# Patient Record
Sex: MALE | Race: WHITE | NOT HISPANIC OR LATINO | Employment: OTHER | URBAN - METROPOLITAN AREA
[De-identification: names, ages, dates, MRNs, and addresses within clinical notes are randomized per-mention and may not be internally consistent; named-entity substitution may affect disease eponyms.]

---

## 2017-02-20 ENCOUNTER — ALLSCRIPTS OFFICE VISIT (OUTPATIENT)
Dept: OTHER | Facility: OTHER | Age: 82
End: 2017-02-20

## 2017-02-23 ENCOUNTER — ALLSCRIPTS OFFICE VISIT (OUTPATIENT)
Dept: OTHER | Facility: OTHER | Age: 82
End: 2017-02-23

## 2017-02-23 DIAGNOSIS — K40.20 BILATERAL INGUINAL HERNIA WITHOUT OBSTRUCTION OR GANGRENE: ICD-10-CM

## 2017-04-06 RX ORDER — AMLODIPINE BESYLATE 5 MG/1
5 TABLET ORAL DAILY
COMMUNITY
End: 2018-02-20

## 2017-04-06 RX ORDER — VALSARTAN 80 MG/1
80 TABLET ORAL DAILY
COMMUNITY
End: 2018-02-22

## 2017-04-06 RX ORDER — ATORVASTATIN CALCIUM 80 MG/1
80 TABLET, FILM COATED ORAL DAILY
COMMUNITY
End: 2018-02-20

## 2017-04-06 RX ORDER — NITROGLYCERIN 0.4 MG/1
0.4 TABLET SUBLINGUAL
COMMUNITY
End: 2020-09-01 | Stop reason: SDUPTHER

## 2017-04-06 RX ORDER — METOPROLOL SUCCINATE 25 MG/1
25 TABLET, EXTENDED RELEASE ORAL DAILY
COMMUNITY
End: 2018-09-08 | Stop reason: SDUPTHER

## 2017-04-10 ENCOUNTER — ANESTHESIA EVENT (OUTPATIENT)
Dept: PERIOP | Facility: HOSPITAL | Age: 82
End: 2017-04-10
Payer: MEDICARE

## 2017-04-10 RX ORDER — OMEGA-3-ACID ETHYL ESTERS 1 G/1
2 CAPSULE, LIQUID FILLED ORAL DAILY
COMMUNITY

## 2017-04-10 RX ORDER — FLUTICASONE PROPIONATE 50 MCG
2 SPRAY, SUSPENSION (ML) NASAL DAILY
COMMUNITY
End: 2019-06-26 | Stop reason: ALTCHOICE

## 2017-04-10 RX ORDER — CLOPIDOGREL BISULFATE 75 MG/1
75 TABLET ORAL DAILY
COMMUNITY
End: 2018-10-09 | Stop reason: SDUPTHER

## 2017-04-10 RX ORDER — VITAMIN B COMPLEX
1 CAPSULE ORAL DAILY
COMMUNITY
End: 2018-02-20

## 2017-04-11 ENCOUNTER — ANESTHESIA (OUTPATIENT)
Dept: PERIOP | Facility: HOSPITAL | Age: 82
End: 2017-04-11
Payer: MEDICARE

## 2017-04-11 ENCOUNTER — HOSPITAL ENCOUNTER (OUTPATIENT)
Facility: HOSPITAL | Age: 82
Setting detail: OUTPATIENT SURGERY
Discharge: HOME | End: 2017-04-11
Attending: SURGERY | Admitting: SURGERY
Payer: MEDICARE

## 2017-04-11 VITALS
DIASTOLIC BLOOD PRESSURE: 63 MMHG | RESPIRATION RATE: 28 BRPM | HEART RATE: 70 BPM | BODY MASS INDEX: 23.88 KG/M2 | TEMPERATURE: 97.1 F | HEIGHT: 66 IN | SYSTOLIC BLOOD PRESSURE: 137 MMHG | OXYGEN SATURATION: 99 % | WEIGHT: 148.59 LBS

## 2017-04-11 PROBLEM — K40.20 NON-RECURRENT BILATERAL INGUINAL HERNIA WITHOUT OBSTRUCTION OR GANGRENE: Status: ACTIVE | Noted: 2017-04-11

## 2017-04-11 PROCEDURE — C1781 MESH (IMPLANTABLE): HCPCS | Performed by: SURGERY

## 2017-04-11 DEVICE — BARD SOFT MESH
Type: IMPLANTABLE DEVICE | Site: INGUINAL | Status: FUNCTIONAL
Brand: BARD SOFT MESH

## 2017-04-11 RX ORDER — SODIUM CHLORIDE, SODIUM LACTATE, POTASSIUM CHLORIDE, CALCIUM CHLORIDE 600; 310; 30; 20 MG/100ML; MG/100ML; MG/100ML; MG/100ML
100 INJECTION, SOLUTION INTRAVENOUS
Status: COMPLETED | OUTPATIENT
Start: 2017-04-11 | End: 2017-04-11

## 2017-04-11 RX ORDER — ROCURONIUM BROMIDE 10 MG/ML
INJECTION, SOLUTION INTRAVENOUS AS NEEDED
Status: DISCONTINUED | OUTPATIENT
Start: 2017-04-11 | End: 2017-04-11 | Stop reason: SURG

## 2017-04-11 RX ORDER — ONDANSETRON 2 MG/ML
INJECTION INTRAMUSCULAR; INTRAVENOUS AS NEEDED
Status: DISCONTINUED | OUTPATIENT
Start: 2017-04-11 | End: 2017-04-11 | Stop reason: SURG

## 2017-04-11 RX ORDER — LIDOCAINE HYDROCHLORIDE 10 MG/ML
INJECTION, SOLUTION INFILTRATION; PERINEURAL AS NEEDED
Status: DISCONTINUED | OUTPATIENT
Start: 2017-04-11 | End: 2017-04-11 | Stop reason: SURG

## 2017-04-11 RX ORDER — PROPOFOL 10 MG/ML
INJECTION, EMULSION INTRAVENOUS AS NEEDED
Status: DISCONTINUED | OUTPATIENT
Start: 2017-04-11 | End: 2017-04-11 | Stop reason: SURG

## 2017-04-11 RX ORDER — FENTANYL CITRATE 50 UG/ML
INJECTION, SOLUTION INTRAMUSCULAR; INTRAVENOUS AS NEEDED
Status: DISCONTINUED | OUTPATIENT
Start: 2017-04-11 | End: 2017-04-11 | Stop reason: SURG

## 2017-04-11 RX ORDER — ACETAMINOPHEN 325 MG/1
650 TABLET ORAL EVERY 6 HOURS PRN
Status: DISCONTINUED | OUTPATIENT
Start: 2017-04-11 | End: 2017-04-11 | Stop reason: HOSPADM

## 2017-04-11 RX ORDER — ONDANSETRON 2 MG/ML
4 INJECTION INTRAMUSCULAR; INTRAVENOUS ONCE
Status: DISCONTINUED | OUTPATIENT
Start: 2017-04-11 | End: 2017-04-11 | Stop reason: HOSPADM

## 2017-04-11 RX ORDER — MAGNESIUM HYDROXIDE 1200 MG/15ML
LIQUID ORAL AS NEEDED
Status: DISCONTINUED | OUTPATIENT
Start: 2017-04-11 | End: 2017-04-11 | Stop reason: HOSPADM

## 2017-04-11 RX ORDER — MORPHINE SULFATE 2 MG/ML
2 INJECTION, SOLUTION INTRAMUSCULAR; INTRAVENOUS
Status: DISCONTINUED | OUTPATIENT
Start: 2017-04-11 | End: 2017-04-11 | Stop reason: HOSPADM

## 2017-04-11 RX ORDER — ACETAMINOPHEN AND CODEINE PHOSPHATE 300; 30 MG/1; MG/1
1 TABLET ORAL EVERY 4 HOURS PRN
Qty: 20 TABLET | Refills: 0 | Status: SHIPPED | OUTPATIENT
Start: 2017-04-11 | End: 2019-10-09

## 2017-04-11 RX ORDER — BUPIVACAINE HYDROCHLORIDE 2.5 MG/ML
INJECTION, SOLUTION EPIDURAL; INFILTRATION; INTRACAUDAL AS NEEDED
Status: DISCONTINUED | OUTPATIENT
Start: 2017-04-11 | End: 2017-04-11 | Stop reason: HOSPADM

## 2017-04-11 RX ORDER — SODIUM CHLORIDE, SODIUM LACTATE, POTASSIUM CHLORIDE, CALCIUM CHLORIDE 600; 310; 30; 20 MG/100ML; MG/100ML; MG/100ML; MG/100ML
INJECTION, SOLUTION INTRAVENOUS CONTINUOUS PRN
Status: DISCONTINUED | OUTPATIENT
Start: 2017-04-11 | End: 2017-04-11 | Stop reason: SURG

## 2017-04-11 RX ORDER — GLYCOPYRROLATE 0.2 MG/ML
INJECTION INTRAMUSCULAR; INTRAVENOUS AS NEEDED
Status: DISCONTINUED | OUTPATIENT
Start: 2017-04-11 | End: 2017-04-11 | Stop reason: SURG

## 2017-04-11 RX ORDER — FENTANYL CITRATE/PF 50 MCG/ML
25 SYRINGE (ML) INJECTION
Status: COMPLETED | OUTPATIENT
Start: 2017-04-11 | End: 2017-04-11

## 2017-04-11 RX ORDER — ESMOLOL HYDROCHLORIDE 10 MG/ML
INJECTION INTRAVENOUS AS NEEDED
Status: DISCONTINUED | OUTPATIENT
Start: 2017-04-11 | End: 2017-04-11 | Stop reason: SURG

## 2017-04-11 RX ADMIN — SODIUM CHLORIDE, SODIUM LACTATE, POTASSIUM CHLORIDE, AND CALCIUM CHLORIDE: .6; .31; .03; .02 INJECTION, SOLUTION INTRAVENOUS at 09:00

## 2017-04-11 RX ADMIN — SODIUM CHLORIDE, SODIUM LACTATE, POTASSIUM CHLORIDE, AND CALCIUM CHLORIDE 100 ML/HR: .6; .31; .03; .02 INJECTION, SOLUTION INTRAVENOUS at 08:40

## 2017-04-11 RX ADMIN — ESMOLOL HYDROCHLORIDE 20 MG: 10 INJECTION, SOLUTION INTRAVENOUS at 09:18

## 2017-04-11 RX ADMIN — ROCURONIUM BROMIDE 30 MG: 10 INJECTION, SOLUTION INTRAVENOUS at 09:07

## 2017-04-11 RX ADMIN — FENTANYL CITRATE 25 MCG: 50 INJECTION INTRAMUSCULAR; INTRAVENOUS at 10:25

## 2017-04-11 RX ADMIN — CEFAZOLIN SODIUM 2000 MG: 2 SOLUTION INTRAVENOUS at 09:13

## 2017-04-11 RX ADMIN — GLYCOPYRROLATE 0.4 MG: 0.2 INJECTION, SOLUTION INTRAMUSCULAR; INTRAVENOUS at 09:55

## 2017-04-11 RX ADMIN — FENTANYL CITRATE 50 MCG: 50 INJECTION, SOLUTION INTRAMUSCULAR; INTRAVENOUS at 09:07

## 2017-04-11 RX ADMIN — FENTANYL CITRATE 25 MCG: 50 INJECTION INTRAMUSCULAR; INTRAVENOUS at 10:33

## 2017-04-11 RX ADMIN — FENTANYL CITRATE 25 MCG: 50 INJECTION INTRAMUSCULAR; INTRAVENOUS at 10:16

## 2017-04-11 RX ADMIN — NEOSTIGMINE METHYLSULFATE 3 MG: 1 INJECTION INTRAMUSCULAR; INTRAVENOUS; SUBCUTANEOUS at 09:55

## 2017-04-11 RX ADMIN — PROPOFOL 120 MG: 10 INJECTION, EMULSION INTRAVENOUS at 09:07

## 2017-04-11 RX ADMIN — ONDANSETRON 4 MG: 2 INJECTION INTRAMUSCULAR; INTRAVENOUS at 09:11

## 2017-04-11 RX ADMIN — LIDOCAINE HYDROCHLORIDE 50 MG: 10 INJECTION, SOLUTION INFILTRATION; PERINEURAL at 09:07

## 2017-04-11 RX ADMIN — FENTANYL CITRATE 50 MCG: 50 INJECTION, SOLUTION INTRAMUSCULAR; INTRAVENOUS at 10:02

## 2017-04-11 RX ADMIN — FENTANYL CITRATE 25 MCG: 50 INJECTION INTRAMUSCULAR; INTRAVENOUS at 10:40

## 2017-04-27 ENCOUNTER — ALLSCRIPTS OFFICE VISIT (OUTPATIENT)
Dept: OTHER | Facility: OTHER | Age: 82
End: 2017-04-27

## 2017-05-02 ENCOUNTER — ALLSCRIPTS OFFICE VISIT (OUTPATIENT)
Dept: OTHER | Facility: OTHER | Age: 82
End: 2017-05-02

## 2017-05-24 ENCOUNTER — TRANSCRIBE ORDERS (OUTPATIENT)
Dept: LAB | Facility: CLINIC | Age: 82
End: 2017-05-24

## 2017-05-24 ENCOUNTER — APPOINTMENT (OUTPATIENT)
Dept: LAB | Facility: CLINIC | Age: 82
End: 2017-05-24
Payer: MEDICARE

## 2017-05-24 DIAGNOSIS — I10 ESSENTIAL (PRIMARY) HYPERTENSION: ICD-10-CM

## 2017-05-24 DIAGNOSIS — E55.9 VITAMIN D DEFICIENCY: ICD-10-CM

## 2017-05-24 DIAGNOSIS — R63.4 ABNORMAL WEIGHT LOSS: ICD-10-CM

## 2017-05-24 DIAGNOSIS — E78.5 HYPERLIPIDEMIA: ICD-10-CM

## 2017-05-24 DIAGNOSIS — K40.20 BILATERAL INGUINAL HERNIA WITHOUT OBSTRUCTION OR GANGRENE: ICD-10-CM

## 2017-05-24 LAB
ALBUMIN SERPL BCP-MCNC: 3.7 G/DL (ref 3.5–5)
ALP SERPL-CCNC: 93 U/L (ref 46–116)
ALT SERPL W P-5'-P-CCNC: 23 U/L (ref 12–78)
ANION GAP SERPL CALCULATED.3IONS-SCNC: 4 MMOL/L (ref 4–13)
AST SERPL W P-5'-P-CCNC: 21 U/L (ref 5–45)
BASOPHILS # BLD AUTO: 0.02 THOUSANDS/ΜL (ref 0–0.1)
BASOPHILS NFR BLD AUTO: 0 % (ref 0–1)
BILIRUB SERPL-MCNC: 0.78 MG/DL (ref 0.2–1)
BUN SERPL-MCNC: 18 MG/DL (ref 5–25)
CALCIUM SERPL-MCNC: 8.8 MG/DL (ref 8.3–10.1)
CHLORIDE SERPL-SCNC: 106 MMOL/L (ref 100–108)
CO2 SERPL-SCNC: 30 MMOL/L (ref 21–32)
CREAT SERPL-MCNC: 0.8 MG/DL (ref 0.6–1.3)
CRP SERPL QL: <3 MG/L
EOSINOPHIL # BLD AUTO: 0.13 THOUSAND/ΜL (ref 0–0.61)
EOSINOPHIL NFR BLD AUTO: 2 % (ref 0–6)
ERYTHROCYTE [DISTWIDTH] IN BLOOD BY AUTOMATED COUNT: 13.2 % (ref 11.6–15.1)
ERYTHROCYTE [SEDIMENTATION RATE] IN BLOOD: 9 MM/HOUR (ref 0–10)
GFR SERPL CREATININE-BSD FRML MDRD: >60 ML/MIN/1.73SQ M
GLUCOSE P FAST SERPL-MCNC: 92 MG/DL (ref 65–99)
HCT VFR BLD AUTO: 41.4 % (ref 36.5–49.3)
HGB BLD-MCNC: 13.5 G/DL (ref 12–17)
LDLC SERPL DIRECT ASSAY-MCNC: 45 MG/DL (ref 0–100)
LYMPHOCYTES # BLD AUTO: 1.9 THOUSANDS/ΜL (ref 0.6–4.47)
LYMPHOCYTES NFR BLD AUTO: 30 % (ref 14–44)
MCH RBC QN AUTO: 30.7 PG (ref 26.8–34.3)
MCHC RBC AUTO-ENTMCNC: 32.6 G/DL (ref 31.4–37.4)
MCV RBC AUTO: 94 FL (ref 82–98)
MONOCYTES # BLD AUTO: 0.58 THOUSAND/ΜL (ref 0.17–1.22)
MONOCYTES NFR BLD AUTO: 9 % (ref 4–12)
NEUTROPHILS # BLD AUTO: 3.71 THOUSANDS/ΜL (ref 1.85–7.62)
NEUTS SEG NFR BLD AUTO: 59 % (ref 43–75)
NRBC BLD AUTO-RTO: 0 /100 WBCS
PLATELET # BLD AUTO: 192 THOUSANDS/UL (ref 149–390)
PMV BLD AUTO: 10.2 FL (ref 8.9–12.7)
POTASSIUM SERPL-SCNC: 4.3 MMOL/L (ref 3.5–5.3)
PROT SERPL-MCNC: 7 G/DL (ref 6.4–8.2)
RBC # BLD AUTO: 4.4 MILLION/UL (ref 3.88–5.62)
SODIUM SERPL-SCNC: 140 MMOL/L (ref 136–145)
T4 FREE SERPL-MCNC: 0.8 NG/DL (ref 0.76–1.46)
TRIGL SERPL-MCNC: 42 MG/DL
TSH SERPL DL<=0.05 MIU/L-ACNC: 2.03 UIU/ML (ref 0.36–3.74)
WBC # BLD AUTO: 6.36 THOUSAND/UL (ref 4.31–10.16)

## 2017-05-24 PROCEDURE — 86140 C-REACTIVE PROTEIN: CPT

## 2017-05-24 PROCEDURE — 84478 ASSAY OF TRIGLYCERIDES: CPT

## 2017-05-24 PROCEDURE — 85652 RBC SED RATE AUTOMATED: CPT

## 2017-05-24 PROCEDURE — 84443 ASSAY THYROID STIM HORMONE: CPT

## 2017-05-24 PROCEDURE — 84439 ASSAY OF FREE THYROXINE: CPT

## 2017-05-24 PROCEDURE — 83721 ASSAY OF BLOOD LIPOPROTEIN: CPT

## 2017-05-24 PROCEDURE — 85025 COMPLETE CBC W/AUTO DIFF WBC: CPT

## 2017-05-24 PROCEDURE — 80053 COMPREHEN METABOLIC PANEL: CPT

## 2017-05-24 PROCEDURE — 36415 COLL VENOUS BLD VENIPUNCTURE: CPT

## 2017-06-13 ENCOUNTER — ALLSCRIPTS OFFICE VISIT (OUTPATIENT)
Dept: OTHER | Facility: OTHER | Age: 82
End: 2017-06-13

## 2017-06-19 ENCOUNTER — LAB REQUISITION (OUTPATIENT)
Dept: LAB | Facility: HOSPITAL | Age: 82
End: 2017-06-19
Payer: MEDICARE

## 2017-06-19 ENCOUNTER — ALLSCRIPTS OFFICE VISIT (OUTPATIENT)
Dept: OTHER | Facility: OTHER | Age: 82
End: 2017-06-19

## 2017-06-19 DIAGNOSIS — L98.9 DISORDER OF SKIN OR SUBCUTANEOUS TISSUE: ICD-10-CM

## 2017-06-19 PROCEDURE — 88305 TISSUE EXAM BY PATHOLOGIST: CPT | Performed by: DERMATOLOGY

## 2017-06-27 ENCOUNTER — GENERIC CONVERSION - ENCOUNTER (OUTPATIENT)
Dept: OTHER | Facility: OTHER | Age: 82
End: 2017-06-27

## 2017-07-31 ENCOUNTER — LAB REQUISITION (OUTPATIENT)
Dept: LAB | Facility: HOSPITAL | Age: 82
End: 2017-07-31
Payer: MEDICARE

## 2017-07-31 ENCOUNTER — ALLSCRIPTS OFFICE VISIT (OUTPATIENT)
Dept: OTHER | Facility: OTHER | Age: 82
End: 2017-07-31

## 2017-07-31 DIAGNOSIS — C44.41 BASAL CELL CARCINOMA OF SKIN OF SCALP AND NECK: ICD-10-CM

## 2017-07-31 PROCEDURE — 88305 TISSUE EXAM BY PATHOLOGIST: CPT | Performed by: DERMATOLOGY

## 2017-08-08 ENCOUNTER — GENERIC CONVERSION - ENCOUNTER (OUTPATIENT)
Dept: OTHER | Facility: OTHER | Age: 82
End: 2017-08-08

## 2017-08-10 ENCOUNTER — ALLSCRIPTS OFFICE VISIT (OUTPATIENT)
Dept: OTHER | Facility: OTHER | Age: 82
End: 2017-08-10

## 2017-08-24 ENCOUNTER — ALLSCRIPTS OFFICE VISIT (OUTPATIENT)
Dept: OTHER | Facility: OTHER | Age: 82
End: 2017-08-24

## 2017-09-27 ENCOUNTER — GENERIC CONVERSION - ENCOUNTER (OUTPATIENT)
Dept: OTHER | Facility: OTHER | Age: 82
End: 2017-09-27

## 2017-12-08 ENCOUNTER — APPOINTMENT (OUTPATIENT)
Dept: LAB | Facility: CLINIC | Age: 82
End: 2017-12-08
Payer: MEDICARE

## 2017-12-08 ENCOUNTER — TRANSCRIBE ORDERS (OUTPATIENT)
Dept: LAB | Facility: CLINIC | Age: 82
End: 2017-12-08

## 2017-12-08 DIAGNOSIS — E78.5 HYPERLIPIDEMIA: ICD-10-CM

## 2017-12-08 DIAGNOSIS — I25.10 ATHEROSCLEROTIC HEART DISEASE OF NATIVE CORONARY ARTERY WITHOUT ANGINA PECTORIS: ICD-10-CM

## 2017-12-08 DIAGNOSIS — I10 ESSENTIAL (PRIMARY) HYPERTENSION: ICD-10-CM

## 2017-12-08 DIAGNOSIS — E55.9 VITAMIN D DEFICIENCY: ICD-10-CM

## 2017-12-08 LAB
ALBUMIN SERPL BCP-MCNC: 3.7 G/DL (ref 3.5–5)
ALP SERPL-CCNC: 89 U/L (ref 46–116)
ALT SERPL W P-5'-P-CCNC: 24 U/L (ref 12–78)
ANION GAP SERPL CALCULATED.3IONS-SCNC: 4 MMOL/L (ref 4–13)
AST SERPL W P-5'-P-CCNC: 26 U/L (ref 5–45)
BASOPHILS # BLD AUTO: 0.02 THOUSANDS/ΜL (ref 0–0.1)
BASOPHILS NFR BLD AUTO: 0 % (ref 0–1)
BILIRUB SERPL-MCNC: 1.21 MG/DL (ref 0.2–1)
BUN SERPL-MCNC: 19 MG/DL (ref 5–25)
CALCIUM SERPL-MCNC: 9 MG/DL (ref 8.3–10.1)
CHLORIDE SERPL-SCNC: 106 MMOL/L (ref 100–108)
CO2 SERPL-SCNC: 29 MMOL/L (ref 21–32)
CREAT SERPL-MCNC: 0.93 MG/DL (ref 0.6–1.3)
EOSINOPHIL # BLD AUTO: 0.1 THOUSAND/ΜL (ref 0–0.61)
EOSINOPHIL NFR BLD AUTO: 2 % (ref 0–6)
ERYTHROCYTE [DISTWIDTH] IN BLOOD BY AUTOMATED COUNT: 12.9 % (ref 11.6–15.1)
GFR SERPL CREATININE-BSD FRML MDRD: 76 ML/MIN/1.73SQ M
GLUCOSE P FAST SERPL-MCNC: 91 MG/DL (ref 65–99)
HCT VFR BLD AUTO: 41 % (ref 36.5–49.3)
HGB BLD-MCNC: 13.4 G/DL (ref 12–17)
LYMPHOCYTES # BLD AUTO: 1.65 THOUSANDS/ΜL (ref 0.6–4.47)
LYMPHOCYTES NFR BLD AUTO: 26 % (ref 14–44)
MCH RBC QN AUTO: 30.7 PG (ref 26.8–34.3)
MCHC RBC AUTO-ENTMCNC: 32.7 G/DL (ref 31.4–37.4)
MCV RBC AUTO: 94 FL (ref 82–98)
MONOCYTES # BLD AUTO: 0.55 THOUSAND/ΜL (ref 0.17–1.22)
MONOCYTES NFR BLD AUTO: 9 % (ref 4–12)
NEUTROPHILS # BLD AUTO: 3.91 THOUSANDS/ΜL (ref 1.85–7.62)
NEUTS SEG NFR BLD AUTO: 63 % (ref 43–75)
NRBC BLD AUTO-RTO: 0 /100 WBCS
PLATELET # BLD AUTO: 207 THOUSANDS/UL (ref 149–390)
PMV BLD AUTO: 10.7 FL (ref 8.9–12.7)
POTASSIUM SERPL-SCNC: 5.3 MMOL/L (ref 3.5–5.3)
PROT SERPL-MCNC: 7.3 G/DL (ref 6.4–8.2)
RBC # BLD AUTO: 4.37 MILLION/UL (ref 3.88–5.62)
SODIUM SERPL-SCNC: 139 MMOL/L (ref 136–145)
TSH SERPL DL<=0.05 MIU/L-ACNC: 2.25 UIU/ML (ref 0.36–3.74)
WBC # BLD AUTO: 6.24 THOUSAND/UL (ref 4.31–10.16)

## 2017-12-08 PROCEDURE — 82306 VITAMIN D 25 HYDROXY: CPT

## 2017-12-08 PROCEDURE — 85025 COMPLETE CBC W/AUTO DIFF WBC: CPT

## 2017-12-08 PROCEDURE — 80053 COMPREHEN METABOLIC PANEL: CPT

## 2017-12-08 PROCEDURE — 84443 ASSAY THYROID STIM HORMONE: CPT

## 2017-12-09 LAB — 25(OH)D3 SERPL-MCNC: 35.5 NG/ML (ref 30–100)

## 2017-12-13 DIAGNOSIS — I25.10 ATHEROSCLEROTIC HEART DISEASE OF NATIVE CORONARY ARTERY WITHOUT ANGINA PECTORIS: ICD-10-CM

## 2017-12-13 DIAGNOSIS — R17 JAUNDICE: ICD-10-CM

## 2017-12-13 DIAGNOSIS — I10 ESSENTIAL (PRIMARY) HYPERTENSION: ICD-10-CM

## 2017-12-13 DIAGNOSIS — E78.5 HYPERLIPIDEMIA: ICD-10-CM

## 2017-12-13 DIAGNOSIS — E55.9 VITAMIN D DEFICIENCY: ICD-10-CM

## 2017-12-15 ENCOUNTER — ALLSCRIPTS OFFICE VISIT (OUTPATIENT)
Dept: OTHER | Facility: OTHER | Age: 82
End: 2017-12-15

## 2017-12-16 NOTE — PROGRESS NOTES
Assessment  1  Actinic keratosis (702 0) (L57 0)   2  Seborrheic keratosis (702 19) (L82 1)   3  Screening for skin condition (V82 0) (Z13 89)   4  H/O nonmelanoma skin cancer (V10 83) (Z85 828)    Plan   · Use a sun block product with an SPF of 15 or more ; Status:Complete;   Done:62Kwf5602   · Wound care as instructed ; Status:Complete;   Done: 95UUC2554   · Follow-up visit in 6 months Evaluation and Treatment  Follow-up  Status: Complete Done: 83UZN9062    Discussion/Summary  Discussion Summary- [facility]:  Assessment #1: Actinic keratosis  Care Plan:  Patient advise lesion is a precancer should resolve with cryosurgery if not to let us know sunblock recommended  Assessment #2: Seborrheic keratosis  Care Plan:  Patient reassured these are normal growths we acquire with age no treatment needed  Assessment #3: History of skin cancer  Care Plan:  No recurrence nothing else atypical sunblock recommended follow-up in 6 months  Assessment #4: Screening for dermatologic disorders  Care Plan:  Nothing else of concern noted on complete exam follow-up in 6 months  Chief Complaint  Chief Complaint Free Text Note Form: 6 month check up      History of Present Illness  HPI: 66-year-old male presents for overall skin check previous history of basal cell carcinoma no specific concerns noted      Review of Systems  Complete Male Dermatology Myra Huntley Patient:  Constitutional: Denies constitutional symptoms  Eyes: Denies eye symptoms  ENT:  denies ear symptoms, nasal symptoms, mouth or throat symptoms  Cardiovascular: Denies cardiovascular symptoms  Respiratory: Denies respiratory symptoms  Gastrointestinal: Denies gastrointestinal symptoms  Musculoskeletal: Denies musculoskeletal symptoms  Integumentary: Denies skin, hair and nail symptoms  Neurological: Denies neurologic symptoms  Psychiatric: Denies psychiatric symptoms  Endocrine: Denies endocrine symptoms    Hematologic/Lymphatic: Denies hematologic symptoms  Active Problems  1  2-vessel coronary artery disease (414 00) (I25 10)   2  Basal cell carcinoma of back (173 51) (C44 519)   3  Basal cell carcinoma of left forearm (173 61) (C44 619)   4  Basal cell carcinoma of scalp (173 41) (C44 41)   5  Basal cell carcinoma of skin (173 91) (C44 91)   6  Bilateral inguinal hernia without obstruction or gangrene, recurrence not specified (550 92) (K40 20)   7  Bradycardia (427 89) (R00 1)   8  Cardiomyopathy (425 4) (I42 9)   9  Cath Stent 1 Type Drug-Eluting   10  Changing pigmented skin lesion (216 9) (L81 9)   11  Changing skin lesion (709 9) (L98 9)   12  Chronic rhinitis (472 0) (J31 0)   13  Colonoscopy (Fiberoptic) Screening   14  Elevated bilirubin (277 4) (R17)   15  Flu vaccine need (V04 81) (Z23)   16  Foot Pain (Soft Tissue) (729 5)   17  Hemorrhoids (455 6) (K64 9)   18  Hepatic hemangioma (228 04) (D18 03)   19  History of cataract surgery (V45 61) (Z98 49)   20  History of multiple pulmonary nodules (V12 69) (Z87 898)   21  Hyperlipidemia (272 4) (E78 5)   22  Hypertension (401 9) (I10)   23  Immunization, tetanus-diphtheria (V06 5) (Z23)   24  Inguinal hernia (550 90) (K40 90)   25  Left shoulder pain (719 41) (M25 512)   26  Left shoulder tendinitis (726 10) (M75 82)   27  Lightheadedness (780 4) (R42)   28  Lumbar pain (724 2) (M54 5)   29  Nasal congestion (478 19) (R09 81)   30  Need for prophylactic vaccination and inoculation against influenza (V04 81) (Z23)   31  Postoperative state (V45 89) (Z98 890)   32  Presence of stent in coronary artery in patient with coronary artery disease  (414 01,V45 82) (I25 10,Z95 5)   33  Primary osteoarthritis of left knee (715 16) (M17 12)   34  Primary osteoarthritis of right knee (715 16) (M17 11)   35  Screening for skin condition (V82 0) (Z13 89)   36  Seborrheic keratosis (702 19) (L82 1)   37  Skin lesion (709 9) (L98 9)   38  Urgency of urination (788 63) (R39 15)   39   Urinary incontinence (788 30) (R32)   40  Visit for suture removal (V58 32) (Z48 02)   41  Vitamin D deficiency (268 9) (E55 9)    Past Medical History  1  History of Abnormal laboratory test (796 4) (R89 9)   2  History of Bilateral pleural effusion (511 9) (J90)   3  History of Chest discomfort (786 59) (R07 89)   4  History of Cough (786 2) (R05)   5  History of Exertional dyspnea (786 09) (R06 09)   6  H/O nonmelanoma skin cancer (V10 83) (Z85 828)   7  History of Herniation of lumbar intervertebral disc (722 10) (M51 26)   8  History of abnormal weight loss (V13 89) (Z87 898)   9  History of basal cell carcinoma (V10 83) (Z85 828)   10  History of bursitis (V13 59) (Z87 39)   11  History of chest pain (V13 89) (Z87 898)   12  History of chest pain (V13 89) (Z87 898)   13  History of dizziness (V13 89) (Z87 898)   14  History of ecchymosis (V12 59) (Z86 79)   15  History of multiple pulmonary nodules (V12 69) (Z87 898)   16  History of Right-sided chest wall pain (786 52) (R07 89)   17  History of Weight loss, non-intentional (783 21) (R63 4)   18  History of Wound infection following procedure (998 59) (T81 4XXA)  Past Medical History Reviewed- Derm:   The past medical history was reviewed  Surgical History  1  History of CABG  Surgical History Reviewed [facility]:   Surgical History reviewed      Family History  Mother    1  Family history of cardiac disorder (V17 49) (Z82 49)  Family History Reviewed- Derm:   Family History was reviewed      Social History   · Alcohol Use (History)   · Caffeine Use   · Former smoker (C89 56) (I87 571)   · Tobacco use (305 1) (Z72 0)  Social History Reviewed [place] [facility]: The social history was reviewed      Current Meds   1  AmLODIPine Besylate 5 MG Oral Tablet; take 1 tablet every day; Therapy: 25LNC0662 to (Evaluate:08Jun2018)  Requested for: 96VSV8256; Last Rx:13Jun2017 Ordered   2  Aspirin 81 MG TABS; TAKE 2 TABLET Daily; Last Rx:73Udr5278 Ordered   3   Atorvastatin Calcium 80 MG Oral Tablet; take 1 tablet every day; Therapy: 13EBI1847 to (Evaluate:25Mar2018)  Requested for: 90UPU1692; Last Rx:30Mar2017 Ordered   4  Clopidogrel Bisulfate 75 MG Oral Tablet; Take 1 tablet daily; Therapy: 51OKL0824 to (Last Rx:17Mar2016)  Requested for: 78NTD0740 Ordered   5  CoQ-10 CAPS; TAKE 1 CAPSULE DAILY WITH A MEAL; Therapy: (Recorded:70Dll8367) to Recorded   6  Fluticasone Propionate 50 MCG/ACT Nasal Suspension; USE 2 SPRAYS IN EACH NOSTRIL ONCE DAILY; Therapy: 12TLE4488 to (Last Rx:23Feb2016)  Requested for: 21Fjb0838 Ordered   7  Hydrocortisone Ace-Pramoxine 2 5-1 % Rectal Cream; use bid prn; Therapy: 77RUZ8433 to (Last Rx:13Jun2017)  Requested for: 13Jun2017 Ordered   8  Metoprolol Succinate ER 25 MG Oral Tablet Extended Release 24 Hour; take 1/2 tablet daily; Therapy: 83PLC7827 to (Evaluate:10Dec2017)  Requested for: 94DSD5358; Last Rx:13Jun2017 Ordered   9  Nitrostat 0 4 MG Sublingual Tablet Sublingual; place 1 tablet under the tongue every 5 minutes up to 3 doses as needed for chest pain; Therapy: 09SLA5801 to (Evaluate:29Nov2015); Last Rx:30Sep2015 Ordered   10  Omega 3 CAPS; take 1 capsule daily; Therapy: (Recorded:73Jfe0855) to Recorded   11  Valsartan 80 MG Oral Tablet; take 1 tablet every day; Therapy: 79Jpn2058 to (Evaluate:25Mar2018)  Requested for: 35XAS4311; Last  Rx:30Mar2017 Ordered   12  Vitamin B Complex Oral Tablet; TAKE 1 TABLET DAILY; Therapy: (Recorded:05Ffl7062) to Recorded   13  Vitamin D3 2000 UNIT Oral Capsule; TAKE 1 CAPSULE Before meals; Therapy: (Recorded:08Fkm5039) to Recorded  Medication List Reviewed: The medication list was reviewed and updated today  Allergies  1  No Known Drug Allergies    Physical Exam   Constitutional  General appearance: Appears healthy and well developed  Lymphatic  No visible disturbance  Musculoskeletal  Digits and nails: No clubbing, cyanosis or edema   Cutaneous and nail exam normal    Skin  Scalp skin texture and hair distribution: Abnormal    Head: Abnormal    Neck: Normal turgor, no rashes, no lesions  Chest: Normal turgor, no rashes, no lesions  Abdomen: Normal turgor, no rashes, no lesions  Back: Normal turgor, no rashes, no lesions  Right upper extremity: Normal turgor, no rashes, no lesions  Left upper extremity: Abnormal    Right lower extremity: Normal turgor, no rashes, no lesions  Left lower extremity: Normal turgor, no rashes, no lesions  Examination for skin lesions: Abnormal   Skin Lesions: Actinic Keratosis: on area number 2 on the diagram   Neuro/Psych  Alert and oriented x 3  Displays comfort and cooperation during encounterl  Affect is normal    Finding Scaling erythematous areas noted above normal keratotic papules with greasy stuck appearance previous site of basal cell carcinoma well healed without recurrence nothing else atypical on complete exam       Procedure   Procedure: destruction of lesion  Indications for the procedure include actinic keratosis  Risks, benefits, alternatives, infection risk, bleeding risk, risk of allergic reaction and the risk of scarring were discussed with the patient--   verbal consent was obtained prior to the procedure  Procedure Note:  The lesion location: See Map  Destruction Technique: cryotherapy with liquid nitrogen application-- and-- 47-61 seconds via cryospray--   Destruction of 2 lesions  Post-Procedure:  Patient Status: the patient tolerated the procedure well  Complications: there were no complications  Future Appointments    Date/Time Provider Specialty Site   12/21/2017 09:40 AM Sara Gunn DO Internal Medicine [address]   06/15/2018 11:50 AM NOLAN Webber   Dermatology [facility]     Signatures   Electronically signed by : NOLAN Burks ; Dec 15 2017 11:56AM EST                       (Author)

## 2017-12-21 ENCOUNTER — GENERIC CONVERSION - ENCOUNTER (OUTPATIENT)
Dept: OTHER | Facility: OTHER | Age: 82
End: 2017-12-21

## 2017-12-28 ENCOUNTER — HOSPITAL ENCOUNTER (OUTPATIENT)
Dept: ULTRASOUND IMAGING | Facility: HOSPITAL | Age: 82
Discharge: HOME | End: 2017-12-31
Attending: INTERNAL MEDICINE
Payer: MEDICARE

## 2017-12-28 ENCOUNTER — APPOINTMENT (OUTPATIENT)
Dept: LAB | Facility: HOSPITAL | Age: 82
End: 2017-12-28
Attending: INTERNAL MEDICINE
Payer: MEDICARE

## 2017-12-28 DIAGNOSIS — R17 JAUNDICE: ICD-10-CM

## 2017-12-28 LAB
BILIRUB DIRECT SERPL-MCNC: 0.23 MG/DL (ref 0–0.2)
BILIRUB SERPL-MCNC: 0.8 MG/DL (ref 0.2–1)
GGT SERPL-CCNC: 11 U/L (ref 5–85)
IRON SATN MFR SERPL: 29 %
IRON SERPL-MCNC: 95 UG/DL (ref 65–175)
TIBC SERPL-MCNC: 329 UG/DL (ref 250–450)

## 2017-12-28 PROCEDURE — 36415 COLL VENOUS BLD VENIPUNCTURE: CPT

## 2017-12-28 PROCEDURE — 83550 IRON BINDING TEST: CPT

## 2017-12-28 PROCEDURE — 86803 HEPATITIS C AB TEST: CPT

## 2017-12-28 PROCEDURE — 82248 BILIRUBIN DIRECT: CPT

## 2017-12-28 PROCEDURE — 82977 ASSAY OF GGT: CPT

## 2017-12-28 PROCEDURE — 83540 ASSAY OF IRON: CPT

## 2017-12-28 PROCEDURE — 82247 BILIRUBIN TOTAL: CPT

## 2017-12-28 PROCEDURE — 76705 ECHO EXAM OF ABDOMEN: CPT

## 2017-12-29 LAB — HCV AB SER QL: NORMAL

## 2018-01-11 NOTE — RESULT NOTES
Verified Results  (1) TISSUE EXAM 14IXA4346 04:48PM Cherelle Broad Order Number: [id number]     Test Name Result Flag Reference   LAB AP CASE REPORT (Report)     Surgical Pathology Report             Case: [id number]                   Authorizing Provider: Sonal Lawson MD     Collected:      06/19/2017 1648        Pathologist:      Ruth Clark MD      Received:      06/21/2017 1053        Specimen:  Skin, Other, Right Scalp   LAB AP FINAL DIAGNOSIS (Report)     A  Skin, Right Scalp, shave biopsy:  - Basal cell carcinoma, nodular and infiltrative types, extending to base   of biopsy  Interpretation performed at [facility], [address] [place] [address]  Electronically signed by Ruth Clark MD on 6/23/2017 at 9:02 PM   LAB AP SURGICAL ADDITIONAL INFORMATION (Report)     These tests were developed and their performance characteristics   determined by [facility] or [facility]  They may not be cleared or approved by the [place]  Food and   Drug Administration  The FDA has determined that such clearance or   approval is not necessary  These tests are used for clinical purposes  They should not be regarded as investigational or for research  This   laboratory has been approved by [place] [address], designated as a high-complexity   laboratory and is qualified to perform these tests  LAB AP GROSS DESCRIPTION (Report)     A  The specimen is received in formalin, labeled with the patient's name   and hospital number, and is designated right scalp, is a shave biopsy of   skin measuring 0 9 x 0 4 x 0 1 cm  The epidermal surface is tan-white and   roughened  The resection margin is inked green and the tips are inked red  The specimen is trisected revealing tan cut surfaces  Entirely submitted   sequentially  One cassette      Note: The estimated total formalin fixation time based upon information   provided by the submitting clinician and the standard processing schedule   is 51 5 hours   RLR   LAB AP CLINICAL INFORMATION      TW Order Number: [id number]  R/O [facility]

## 2018-01-11 NOTE — PROGRESS NOTES
Chief Complaint  patient came in today due to bleeding @bx site L scalp, patient stated it was bleeding all night, patient's wife applied a bandage but site was still actively bleeding when pt arrived  applied pressure, and aluminum chloride, pressure bandage placed  patient was told if bleeding starts again to go to the e r  Active Problems    1  2-vessel coronary artery disease (414 00) (I25 10)   2  Abnormal laboratory test (796 4) (R89 9)   3  Basal cell carcinoma of left forearm (173 61) (C44 619)   4  Basal cell carcinoma of skin (173 91) (C44 91)   5  Bradycardia (427 89) (R00 1)   6  Cardiomyopathy (425 4) (I42 9)   7  Cath Stent 1 Type Drug-Eluting   8  Changing pigmented skin lesion (216 9) (D22 9)   9  Chronic rhinitis (472 0) (J31 0)   10  Colonoscopy (Fiberoptic) Screening   11  Ecchymosis (459 89) (R58)   12  Foot Pain (Soft Tissue) (729 5)   13  Hemorrhoids (455 6) (K64 9)   14  Hepatic hemangioma (228 04) (D18 03)   15  History of cataract surgery (V45 61) (Z98 49)   16  Hyperlipidemia (272 4) (E78 5)   17  Hypertension (401 9) (I10)   18  Immunization, tetanus-diphtheria (V06 5) (Z23)   19  Left shoulder pain (719 41) (M25 512)   20  Left shoulder tendinitis (726 10) (M75 82)   21  Lightheadedness (780 4) (R42)   22  Lumbar pain (724 2) (M54 5)   23  Nasal congestion (478 19) (R09 81)   24  Need for prophylactic vaccination and inoculation against influenza (V04 81) (Z23)   25  Presence of stent in coronary artery in patient with coronary artery disease    (414 01,V45 82) (I25 10,Z95 5)   26  Primary osteoarthritis of left knee (715 16) (M17 12)   27  Primary osteoarthritis of right knee (715 16) (M17 11)   28  Pulmonary nodules (793 19) (R91 8)   29  Seborrheic keratosis (702 19) (L82 1)   30  Skin lesion (709 9) (L98 9)   31  Urgency of urination (788 63) (R39 15)   32  Urinary incontinence (788 30) (R32)   33  Visit for suture removal (V58 32) (Z48 02)   34   Vitamin D deficiency (268 9) (E55 9)   35  Wound infection following procedure (998 59) (T81 4XXA)    Current Meds   1  AmLODIPine Besylate 5 MG Oral Tablet; take 1 tablet every day; Therapy: 66SGS5934 to (Evaluate:56Yfq9657)  Requested for: 15Mzs2072; Last   Rx:63Wtx3942 Ordered   2  Aspirin 81 MG TABS; TAKE 2 TABLET Daily; Last Rx:92Xmh4312 Ordered   3  Atorvastatin Calcium 80 MG Oral Tablet; Take 1 tablet daily; Therapy: 87VKV8407 to (Last Rx:17Mar2016)  Requested for: 52EOV8260 Ordered   4  Clopidogrel Bisulfate 75 MG Oral Tablet; Take 1 tablet daily; Therapy: 60FNK2997 to (Last Rx:17Mar2016)  Requested for: 11EIL0323 Ordered   5  CoQ-10 CAPS; TAKE 1 CAPSULE DAILY WITH A MEAL; Therapy: (Recorded:41Ihy1859) to Recorded   6  Fluticasone Propionate 50 MCG/ACT Nasal Suspension; USE 2 SPRAYS IN EACH   NOSTRIL ONCE DAILY; Therapy: 88ZON6463 to (Last Rx:96Djs8023)  Requested for: 04Gyy2853 Ordered   7  Hydrocortisone Ace-Pramoxine 2 5-1 % Rectal Cream; use bid prn; Therapy: 66CKA1459 to (Last Rx:16Apr2014)  Requested for: 16Apr2014 Ordered   8  Metoprolol Succinate ER 25 MG Oral Tablet Extended Release 24 Hour; TAKE 0 5 tablet   daily; Therapy: 47DSW0456 to (Evaluate:15Jun2016)  Requested for: 43TAB0455; Last   Rx:17Mar2016 Ordered   9  Nitrostat 0 4 MG Sublingual Tablet Sublingual; place 1 tablet under the tongue every 5   minutes up to 3 doses as needed for chest pain; Therapy: 40QCN0112 to (Evaluate:29Nov2015); Last Rx:53Npz4326 Ordered   10  Omega 3 CAPS; take 1 capsule daily; Therapy: (Recorded:60Nci5780) to Recorded   11  Sulfamethoxazole-Trimethoprim 800-160 MG Oral Tablet; TAKE 2 TABLETS EVERY 12    HOURS; Therapy: 45Fnx8039 to (Maribell Nava)  Requested for: 87Lft7495; Last    Rx:51Vyp9529 Ordered   12  Valsartan 80 MG Oral Tablet; Take 1 tablet daily; Therapy: 03Apr2014 to (Evaluate:12Mar2017)  Requested for: 39ZIN8518; Last    Rx:17Mar2016 Ordered   13   Vitamin B Complex Oral Tablet; TAKE 1 TABLET DAILY; Therapy: (Recorded:08Kme9622) to Recorded   14  Vitamin D3 2000 UNIT Oral Capsule; TAKE 1 CAPSULE Before meals; Therapy: (Recorded:30Bhb6065) to Recorded    Allergies    1  No Known Drug Allergies    Assessment    1  Changing pigmented skin lesion (216 9) (D22 9)   2  Skin lesion (709 9) (L98 9)   3  Seborrheic keratosis (702 19) (L82 1)   4  Basal cell carcinoma of left forearm (173 61) (C44 619)    Plan  Basal cell carcinoma of left forearm    · Use a sun block product with an SPF of 15 or more ; Status:Complete;   Done:  87FAR1709   · Schedule Surgery Treatment  Procedure  Status: Complete  Done: 99NSA3012  Changing pigmented skin lesion    · (1) TISSUE EXAM; Status: In Progress - Specimen/Data Collected;   Done: 86TTC1209  B : BACK  B Date/Time: : 53JDP5190  B : Skin Shave  A : L SCALP  A Date/Time: : 16YTK0719  A : Skin Shave  Impression: : A- CHANGING PIGMENTED LESION      B-R/O [facility]    Future Appointments    Date/Time Provider Specialty Site   11/08/2016 01:30 PM Joey Zamora DO Internal Medicine [facility]   11/08/2016 01:00 PM Jovany Begum  Internal Medicine [facility]   09/15/2016 02:00 PM NOLAN Quiles   Dermatology [facility]     Signatures   Electronically signed by : NOLAN Valencia ; Sep  5 2016  9:12AM EST                       (Author)

## 2018-01-13 VITALS
WEIGHT: 146 LBS | HEIGHT: 65 IN | DIASTOLIC BLOOD PRESSURE: 62 MMHG | SYSTOLIC BLOOD PRESSURE: 125 MMHG | BODY MASS INDEX: 24.32 KG/M2 | OXYGEN SATURATION: 98 % | TEMPERATURE: 97.8 F | HEART RATE: 78 BPM

## 2018-01-13 VITALS
BODY MASS INDEX: 25.16 KG/M2 | WEIGHT: 151 LBS | RESPIRATION RATE: 70 BRPM | DIASTOLIC BLOOD PRESSURE: 78 MMHG | HEART RATE: 80 BPM | SYSTOLIC BLOOD PRESSURE: 130 MMHG | HEIGHT: 65 IN | TEMPERATURE: 97.5 F

## 2018-01-14 VITALS
DIASTOLIC BLOOD PRESSURE: 50 MMHG | SYSTOLIC BLOOD PRESSURE: 130 MMHG | TEMPERATURE: 98.2 F | OXYGEN SATURATION: 98 % | WEIGHT: 151 LBS | HEART RATE: 90 BPM | HEIGHT: 65 IN | BODY MASS INDEX: 25.16 KG/M2

## 2018-01-14 VITALS
TEMPERATURE: 97.7 F | SYSTOLIC BLOOD PRESSURE: 138 MMHG | BODY MASS INDEX: 25.55 KG/M2 | DIASTOLIC BLOOD PRESSURE: 68 MMHG | WEIGHT: 153.38 LBS | HEIGHT: 65 IN | OXYGEN SATURATION: 98 % | HEART RATE: 83 BPM

## 2018-01-15 NOTE — RESULT NOTES
Verified Results  (1) TISSUE EXAM 23Fhq0109 12:50PM Trini Dryced Order Number: [id number]     Test Name Result Flag Reference   LAB AP CASE REPORT (Report)     Surgical Pathology Report             Case: [id number]                   Authorizing Provider: Esmer Ng MD     Collected:      07/31/2017 1250        Pathologist:      Natalie Carballo MD      Received:      08/01/2017 1143        Specimen:  Skin, Other, Right Scalp   LAB AP FINAL DIAGNOSIS (Report)     A  Skin, Right Scalp, excision:  - Scar and residual basal cell carcinoma (0 9 cm), nodular and   infiltrative types,    extending to deep reticular dermis   - No perineural or lymph-vascular invasion identified   - Resection margins (peripheral and deep) are negative for malignancy  - Adjacent pigmented and reticulated seborrheic keratosis  Interpretation performed at [facility], [address] [place] [address]  Electronically signed by Natalie Carballo MD on 8/7/2017 at 8:22 PM   LAB AP SURGICAL ADDITIONAL INFORMATION (Report)     All controls performed with the immunohistochemical stains reported above   reacted appropriately  These tests were developed and their performance   characteristics determined by [facility] or   [facility]  They may not be cleared or approved by the [place]  Food and Drug Administration  The FDA has determined that such clearance   or approval is not necessary  These tests are used for clinical purposes  They should not be regarded as investigational or for research  This   laboratory has been approved by [place], designated as a high-complexity   laboratory and is qualified to perform these tests  LAB AP GROSS DESCRIPTION (Report)     A   The specimen is received in formalin, labeled with the patient's name   and hospital number, and is designated right scalp, is an unoriented   elliptical resection of skin measuring 2 6 x 1 4 cm and is excised to a depth of 0 4 cm  The epidermal surface is tan and contains a 0 9 x 0 6 cm   tan pink, firm, and ill-defined macule that comes within 0 4 cm of the   nearest resection margin  The resection margin is inked green and the tips   are inked red  The specimen is serially sectioned parallel to the short   axis revealing tan cut surfaces  The specimen is entirely submitted with   the tips in cassette A1 and the remaining tissue containing lesion in   cassettes A2-A3  Note: The estimated total formalin fixation time based upon information   provided by the submitting clinician and the standard processing schedule   is 31 75 hours   RLR   LAB AP CLINICAL INFORMATION      TW Order Number: [id number]  [facility] check margins

## 2018-01-16 NOTE — PROGRESS NOTES
Assessment    1  Encounter for preventive health examination (V70 0) (Z00 00)    Plan  Health Maintenance    · Follow-up as previously scheduled Evaluation and Treatment  Follow-up  Status:  Complete  Done: [id number]    Discussion/Summary    Medicare wellness completed  Follow up as scheduled  Impression: Initial Annual Wellness Visit  Cardiovascular screening and counseling: screening is current  Diabetes screening and counseling: screening is current  Colorectal cancer screening and counseling: screening not indicated  Prostate cancer screening and counseling: screening not indicated  Osteoporosis screening and counseling: screening not indicated  Abdominal aortic aneurysm screening and counseling: screening not indicated  Glaucoma screening and counseling: screening is current  Immunizations: influenza vaccine is up to date this year, will consider prevnar, has rx and Tdap vaccination up to date  Chief Complaint  Patient presents for a wellness care  History of Present Illness  Welcome to Medicare and Wellness Visits: The patient is being seen for the initial annual wellness visit  Medicare Screening and Risk Factors   Hospitalizations: he has been previously hospitalizied and he has been hospitalized 1 times  Once per lifetime medicare screening tests: AAA screening US has not yet been done  Medicare Screening Tests Risk Questions   Abdominal aortic aneurysm risk assessment: none indicated  Drug and Alcohol Use: The patient is a former cigarette smoker and quit smoking 1967  The patient reports drinking 1 wine drinks per day  Diet and Physical Activity: Current diet includes well balanced meals, 2 servings of fruit per day, 1 servings of vegetables per day, 1 servings of meat per day, 2 servings of whole grains per day and 2 cups of coffee per day  The patient does not exercise     Mood Disorder and Cognitive Impairment Screening: PHQ-9 Depression Scale   Over the past 2 weeks, how often have you been bothered by the following problems? 1 ) Little interest or pleasure in doing things? Not at all    2 ) Feeling down, depressed or hopeless? Not at all    3 ) Trouble falling asleep or sleeping too much? Not at all    4 ) Feeling tired or having little energy? Not at all    5 ) Poor appetite or overeating? Not at all    6 ) Feeling bad about yourself, or that you are a failure, or have let yourself or your family down? Not at all    7 ) Trouble concentrating on things, such as reading a newspaper or watching television? Not at all    8 ) Moving or speaking so slowly that other people could have noticed, or the opposite, moving or speaking faster than usual? Not at all    9 ) Thoughts that you would be off dead or of hurting yourself in some way? Not at all  TOTAL SCORE: 9    Cognitive impairment screening: denies difficulty learning/retaining new information, denies difficulty handling complex tasks, denies difficulty with reasoning, denies difficulty with spatial ability and orientation, difficulty with language and denies difficulty with behavior  Functional Ability/Level of Safety: Hearing is slightly decreased in the right ear, normal in the left ear and a hearing aid is not used  The patient is currently able to do activities of daily living without limitations, able to do instrumental activities of daily living without limitations and able to participate in social activities without limitations  Fall risk factors: The patient fell 0 times in the past 12 months  Home safety risk factors:  grab bars in the bathroom  Advance Directives: Advance directives: no living will, no durable power of  for health care directives and no advance directives  Co-Managers and Medical Equipment/Suppliers: See Patient Care Team      Patient Care Team    Care Team Member Role Specialty Office Number   Julissa Nguyen   [phone]   Pavel GALE    Dermatology [phone]     Review of Systems    Constitutional: negative  Head and Face: negative  Eyes: negative  ENT: negative  Cardiovascular: negative  Respiratory: negative  Gastrointestinal: negative  Genitourinary: negative  Musculoskeletal: negative  Integumentary and Breasts: negative  Neurological: negative  Psychiatric: negative  Active Problems     1  Basal cell carcinoma of back (173 51) (C44 519)   2  Basal cell carcinoma of left forearm (173 61) (C44 619)   3  Basal cell carcinoma of skin (173 91) (C44 91)   4  Bilateral inguinal hernia without obstruction or gangrene, recurrence not specified   (550 92) (K40 20)   5  Bradycardia (427 89) (R00 1)   6  Cardiomyopathy (425 4) (I42 9)   7  Changing pigmented skin lesion (216 9) (L81 9)   8  Chronic rhinitis (472 0) (J31 0)   9  Colonoscopy (Fiberoptic) Screening   10  Flu vaccine need (V04 81) (Z23)   11  Foot Pain (Soft Tissue) (729 5)   12  Hemorrhoids (455 6) (K64 9)   13  Hepatic hemangioma (228 04) (D18 03)   14  History of cataract surgery (V45 61) (Z98 49)   15  History of multiple pulmonary nodules (V12 69) (Z87 898)   16  Immunization, tetanus-diphtheria (V06 5) (Z23)   17  Inguinal hernia (550 90) (K40 90)   18  Left shoulder pain (719 41) (M25 512)   19  Left shoulder tendinitis (726 10) (M75 82)   20  Lightheadedness (780 4) (R42)   21  Lumbar pain (724 2) (M54 5)   22  Nasal congestion (478 19) (R09 81)   23  Need for prophylactic vaccination and inoculation against influenza (V04 81) (Z23)   24  Postoperative state (V45 89) (Z98 890)   25  Presence of stent in coronary artery in patient with coronary artery disease    (414 01,V45 82) (I25 10,Z95 5)   26  Primary osteoarthritis of left knee (715 16) (M17 12)   27  Primary osteoarthritis of right knee (715 16) (M17 11)   28  Seborrheic keratosis (702 19) (L82 1)   29  Skin lesion (709 9) (L98 9)   30  Urgency of urination (788 63) (R39 15)   31   Urinary incontinence (788 30) (R32)   32  Visit for suture removal (V58 32) (Z48 02)    Hyperlipidemia (272 4) (E78 5)       Hypertension (401 9) (I10)       Vitamin D deficiency (268 9) (E55 9)       Abnormal laboratory test (796 4) (R89 9)       2-vessel coronary artery disease (414 00) (I25 10)       Cath Stent 1 Type Drug-Eluting       Ecchymosis (459 89) (R58)       Wound infection following procedure (998 59) (T81 4XXA)       Weight loss, non-intentional (783 21) (R63 4)          Past Medical History    · History of Bilateral pleural effusion (511 9) (J90)   · History of Chest discomfort (786 59) (R07 89)   · History of Cough (786 2) (R05)   · History of Exertional dyspnea (786 09) (R06 09)   · History of Herniation of lumbar intervertebral disc (722 10) (M51 26)   · History of abnormal weight loss (V13 89) (Z33 196)   · History of basal cell carcinoma (V10 83) (Z85 828)   · History of bursitis (V13 59) (Z87 39)   · History of chest pain (V13 89) (F15 942)   · History of chest pain (V13 89) (D91 265)   · History of dizziness (V13 89) (M27 104)   · History of multiple pulmonary nodules (V12 69) (J62 066)   · History of Right-sided chest wall pain (786 52) (R07 89)    The active problems and past medical history were reviewed and updated today  Surgical History    · History of CABG    The surgical history was reviewed and updated today  Family History  Mother    · Family history of cardiac disorder (V17 49) (Z82 49)    The family history was reviewed and updated today  Social History    · Alcohol Use (History)   · Caffeine Use   · Former smoker (V15 82) (S69 134)   · Tobacco use (305 1) (Z72 0)   · [address]  The social history was reviewed and updated today  Current Meds   1  AmLODIPine Besylate 5 MG Oral Tablet; take 1 tablet every day; Therapy: 28AHV3574 to (Evaluate:98Zca5238)  Requested for: 46Nmr1587; Last   Rx:89Qtz6641 Ordered   2  Aspirin 81 MG TABS; TAKE 2 TABLET Daily;  Last Rx:35Env6205 Ordered   3  Atorvastatin Calcium 80 MG Oral Tablet; take 1 tablet every day; Therapy: 30UDT3557 to (Evaluate:25Mar2018)  Requested for: 07YCK1356; Last   Rx:30Mar2017 Ordered   4  Clopidogrel Bisulfate 75 MG Oral Tablet; Take 1 tablet daily; Therapy: 03MAG5396 to (Last Rx:17Mar2016)  Requested for: 02OCB1801 Ordered   5  CoQ-10 CAPS; TAKE 1 CAPSULE DAILY WITH A MEAL; Therapy: (Recorded:24Qud7189) to Recorded   6  Fluticasone Propionate 50 MCG/ACT Nasal Suspension; USE 2 SPRAYS IN EACH   NOSTRIL ONCE DAILY; Therapy: 25GKH7825 to (Last Rx:83Jwq2239)  Requested for: 66Hku7163 Ordered   7  Hydrocortisone Ace-Pramoxine 2 5-1 % Rectal Cream; use bid prn; Therapy: 43LTZ4972 to (Last Rx:16Apr2014)  Requested for: 51Ryh0766 Ordered   8  Metoprolol Succinate ER 25 MG Oral Tablet Extended Release 24 Hour; take 1/2 tablet   daily; Therapy: 48RTI9809 to (Evaluate:03Dec2017)  Requested for: 88WMK6611; Last   Rx:06Jun2017 Ordered   9  Nitrostat 0 4 MG Sublingual Tablet Sublingual; place 1 tablet under the tongue every 5   minutes up to 3 doses as needed for chest pain; Therapy: 98SSK3916 to (Evaluate:29Nov2015); Last Rx:38Esx9293 Ordered   10  Omega 3 CAPS; take 1 capsule daily; Therapy: (Recorded:90Shc3157) to Recorded   11  Valsartan 80 MG Oral Tablet; take 1 tablet every day; Therapy: 03Apr2014 to (Evaluate:25Mar2018)  Requested for: 74LEY7713; Last    Rx:30Mar2017 Ordered   12  Vitamin B Complex Oral Tablet; TAKE 1 TABLET DAILY; Therapy: (Recorded:81Wdl0016) to Recorded   13  Vitamin D3 2000 UNIT Oral Capsule; TAKE 1 CAPSULE Before meals; Therapy: (Recorded:75Hfc8937) to Recorded    The medication list was reviewed and updated today  Allergies    1   No Known Drug Allergies    Immunizations   1 2 3 4    Influenza  01-Oct-2012 17-Sep-2014 30-Sep-2015 06-Oct-2016    PPSV  10-Dec-2004 10-Sep-2010      Td/DT  08-Mar-2005 07-Oct-2015       Procedure    Procedure:   Results: 20/20 in both eyes with corrective device, 20/20 in the right eye with corrective device, 20/20 in the left eye with corrective device   Color vision was and the results were normal       Future Appointments    Date/Time Provider Specialty Site   12/21/2017 09:30 AM Rohit Velasquez DO Internal Medicine [facility] INTERNAL MED Merissa Argueta   06/19/2017 12:15 PM NOLAN Parikh   Dermatology [facility]     Signatures   Electronically signed by : Jovany Mcclure [facility]; Jun 13 2017  9:55AM EST                       (Author)    Electronically signed by : Jonathan Diop DO; Jun 13 2017  4:56PM EST

## 2018-01-18 NOTE — MISCELLANEOUS
Discussion/Summary  Discussion Summary:   1 hyperlipidemia will stop high dose simvastatin secondary to drug interaction with amlodipine  We'll change statin to Rx atorvastatin 80 to call if he develops any muscle aches  2 status post stent 2 aspirin and clopidogrel, he was having lightheadedness will try low-dose metoprolol 12 5 ER once a day, to stop if he develops any lightheadedness will see him back in one month  3 bilateral pleural effusions we'll check chest x-ray in 3 weeks see him back in one month if they remain will refer to pulmonary    Pulmonary function tests were normal  We'll need to check laboratory work after his next visit in a month  Counseling Documentation With Imm: The patient was counseled regarding diagnostic results, impressions  total time of encounter was 30 minutes and 20 minutes was spent counseling  Medication SE Review and Pt Understands Tx: Possible side effects of new medications were reviewed with the patient/guardian today  The treatment plan was reviewed with the patient/guardian  The patient/guardian understands and agrees with the treatment plan      Chief Complaint  Chief Complaint Free Text Note Form: PT presents for f/u hospital       History of Present Illness  TCM Communication [facility] records were reviewed  He was hospitalized at [facility]  The date of admission: 03/09/2016, date of discharge: 03/10/2016  Diagnosis: cardiac cath coronary stent  He was discharged to home  Medications reviewed and updated today  Counseling was provided to the patient  Topics counseled included importance of compliance with treatment  Communication performed and completed by daniele   HPI: Has not had any chest pain or lightheadedness since discharge from the hospital,      Active Problems    1  2-vessel coronary artery disease (414 00) (I25 10)   2  Abnormal laboratory test (796 4) (R89 9)   3  Abnormal weight loss (783 21) (R63 4)   4  Bradycardia (427 89) (R00 1)   5   Cath Stent 1 Type Drug-Eluting   6  Chest discomfort (786 59) (R07 89)   7  Chronic rhinitis (472 0) (J31 0)   8  Colonoscopy (Fiberoptic) Screening   9  Cough (786 2) (R05)   10  Exertional dyspnea (786 09) (R06 09)   11  Foot Pain (Soft Tissue) (729 5)   12  Hemorrhoids (455 6) (K64 9)   13  Hepatic hemangioma (228 04) (D18 03)   14  History of cataract surgery (V45 61) (Z98 49)   15  Hyperlipidemia (272 4) (E78 5)   16  Hypertension (401 9) (I10)   17  Immunization, tetanus-diphtheria (V06 5) (Z23)   18  Lightheadedness (780 4) (R42)   19  Lumbar pain (724 2) (M54 5)   20  Nasal congestion (478 19) (R09 81)   21  Need for prophylactic vaccination and inoculation against influenza (V04 81) (Z23)   22  Primary osteoarthritis of left knee (715 16) (M17 12)   23  Primary osteoarthritis of right knee (715 16) (M17 11)   24  Pulmonary nodules (793 19) (R91 8)   25  Right-sided chest wall pain (786 52) (R07 89)   26  Urgency of urination (788 63) (R39 15)   27  Urinary incontinence (788 30) (R32)   28  Vitamin D deficiency (268 9) (E55 9)    Past Medical History    1  History of Herniation of lumbar intervertebral disc (722 10) (M51 26)   2  History of bursitis (V13 59) (Z87 39)   3  History of chest pain (V13 89) (Z87 898)   4  History of chest pain (V13 89) (Z87 898)   5  History of dizziness (V13 89) (K06 888)    Surgical History    1  History of CABG    Family History    1  Family history of cardiac disorder (V17 49) (Z82 49)  Family History Reviewed: The family history was reviewed and updated today  Social History    · Alcohol Use (History)   · Caffeine Use   · Former smoker (P39 06) (C14 341)   · Tobacco use (305 1) (Z72 0)  Social History Reviewed: The social history was reviewed and updated today  The social history was reviewed and is unchanged  Current Meds   1  AmLODIPine Besylate 5 MG Oral Tablet; take 1 tablet every day; Therapy: 31XEQ6706 to (Radha Hsieh)  Requested for: 17ZMK7885;  Last Rx: 46XZB5692 Ordered   2  Aspirin 81 MG Oral Tablet; TAKE 2 TABLET Daily; Last Rx:39Gef4343 Ordered   3  CoQ-10 CAPS; TAKE 1 CAPSULE DAILY WITH A MEAL; Therapy: (Recorded:16Pma5621) to Recorded   4  Fluticasone Propionate 50 MCG/ACT Nasal Suspension; USE 2 SPRAYS IN EACH   NOSTRIL ONCE DAILY; Therapy: 83FDI5668 to (Last Rx:79Cow3176)  Requested for: 36Ima4511 Ordered   5  Hydrocortisone Ace-Pramoxine 2 5-1 % Rectal Cream; use bid prn; Therapy: 21KLG6741 to (Last Rx:13Llc4399)  Requested for: 58Wta3988 Ordered   6  Nitrostat 0 4 MG Sublingual Tablet Sublingual; place 1 tablet under the tongue every 5   minutes up to 3 doses as needed for chest pain; Therapy: 14JZB1972 to (Evaluate:29Nov2015); Last Rx:85Qlv7716 Ordered   7  Omega 3 CAPS; take 1 capsule daily; Therapy: (Recorded:07Tqh4808) to Recorded   8  Simvastatin 20 MG Oral Tablet; TAKE 1 TABLET DAILY AT BEDTIME  Requested for:   40WJN6254; Last Rx:32Ndl2557 Ordered   9  Valsartan 80 MG Oral Tablet; Take 1 tablet daily; Therapy: 03Apr2014 to (Evaluate:19Mar2016)  Requested for: 46EJA8576; Last   Rx:25Mar2015 Ordered   10  Vitamin B Complex Oral Tablet; TAKE 1 TABLET DAILY; Therapy: (Recorded:98Yzd0436) to Recorded   11  Vitamin D3 2000 UNIT Oral Capsule; TAKE 1 CAPSULE Before meals; Therapy: (Recorded:32Ebh9627) to Recorded  Medication List Reviewed: The medication list was reviewed and updated today  Allergies    1  No Known Drug Allergies    Vitals  Signs [Data Includes: Current Encounter]   Recorded: 29BOP7623 02:27PM   Temperature: 97 7 F  Heart Rate: 74  Height: 5 ft 5 in  Weight: 152 lb   BMI Calculated: 25 29  BSA Calculated: 1 76  O2 Saturation: 99    Physical Exam    Constitutional   General appearance: No acute distress, well appearing and well nourished  Neck   Neck: Supple, symmetric, trachea midline, no masses  Thyroid: Normal, no thyromegaly      Pulmonary   Respiratory effort: No increased work of breathing or signs of respiratory distress  Auscultation of lungs: Clear to auscultation  Cardiovascular   Auscultation of heart: Abnormal   The heart rate was normal  The rhythm was regular with premature beats  Heart sounds: abnormal S1 and abnormal S2, but no S3  no murmurs were heard  Carotid pulses: 2+ bilaterally  Examination of extremities for edema and/or varicosities: Normal     Abdomen   Abdomen: Non-tender, no masses  Liver and spleen: No hepatomegaly or splenomegaly         Signatures   Electronically signed by : Aurelia Hill, ; Mar 10 2016  2:06PM EST                       (Author)    Electronically signed by : Erick Juarez DO; Mar 17 2016  3:39PM EST                       (Author)

## 2018-01-18 NOTE — RESULT NOTES
Message   margins clear     Verified Results  (1) TISSUE EXAM [id number] 12:00AM Barney Thorpe     Test Name Result Flag Reference   LAB AP CASE REPORT (Report)     Surgical Pathology Report             Case: [id number]                   Authorizing Provider: Fatimah Reeder MD     Collected:      09/15/2016           Pathologist:      Deidra Gleason MD      Received:      09/19/2016 5779        Specimen:  Arm, Left, Left forearm   LAB AP FINAL DIAGNOSIS (Report)     A  Skin, Left forearm, re-excision:  - No residual basal cell carcinoma identified; negative for malignancy  -- No perineural or lymph-vascular invasion identified   - Scar and changes consistent with prior surgical site  - Adjacent solar lentigo with evolving seborrheic keratosis  Interpretation performed at [facility], [address] [place] [address]  Electronically signed by Deidra Gleason MD on 9/23/2016 at 5:03 PM   LAB AP SURGICAL ADDITIONAL INFORMATION (Report)     These tests were developed and their performance characteristics   determined by [facility] or [employer]  They may not be cleared or approved by the [place]  Food and   Drug Administration  The FDA has determined that such clearance or   approval is not necessary  These tests are used for clinical purposes  They should not be regarded as investigational or for research  This   laboratory has been approved by [facility], designated as a high-complexity   laboratory and is qualified to perform these tests  LAB AP GROSS DESCRIPTION (Report)     A  The specimen is received in formalin, labeled with the patient's name   and hospital number, and is designated left forearm BCC check margins  The specimen consists of an unoriented tan ellipse of skin measuring 2 4 x   1 5 cm, excised to a maximum depth of 0 4 cm  The skin surface appears   keratotic; no discrete lesion is seen  The margin is inked blue   The skin   surface tips are inked red  The specimen is sectioned revealing somewhat   firm tan yellow cut surfaces  Entirely submitted  Four cassettes  Tips in   cassette 1; center sequentially submitted in cassettes 2-4, 2 pieces in   each cassette  Note: The estimated total formalin fixation time based upon information   provided by the submitting clinician and the standard processing schedule   is over 72 hours   MAC   LAB AP CLINICAL INFORMATION BCC check margins

## 2018-01-23 NOTE — PROGRESS NOTES
Chief Complaint  CC: NEW PATIENT, FULL BODY SKIN CANCER EXAM, BCC L FOREARM, BX IN CHART  History of Present Illness  80-year-old male presents for overall skin check and recent history of excision of a basal cell carcinoma left forearm with margins involved patient also concerned regarding several other growths including a lesion on his left scalp that may have been treated previously with cryosurgery      Assessment  Assessed    1  Changing pigmented skin lesion (216 9) (D22 9)   2  Skin lesion (709 9) (L98 9)   3  Seborrheic keratosis (702 19) (L82 1)   4  Basal cell carcinoma of left forearm (173 61) (C44 619)    Active Problems  Problems    1  2-vessel coronary artery disease (414 00) (I25 10)   2  Abnormal laboratory test (796 4) (R89 9)   3  Basal cell carcinoma of skin (173 91) (C44 91)   4  Bradycardia (427 89) (R00 1)   5  Cardiomyopathy (425 4) (I42 9)   6  Cath Stent 1 Type Drug-Eluting   7  Chronic rhinitis (472 0) (J31 0)   8  Colonoscopy (Fiberoptic) Screening   9  Ecchymosis (459 89) (R58)   10  Foot Pain (Soft Tissue) (729 5)   11  Hemorrhoids (455 6) (K64 9)   12  Hepatic hemangioma (228 04) (D18 03)   13  History of cataract surgery (V45 61) (Z98 49)   14  Hyperlipidemia (272 4) (E78 5)   15  Hypertension (401 9) (I10)   16  Immunization, tetanus-diphtheria (V06 5) (Z23)   17  Left shoulder pain (719 41) (M25 512)   18  Left shoulder tendinitis (726 10) (M75 82)   19  Lightheadedness (780 4) (R42)   20  Lumbar pain (724 2) (M54 5)   21  Nasal congestion (478 19) (R09 81)   22  Need for prophylactic vaccination and inoculation against influenza (V04 81) (Z23)   23  Presence of stent in coronary artery in patient with coronary artery disease    (414 01,V45 82) (I25 10,Z95 5)   24  Primary osteoarthritis of left knee (715 16) (M17 12)   25  Primary osteoarthritis of right knee (715 16) (M17 11)   26  Pulmonary nodules (793 19) (R91 8)   27  Skin lesion (709 9) (L98 9)   28   Urgency of urination (788 63) (R39 15)   29  Urinary incontinence (788 30) (R32)   30  Visit for suture removal (V58 32) (Z48 02)   31  Vitamin D deficiency (268 9) (E55 9)   32  Wound infection following procedure (998 59) (T81 4XXA)    Past Medical History  Problems    1  History of Bilateral pleural effusion (511 9) (J90)   2  History of Chest discomfort (786 59) (R07 89)   3  History of Cough (786 2) (R05)   4  History of Exertional dyspnea (786 09) (R06 09)   5  History of Herniation of lumbar intervertebral disc (722 10) (M51 26)   6  History of abnormal weight loss (V13 89) (Z87 898)   7  History of basal cell carcinoma (V10 83) (Z85 828)   8  History of bursitis (V13 59) (Z87 39)   9  History of chest pain (V13 89) (Z87 898)   10  History of chest pain (V13 89) (Z87 898)   11  History of dizziness (V13 89) (Z87 898)   12  History of Right-sided chest wall pain (786 52) (R07 89)    The past medical history was reviewed  Surgical History  Problems    1  History of CABG    Surgical History reviewed      Family History  Mother    1  Family history of cardiac disorder (V17 49) (Z82 49)  Family History Reviewed- Derm:   Family History was reviewed      Social History  Problems    · Alcohol Use (History)   · Caffeine Use   · Former smoker (V15 82) (C94 571)   · Tobacco use (305 1) (Z72 0)  The social history was reviewed      Current Meds   1  AmLODIPine Besylate 5 MG Oral Tablet; take 1 tablet every day; Therapy: 49CKP6707 to (Evaluate:94Iui3295)  Requested for: 20Vep9786; Last   Rx:83Loz0196 Ordered   2  Aspirin 81 MG TABS; TAKE 2 TABLET Daily; Last Rx:30Ydq0611 Ordered   3  Atorvastatin Calcium 80 MG Oral Tablet; Take 1 tablet daily; Therapy: 43ZLL4765 to (Last Rx:17Mar2016)  Requested for: 10YTE2543 Ordered   4  Clopidogrel Bisulfate 75 MG Oral Tablet; Take 1 tablet daily; Therapy: 46RJY2937 to (Last Rx:17Mar2016)  Requested for: 84DBN7960 Ordered   5  CoQ-10 CAPS; TAKE 1 CAPSULE DAILY WITH A MEAL;    Therapy: (Recorded:80Men8899) to Recorded   6  Fluticasone Propionate 50 MCG/ACT Nasal Suspension; USE 2 SPRAYS IN EACH   NOSTRIL ONCE DAILY; Therapy: 96MCA4094 to (Last Rx:16Xla4601)  Requested for: 12Gnt6555 Ordered   7  Hydrocortisone Ace-Pramoxine 2 5-1 % Rectal Cream; use bid prn; Therapy: 33WOB4831 to (Last Rx:16Apr2014)  Requested for: 89Wch2299 Ordered   8  Metoprolol Succinate ER 25 MG Oral Tablet Extended Release 24 Hour; TAKE 0 5 tablet   daily; Therapy: 61SBB1439 to (Evaluate:15Jun2016)  Requested for: 33NJZ3202; Last   Rx:17Mar2016 Ordered   9  Nitrostat 0 4 MG Sublingual Tablet Sublingual; place 1 tablet under the tongue every 5   minutes up to 3 doses as needed for chest pain; Therapy: 22MHA7431 to (Evaluate:29Nov2015); Last Rx:70Aig7918 Ordered   10  Omega 3 CAPS; take 1 capsule daily; Therapy: (Recorded:63Ewd7503) to Recorded   11  Sulfamethoxazole-Trimethoprim 800-160 MG Oral Tablet; TAKE 2 TABLETS EVERY 12    HOURS; Therapy: 76Bsj6939 to (Hannah Daub)  Requested for: 40Ffh6763; Last    Rx:22Wra5013 Ordered   12  Valsartan 80 MG Oral Tablet; Take 1 tablet daily; Therapy: 25Dfd0270 to (Evaluate:12Mar2017)  Requested for: 13KPZ1138; Last    Rx:17Mar2016 Ordered   13  Vitamin B Complex Oral Tablet; TAKE 1 TABLET DAILY; Therapy: (Recorded:75Dll8651) to Recorded   14  Vitamin D3 2000 UNIT Oral Capsule; TAKE 1 CAPSULE Before meals; Therapy: (Recorded:53Xlj8832) to Recorded    Review of Systems    Constitutional: Denies constitutional symptoms  Eyes: Denies eye symptoms  ENT:  denies ear symptoms, nasal symptoms, mouth or throat symptoms  Cardiovascular: Denies cardiovascular symptoms  Respiratory: Denies respiratory symptoms  Gastrointestinal: Denies gastrointestinal symptoms  Musculoskeletal: Denies musculoskeletal symptoms  Integumentary: Denies symptoms other than stated above  Neurological: Denies neurologic symptoms  Psychiatric: Denies psychiatric symptoms  Endocrine: Denies endocrine symptoms  Hematologic/Lymphatic: Denies hematologic symptoms  Allergies  Medication    1  No Known Drug Allergies    Physical Exam    Constitutional   General appearance: Appears healthy and well developed  Lymphatic   No visible disturbance  Musculoskeletal   Digits and nails: No clubbing, cyanosis or edema  Cutaneous and nail exam normal     Skin   Scalp skin texture and hair distribution: Normal skin texture on scalp, normal hair distribution  Head: Abnormal     Neck: Normal turgor, no rashes, no lesions  Chest: Normal turgor, no rashes, no lesions  Abdomen: Normal turgor, no rashes, no lesions  Back: Abnormal     Right upper extremity: Normal turgor, no rashes, no lesions  Left upper extremity: Abnormal     Right lower extremity: Normal turgor, no rashes, no lesions  Left lower extremity: Normal turgor, no rashes, no lesions  Neuro/Psych   Alert and oriented x 3  Displays comfort and cooperation during encounterl  Affect is normal     Finding Healing surgical wound left forearm no clinical sign of basal cell carcinoma involvement pearly 4 mm macule noted on the right upper back pigmented 6 mm macule with erythema and regular borders left parietal scalp normal keratotic papules with greasy stuck on appearance nothing else atypical noted on exam       Procedure    Procedure: skin biopsy  Indications for the procedure include the lesion has changed  Risks, benefits, alternatives, infection risk, bleeding risk, risk of allergic reaction and risk of scarring were discussed with the patient   verbal consent was obtained prior to the procedure  Procedure Note:   Anesthesia: 1 ml of lidocaine 1% with epinephrine  The lesion was located on the Left parietal scalp  The patient was prepped using alcohol  a shave biopsy of the lesion was taken  The hemostasis of the wound was achieved with aluminum chloride  Dressing:  The wound was cleaned and petroleum jelly was applied  Specimen: the excised lesion was place in buffered formalin and sent for pathology  Skin Lesion #2:   Indications for the procedure include basal cell carcinoma suspected  Procedure Note:  Anesthesia: 1 ml of lidocaine 1% with epinephrine  The lesion was located on the Right back  The patient was prepped using alcohol  a shave biopsy of the lesion was taken  The hemostasis of the wound was achieved with aluminum chloride  Dressing: The wound was cleaned and petroleum jelly was applied and a sterile dressing was placed  Specimen: the excised lesion was place in buffered formalin and sent for pathology  Post-Procedure:   Patient Status: the patient tolerated the procedure well  Complications: there were no complications  Follow-up in the office  patient will be called in event skin cancer is found  Patient can call the office in 7-10 days for results if not contacted  Plan  Basal cell carcinoma of left forearm    · Use a sun block product with an SPF of 15 or more ; Status:Complete;   Done:  26WBE7343   · Schedule Surgery Treatment  Procedure  Status: Complete  Done: 26IUP0340  Changing pigmented skin lesion    · (1) TISSUE EXAM; Status: In Progress - Specimen/Data Collected;   Done: 10SSL5716  B : BACK  B Date/Time: : 72YHQ4035  B : Skin Shave  A : L SCALP  A Date/Time: : 31LSI0054  A : Skin Shave  Impression: : A- CHANGING PIGMENTED LESION      B-R/O BCC    Discussion/Summary    Assessment #1: Changing pigmented lesion  Care Plan:   Question of irritated keratosis rule out atypia await result of biopsy before further treatment indicated  Assessment #2: Changing skin lesion  Care Plan:   Possible basal cell carcinoma mid back we'll plan on curettage positive  Assessment #3: Basal cell carcinoma left forearm margins positive  Care Plan:   We'll plan on reexcision of this lesion near future  Assessment #4: Seborrheic keratosis     Care Plan:   Patient reassured these are normal growths we acquire with age no treatment needed  Assessment #5: Screening for dermatologic disorders  Care Plan:   Nothing else of concern noted on complete exam follow-up for excision  Future Appointments    Date/Time Provider Specialty Site   11/08/2016 01:30 PM Giancarlo Thompson, DO Internal Medicine [facility]   11/08/2016 01:00 PM Naty Carlos, [address]  Internal Medicine [facility] INTERNAL [facility]   09/15/2016 02:00 PM NOLAN Chapman   Dermatology [facility] SPECIALTY [facility]     Signatures   Electronically signed by : NOLAN Sheldon ; Sep  1 2016  5:48PM EST                       (Author)

## 2018-01-24 VITALS
OXYGEN SATURATION: 99 % | BODY MASS INDEX: 25.49 KG/M2 | WEIGHT: 153 LBS | HEART RATE: 74 BPM | HEIGHT: 65 IN | TEMPERATURE: 96.6 F

## 2018-01-24 VITALS — DIASTOLIC BLOOD PRESSURE: 75 MMHG | SYSTOLIC BLOOD PRESSURE: 130 MMHG

## 2018-02-20 ENCOUNTER — OFFICE VISIT (OUTPATIENT)
Dept: INTERNAL MEDICINE CLINIC | Facility: CLINIC | Age: 83
End: 2018-02-20
Payer: MEDICARE

## 2018-02-20 VITALS
SYSTOLIC BLOOD PRESSURE: 120 MMHG | RESPIRATION RATE: 16 BRPM | HEART RATE: 81 BPM | WEIGHT: 154 LBS | HEIGHT: 65 IN | DIASTOLIC BLOOD PRESSURE: 70 MMHG | TEMPERATURE: 97.7 F | BODY MASS INDEX: 25.66 KG/M2 | OXYGEN SATURATION: 98 %

## 2018-02-20 DIAGNOSIS — R04.0 EPISTAXIS: Primary | ICD-10-CM

## 2018-02-20 DIAGNOSIS — I10 HYPERTENSION, UNSPECIFIED TYPE: ICD-10-CM

## 2018-02-20 DIAGNOSIS — I25.10 2-VESSEL CORONARY ARTERY DISEASE: ICD-10-CM

## 2018-02-20 DIAGNOSIS — R17 ELEVATED BILIRUBIN: ICD-10-CM

## 2018-02-20 PROBLEM — K80.20 CHOLELITHIASIS: Status: ACTIVE | Noted: 2018-01-02

## 2018-02-20 PROBLEM — C44.41 BASAL CELL CARCINOMA OF SCALP: Status: ACTIVE | Noted: 2017-07-31

## 2018-02-20 PROCEDURE — 99214 OFFICE O/P EST MOD 30 MIN: CPT | Performed by: INTERNAL MEDICINE

## 2018-02-20 RX ORDER — NITROGLYCERIN 0.4 MG/1
1 TABLET SUBLINGUAL
COMMUNITY
Start: 2015-09-30 | End: 2018-02-20

## 2018-02-20 RX ORDER — METOPROLOL SUCCINATE 25 MG/1
0.5 TABLET, EXTENDED RELEASE ORAL DAILY
COMMUNITY
Start: 2016-03-17 | End: 2018-02-20

## 2018-02-20 RX ORDER — ATORVASTATIN CALCIUM 80 MG/1
1 TABLET, FILM COATED ORAL DAILY
COMMUNITY
Start: 2016-03-17 | End: 2018-04-26 | Stop reason: SDUPTHER

## 2018-02-20 RX ORDER — CHOLECALCIFEROL (VITAMIN D3) 125 MCG
1 CAPSULE ORAL
COMMUNITY
End: 2018-02-20

## 2018-02-20 RX ORDER — B-COMPLEX WITH VITAMIN C
1 TABLET ORAL DAILY
COMMUNITY

## 2018-02-20 RX ORDER — AMLODIPINE BESYLATE 5 MG/1
1 TABLET ORAL DAILY
COMMUNITY
Start: 2015-07-06 | End: 2018-07-02 | Stop reason: SDUPTHER

## 2018-02-20 NOTE — PROGRESS NOTES
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 since 2015 will order MRCP to rule out any other problems  2  Epistaxis with some crusting on the left Kiesselbach's plexus to use Vaseline on a Q-tip 3 times a week while he does on will just observe recheck in 6 months     There are no diagnoses linked to this encounter  The patient was counseled regarding instructions for management, risk factor reductions, patient and family education,impressions, risks and benefits of treatment options, side effects of medications, importance of compliance with treatment  The treatment plan was reviewed with the patient/guardian and patient/guardian understands and agrees with the treatment plan  Subjective:      Patient ID: Rianna Jacobson is a 80 y o  male  Nose bleed 1 week ago to ER waited 1 hr  Stopped, 5 days latter nose bleed again,     He has no abdominal pain        The following portions of the patient's history were reviewed and updated as appropriate:   He has a past medical history of Abnormal weight loss; Arthritis; Basal cell carcinoma; Bursitis; Cancer ([address] ); Cardiomyopathy ([address] ); Chest discomfort; Chest pain; Coronary artery disease; Ecchymosis; Exertional dyspnea; Hepatic hemangioma; Herniation of lumbar intervertebral disc; Hyperlipidemia; Hypertension; Nonmelanoma skin cancer; Pleural effusion; Pulmonary nodule; Shortness of breath; and Vitamin D deficiency  ,   does not have any pertinent problems on file  ,   has a past surgical history that includes Carpal tunnel release (Bilateral); Coronary artery bypass graft (03/24/2011); Coronary artery bypass graft; and pr repair incisional hernia,reducible (Bilateral, 4/11/2017)  ,  family history includes Heart disease in his mother; Hypertension in his mother  ,   reports that he quit smoking about 37 years ago   He has never used smokeless tobacco  He reports that he drinks about 0 6 oz of alcohol per week   He reports that he does not use drugs  ,  has No Known Allergies       Review of Systems   Constitutional: Negative for appetite change, chills, fatigue, fever and unexpected weight change  HENT: Positive for nosebleeds  Negative for congestion, ear pain, facial swelling, hearing loss, mouth sores, postnasal drip, rhinorrhea, sinus pain, sore throat, trouble swallowing and voice change  Eyes: Negative for pain, discharge, redness and visual disturbance  Respiratory: Negative for apnea, chest tightness, shortness of breath, wheezing and stridor  Cardiovascular: Negative for chest pain, palpitations and leg swelling  Gastrointestinal: Negative for abdominal distention, abdominal pain, blood in stool, constipation, diarrhea and vomiting  Endocrine: Negative for cold intolerance, heat intolerance, polydipsia, polyphagia and polyuria  Genitourinary: Negative for difficulty urinating, dysuria, flank pain, frequency, genital sores, hematuria and urgency  Musculoskeletal: Negative for arthralgias and back pain  Skin: Negative for rash and wound  Allergic/Immunologic: Negative for environmental allergies, food allergies and immunocompromised state  Neurological: Negative for dizziness, tremors, seizures, syncope, facial asymmetry, speech difficulty, weakness, light-headedness, numbness and headaches  Hematological: Negative for adenopathy  Does not bruise/bleed easily  Psychiatric/Behavioral: Negative for agitation, behavioral problems, dysphoric mood, hallucinations, self-injury, sleep disturbance and suicidal ideas  The patient is not hyperactive  Objective:     Physical Exam   Constitutional: He is oriented to person, place, and time  He appears well-developed  HENT:   Right Ear: External ear normal    Left Ear: External ear normal    Crusting left kisselback plexus   Eyes: Right eye exhibits no discharge  Left eye exhibits no discharge  No scleral icterus  Neck: Carotid bruit is not present  No tracheal deviation present  No thyroid mass and no thyromegaly present  Cardiovascular: Normal rate, regular rhythm, normal heart sounds and intact distal pulses  Exam reveals no gallop and no friction rub  No murmur heard  Pulmonary/Chest: No respiratory distress  He has no wheezes  He has no rales  Musculoskeletal: He exhibits no edema  Lymphadenopathy:     He has no cervical adenopathy  Neurological: He is alert and oriented to person, place, and time  Coordination normal    Psychiatric: He has a normal mood and affect  His behavior is normal  Judgment and thought content normal    Nursing note and vitals reviewed

## 2018-02-20 NOTE — PATIENT INSTRUCTIONS
Epistaxis   WHAT YOU SHOULD KNOW:   Epistaxis is a nosebleed  A nosebleed occurs when the blood vessels near the surface of the nasal cavity are injured or damaged  AFTER YOU LEAVE:   Medicines:   · Nasal sprays:  Vasoconstrictor nasal spray is a medicine that helps make nasal blood vessels narrower  This limits the blood flow and stops the bleeding  This medicine also decreases the swelling inside your nose and helps you breathe easier  You may also be directed to use saline or other nasal sprays to add moisture to your nose  · Antibiotics: This medicine is given to help treat or prevent an infection caused by bacteria  · Take your medicine as directed  Call your healthcare provider if you think your medicine is not helping or if you have side effects  Tell him if you are allergic to any medicine  Keep a list of the medicines, vitamins, and herbs you take  Include the amounts, and when and why you take them  Bring the list or the pill bottles to follow-up visits  Carry your medicine list with you in case of an emergency  Follow up with your primary healthcare provider or otolaryngologist within 2 to 3 days or as directed: Any packing in your nose should be removed within 2 to 3 days  Write down your questions so you remember to ask them during your visits  First aid:   · Sit up and lean forward: This will help prevent you from swallowing blood  Spit blood and saliva into a bowl  · Apply pressure to your nose:  Use 2 fingers to pinch your nose shut for 10 minutes  This will help stop the bleeding  Breathe through your mouth  · Apply ice:  Use a cold pack or put crushed ice in a bag, cover with a towel, and place on the bridge of your nose  · Nasal packing:  Pack your nose with a cotton ball, tissue, tampon, or gauze bandage to stop the bleeding  Prevent epistaxis:  · Avoid nose picking and blowing your nose too hard: You can irritate or damage your nose if you pick it   Blowing your nose too hard may cause the bleeding to start again  · Avoid irritants:  Substances that can irritate your nose should be avoided  These include tobacco smoke and chemical sprays such as  that contain ammonia  · Use a cool mist humidifier in your home: This will add the moisture to the air and help keep your nose moist      · Put a small amount of petroleum jelly inside your nostrils: You may apply a small amount of petroleum jelly if you do not have a nasal packing  This will help keep your nose from drying out or getting irritated  Do not put anything else inside your nose unless your primary healthcare provider tells you to do so  Contact your primary healthcare provider or otolaryngologist if:   · You have a fever and are vomiting  · You have pain in and around your nose that is getting worse even after you take pain medicines  · Your nasal pack is loose  · You have questions or concerns about your condition or care  Seek care immediately if:   · Your nasal packing is soaked with blood  · Your nose is still bleeding after 20 minutes, even after you pinch it  · You have a foul-smelling discharge coming out of your nose  · You feel so weak and dizzy that you have trouble standing up  · You have trouble breathing or talking  © 2014 [employer] is for End User's use only and may not be sold, redistributed or otherwise used for commercial purposes  All illustrations and images included in CareNotes® are the copyrighted property of A D A M [employer] Inc  or Beny Bartholomew  The above information is an  only  It is not intended as medical advice for individual conditions or treatments  Talk to your doctor, nurse or pharmacist before following any medical regimen to see if it is safe and effective for you      vaseline Q tip 3 times per week

## 2018-02-22 ENCOUNTER — OFFICE VISIT (OUTPATIENT)
Dept: INTERNAL MEDICINE CLINIC | Facility: CLINIC | Age: 83
End: 2018-02-22
Payer: MEDICARE

## 2018-02-22 VITALS
HEIGHT: 65 IN | HEART RATE: 90 BPM | OXYGEN SATURATION: 99 % | RESPIRATION RATE: 18 BRPM | TEMPERATURE: 97.2 F | BODY MASS INDEX: 25.66 KG/M2 | WEIGHT: 154 LBS

## 2018-02-22 DIAGNOSIS — R04.0 EPISTAXIS: Primary | ICD-10-CM

## 2018-02-22 PROCEDURE — 30901 CONTROL OF NOSEBLEED: CPT | Performed by: INTERNAL MEDICINE

## 2018-02-22 RX ORDER — VALSARTAN 80 MG/1
1 TABLET ORAL DAILY
COMMUNITY
Start: 2014-04-03 | End: 2018-04-03 | Stop reason: SDUPTHER

## 2018-02-22 NOTE — PROGRESS NOTES
Assessment/Plan:    1  Recurrent left epistaxis nasal corner E was done after anesthesia with lidocaine using silver nitrate epistaxis precautions given no nose picking, nose blowing     Diagnoses and all orders for this visit:    Epistaxis  -     Epistaxis management    Other orders  -     valsartan (DIOVAN) 80 mg tablet; Take 1 tablet by mouth daily        The patient was counseled regarding instructions for management, risk factor reductions, patient and family education,impressions, risks and benefits of treatment options, side effects of medications, importance of compliance with treatment  The treatment plan was reviewed with the patient/guardian and patient/guardian understands and agrees with the treatment plan  Subjective:      Patient ID: Arti Norton is a 80 y o  male  Patient had recurrence left epistaxis        The following portions of the patient's history were reviewed and updated as appropriate:   He has a past medical history of Abnormal weight loss; Arthritis; Basal cell carcinoma; Bursitis; Cancer ([address] ); [address] [address] ); Chest discomfort; Chest pain; Coronary artery disease; Ecchymosis; Exertional dyspnea; Hepatic hemangioma; Herniation of lumbar intervertebral disc; Hyperlipidemia; Hypertension; Nonmelanoma skin cancer; Pleural effusion; Pulmonary nodule; Shortness of breath; and Vitamin D deficiency  ,   does not have any pertinent problems on file  ,   has a past surgical history that includes Carpal tunnel release (Bilateral); Coronary artery bypass graft (03/24/2011); Coronary artery bypass graft; and pr repair incisional hernia,reducible (Bilateral, 4/11/2017)  ,  family history includes Heart disease in his mother; Hypertension in his mother  ,   reports that he quit smoking about 37 years ago  He has never used smokeless tobacco  He reports that he drinks about 0 6 oz of alcohol per week   He reports that he does not use drugs  ,  has No Known Allergies       Review of Systems   Constitutional: Negative for appetite change, chills, fatigue, fever and unexpected weight change  HENT: Negative for congestion, ear pain, facial swelling, hearing loss, mouth sores, nosebleeds, postnasal drip, rhinorrhea, sinus pain, sore throat, trouble swallowing and voice change  Epistaxis   Eyes: Negative for pain, discharge, redness and visual disturbance  Respiratory: Negative for apnea, chest tightness, shortness of breath, wheezing and stridor  Cardiovascular: Negative for chest pain, palpitations and leg swelling  Gastrointestinal: Negative for abdominal distention, abdominal pain, blood in stool, constipation, diarrhea and vomiting  Endocrine: Negative for cold intolerance, heat intolerance, polydipsia, polyphagia and polyuria  Genitourinary: Negative for difficulty urinating, dysuria, flank pain, frequency, genital sores, hematuria and urgency  Musculoskeletal: Negative for arthralgias and back pain  Skin: Negative for rash and wound  Allergic/Immunologic: Negative for environmental allergies, food allergies and immunocompromised state  Neurological: Negative for dizziness, tremors, seizures, syncope, facial asymmetry, speech difficulty, weakness, light-headedness, numbness and headaches  Hematological: Negative for adenopathy  Does not bruise/bleed easily  Psychiatric/Behavioral: Negative for agitation, behavioral problems, dysphoric mood, hallucinations, self-injury, sleep disturbance and suicidal ideas  The patient is not hyperactive            Objective:     Physical Exam    Epistaxis management  Date/Time: 2/22/2018 12:23 PM  Performed by: Werner Hammer  Authorized by: Werner Hammer     Patient location:  Bedside  Consent:     Consent obtained:  Verbal    Consent given by:  Patient    Risks discussed:  Bleeding  Universal protocol:     Procedure explained and questions answered to patient or proxy's satisfaction: yes      Patient identity confirmed: Verbally with patient  Anesthesia (see MAR for exact dosages): Anesthesia method:  Topical application    Topical anesthesia: Lidocaine on 2 x 2  Procedure details:     Treatment site:  L anterior    Hemostasis method:  Anterior pack    Approach:  External    Spec Headlamp used: Yes      Treatment complexity:  Limited    Treatment episode: initial    Post-procedure details:     Assessment:  Bleeding stopped    Patient tolerance of procedure:   Tolerated well, no immediate complications

## 2018-02-24 ENCOUNTER — HOSPITAL ENCOUNTER (EMERGENCY)
Facility: HOSPITAL | Age: 83
Discharge: HOME | End: 2018-02-24
Attending: EMERGENCY MEDICINE | Admitting: EMERGENCY MEDICINE
Payer: MEDICARE

## 2018-02-24 VITALS
DIASTOLIC BLOOD PRESSURE: 76 MMHG | SYSTOLIC BLOOD PRESSURE: 126 MMHG | HEART RATE: 92 BPM | OXYGEN SATURATION: 99 % | RESPIRATION RATE: 18 BRPM | TEMPERATURE: 98.2 F

## 2018-02-24 DIAGNOSIS — R04.0 ANTERIOR EPISTAXIS: Primary | ICD-10-CM

## 2018-02-24 PROCEDURE — 99283 EMERGENCY DEPT VISIT LOW MDM: CPT

## 2018-02-24 RX ORDER — CEPHALEXIN 500 MG/1
500 CAPSULE ORAL EVERY 8 HOURS SCHEDULED
Qty: 9 CAPSULE | Refills: 0 | Status: SHIPPED | OUTPATIENT
Start: 2018-02-24 | End: 2018-02-27

## 2018-02-24 RX ORDER — BACITRACIN, NEOMYCIN, POLYMYXIN B 400; 3.5; 5 [USP'U]/G; MG/G; [USP'U]/G
1 OINTMENT TOPICAL ONCE
Status: COMPLETED | OUTPATIENT
Start: 2018-02-24 | End: 2018-02-24

## 2018-02-24 RX ORDER — OXYMETAZOLINE HYDROCHLORIDE 0.05 G/100ML
2 SPRAY NASAL ONCE
Status: COMPLETED | OUTPATIENT
Start: 2018-02-24 | End: 2018-02-24

## 2018-02-24 RX ORDER — CEPHALEXIN 250 MG/1
500 CAPSULE ORAL ONCE
Status: COMPLETED | OUTPATIENT
Start: 2018-02-24 | End: 2018-02-24

## 2018-02-24 RX ADMIN — CEPHALEXIN 500 MG: 250 CAPSULE ORAL at 10:59

## 2018-02-24 RX ADMIN — BACITRACIN ZINC NEOMYCIN SULFATE POLYMYXIN B SULFATE 1 LARGE APPLICATION: 400; 3.5; 5 OINTMENT TOPICAL at 11:00

## 2018-02-24 RX ADMIN — OXYMETAZOLINE HYDROCHLORIDE 2 SPRAY: 5 SPRAY NASAL at 10:59

## 2018-02-24 NOTE — DISCHARGE INSTRUCTIONS
Please return if he developed worsening or other concerning symptoms otherwise your follow up with ear nose and throat doctor on Monday or Tuesday as instructed for packing removal and further evaluation as discussed     Epistaxis   WHAT YOU SHOULD KNOW:   Epistaxis is a nosebleed  A nosebleed occurs when the blood vessels near the surface of the nasal cavity are injured or damaged  AFTER YOU LEAVE:   Medicines:   · Nasal sprays:  Vasoconstrictor nasal spray is a medicine that helps make nasal blood vessels narrower  This limits the blood flow and stops the bleeding  This medicine also decreases the swelling inside your nose and helps you breathe easier  You may also be directed to use saline or other nasal sprays to add moisture to your nose  · Antibiotics: This medicine is given to help treat or prevent an infection caused by bacteria  · Take your medicine as directed  Call your healthcare provider if you think your medicine is not helping or if you have side effects  Tell him if you are allergic to any medicine  Keep a list of the medicines, vitamins, and herbs you take  Include the amounts, and when and why you take them  Bring the list or the pill bottles to follow-up visits  Carry your medicine list with you in case of an emergency  Follow up with your primary healthcare provider or otolaryngologist within 2 to 3 days or as directed: Any packing in your nose should be removed within 2 to 3 days  Write down your questions so you remember to ask them during your visits  First aid:   · Sit up and lean forward: This will help prevent you from swallowing blood  Spit blood and saliva into a bowl  · Apply pressure to your nose:  Use 2 fingers to pinch your nose shut for 10 minutes  This will help stop the bleeding  Breathe through your mouth  · Apply ice:  Use a cold pack or put crushed ice in a bag, cover with a towel, and place on the bridge of your nose       · Nasal packing:  Pack your nose with a cotton ball, tissue, tampon, or gauze bandage to stop the bleeding  Prevent epistaxis:  · Avoid nose picking and blowing your nose too hard: You can irritate or damage your nose if you pick it  Blowing your nose too hard may cause the bleeding to start again  · Avoid irritants:  Substances that can irritate your nose should be avoided  These include tobacco smoke and chemical sprays such as  that contain ammonia  · Use a cool mist humidifier in your home: This will add the moisture to the air and help keep your nose moist      · Put a small amount of petroleum jelly inside your nostrils: You may apply a small amount of petroleum jelly if you do not have a nasal packing  This will help keep your nose from drying out or getting irritated  Do not put anything else inside your nose unless your primary healthcare provider tells you to do so  Contact your primary healthcare provider or otolaryngologist if:   · You have a fever and are vomiting  · You have pain in and around your nose that is getting worse even after you take pain medicines  · Your nasal pack is loose  · You have questions or concerns about your condition or care  Seek care immediately if:   · Your nasal packing is soaked with blood  · Your nose is still bleeding after 20 minutes, even after you pinch it  · You have a foul-smelling discharge coming out of your nose  · You feel so weak and dizzy that you have trouble standing up  · You have trouble breathing or talking  © 2014 [address] [employer] is for End User's use only and may not be sold, redistributed or otherwise used for commercial purposes  All illustrations and images included in CareNotes® are the copyrighted property of [employer]  or [employer]y Bartholomew  The above information is an  only  It is not intended as medical advice for individual conditions or treatments   Talk to your doctor, nurse or pharmacist before following any medical regimen to see if it is safe and effective for you  Nosebleed   WHAT YOU NEED TO KNOW:   A nosebleed, or epistaxis, occurs when one or more of the blood vessels in your nose break  You may have dark or bright red blood from one or both nostrils  A nosebleed is most commonly caused by dry air or picking your nose  A direct blow to your nose, irritation from a cold or allergies, or a foreign object can also cause a nosebleed  DISCHARGE INSTRUCTIONS:   Return to the emergency department if:   · Your nasal packing is soaked with blood  · Your nose is still bleeding after 20 minutes, even after you pinch it  · You have a foul-smelling discharge coming out of your nose  · You feel so weak and dizzy that you have trouble standing up  · You have trouble breathing or talking  Contact your healthcare provider if:   · You have a fever and are vomiting  · You have pain in and around your nose that is getting worse even after you take pain medicines  · Your nasal pack is loose  · You have questions or concerns about your condition or care  First aid:   · Sit up and lean forward  This will help prevent you from swallowing blood  Spit blood and saliva into a bowl  · Apply pressure to your nose  Use 2 fingers to pinch your nose shut for 10 to 15 minutes  This will help stop the bleeding  Breathe through your mouth  · Apply ice  on the bridge of your nose to decrease swelling and bleeding  Use a cold pack or put crushed ice in a plastic bag  Cover it with a towel to protect your skin  · Pack your nose  with a cotton ball, tissue, tampon, or gauze bandage to stop the bleeding  Medicines:   · Medicines  applied to a small piece of cotton and placed in your nose  Medicine may also be sprayed in or applied directly to your nose  You may need medicine to prevent an infection   If bleeding is severe, medicine may be injected into a blood vessel in your nose  · Take your medicine as directed  Contact your healthcare provider if you think your medicine is not helping or if you have side effects  Tell him of her if you are allergic to any medicine  Keep a list of the medicines, vitamins, and herbs you take  Include the amounts, and when and why you take them  Bring the list or the pill bottles to follow-up visits  Carry your medicine list with you in case of an emergency  Prevent another nosebleed:   · Keep your nose moist   Put a small amount of petroleum jelly inside your nostrils as needed  Use a saline (saltwater) nasal spray  Do not put anything else inside your nose unless your healthcare provider says it is okay  Do not  use oil-based lubricants if you use oxygen therapy  They may be flammable  · Use a cool mist humidifier to increase air moisture in your home  This will help your nose stay moist      · Do not pick or blow your nose for at least a week  You can irritate or damage your nose if you pick it  Blowing your nose too hard may cause the bleeding to start again  Do not bend over or strain as this can cause the bleeding to start again  · Avoid irritants  such as tobacco smoke or chemical sprays such as   Follow up with your healthcare provider as directed: Any packing in your nose should be removed within 2 to 3 days  Write down your questions so you remember to ask them during your visits  © 2017 [address] [employer] Information is for End User's use only and may not be sold, redistributed or otherwise used for commercial purposes  All illustrations and images included in CareNotes® are the copyrighted property of [employer]  or Beny Bartholomew  The above information is an  only  It is not intended as medical advice for individual conditions or treatments  Talk to your doctor, nurse or pharmacist before following any medical regimen to see if it is safe and effective for you    Epistaxis WHAT YOU SHOULD KNOW:   Epistaxis is a nosebleed  A nosebleed occurs when the blood vessels near the surface of the nasal cavity are injured or damaged  AFTER YOU LEAVE:   Medicines:   · Nasal sprays:  Vasoconstrictor nasal spray is a medicine that helps make nasal blood vessels narrower  This limits the blood flow and stops the bleeding  This medicine also decreases the swelling inside your nose and helps you breathe easier  You may also be directed to use saline or other nasal sprays to add moisture to your nose  · Antibiotics: This medicine is given to help treat or prevent an infection caused by bacteria  · Take your medicine as directed  Call your healthcare provider if you think your medicine is not helping or if you have side effects  Tell him if you are allergic to any medicine  Keep a list of the medicines, vitamins, and herbs you take  Include the amounts, and when and why you take them  Bring the list or the pill bottles to follow-up visits  Carry your medicine list with you in case of an emergency  Follow up with your primary healthcare provider or otolaryngologist within 2 to 3 days or as directed: Any packing in your nose should be removed within 2 to 3 days  Write down your questions so you remember to ask them during your visits  First aid:   · Sit up and lean forward: This will help prevent you from swallowing blood  Spit blood and saliva into a bowl  · Apply pressure to your nose:  Use 2 fingers to pinch your nose shut for 10 minutes  This will help stop the bleeding  Breathe through your mouth  · Apply ice:  Use a cold pack or put crushed ice in a bag, cover with a towel, and place on the bridge of your nose  · Nasal packing:  Pack your nose with a cotton ball, tissue, tampon, or gauze bandage to stop the bleeding  Prevent epistaxis:  · Avoid nose picking and blowing your nose too hard: You can irritate or damage your nose if you pick it   Blowing your nose too hard may cause the bleeding to start again  · Avoid irritants:  Substances that can irritate your nose should be avoided  These include tobacco smoke and chemical sprays such as  that contain ammonia  · Use a cool mist humidifier in your home: This will add the moisture to the air and help keep your nose moist      · Put a small amount of petroleum jelly inside your nostrils: You may apply a small amount of petroleum jelly if you do not have a nasal packing  This will help keep your nose from drying out or getting irritated  Do not put anything else inside your nose unless your primary healthcare provider tells you to do so  Contact your primary healthcare provider or otolaryngologist if:   · You have a fever and are vomiting  · You have pain in and around your nose that is getting worse even after you take pain medicines  · Your nasal pack is loose  · You have questions or concerns about your condition or care  Seek care immediately if:   · Your nasal packing is soaked with blood  · Your nose is still bleeding after 20 minutes, even after you pinch it  · You have a foul-smelling discharge coming out of your nose  · You feel so weak and dizzy that you have trouble standing up  · You have trouble breathing or talking  © 2014 [address] [employer] is for End User's use only and may not be sold, redistributed or otherwise used for commercial purposes  All illustrations and images included in CareNotes® are the copyrighted property of [employer]  or Beny Bartholomew  The above information is an  only  It is not intended as medical advice for individual conditions or treatments  Talk to your doctor, nurse or pharmacist before following any medical regimen to see if it is safe and effective for you

## 2018-02-24 NOTE — ED PROVIDER NOTES
History  Chief Complaint   Patient presents with    Nose Bleed     patient is c/o a nose bleed that began at 0600 this am  saw pcp on thursday for same  denies dizziness  Pt 61-year-old male with history of CAD on baby aspirin presenting with chief complaint of recurrent nose bleed  Patient states that over the last week and a half he has had 3-4 episodes of epistaxis at of his left anterior nostril, it has bled approximately 4 times over the last week and a half, it is out of his left anterior nostril he states that he puts a cold towel over his face to try and stop that as well as tries to show of tissue paper up into his left nostril he seen his family doctor twice, on the 2nd visit he had his left nasal septum cauterized with silver nitrate, he was doing okay until this morning he felt a scab in his nose he stood up and started bleeding spontaneously, he placed a piece of toilet paper in his left nostril it is subsequently resolved is here for further evaluation of recurrent epistaxis no bleeding at this time he is not on Plavix or other anticoagulants at this time he denies dizziness near syncope palpitations chest pain shortness of breath he has no other stigmata of easy bleeding or bruising he has no other complaints or concerns denies a complete review systems as noted            Prior to Admission Medications   Prescriptions Last Dose Informant Patient Reported? Taking?    B Complex Vitamins (VITAMIN B COMPLEX) TABS   Yes No   Sig: Take 1 tablet by mouth daily   Cholecalciferol (VITAMIN D-3 PO)   Yes No   Sig: Take 2,000 Units by mouth 3 (three) times a day before meals   Coenzyme Q10 (COQ-10) 10 MG CAPS   Yes No   Sig: Take 1 capsule by mouth Daily   acetaminophen-codeine (TYLENOL #3) 300-30 mg per tablet   No No   Sig: Take 1 tablet by mouth every 4 (four) hours as needed for moderate pain for up to 10 days   amLODIPine (NORVASC) 5 mg tablet   Yes No   Sig: Take 1 tablet by mouth daily   aspirin 81 MG tablet   Yes No   Sig: Take 2 tablets by mouth daily   atorvastatin (LIPITOR) 80 mg tablet   Yes No   Sig: Take 1 tablet by mouth daily   clopidogrel (PLAVIX) 75 mg tablet   Yes No   Sig: Take 75 mg by mouth daily   co-enzyme Q-10 30 MG capsule  Self Yes No   Sig: Take 30 mg by mouth daily   fluticasone (FLONASE) 50 mcg/act nasal spray   Yes No   Si sprays into each nostril daily   hydrocortisone-pramoxine (ANALPRAM-HC) 2 5-1 % rectal cream   Yes No   Sig: Insert into the rectum   metoprolol succinate (TOPROL-XL) 25 mg 24 hr tablet  Self Yes No   Sig: Take 25 mg by mouth daily   nitroglycerin (NITROSTAT) 0 4 mg SL tablet   Yes No   Sig: Place 0 4 mg under the tongue every 5 (five) minutes as needed for chest pain   omega-3-acid ethyl esters (LOVAZA) 1 g capsule   Yes No   Sig: Take 2 g by mouth daily   valsartan (DIOVAN) 80 mg tablet   Yes No   Sig: Take 1 tablet by mouth daily      Facility-Administered Medications: None       Past Medical History:   Diagnosis Date    Abnormal weight loss     Last Assessed: 2014    Arthritis     Basal cell carcinoma     Last Assessed: 2016    Bursitis     left hip pain    Cancer (HCC)     BCA- back, left forearm, skin    Cardiomyopathy ([address] )     Chest discomfort     Last Assessed: 2016    Chest pain     Last Assessed: 2013    Coronary artery disease     Ecchymosis     Last Assessed: 2016    Exertional dyspnea     Last Assessed: 2016    Hepatic hemangioma     Herniation of lumbar intervertebral disc     Hyperlipidemia     Hypertension     Nonmelanoma skin cancer     Last Assessed: 12/15/2017    Pleural effusion     Bilateral; Last Assessed: 2016    Pulmonary nodule     multiple nodes;  Last Assessesd: 2016    Shortness of breath     Vitamin D deficiency        Past Surgical History:   Procedure Laterality Date    CARPAL TUNNEL RELEASE Bilateral     CORONARY ARTERY BYPASS GRAFT  2011    CORONARY ARTERY BYPASS GRAFT      ID REPAIR INCISIONAL HERNIA,REDUCIBLE Bilateral 4/11/2017    Procedure: LAPAROSCOPIC INGUINAL HERNIA REPAIR WITH MESH ;  Surgeon: Adams Paulson MD;  Location: [facility];  Service: General       Family History   Problem Relation Age of Onset    Heart disease Mother     Hypertension Mother      I have reviewed and agree with the history as documented  Social History   Substance Use Topics    Smoking status: Former Smoker     Quit date: 4/11/1980    Smokeless tobacco: Never Used      Comment: quit date 1970 per allscripts    Alcohol use 0 6 oz/week     1 Glasses of wine per week      Comment: daily        Review of Systems   Constitutional: Negative for chills, fatigue and fever  HENT: Positive for nosebleeds  Negative for facial swelling, rhinorrhea, sore throat, trouble swallowing and voice change  Eyes: Negative for photophobia, pain and visual disturbance  Respiratory: Negative for cough and shortness of breath  Cardiovascular: Negative for chest pain and palpitations  Gastrointestinal: Negative for nausea and vomiting  Genitourinary: Negative for dysuria, frequency and urgency  Musculoskeletal: Negative for arthralgias, neck pain and neck stiffness  Skin: Negative for color change and rash  Neurological: Negative for dizziness, weakness, light-headedness and headaches  Hematological: Negative for adenopathy  Does not bruise/bleed easily  Psychiatric/Behavioral: Negative for agitation and behavioral problems  All other systems reviewed and are negative        Physical Exam  ED Triage Vitals [02/24/18 0924]   Temperature Pulse Respirations Blood Pressure SpO2   98 2 °F (36 8 °C) 92 18 126/76 99 %      Temp Source Heart Rate Source Patient Position - Orthostatic VS BP Location FiO2 (%)   Oral Monitor Sitting Right arm --      Pain Score       No Pain           Orthostatic Vital Signs  Vitals:    02/24/18 0924   BP: 126/76   Pulse: 92   Patient Position - Orthostatic VS: Sitting       Physical Exam   Constitutional: He is oriented to person, place, and time  He appears well-developed and well-nourished  No distress  Very well-appearing conversational no acute distress   HENT:   Head: Normocephalic and atraumatic  Right Ear: External ear normal    Left Ear: External ear normal    Small excoriation left anterior nasal septum that is not actively bleeding, blood clot in the way   Eyes: EOM are normal  Pupils are equal, round, and reactive to light  Neck: Normal range of motion  Neck supple  No tracheal deviation present  Cardiovascular: Normal rate, regular rhythm and normal heart sounds  Exam reveals no gallop and no friction rub  No murmur heard  Pulmonary/Chest: Effort normal and breath sounds normal  He has no wheezes  He has no rales  Musculoskeletal: Normal range of motion  He exhibits no edema or tenderness  Neurological: He is alert and oriented to person, place, and time  No cranial nerve deficit  He exhibits normal muscle tone  Coordination normal    Skin: Skin is warm and dry  No rash noted  Psychiatric: He has a normal mood and affect  His behavior is normal    Nursing note and vitals reviewed        ED Medications  Medications   oxymetazoline (AFRIN) 0 05 % nasal spray 2 spray (2 sprays Each Nare Given 2/24/18 1059)   neomycin-bacitracin-polymyxin b (NEOSPORIN) ointment 1 large application (1 large application Topical Given 2/24/18 1100)   cephalexin (KEFLEX) capsule 500 mg (500 mg Oral Given 2/24/18 1059)       Diagnostic Studies  Results Reviewed     None                 No orders to display              Procedures  Epistaxis Mgmt  Date/Time: 2/24/2018 10:37 AM  Performed by: Deepthi Alvarado by: Stephanie Arevalo     Patient location:  ED  Other Assisting Provider: Yes (comment)    Consent:     Consent obtained:  Verbal    Consent given by:  Patient    Risks discussed:  Bleeding, infection, nasal injury and pain    Alternatives discussed:  No treatment, delayed treatment, alternative treatment, referral and observation  Universal protocol:     Procedure explained and questions answered to patient or proxy's satisfaction: yes      Patient identity confirmed:  Verbally with patient  Anesthesia (see MAR for exact dosages): Anesthesia method:  Topical application    Local [employer]:  Oxymetazoline (Afrin)  Procedure details:     Treatment site:  L anterior    Hemostasis method:  Anterior pack    Approach:  External    Treatment complexity:  Limited    Treatment episode: recurring    Post-procedure details:     Assessment:  Bleeding stopped    Patient tolerance of procedure:   Tolerated well, no immediate complications  Comments:      Patient's clot was evacuated he had brisk bleeding from large excoriation on his left anterior nasal septum, not improved with Afrin, recurrence discussed risks benefits alternatives the packing, large left anterior nasal packing place resolution of bleeding patient tolerated well given extensive discharge return in follow-up instructions           Phone Contacts  ED Phone Contact    ED Course  ED Course as of Feb 24 1853   Sat Feb 24, 2018   1050 Patient seen evaluated discharge, understands agrees to discharge return instructions no subsequent bleeding                                MDM  Number of Diagnoses or Management Options  Anterior epistaxis:   Diagnosis management comments: Move a 1year-old male with a history of CAD on aspirin with recurrent nosebleeds over the last week and half through his left nostril no significant posterior bleeding, had a cauterize recently in his family doctor's office here for recurrent bleeding today, he has no other complaints no dizziness lightheadedness shortness of breath fatigue chest pain or other associated symptoms he is not on other blood thinners on exam here he is afebrile normal vital signs clinically well-appearing, small excoriation on his left anterior nasal septum low blood clot is currently in place, will try to treat conservatively if he fails will place anterior nasal packing, likely discharge with ENT follow-up at this point    CritCare Time    Disposition  Final diagnoses:   Anterior epistaxis     Time reflects when diagnosis was documented in both MDM as applicable and the Disposition within this note     Time User Action Codes Description Comment    2/24/2018 10:50 AM Juaquin Velasco Add [R04 0] Anterior epistaxis       ED Disposition     ED Disposition Condition Comment    Discharge  Alla Josselyn discharge to home/self care  Condition at discharge: Good        Follow-up Information     Follow up With Specialties Details Why Contact Info Additional [address] [facility] Emergency Department Emergency Medicine  If symptoms worsen [address]  [address] [facility] ED, [address] [facility], [place], [address]    Martinez Miller MD Otolaryngology In 3 days  [address] [facility] [address] [facility]       Jazzy Lynn MD Otolaryngology In 3 days  [address], [facility] [address]    [address] [facility] [address]           Discharge Medication List as of 2/24/2018 10:52 AM      START taking these medications    Details   cephalexin (KEFLEX) 500 mg capsule Take 1 capsule (500 mg total) by mouth every 8 (eight) hours for 3 days, Starting Sat 2/24/2018, Until Tue 2/27/2018, Print         CONTINUE these medications which have NOT CHANGED    Details   acetaminophen-codeine (TYLENOL #3) 300-30 mg per tablet Take 1 tablet by mouth every 4 (four) hours as needed for moderate pain for up to 10 days, Starting 4/11/2017, Until Fri 4/21/17, Print      amLODIPine (NORVASC) 5 mg tablet Take 1 tablet by mouth daily, Starting Mon 7/6/2015, Historical Med      aspirin 81 MG tablet Take 2 tablets by mouth daily, Historical Med      atorvastatin (LIPITOR) 80 mg tablet Take 1 tablet by mouth daily, Starting Thu 3/17/2016, Historical Med      B Complex Vitamins (VITAMIN B COMPLEX) TABS Take 1 tablet by mouth daily, Historical Med      Cholecalciferol (VITAMIN D-3 PO) Take 2,000 Units by mouth 3 (three) times a day before meals, Until Discontinued, Historical Med      clopidogrel (PLAVIX) 75 mg tablet Take 75 mg by mouth daily, Until Discontinued, Historical Med      !! co-enzyme Q-10 30 MG capsule Take 30 mg by mouth daily, Until Discontinued, Historical Med      !! Coenzyme Q10 (COQ-10) 10 MG CAPS Take 1 capsule by mouth Daily, Historical Med      fluticasone (FLONASE) 50 mcg/act nasal spray 2 sprays into each nostril daily, Until Discontinued, Historical Med      hydrocortisone-pramoxine (ANALPRAM-HC) 2 5-1 % rectal cream Insert into the rectum, Starting Wed 4/16/2014, Historical Med      metoprolol succinate (TOPROL-XL) 25 mg 24 hr tablet Take 25 mg by mouth daily, Until Discontinued, Historical Med      nitroglycerin (NITROSTAT) 0 4 mg SL tablet Place 0 4 mg under the tongue every 5 (five) minutes as needed for chest pain, Until Discontinued, Historical Med      omega-3-acid ethyl esters (LOVAZA) 1 g capsule Take 2 g by mouth daily, Until Discontinued, Historical Med      valsartan (DIOVAN) 80 mg tablet Take 1 tablet by mouth daily, Starting Thu 4/3/2014, Historical Med       !! - Potential duplicate medications found  Please discuss with provider  No discharge procedures on file      ED Provider  Electronically Signed by           Nava Burnham DO  02/24/18 9780

## 2018-02-26 ENCOUNTER — HOSPITAL ENCOUNTER (EMERGENCY)
Facility: HOSPITAL | Age: 83
Discharge: HOME | End: 2018-02-26
Attending: EMERGENCY MEDICINE
Payer: MEDICARE

## 2018-02-26 VITALS
BODY MASS INDEX: 25.66 KG/M2 | HEIGHT: 65 IN | WEIGHT: 154 LBS | TEMPERATURE: 97.6 F | SYSTOLIC BLOOD PRESSURE: 151 MMHG | RESPIRATION RATE: 18 BRPM | DIASTOLIC BLOOD PRESSURE: 77 MMHG | HEART RATE: 110 BPM | OXYGEN SATURATION: 96 %

## 2018-02-26 DIAGNOSIS — Z48.00 ENCOUNTER FOR REMOVAL OF NASAL PACKING: Primary | ICD-10-CM

## 2018-02-26 PROCEDURE — 99283 EMERGENCY DEPT VISIT LOW MDM: CPT

## 2018-02-26 RX ORDER — BACITRACIN, NEOMYCIN, POLYMYXIN B 400; 3.5; 5 [USP'U]/G; MG/G; [USP'U]/G
1 OINTMENT TOPICAL ONCE
Status: COMPLETED | OUTPATIENT
Start: 2018-02-26 | End: 2018-02-26

## 2018-02-26 RX ORDER — OXYMETAZOLINE HYDROCHLORIDE 0.05 G/100ML
2 SPRAY NASAL ONCE
Status: COMPLETED | OUTPATIENT
Start: 2018-02-26 | End: 2018-02-26

## 2018-02-26 RX ADMIN — BACITRACIN ZINC NEOMYCIN SULFATE POLYMYXIN B SULFATE 1 LARGE APPLICATION: 400; 3.5; 5 OINTMENT TOPICAL at 13:03

## 2018-02-26 RX ADMIN — OXYMETAZOLINE HYDROCHLORIDE 2 SPRAY: 5 SPRAY NASAL at 13:04

## 2018-02-26 NOTE — DISCHARGE INSTRUCTIONS
Please return if you bleeding not controlled at home you worsening or other concerning symptoms otherwise follow up with Ear Nose and Throat doctor as instructed       Epistaxis   WHAT YOU SHOULD KNOW:   Epistaxis is a nosebleed  A nosebleed occurs when the blood vessels near the surface of the nasal cavity are injured or damaged  AFTER YOU LEAVE:   Medicines:   · Nasal sprays:  Vasoconstrictor nasal spray is a medicine that helps make nasal blood vessels narrower  This limits the blood flow and stops the bleeding  This medicine also decreases the swelling inside your nose and helps you breathe easier  You may also be directed to use saline or other nasal sprays to add moisture to your nose  · Antibiotics: This medicine is given to help treat or prevent an infection caused by bacteria  · Take your medicine as directed  Call your healthcare provider if you think your medicine is not helping or if you have side effects  Tell him if you are allergic to any medicine  Keep a list of the medicines, vitamins, and herbs you take  Include the amounts, and when and why you take them  Bring the list or the pill bottles to follow-up visits  Carry your medicine list with you in case of an emergency  Follow up with your primary healthcare provider or otolaryngologist within 2 to 3 days or as directed: Any packing in your nose should be removed within 2 to 3 days  Write down your questions so you remember to ask them during your visits  First aid:   · Sit up and lean forward: This will help prevent you from swallowing blood  Spit blood and saliva into a bowl  · Apply pressure to your nose:  Use 2 fingers to pinch your nose shut for 10 minutes  This will help stop the bleeding  Breathe through your mouth  · Apply ice:  Use a cold pack or put crushed ice in a bag, cover with a towel, and place on the bridge of your nose       · Nasal packing:  Pack your nose with a cotton ball, tissue, tampon, or gauze bandage to stop the bleeding  Prevent epistaxis:  · Avoid nose picking and blowing your nose too hard: You can irritate or damage your nose if you pick it  Blowing your nose too hard may cause the bleeding to start again  · Avoid irritants:  Substances that can irritate your nose should be avoided  These include tobacco smoke and chemical sprays such as  that contain ammonia  · Use a cool mist humidifier in your home: This will add the moisture to the air and help keep your nose moist      · Put a small amount of petroleum jelly inside your nostrils: You may apply a small amount of petroleum jelly if you do not have a nasal packing  This will help keep your nose from drying out or getting irritated  Do not put anything else inside your nose unless your primary healthcare provider tells you to do so  Contact your primary healthcare provider or otolaryngologist if:   · You have a fever and are vomiting  · You have pain in and around your nose that is getting worse even after you take pain medicines  · Your nasal pack is loose  · You have questions or concerns about your condition or care  Seek care immediately if:   · Your nasal packing is soaked with blood  · Your nose is still bleeding after 20 minutes, even after you pinch it  · You have a foul-smelling discharge coming out of your nose  · You feel so weak and dizzy that you have trouble standing up  · You have trouble breathing or talking  © 2014 [address] [employer] is for End User's use only and may not be sold, redistributed or otherwise used for commercial purposes  All illustrations and images included in CareNotes® are the copyrighted property of [employer]  or [address]  The above information is an  only  It is not intended as medical advice for individual conditions or treatments   Talk to your doctor, nurse or pharmacist before following any medical regimen to see if it is safe and effective for you

## 2018-02-26 NOTE — ED PROVIDER NOTES
History  Chief Complaint   Patient presents with    Medical Problem     pt was seen on Sat  for nose bleed, nose was packed, was given number to f/u with ENT, ENT told pt they don't remove packing, was sent here to having packing removed      80-year-old male here for nasal packing removal   Patient was seen by myself 2-3 days ago for recurrent epistaxis from his left not anterior nostril, status post cautery, he had bleeding in the department, his nose was evaluated he had large excoriation after clot was evacuated with persistent oozing failed conservative treatment, this was packed by myself with large Merocel, patient was instructed to call ENT given prophylactic antibiotics he called ENT and was told that they do not remove packing go back to the emergency department  Patient is here for packing removal he has no other complaints no fevers headache facial pain no persistent bleeding or bruising he has no difficulty swallowing change in voice neck pain or stiffness he is not dizzy he has no chest pain shortness of breath or other symptoms  He otherwise denies a complete review systems as noted            Prior to Admission Medications   Prescriptions Last Dose Informant Patient Reported? Taking?    B Complex Vitamins (VITAMIN B COMPLEX) TABS   Yes No   Sig: Take 1 tablet by mouth daily   Cholecalciferol (VITAMIN D-3 PO)   Yes No   Sig: Take 2,000 Units by mouth 3 (three) times a day before meals   Coenzyme Q10 (COQ-10) 10 MG CAPS   Yes No   Sig: Take 1 capsule by mouth Daily   acetaminophen-codeine (TYLENOL #3) 300-30 mg per tablet   No No   Sig: Take 1 tablet by mouth every 4 (four) hours as needed for moderate pain for up to 10 days   amLODIPine (NORVASC) 5 mg tablet   Yes No   Sig: Take 1 tablet by mouth daily   aspirin 81 MG tablet   Yes No   Sig: Take 2 tablets by mouth daily   atorvastatin (LIPITOR) 80 mg tablet   Yes No   Sig: Take 1 tablet by mouth daily   cephalexin (KEFLEX) 500 mg capsule   No No Sig: Take 1 capsule (500 mg total) by mouth every 8 (eight) hours for 3 days   clopidogrel (PLAVIX) 75 mg tablet   Yes No   Sig: Take 75 mg by mouth daily   co-enzyme Q-10 30 MG capsule  Self Yes No   Sig: Take 30 mg by mouth daily   fluticasone (FLONASE) 50 mcg/act nasal spray   Yes No   Si sprays into each nostril daily   hydrocortisone-pramoxine (ANALPRAM-HC) 2 5-1 % rectal cream   Yes No   Sig: Insert into the rectum   metoprolol succinate (TOPROL-XL) 25 mg 24 hr tablet  Self Yes No   Sig: Take 25 mg by mouth daily   nitroglycerin (NITROSTAT) 0 4 mg SL tablet   Yes No   Sig: Place 0 4 mg under the tongue every 5 (five) minutes as needed for chest pain   omega-3-acid ethyl esters (LOVAZA) 1 g capsule   Yes No   Sig: Take 2 g by mouth daily   valsartan (DIOVAN) 80 mg tablet   Yes No   Sig: Take 1 tablet by mouth daily      Facility-Administered Medications: None       Past Medical History:   Diagnosis Date    Abnormal weight loss     Last Assessed: 2014    Arthritis     Basal cell carcinoma     Last Assessed: 2016    Bursitis     left hip pain    Cancer (HCC)     BCA- back, left forearm, skin    Cardiomyopathy ([facility] [address] )     Chest discomfort     Last Assessed: 2016    Chest pain     Last Assessed: 2013    Coronary artery disease     Ecchymosis     Last Assessed: 2016    Exertional dyspnea     Last Assessed: 2016    Hepatic hemangioma     Herniation of lumbar intervertebral disc     Hyperlipidemia     Hypertension     Nonmelanoma skin cancer     Last Assessed: 12/15/2017    Pleural effusion     Bilateral; Last Assessed: 2016    Pulmonary nodule     multiple nodes;  Last Assessesd: 2016    Shortness of breath     Vitamin D deficiency        Past Surgical History:   Procedure Laterality Date    CARPAL TUNNEL RELEASE Bilateral     CORONARY ARTERY BYPASS GRAFT  2011    CORONARY ARTERY BYPASS GRAFT      SD REPAIR INCISIONAL HERNIA,REDUCIBLE Bilateral 4/11/2017    Procedure: LAPAROSCOPIC INGUINAL HERNIA REPAIR WITH MESH ;  Surgeon: Stoney Oliva MD;  Location: [facility];  Service: General       Family History   Problem Relation Age of Onset    Heart disease Mother     Hypertension Mother      I have reviewed and agree with the history as documented  Social History   Substance Use Topics    Smoking status: Former Smoker     Quit date: 4/11/1980    Smokeless tobacco: Never Used      Comment: quit date 1970 per allscripts    Alcohol use 0 6 oz/week     1 Glasses of wine per week      Comment: daily        Review of Systems   Constitutional: Negative for activity change, appetite change, chills and fever  HENT: Negative for ear pain, facial swelling, mouth sores, nosebleeds, trouble swallowing and voice change  Eyes: Negative for photophobia, pain and visual disturbance  Respiratory: Negative for cough and shortness of breath  Cardiovascular: Negative for chest pain and palpitations  Gastrointestinal: Negative for nausea and vomiting  Musculoskeletal: Negative for neck pain and neck stiffness  Skin: Negative for color change and rash  Neurological: Negative for dizziness, facial asymmetry, weakness, light-headedness and headaches  Hematological: Negative for adenopathy  Does not bruise/bleed easily  Psychiatric/Behavioral: Negative for agitation and behavioral problems  All other systems reviewed and are negative  Physical Exam  ED Triage Vitals [02/26/18 1052]   Temperature Pulse Respirations Blood Pressure SpO2   97 6 °F (36 4 °C) (!) 110 18 151/77 96 %      Temp Source Heart Rate Source Patient Position - Orthostatic VS BP Location FiO2 (%)   Oral Monitor Sitting Left arm --      Pain Score       No Pain           Orthostatic Vital Signs  Vitals:    02/26/18 1052   BP: 151/77   Pulse: (!) 110   Patient Position - Orthostatic VS: Sitting       Physical Exam   Constitutional: He is oriented to person, place, and time   He appears well-developed and well-nourished  No distress  Well-appearing conversational no acute distress, his wife is at bedside   HENT:   Head: Normocephalic and atraumatic  Right Ear: External ear normal    Left Ear: External ear normal    Merocel packing left nostril in place it is not saturated there is no oozing head and neck exam otherwise unremarkable   Eyes: EOM are normal  Pupils are equal, round, and reactive to light  Neck: Normal range of motion  Neck supple  No tracheal deviation present  Cardiovascular: Normal rate, regular rhythm and normal heart sounds  Exam reveals no gallop and no friction rub  No murmur heard  Pulmonary/Chest: Effort normal and breath sounds normal  He has no wheezes  He has no rales  Musculoskeletal: Normal range of motion  He exhibits no edema or tenderness  Neurological: He is alert and oriented to person, place, and time  No cranial nerve deficit  He exhibits normal muscle tone  Coordination normal    Skin: Skin is warm and dry  No rash noted  Psychiatric: He has a normal mood and affect  His behavior is normal    Nursing note and vitals reviewed        ED Medications  Medications   oxymetazoline (AFRIN) 0 05 % nasal spray 2 spray (2 sprays Each Nare Given by Other 2/26/18 1304)   neomycin-bacitracin-polymyxin b (NEOSPORIN) ointment 1 large application (1 large application Topical Given by Other 2/26/18 1303)       Diagnostic Studies  Results Reviewed     None                 No orders to display              Procedures  Procedures       Phone Contacts  ED Phone Contact    ED Course  ED Course as of Feb 27 2236   Mon Feb 26, 2018   1257 Packing removed by myself understands agrees to discharge return instructions given nasal clamp supplies, no subsequent bleeding will follow up with ENT                                MDM  Number of Diagnoses or Management Options  Encounter for removal of nasal packing:   Diagnosis management comments: 27-year-old male on aspirin for packing removal, seen evaluated by myself 3 days ago had failed conservative treatment and cauterization for recurrent anterior epistaxis, had large area of excoriation with persistent oozing in the emergency department, packing was placed by myself given prophylactic antibiotics, patient instructed to return to the emergency department by ENT he has no further complaints no subsequent bleeding or oozing, Afrin was applied to the packing, the packing was saturated with normal saline, gentle pressure was applied removed, patient was observed without subsequent bleeding previously noted excoriation significantly improved without evidence of ongoing bleeding given Afrin to go home with, topical antibiotic ointment, nasal clip, close return instructions, ENT follow-up and Lauri, patient wife agreeable to plan    CritCare Time    Disposition  Final diagnoses:   Encounter for removal of nasal packing     Time reflects when diagnosis was documented in both MDM as applicable and the Disposition within this note     Time User Action Codes Description Comment    2/26/2018 12:58 PM Hannah Kemp Add [Z48 00] Encounter for removal of nasal packing       ED Disposition     ED Disposition Condition Comment    Discharge  Chaz Morris discharge to home/self care  Condition at discharge: Good        Follow-up Information     Follow up With Specialties Details Why Contact Info Additional Information    [facility] Emergency Department Emergency Medicine  If symptoms worsen [place] [address] ED, [address] [place] [address], Raejsh Yi MD Otolaryngology In 3 days As needed [address] [address]  [phone]           Discharge Medication List as of 2/26/2018 12:59 PM      CONTINUE these medications which have NOT CHANGED    Details   acetaminophen-codeine (TYLENOL #3) 300-30 mg per tablet Take 1 tablet by mouth every 4 (four) hours as needed for moderate pain for up to 10 days, Starting 4/11/2017, Until Fri 4/21/17, Print      amLODIPine (NORVASC) 5 mg tablet Take 1 tablet by mouth daily, Starting Mon 7/6/2015, Historical Med      aspirin 81 MG tablet Take 2 tablets by mouth daily, Historical Med      atorvastatin (LIPITOR) 80 mg tablet Take 1 tablet by mouth daily, Starting Thu 3/17/2016, Historical Med      B Complex Vitamins (VITAMIN B COMPLEX) TABS Take 1 tablet by mouth daily, Historical Med      cephalexin (KEFLEX) 500 mg capsule Take 1 capsule (500 mg total) by mouth every 8 (eight) hours for 3 days, Starting Sat 2/24/2018, Until Tue 2/27/2018, Print      Cholecalciferol (VITAMIN D-3 PO) Take 2,000 Units by mouth 3 (three) times a day before meals, Until Discontinued, Historical Med      clopidogrel (PLAVIX) 75 mg tablet Take 75 mg by mouth daily, Until Discontinued, Historical Med      !! co-enzyme Q-10 30 MG capsule Take 30 mg by mouth daily, Until Discontinued, Historical Med      !! Coenzyme Q10 (COQ-10) 10 MG CAPS Take 1 capsule by mouth Daily, Historical Med      fluticasone (FLONASE) 50 mcg/act nasal spray 2 sprays into each nostril daily, Until Discontinued, Historical Med      hydrocortisone-pramoxine (ANALPRAM-HC) 2 5-1 % rectal cream Insert into the rectum, Starting Wed 4/16/2014, Historical Med      metoprolol succinate (TOPROL-XL) 25 mg 24 hr tablet Take 25 mg by mouth daily, Until Discontinued, Historical Med      nitroglycerin (NITROSTAT) 0 4 mg SL tablet Place 0 4 mg under the tongue every 5 (five) minutes as needed for chest pain, Until Discontinued, Historical Med      omega-3-acid ethyl esters (LOVAZA) 1 g capsule Take 2 g by mouth daily, Until Discontinued, Historical Med      valsartan (DIOVAN) 80 mg tablet Take 1 tablet by mouth daily, Starting Thu 4/3/2014, Historical Med       !! - Potential duplicate medications found  Please discuss with provider          No discharge procedures on file      ED Provider  Electronically Signed by           Donnell Bergman DO  02/27/18 2384

## 2018-02-27 ENCOUNTER — TELEPHONE (OUTPATIENT)
Dept: INTERNAL MEDICINE CLINIC | Facility: CLINIC | Age: 83
End: 2018-02-27

## 2018-02-27 DIAGNOSIS — I10 HYPERTENSION, UNSPECIFIED TYPE: Primary | ICD-10-CM

## 2018-02-27 NOTE — TELEPHONE ENCOUNTER
Pt would need a bun creatine done today or tommorow in order to have mri scheduled for Thursday please        His last cmp was 12/8, I think that's too old

## 2018-02-28 ENCOUNTER — APPOINTMENT (OUTPATIENT)
Dept: LAB | Facility: HOSPITAL | Age: 83
End: 2018-02-28
Attending: INTERNAL MEDICINE
Payer: MEDICARE

## 2018-02-28 DIAGNOSIS — I10 HYPERTENSION, UNSPECIFIED TYPE: ICD-10-CM

## 2018-02-28 DIAGNOSIS — R04.0 EPISTAXIS: ICD-10-CM

## 2018-02-28 DIAGNOSIS — I25.10 2-VESSEL CORONARY ARTERY DISEASE: ICD-10-CM

## 2018-02-28 LAB
ALBUMIN SERPL BCP-MCNC: 3.5 G/DL (ref 3.5–5)
ALP SERPL-CCNC: 103 U/L (ref 46–116)
ALT SERPL W P-5'-P-CCNC: 22 U/L (ref 12–78)
ANION GAP SERPL CALCULATED.3IONS-SCNC: 8 MMOL/L (ref 4–13)
AST SERPL W P-5'-P-CCNC: 18 U/L (ref 5–45)
BASOPHILS # BLD AUTO: 0.04 THOUSANDS/ΜL (ref 0–0.1)
BASOPHILS NFR BLD AUTO: 1 % (ref 0–1)
BILIRUB SERPL-MCNC: 0.7 MG/DL (ref 0.2–1)
BUN SERPL-MCNC: 25 MG/DL (ref 5–25)
CALCIUM SERPL-MCNC: 8.9 MG/DL (ref 8.3–10.1)
CHLORIDE SERPL-SCNC: 105 MMOL/L (ref 100–108)
CO2 SERPL-SCNC: 29 MMOL/L (ref 21–32)
CREAT SERPL-MCNC: 0.99 MG/DL (ref 0.6–1.3)
EOSINOPHIL # BLD AUTO: 0.21 THOUSAND/ΜL (ref 0–0.61)
EOSINOPHIL NFR BLD AUTO: 3 % (ref 0–6)
ERYTHROCYTE [DISTWIDTH] IN BLOOD BY AUTOMATED COUNT: 12.6 % (ref 11.6–15.1)
GFR SERPL CREATININE-BSD FRML MDRD: 70 ML/MIN/1.73SQ M
GLUCOSE SERPL-MCNC: 93 MG/DL (ref 65–140)
HCT VFR BLD AUTO: 41.2 % (ref 36.5–49.3)
HGB BLD-MCNC: 13.3 G/DL (ref 12–17)
LDLC SERPL DIRECT ASSAY-MCNC: 36 MG/DL (ref 0–100)
LYMPHOCYTES # BLD AUTO: 1.67 THOUSANDS/ΜL (ref 0.6–4.47)
LYMPHOCYTES NFR BLD AUTO: 24 % (ref 14–44)
MCH RBC QN AUTO: 30.4 PG (ref 26.8–34.3)
MCHC RBC AUTO-ENTMCNC: 32.3 G/DL (ref 31.4–37.4)
MCV RBC AUTO: 94 FL (ref 82–98)
MONOCYTES # BLD AUTO: 0.55 THOUSAND/ΜL (ref 0.17–1.22)
MONOCYTES NFR BLD AUTO: 8 % (ref 4–12)
NEUTROPHILS # BLD AUTO: 4.42 THOUSANDS/ΜL (ref 1.85–7.62)
NEUTS SEG NFR BLD AUTO: 64 % (ref 43–75)
NRBC BLD AUTO-RTO: 0 /100 WBCS
PHOSPHATE SERPL-MCNC: 3.7 MG/DL (ref 2.3–4.1)
PLATELET # BLD AUTO: 199 THOUSANDS/UL (ref 149–390)
PMV BLD AUTO: 9.7 FL (ref 8.9–12.7)
POTASSIUM SERPL-SCNC: 4.2 MMOL/L (ref 3.5–5.3)
PROT SERPL-MCNC: 7.2 G/DL (ref 6.4–8.2)
RBC # BLD AUTO: 4.37 MILLION/UL (ref 3.88–5.62)
SODIUM SERPL-SCNC: 142 MMOL/L (ref 136–145)
TRIGL SERPL-MCNC: 45 MG/DL
WBC # BLD AUTO: 6.9 THOUSAND/UL (ref 4.31–10.16)

## 2018-02-28 PROCEDURE — 83721 ASSAY OF BLOOD LIPOPROTEIN: CPT

## 2018-02-28 PROCEDURE — 80053 COMPREHEN METABOLIC PANEL: CPT

## 2018-02-28 PROCEDURE — 85025 COMPLETE CBC W/AUTO DIFF WBC: CPT

## 2018-02-28 PROCEDURE — 84478 ASSAY OF TRIGLYCERIDES: CPT

## 2018-02-28 PROCEDURE — 84100 ASSAY OF PHOSPHORUS: CPT

## 2018-02-28 PROCEDURE — 36415 COLL VENOUS BLD VENIPUNCTURE: CPT

## 2018-03-01 ENCOUNTER — HOSPITAL ENCOUNTER (OUTPATIENT)
Dept: MRI IMAGING | Facility: HOSPITAL | Age: 83
Discharge: HOME | End: 2018-03-01
Attending: INTERNAL MEDICINE
Payer: MEDICARE

## 2018-03-01 DIAGNOSIS — R17 ELEVATED BILIRUBIN: ICD-10-CM

## 2018-03-01 PROCEDURE — 74183 MRI ABD W/O CNTR FLWD CNTR: CPT

## 2018-03-01 PROCEDURE — 76377 3D RENDER W/INTRP POSTPROCES: CPT

## 2018-03-01 PROCEDURE — A9585 GADOBUTROL INJECTION: HCPCS | Performed by: INTERNAL MEDICINE

## 2018-03-01 RX ADMIN — GADOBUTROL 6 ML: 604.72 INJECTION INTRAVENOUS at 10:31

## 2018-04-03 DIAGNOSIS — I10 HYPERTENSION, UNSPECIFIED TYPE: Primary | ICD-10-CM

## 2018-04-03 RX ORDER — VALSARTAN 80 MG/1
TABLET ORAL
Qty: 90 TABLET | Refills: 3 | Status: SHIPPED | OUTPATIENT
Start: 2018-04-03 | End: 2018-10-09 | Stop reason: SDUPTHER

## 2018-04-26 DIAGNOSIS — E78.5 HYPERLIPIDEMIA, UNSPECIFIED HYPERLIPIDEMIA TYPE: Primary | ICD-10-CM

## 2018-04-26 RX ORDER — ATORVASTATIN CALCIUM 80 MG/1
TABLET, FILM COATED ORAL
Qty: 90 TABLET | Refills: 3 | Status: SHIPPED | OUTPATIENT
Start: 2018-04-26 | End: 2018-10-09 | Stop reason: SDUPTHER

## 2018-05-31 NOTE — RESULT NOTES
PFT Results v2:   Diagnosis/Reason For Study: Dyspnea   Referring Provider: Dr Fernando James   Spirometry: Forced vital capacity: 3 60L and 114% Predicted Values  Forced expiratory volume in one second: 2 75L and 125% Predicted Value  FEV1/FVC ratio is 107% Predicted Values  Post Bronchodilator Spirometry: Forced vital capacity : 3 45L and 109% Predicted Values  Forced expiratory volume in one second : 2 73L and 124% Predicted Value  FEV1/FVC ratio is 111% Predicted Values  Lung Volumes: Total lung capacity : 6 20L and 102% Predicted Values  RV: 116% Predicted Values  RV/T% Predicted Values  DLCO:   DLCO 81% Predicted Values  PFT Interpretation:   Patient had a full lung function testing with spirometry lung volumes and DLCO  Patient gave a good effort  Results meet the ATS standards for acceptability and repeat ability  The flow volume curve is normal   There is no obstructive or restrictive ventilatory limitation  The lung volumes and DLCO are normal   Clinical correlation required        Future Appointments    Date/Time Provider Specialty Site   2016 09:15 AM Dia Quispe DO Internal Medicine [place] [address]      Electronically signed by : NOLAN Gómez ; Mar  9 2016  7:32PM EST                       (Author) Detail Level: Zone

## 2018-06-30 DIAGNOSIS — I10 HYPERTENSION, UNSPECIFIED TYPE: Primary | ICD-10-CM

## 2018-07-02 RX ORDER — AMLODIPINE BESYLATE 5 MG/1
TABLET ORAL
Qty: 90 TABLET | Refills: 3 | Status: SHIPPED | OUTPATIENT
Start: 2018-07-02 | End: 2018-10-09 | Stop reason: SDUPTHER

## 2018-08-30 ENCOUNTER — APPOINTMENT (OUTPATIENT)
Dept: LAB | Facility: HOSPITAL | Age: 83
End: 2018-08-30
Attending: INTERNAL MEDICINE
Payer: MEDICARE

## 2018-08-30 ENCOUNTER — TRANSCRIBE ORDERS (OUTPATIENT)
Dept: ADMINISTRATIVE | Facility: HOSPITAL | Age: 83
End: 2018-08-30

## 2018-08-30 DIAGNOSIS — R04.0 EPISTAXIS: Primary | ICD-10-CM

## 2018-08-30 DIAGNOSIS — I10 ESSENTIAL HYPERTENSION, MALIGNANT: ICD-10-CM

## 2018-08-30 DIAGNOSIS — I25.119 ATHEROSCLEROSIS OF NATIVE CORONARY ARTERY WITH ANGINA PECTORIS, UNSPECIFIED WHETHER NATIVE OR TRANSPLANTED HEART (HCC): ICD-10-CM

## 2018-08-30 DIAGNOSIS — R04.0 EPISTAXIS: ICD-10-CM

## 2018-08-30 LAB
ALBUMIN SERPL BCP-MCNC: 3.5 G/DL (ref 3.5–5)
ALP SERPL-CCNC: 88 U/L (ref 46–116)
ALT SERPL W P-5'-P-CCNC: 24 U/L (ref 12–78)
ANION GAP SERPL CALCULATED.3IONS-SCNC: 4 MMOL/L (ref 4–13)
AST SERPL W P-5'-P-CCNC: 23 U/L (ref 5–45)
BASOPHILS # BLD AUTO: 0.03 THOUSANDS/ΜL (ref 0–0.1)
BASOPHILS NFR BLD AUTO: 1 % (ref 0–1)
BILIRUB SERPL-MCNC: 1 MG/DL (ref 0.2–1)
BUN SERPL-MCNC: 20 MG/DL (ref 5–25)
CALCIUM SERPL-MCNC: 8.6 MG/DL (ref 8.3–10.1)
CHLORIDE SERPL-SCNC: 104 MMOL/L (ref 100–108)
CO2 SERPL-SCNC: 31 MMOL/L (ref 21–32)
CREAT SERPL-MCNC: 0.99 MG/DL (ref 0.6–1.3)
EOSINOPHIL # BLD AUTO: 0.12 THOUSAND/ΜL (ref 0–0.61)
EOSINOPHIL NFR BLD AUTO: 2 % (ref 0–6)
ERYTHROCYTE [DISTWIDTH] IN BLOOD BY AUTOMATED COUNT: 12.8 % (ref 11.6–15.1)
GFR SERPL CREATININE-BSD FRML MDRD: 70 ML/MIN/1.73SQ M
GLUCOSE P FAST SERPL-MCNC: 95 MG/DL (ref 65–99)
HCT VFR BLD AUTO: 39.8 % (ref 36.5–49.3)
HGB BLD-MCNC: 13.2 G/DL (ref 12–17)
IMM GRANULOCYTES # BLD AUTO: 0.02 THOUSAND/UL (ref 0–0.2)
IMM GRANULOCYTES NFR BLD AUTO: 0 % (ref 0–2)
LDLC SERPL DIRECT ASSAY-MCNC: 35 MG/DL (ref 0–100)
LYMPHOCYTES # BLD AUTO: 1.86 THOUSANDS/ΜL (ref 0.6–4.47)
LYMPHOCYTES NFR BLD AUTO: 30 % (ref 14–44)
MCH RBC QN AUTO: 30.8 PG (ref 26.8–34.3)
MCHC RBC AUTO-ENTMCNC: 33.2 G/DL (ref 31.4–37.4)
MCV RBC AUTO: 93 FL (ref 82–98)
MONOCYTES # BLD AUTO: 0.61 THOUSAND/ΜL (ref 0.17–1.22)
MONOCYTES NFR BLD AUTO: 10 % (ref 4–12)
NEUTROPHILS # BLD AUTO: 3.66 THOUSANDS/ΜL (ref 1.85–7.62)
NEUTS SEG NFR BLD AUTO: 57 % (ref 43–75)
NRBC BLD AUTO-RTO: 0 /100 WBCS
PLATELET # BLD AUTO: 179 THOUSANDS/UL (ref 149–390)
PMV BLD AUTO: 9.9 FL (ref 8.9–12.7)
POTASSIUM SERPL-SCNC: 4.2 MMOL/L (ref 3.5–5.3)
PROT SERPL-MCNC: 7 G/DL (ref 6.4–8.2)
RBC # BLD AUTO: 4.28 MILLION/UL (ref 3.88–5.62)
SODIUM SERPL-SCNC: 139 MMOL/L (ref 136–145)
TRIGL SERPL-MCNC: 37 MG/DL
WBC # BLD AUTO: 6.3 THOUSAND/UL (ref 4.31–10.16)

## 2018-08-30 PROCEDURE — 80053 COMPREHEN METABOLIC PANEL: CPT

## 2018-08-30 PROCEDURE — 36415 COLL VENOUS BLD VENIPUNCTURE: CPT

## 2018-08-30 PROCEDURE — 84478 ASSAY OF TRIGLYCERIDES: CPT

## 2018-08-30 PROCEDURE — 83721 ASSAY OF BLOOD LIPOPROTEIN: CPT

## 2018-08-30 PROCEDURE — 85025 COMPLETE CBC W/AUTO DIFF WBC: CPT

## 2018-09-08 DIAGNOSIS — I25.10 2-VESSEL CORONARY ARTERY DISEASE: Primary | ICD-10-CM

## 2018-09-08 DIAGNOSIS — I10 HYPERTENSION, UNSPECIFIED TYPE: ICD-10-CM

## 2018-09-11 RX ORDER — METOPROLOL SUCCINATE 25 MG/1
TABLET, EXTENDED RELEASE ORAL
Qty: 90 TABLET | Refills: 0 | Status: SHIPPED | OUTPATIENT
Start: 2018-09-11 | End: 2018-10-09 | Stop reason: SDUPTHER

## 2018-09-12 ENCOUNTER — CLINICAL SUPPORT (OUTPATIENT)
Dept: INTERNAL MEDICINE CLINIC | Facility: CLINIC | Age: 83
End: 2018-09-12
Payer: MEDICARE

## 2018-09-12 DIAGNOSIS — Z23 NEED FOR IMMUNIZATION AGAINST INFLUENZA: Primary | ICD-10-CM

## 2018-09-12 PROCEDURE — 90662 IIV NO PRSV INCREASED AG IM: CPT

## 2018-09-12 PROCEDURE — G0008 ADMIN INFLUENZA VIRUS VAC: HCPCS

## 2018-10-09 ENCOUNTER — OFFICE VISIT (OUTPATIENT)
Dept: INTERNAL MEDICINE CLINIC | Facility: CLINIC | Age: 83
End: 2018-10-09
Payer: MEDICARE

## 2018-10-09 VITALS
HEART RATE: 72 BPM | WEIGHT: 155 LBS | TEMPERATURE: 97.4 F | OXYGEN SATURATION: 98 % | BODY MASS INDEX: 25.83 KG/M2 | SYSTOLIC BLOOD PRESSURE: 130 MMHG | RESPIRATION RATE: 16 BRPM | DIASTOLIC BLOOD PRESSURE: 70 MMHG | HEIGHT: 65 IN

## 2018-10-09 DIAGNOSIS — R01.1 MURMUR, CARDIAC: ICD-10-CM

## 2018-10-09 DIAGNOSIS — E78.5 HYPERLIPIDEMIA, UNSPECIFIED HYPERLIPIDEMIA TYPE: Primary | ICD-10-CM

## 2018-10-09 DIAGNOSIS — Z95.5 HISTORY OF HEART ARTERY STENT: ICD-10-CM

## 2018-10-09 DIAGNOSIS — I25.10 PRESENCE OF STENT IN CORONARY ARTERY IN PATIENT WITH CORONARY ARTERY DISEASE: ICD-10-CM

## 2018-10-09 DIAGNOSIS — I10 HYPERTENSION, UNSPECIFIED TYPE: ICD-10-CM

## 2018-10-09 DIAGNOSIS — I25.10 2-VESSEL CORONARY ARTERY DISEASE: ICD-10-CM

## 2018-10-09 DIAGNOSIS — Z95.5 PRESENCE OF STENT IN CORONARY ARTERY IN PATIENT WITH CORONARY ARTERY DISEASE: ICD-10-CM

## 2018-10-09 DIAGNOSIS — R17 ELEVATED BILIRUBIN: ICD-10-CM

## 2018-10-09 DIAGNOSIS — E55.9 VITAMIN D DEFICIENCY: ICD-10-CM

## 2018-10-09 PROBLEM — K40.20 NON-RECURRENT BILATERAL INGUINAL HERNIA WITHOUT OBSTRUCTION OR GANGRENE: Status: RESOLVED | Noted: 2017-04-11 | Resolved: 2018-10-09

## 2018-10-09 PROCEDURE — 99214 OFFICE O/P EST MOD 30 MIN: CPT | Performed by: INTERNAL MEDICINE

## 2018-10-09 RX ORDER — METOPROLOL SUCCINATE 25 MG/1
12.5 TABLET, EXTENDED RELEASE ORAL DAILY
Qty: 90 TABLET | Refills: 2 | Status: SHIPPED | OUTPATIENT
Start: 2018-10-09 | End: 2019-02-28 | Stop reason: SDUPTHER

## 2018-10-09 RX ORDER — VALSARTAN 80 MG/1
80 TABLET ORAL DAILY
Qty: 90 TABLET | Refills: 2 | Status: SHIPPED | OUTPATIENT
Start: 2018-10-09 | End: 2019-03-01 | Stop reason: SDUPTHER

## 2018-10-09 RX ORDER — CLOPIDOGREL BISULFATE 75 MG/1
75 TABLET ORAL DAILY
Qty: 90 TABLET | Refills: 2 | Status: SHIPPED | OUTPATIENT
Start: 2018-10-09 | End: 2019-06-26 | Stop reason: ALTCHOICE

## 2018-10-09 RX ORDER — AMLODIPINE BESYLATE 5 MG/1
5 TABLET ORAL DAILY
Qty: 90 TABLET | Refills: 2 | Status: SHIPPED | OUTPATIENT
Start: 2018-10-09 | End: 2019-02-28 | Stop reason: SDUPTHER

## 2018-10-09 RX ORDER — ATORVASTATIN CALCIUM 80 MG/1
80 TABLET, FILM COATED ORAL DAILY
Qty: 90 TABLET | Refills: 2 | Status: SHIPPED | OUTPATIENT
Start: 2018-10-09 | End: 2019-02-28 | Stop reason: SDUPTHER

## 2018-10-09 NOTE — LETTER
October 9, 2018     Gee Schwartz MD  [address] [facility] [address]    Patient: Kamlesh Steven   YOB: 1934   Date of Visit: 10/9/2018       Dear Dr Elizabeth Rodriguez: Thank you for referring Kamlesh Steven to me for evaluation  Below are my notes for this consultation  If you have questions, please do not hesitate to call me  I look forward to following your patient along with you  Sincerely,        Debi Cordero DO        CC: No Recipients  Debi Cordero DO  10/9/2018 11:22 AM  Sign at close encounter  Assessment/Plan:    1  History of stents patient is on beta-blocker generic Plavix and a statin is asymptomatic good follow-up with Cardiology  2  Patient with cardiac murmur echocardiogram ordered his established patient of Dr Elizabeth Rodriguez will be visiting with him to see if he has had a recent echo or not Dr Elizabeth Rodriguez will decide  3  Hypertension is at goal he is on valsartan will be checking with pharmacy to be sure who that is medications not all recall  4  Vitamin-D deficiency recheck in 6 months continue vitamin-D replacement       Diagnoses and all orders for this visit:    Hyperlipidemia, unspecified hyperlipidemia type  -     atorvastatin (LIPITOR) 80 mg tablet; Take 1 tablet (80 mg total) by mouth daily  -     CBC and differential; Future  -     Comprehensive metabolic panel; Future  -     Vitamin D 25 hydroxy; Future  -     TSH, 3rd generation with Free T4 reflex; Future  -     Lipid Panel with Direct LDL reflex; Future  -     LDL cholesterol, direct; Future    Hypertension, unspecified type  -     amLODIPine (NORVASC) 5 mg tablet; Take 1 tablet (5 mg total) by mouth daily  -     metoprolol succinate (TOPROL-XL) 25 mg 24 hr tablet; Take 0 5 tablets (12 5 mg total) by mouth daily  -     valsartan (DIOVAN) 80 mg tablet; Take 1 tablet (80 mg total) by mouth daily  -     CBC and differential; Future  -     Comprehensive metabolic panel;  Future  - Vitamin D 25 hydroxy; Future  -     TSH, 3rd generation with Free T4 reflex; Future  -     Lipid Panel with Direct LDL reflex; Future  -     LDL cholesterol, direct; Future    Vitamin D deficiency  -     CBC and differential; Future  -     Comprehensive metabolic panel; Future  -     Vitamin D 25 hydroxy; Future  -     TSH, 3rd generation with Free T4 reflex; Future  -     Lipid Panel with Direct LDL reflex; Future  -     LDL cholesterol, direct; Future    History of heart artery stent  -     CBC and differential; Future  -     Comprehensive metabolic panel; Future  -     Vitamin D 25 hydroxy; Future  -     TSH, 3rd generation with Free T4 reflex; Future  -     Lipid Panel with Direct LDL reflex; Future  -     LDL cholesterol, direct; Future    Elevated bilirubin  -     CBC and differential; Future  -     Comprehensive metabolic panel; Future  -     Vitamin D 25 hydroxy; Future  -     TSH, 3rd generation with Free T4 reflex; Future  -     Lipid Panel with Direct LDL reflex; Future  -     LDL cholesterol, direct; Future    2-vessel coronary artery disease  -     clopidogrel (PLAVIX) 75 mg tablet; Take 1 tablet (75 mg total) by mouth daily  -     metoprolol succinate (TOPROL-XL) 25 mg 24 hr tablet; Take 0 5 tablets (12 5 mg total) by mouth daily  -     CBC and differential; Future  -     Comprehensive metabolic panel; Future  -     Vitamin D 25 hydroxy; Future  -     TSH, 3rd generation with Free T4 reflex; Future  -     Lipid Panel with Direct LDL reflex; Future  -     LDL cholesterol, direct; Future    Presence of stent in coronary artery in patient with coronary artery disease  -     clopidogrel (PLAVIX) 75 mg tablet; Take 1 tablet (75 mg total) by mouth daily  -     CBC and differential; Future  -     Comprehensive metabolic panel; Future  -     Vitamin D 25 hydroxy; Future  -     TSH, 3rd generation with Free T4 reflex; Future  -     Lipid Panel with Direct LDL reflex;  Future  -     LDL cholesterol, direct; Future    Murmur, cardiac  -     Echo complete with contrast if indicated; Future         Scheduled Meds:  Continuous Infusions:  No current facility-administered medications for this visit  PRN Meds:   Scheduled Meds:  Continuous Infusions:  No current facility-administered medications for this visit     Scheduled Meds:    Current Outpatient Prescriptions:     amLODIPine (NORVASC) 5 mg tablet, Take 1 tablet (5 mg total) by mouth daily, Disp: 90 tablet, Rfl: 2    aspirin 81 MG tablet, Take 2 tablets by mouth daily, Disp: , Rfl:     atorvastatin (LIPITOR) 80 mg tablet, Take 1 tablet (80 mg total) by mouth daily, Disp: 90 tablet, Rfl: 2    B Complex Vitamins (VITAMIN B COMPLEX) TABS, Take 1 tablet by mouth daily, Disp: , Rfl:     Cholecalciferol (VITAMIN D-3 PO), Take 2,000 Units by mouth 3 (three) times a day before meals, Disp: , Rfl:     clopidogrel (PLAVIX) 75 mg tablet, Take 1 tablet (75 mg total) by mouth daily, Disp: 90 tablet, Rfl: 2    co-enzyme Q-10 30 MG capsule, Take 30 mg by mouth daily, Disp: , Rfl:     Coenzyme Q10 (COQ-10) 10 MG CAPS, Take 1 capsule by mouth Daily, Disp: , Rfl:     fluticasone (FLONASE) 50 mcg/act nasal spray, 2 sprays into each nostril daily, Disp: , Rfl:     hydrocortisone-pramoxine (ANALPRAM-HC) 2 5-1 % rectal cream, Insert into the rectum, Disp: , Rfl:     metoprolol succinate (TOPROL-XL) 25 mg 24 hr tablet, Take 0 5 tablets (12 5 mg total) by mouth daily, Disp: 90 tablet, Rfl: 2    nitroglycerin (NITROSTAT) 0 4 mg SL tablet, Place 0 4 mg under the tongue every 5 (five) minutes as needed for chest pain, Disp: , Rfl:     omega-3-acid ethyl esters (LOVAZA) 1 g capsule, Take 2 g by mouth daily, Disp: , Rfl:     valsartan (DIOVAN) 80 mg tablet, Take 1 tablet (80 mg total) by mouth daily, Disp: 90 tablet, Rfl: 2    acetaminophen-codeine (TYLENOL #3) 300-30 mg per tablet, Take 1 tablet by mouth every 4 (four) hours as needed for moderate pain for up to 10 days, Disp: 20 tablet, Rfl: 0      The patient was counseled regarding instructions for management, risk factor reductions, patient and family education,impressions, risks and benefits of treatment options, side effects of medications, importance of compliance with treatment  The treatment plan was reviewed with the patient/guardian and patient/guardian understands and agrees with the treatment plan  Subjective:      Patient ID: Arti Norton is a 80 y o  male  Tolerating medications well no chest pain or shortness of breath        The following portions of the patient's history were reviewed and updated as appropriate:   He has a past medical history of Abnormal weight loss; Arthritis; Basal cell carcinoma; Bursitis; Cancer ([facility] [address] ); Cardiomyopathy ([facility] [address] ); Chest discomfort; Chest pain; Coronary artery disease; Ecchymosis; Exertional dyspnea; Hepatic hemangioma; Herniation of lumbar intervertebral disc; Hyperlipidemia; Hypertension; Nonmelanoma skin cancer; Pleural effusion; Pulmonary nodule; Shortness of breath; and Vitamin D deficiency  ,   does not have any pertinent problems on file  ,   has a past surgical history that includes Carpal tunnel release (Bilateral); Coronary artery bypass graft (03/24/2011); Coronary artery bypass graft; and pr repair incisional hernia,reducible (Bilateral, 4/11/2017)  ,  family history includes Heart disease in his mother; Hypertension in his mother  ,   reports that he quit smoking about 38 years ago  He has never used smokeless tobacco  He reports that he drinks about 0 6 oz of alcohol per week   He reports that he does not use drugs  ,  has No Known Allergies       Review of Systems   Constitutional: Negative for appetite change, chills, fatigue, fever and unexpected weight change  HENT: Negative for congestion, ear pain, facial swelling, hearing loss, mouth sores, nosebleeds, postnasal drip, rhinorrhea, sinus pain, sore throat, trouble swallowing and voice change      Eyes: Negative for pain, discharge, redness and visual disturbance  Respiratory: Negative for apnea, chest tightness, shortness of breath, wheezing and stridor  Cardiovascular: Negative for chest pain, palpitations and leg swelling  Gastrointestinal: Negative for abdominal distention, abdominal pain, blood in stool, constipation, diarrhea and vomiting  Endocrine: Negative for cold intolerance, heat intolerance, polydipsia, polyphagia and polyuria  Genitourinary: Negative for difficulty urinating, dysuria, flank pain, frequency, genital sores, hematuria and urgency  Musculoskeletal: Negative for arthralgias and back pain  Skin: Negative for rash and wound  Allergic/Immunologic: Negative for environmental allergies, food allergies and immunocompromised state  Neurological: Negative for dizziness, tremors, seizures, syncope, facial asymmetry, speech difficulty, weakness, light-headedness, numbness and headaches  Hematological: Negative for adenopathy  Does not bruise/bleed easily  Psychiatric/Behavioral: Negative for agitation, behavioral problems, dysphoric mood, hallucinations, self-injury, sleep disturbance and suicidal ideas  The patient is not hyperactive  Objective:     Physical Exam   Constitutional: He is oriented to person, place, and time  He appears well-developed  HENT:   Right Ear: External ear normal    Left Ear: External ear normal    Eyes: Right eye exhibits no discharge  Left eye exhibits no discharge  No scleral icterus  Neck: Carotid bruit is not present  No tracheal deviation present  No thyroid mass and no thyromegaly present  Cardiovascular: Normal rate, regular rhythm and intact distal pulses  Exam reveals no gallop and no friction rub  Murmur heard  Systolic murmur is present with a grade of 2/6   Pulmonary/Chest: No respiratory distress  He has no wheezes  He has no rales  Musculoskeletal: He exhibits no edema  Lymphadenopathy:     He has no cervical adenopathy  Neurological: He is alert and oriented to person, place, and time  Coordination normal    Psychiatric: He has a normal mood and affect  His behavior is normal  Judgment and thought content normal    Nursing note and vitals reviewed        Vitals:    10/09/18 1018 10/09/18 1111   BP:  130/70   BP Location:  Left arm   Patient Position:  Sitting   Pulse: 72    Resp: 16    Temp: (!) 97 4 °F (36 3 °C)    SpO2: 98%    Weight: 70 3 kg (155 lb)    Height: 5' 5" (1 651 m)

## 2018-10-09 NOTE — PROGRESS NOTES
Assessment/Plan:    1  History of stents patient is on beta-blocker generic Plavix and a statin is asymptomatic good follow-up with Cardiology  2  Patient with cardiac murmur echocardiogram ordered his established patient of Dr Mary Anne Fernandez will be visiting with him to see if he has had a recent echo or not Dr Mary Anne Fernandez will decide  3  Hypertension is at goal he is on valsartan will be checking with pharmacy to be sure who that is medications not all recall  4  Vitamin-D deficiency recheck in 6 months continue vitamin-D replacement       Diagnoses and all orders for this visit:    Hyperlipidemia, unspecified hyperlipidemia type  -     atorvastatin (LIPITOR) 80 mg tablet; Take 1 tablet (80 mg total) by mouth daily  -     CBC and differential; Future  -     Comprehensive metabolic panel; Future  -     Vitamin D 25 hydroxy; Future  -     TSH, 3rd generation with Free T4 reflex; Future  -     Lipid Panel with Direct LDL reflex; Future  -     LDL cholesterol, direct; Future    Hypertension, unspecified type  -     amLODIPine (NORVASC) 5 mg tablet; Take 1 tablet (5 mg total) by mouth daily  -     metoprolol succinate (TOPROL-XL) 25 mg 24 hr tablet; Take 0 5 tablets (12 5 mg total) by mouth daily  -     valsartan (DIOVAN) 80 mg tablet; Take 1 tablet (80 mg total) by mouth daily  -     CBC and differential; Future  -     Comprehensive metabolic panel; Future  -     Vitamin D 25 hydroxy; Future  -     TSH, 3rd generation with Free T4 reflex; Future  -     Lipid Panel with Direct LDL reflex; Future  -     LDL cholesterol, direct; Future    Vitamin D deficiency  -     CBC and differential; Future  -     Comprehensive metabolic panel; Future  -     Vitamin D 25 hydroxy; Future  -     TSH, 3rd generation with Free T4 reflex; Future  -     Lipid Panel with Direct LDL reflex; Future  -     LDL cholesterol, direct;  Future    History of heart artery stent  -     CBC and differential; Future  -     Comprehensive metabolic panel; Future  -     Vitamin D 25 hydroxy; Future  -     TSH, 3rd generation with Free T4 reflex; Future  -     Lipid Panel with Direct LDL reflex; Future  -     LDL cholesterol, direct; Future    Elevated bilirubin  -     CBC and differential; Future  -     Comprehensive metabolic panel; Future  -     Vitamin D 25 hydroxy; Future  -     TSH, 3rd generation with Free T4 reflex; Future  -     Lipid Panel with Direct LDL reflex; Future  -     LDL cholesterol, direct; Future    2-vessel coronary artery disease  -     clopidogrel (PLAVIX) 75 mg tablet; Take 1 tablet (75 mg total) by mouth daily  -     metoprolol succinate (TOPROL-XL) 25 mg 24 hr tablet; Take 0 5 tablets (12 5 mg total) by mouth daily  -     CBC and differential; Future  -     Comprehensive metabolic panel; Future  -     Vitamin D 25 hydroxy; Future  -     TSH, 3rd generation with Free T4 reflex; Future  -     Lipid Panel with Direct LDL reflex; Future  -     LDL cholesterol, direct; Future    Presence of stent in coronary artery in patient with coronary artery disease  -     clopidogrel (PLAVIX) 75 mg tablet; Take 1 tablet (75 mg total) by mouth daily  -     CBC and differential; Future  -     Comprehensive metabolic panel; Future  -     Vitamin D 25 hydroxy; Future  -     TSH, 3rd generation with Free T4 reflex; Future  -     Lipid Panel with Direct LDL reflex; Future  -     LDL cholesterol, direct; Future    Murmur, cardiac  -     Echo complete with contrast if indicated; Future         Scheduled Meds:  Continuous Infusions:  No current facility-administered medications for this visit  PRN Meds:   Scheduled Meds:  Continuous Infusions:  No current facility-administered medications for this visit     Scheduled Meds:    Current Outpatient Prescriptions:     amLODIPine (NORVASC) 5 mg tablet, Take 1 tablet (5 mg total) by mouth daily, Disp: 90 tablet, Rfl: 2    aspirin 81 MG tablet, Take 2 tablets by mouth daily, Disp: , Rfl:     atorvastatin (LIPITOR) 80 mg tablet, Take 1 tablet (80 mg total) by mouth daily, Disp: 90 tablet, Rfl: 2    B Complex Vitamins (VITAMIN B COMPLEX) TABS, Take 1 tablet by mouth daily, Disp: , Rfl:     Cholecalciferol (VITAMIN D-3 PO), Take 2,000 Units by mouth 3 (three) times a day before meals, Disp: , Rfl:     clopidogrel (PLAVIX) 75 mg tablet, Take 1 tablet (75 mg total) by mouth daily, Disp: 90 tablet, Rfl: 2    co-enzyme Q-10 30 MG capsule, Take 30 mg by mouth daily, Disp: , Rfl:     Coenzyme Q10 (COQ-10) 10 MG CAPS, Take 1 capsule by mouth Daily, Disp: , Rfl:     fluticasone (FLONASE) 50 mcg/act nasal spray, 2 sprays into each nostril daily, Disp: , Rfl:     hydrocortisone-pramoxine (ANALPRAM-HC) 2 5-1 % rectal cream, Insert into the rectum, Disp: , Rfl:     metoprolol succinate (TOPROL-XL) 25 mg 24 hr tablet, Take 0 5 tablets (12 5 mg total) by mouth daily, Disp: 90 tablet, Rfl: 2    nitroglycerin (NITROSTAT) 0 4 mg SL tablet, Place 0 4 mg under the tongue every 5 (five) minutes as needed for chest pain, Disp: , Rfl:     omega-3-acid ethyl esters (LOVAZA) 1 g capsule, Take 2 g by mouth daily, Disp: , Rfl:     valsartan (DIOVAN) 80 mg tablet, Take 1 tablet (80 mg total) by mouth daily, Disp: 90 tablet, Rfl: 2    acetaminophen-codeine (TYLENOL #3) 300-30 mg per tablet, Take 1 tablet by mouth every 4 (four) hours as needed for moderate pain for up to 10 days, Disp: 20 tablet, Rfl: 0      The patient was counseled regarding instructions for management, risk factor reductions, patient and family education,impressions, risks and benefits of treatment options, side effects of medications, importance of compliance with treatment  The treatment plan was reviewed with the patient/guardian and patient/guardian understands and agrees with the treatment plan  Subjective:      Patient ID: Michelle Sandoval is a 80 y o  male      Tolerating medications well no chest pain or shortness of breath        The following portions of the patient's history were reviewed and updated as appropriate:   He has a past medical history of Abnormal weight loss; Arthritis; Basal cell carcinoma; Bursitis; Cancer ([facility] [address] ); Cardiomyopathy ([facility] [address] ); Chest discomfort; Chest pain; Coronary artery disease; Ecchymosis; Exertional dyspnea; Hepatic hemangioma; Herniation of lumbar intervertebral disc; Hyperlipidemia; Hypertension; Nonmelanoma skin cancer; Pleural effusion; Pulmonary nodule; Shortness of breath; and Vitamin D deficiency  ,   does not have any pertinent problems on file  ,   has a past surgical history that includes Carpal tunnel release (Bilateral); Coronary artery bypass graft (03/24/2011); Coronary artery bypass graft; and pr repair incisional hernia,reducible (Bilateral, 4/11/2017)  ,  family history includes Heart disease in his mother; Hypertension in his mother  ,   reports that he quit smoking about 38 years ago  He has never used smokeless tobacco  He reports that he drinks about 0 6 oz of alcohol per week   He reports that he does not use drugs  ,  has No Known Allergies       Review of Systems   Constitutional: Negative for appetite change, chills, fatigue, fever and unexpected weight change  HENT: Negative for congestion, ear pain, facial swelling, hearing loss, mouth sores, nosebleeds, postnasal drip, rhinorrhea, sinus pain, sore throat, trouble swallowing and voice change  Eyes: Negative for pain, discharge, redness and visual disturbance  Respiratory: Negative for apnea, chest tightness, shortness of breath, wheezing and stridor  Cardiovascular: Negative for chest pain, palpitations and leg swelling  Gastrointestinal: Negative for abdominal distention, abdominal pain, blood in stool, constipation, diarrhea and vomiting  Endocrine: Negative for cold intolerance, heat intolerance, polydipsia, polyphagia and polyuria  Genitourinary: Negative for difficulty urinating, dysuria, flank pain, frequency, genital sores, hematuria and urgency  Musculoskeletal: Negative for arthralgias and back pain  Skin: Negative for rash and wound  Allergic/Immunologic: Negative for environmental allergies, food allergies and immunocompromised state  Neurological: Negative for dizziness, tremors, seizures, syncope, facial asymmetry, speech difficulty, weakness, light-headedness, numbness and headaches  Hematological: Negative for adenopathy  Does not bruise/bleed easily  Psychiatric/Behavioral: Negative for agitation, behavioral problems, dysphoric mood, hallucinations, self-injury, sleep disturbance and suicidal ideas  The patient is not hyperactive  Objective:     Physical Exam   Constitutional: He is oriented to person, place, and time  He appears well-developed  HENT:   Right Ear: External ear normal    Left Ear: External ear normal    Eyes: Right eye exhibits no discharge  Left eye exhibits no discharge  No scleral icterus  Neck: Carotid bruit is not present  No tracheal deviation present  No thyroid mass and no thyromegaly present  Cardiovascular: Normal rate, regular rhythm and intact distal pulses  Exam reveals no gallop and no friction rub  Murmur heard  Systolic murmur is present with a grade of 2/6   Pulmonary/Chest: No respiratory distress  He has no wheezes  He has no rales  Musculoskeletal: He exhibits no edema  Lymphadenopathy:     He has no cervical adenopathy  Neurological: He is alert and oriented to person, place, and time  Coordination normal    Psychiatric: He has a normal mood and affect  His behavior is normal  Judgment and thought content normal    Nursing note and vitals reviewed        Vitals:    10/09/18 1018 10/09/18 1111   BP:  130/70   BP Location:  Left arm   Patient Position:  Sitting   Pulse: 72    Resp: 16    Temp: (!) 97 4 °F (36 3 °C)    SpO2: 98%    Weight: 70 3 kg (155 lb)    Height: 5' 5" (1 651 m)

## 2019-02-28 DIAGNOSIS — E78.5 HYPERLIPIDEMIA, UNSPECIFIED HYPERLIPIDEMIA TYPE: ICD-10-CM

## 2019-02-28 DIAGNOSIS — I10 HYPERTENSION, UNSPECIFIED TYPE: ICD-10-CM

## 2019-02-28 DIAGNOSIS — I25.10 2-VESSEL CORONARY ARTERY DISEASE: ICD-10-CM

## 2019-02-28 RX ORDER — VALSARTAN 80 MG/1
80 TABLET ORAL DAILY
Qty: 90 TABLET | Refills: 2 | Status: CANCELLED | OUTPATIENT
Start: 2019-02-28

## 2019-03-01 RX ORDER — AMLODIPINE BESYLATE 5 MG/1
5 TABLET ORAL DAILY
Qty: 90 TABLET | Refills: 2 | Status: SHIPPED | OUTPATIENT
Start: 2019-03-01 | End: 2019-06-26 | Stop reason: SDUPTHER

## 2019-03-01 RX ORDER — METOPROLOL SUCCINATE 25 MG/1
12.5 TABLET, EXTENDED RELEASE ORAL DAILY
Qty: 90 TABLET | Refills: 2 | Status: SHIPPED | OUTPATIENT
Start: 2019-03-01 | End: 2019-06-26 | Stop reason: SDUPTHER

## 2019-03-01 RX ORDER — CANDESARTAN 8 MG/1
8 TABLET ORAL DAILY
Qty: 90 TABLET | Refills: 1 | Status: SHIPPED | OUTPATIENT
Start: 2019-03-01 | End: 2019-03-26

## 2019-03-01 RX ORDER — ATORVASTATIN CALCIUM 80 MG/1
80 TABLET, FILM COATED ORAL DAILY
Qty: 90 TABLET | Refills: 2 | Status: SHIPPED | OUTPATIENT
Start: 2019-03-01 | End: 2019-06-26 | Stop reason: SDUPTHER

## 2019-03-22 ENCOUNTER — OFFICE VISIT (OUTPATIENT)
Dept: DERMATOLOGY | Facility: CLINIC | Age: 84
End: 2019-03-22
Payer: MEDICARE

## 2019-03-22 DIAGNOSIS — Z85.828 HISTORY OF SKIN CANCER: ICD-10-CM

## 2019-03-22 DIAGNOSIS — Z13.89 SCREENING FOR SKIN CONDITION: ICD-10-CM

## 2019-03-22 DIAGNOSIS — L82.1 SEBORRHEIC KERATOSIS: ICD-10-CM

## 2019-03-22 DIAGNOSIS — L98.9 UNKNOWN SKIN LESION: Primary | ICD-10-CM

## 2019-03-22 PROCEDURE — 11103 TANGNTL BX SKIN EA SEP/ADDL: CPT | Performed by: DERMATOLOGY

## 2019-03-22 PROCEDURE — 88305 TISSUE EXAM BY PATHOLOGIST: CPT | Performed by: PATHOLOGY

## 2019-03-22 PROCEDURE — 11102 TANGNTL BX SKIN SINGLE LES: CPT | Performed by: DERMATOLOGY

## 2019-03-22 PROCEDURE — 99213 OFFICE O/P EST LOW 20 MIN: CPT | Performed by: DERMATOLOGY

## 2019-03-22 NOTE — PATIENT INSTRUCTIONS
Skin lesion possible squamous cell/basal cell carcinomas await results of biopsy may be amenable to curettage  Seborrheic keratosis patient reassured these are normal growths we acquire with age no treatment needed  History of skin cancer in no recurrence nothing else atypical sunblock recommended follow-up in 6 months  Screening for dermatologic disorders nothing else of concern noted on complete exam follow-up in 6 months advised patient is importance of six-month follow ups to make sure that we can find lesions before they become symptomatic or growing actively  Wound care instructions given to patient

## 2019-03-22 NOTE — PROGRESS NOTES
[facility] DERMATOLOGY  [address] [place] [address]  [phone]  [phone]     MRN: [id number] : 1934  Encounter: [id number]  Patient Information: Beatris Hines  Chief complaint:  Skin lesion on scalp    History of present illness:  80-year-old male with previous history of basal cell carcinoma removed by me 2 years ago for concerns regarding a growth on his left posterior scalp not aware the lesion we noted on the right scalp  Patient has not been back for follow-up for over a year  No other concerns noted  Past Medical History:   Diagnosis Date    Abnormal weight loss     Last Assessed: 2014    Arthritis     Basal cell carcinoma     Last Assessed: 2016    Bursitis     left hip pain    Cancer (HCC)     BCA- back, left forearm, skin    Cardiomyopathy ([address] )     Chest discomfort     Last Assessed: 2016    Chest pain     Last Assessed: 2013    Coronary artery disease     Ecchymosis     Last Assessed: 2016    Exertional dyspnea     Last Assessed: 2016    Hepatic hemangioma     Herniation of lumbar intervertebral disc     Hyperlipidemia     Hypertension     Nonmelanoma skin cancer     Last Assessed: 12/15/2017    Pleural effusion     Bilateral; Last Assessed: 2016    Pulmonary nodule     multiple nodes;  Last Assessesd: 2016    Shortness of breath     Vitamin D deficiency      Past Surgical History:   Procedure Laterality Date    CARPAL TUNNEL RELEASE Bilateral     CORONARY ARTERY BYPASS GRAFT  2011    CORONARY ARTERY BYPASS GRAFT      VA REPAIR INCISIONAL HERNIA,REDUCIBLE Bilateral 2017    Procedure: LAPAROSCOPIC INGUINAL HERNIA REPAIR WITH MESH ;  Surgeon: Camila Glaser MD;  Location: [facility];  Service: General     Social History   Social History     Substance and Sexual Activity   Alcohol Use Yes    Alcohol/week: 0 6 oz    Types: 1 Glasses of wine per week    Comment: daily     Social History     Substance and Sexual Activity   Drug Use No     Social History     Tobacco Use   Smoking Status Former Smoker    Last attempt to quit: 1980    Years since quittin 9   Smokeless Tobacco Never Used   Tobacco Comment    quit date  per allscripts     Family History   Problem Relation Age of Onset    Heart disease Mother     Hypertension Mother      Meds/Allergies   No Known Allergies    Meds:  Prior to Admission medications    Medication Sig Start Date End Date Taking?  Authorizing Provider   amLODIPine (NORVASC) 5 mg tablet Take 1 tablet (5 mg total) by mouth daily 3/1/19  Yes CORY Cm   aspirin 81 MG tablet Take 2 tablets by mouth daily   Yes Historical Provider, MD   atorvastatin (LIPITOR) 80 mg tablet Take 1 tablet (80 mg total) by mouth daily 3/1/19  Yes CORY Teague   B Complex Vitamins (VITAMIN B COMPLEX) TABS Take 1 tablet by mouth daily   Yes Historical Provider, MD   candesartan (ATACAND) 8 MG tablet Take 1 tablet (8 mg total) by mouth daily 3/1/19  Yes CORY Teague   Cholecalciferol (VITAMIN D-3 PO) Take 2,000 Units by mouth 3 (three) times a day before meals   Yes Historical Provider, MD   co-enzyme Q-10 30 MG capsule Take 30 mg by mouth daily   Yes Historical Provider, MD   fluticasone (FLONASE) 50 mcg/act nasal spray 2 sprays into each nostril daily   Yes Historical Provider, MD   hydrocortisone-pramoxine [facility]) 2 5-1 % rectal cream Insert into the rectum 14  Yes Historical Provider, MD   metoprolol succinate (TOPROL-XL) 25 mg 24 hr tablet Take 0 5 tablets (12 5 mg total) by mouth daily 3/1/19  Yes CORY Teague   nitroglycerin (NITROSTAT) 0 4 mg SL tablet Place 0 4 mg under the tongue every 5 (five) minutes as needed for chest pain   Yes Historical Provider, MD   omega-3-acid ethyl esters (LOVAZA) 1 g capsule Take 2 g by mouth daily   Yes Historical Provider, MD   acetaminophen-codeine (TYLENOL #3) 300-30 mg per tablet Take 1 tablet by mouth every 4 (four) hours as needed for moderate pain for up to 10 days 4/11/17 4/21/17  Lorenza Jensen MD   clopidogrel (PLAVIX) 75 mg tablet Take 1 tablet (75 mg total) by mouth daily  Patient not taking: Reported on 3/22/2019 10/9/18   Jesse Deleon DO   Coenzyme Q10 (COQ-10) 10 MG CAPS Take 1 capsule by mouth Daily    Historical Provider, MD       Subjective:     Review of Systems:    General: negative for - chills, fatigue, fever,  weight gain or weight loss  Psychological: negative for - anxiety, behavioral disorder, concentration difficulties, decreased libido, depression, irritability, memory difficulties, mood swings, sleep disturbances or suicidal ideation  ENT: negative for - hearing difficulties , nasal congestion, nasal discharge, oral lesions, sinus pain, sneezing, sore throat  Allergy and Immunology: negative for - hives, insect bite sensitivity,  Hematological and Lymphatic: negative for - bleeding problems, blood clots,bruising, swollen lymph nodes  Endocrine: negative for - hair pattern changes, hot flashes, malaise/lethargy, mood swings, palpitations, polydipsia/polyuria, skin changes, temperature intolerance or unexpected weight change  Respiratory: negative for - cough, hemoptysis, orthopnea, shortness of breath, or wheezing  Cardiovascular: negative for - chest pain, dyspnea on exertion, edema,  Gastrointestinal: negative for - abdominal pain, nausea/vomiting  Genito-Urinary: negative for - dysuria, incontinence, irregular/heavy menses or urinary frequency/urgency  Musculoskeletal: negative for - gait disturbance, joint pain, joint stiffness, joint swelling, muscle pain, muscular weakness  Dermatological:  As in HPI  Neurological: negative for confusion, dizziness, headaches, impaired coordination/balance, memory loss, numbness/tingling, seizures, speech problems, tremors or weakness       Objective: There were no vitals taken for this visit      Physical Exam:    General Appearance:    Alert, cooperative, no distress   Head:    Normocephalic, without obvious abnormality, atraumatic           Skin:   A full skin exam was performed including scalp, head scalp, eyes, ears, nose, lips, neck, chest, axilla, abdomen, back, buttocks, bilateral upper extremities, bilateral lower extremities, hands, feet, fingers, toes, fingernails, and toenails previous sites of skin cancer well healed without recurrence normal keratotic papules with greasy stuck on appearance 5 mm hyperkeratotic irritated nodule noted on the left posterior scalp and 6 mm pearly macule noted on the right anterior scalp nothing else atypical noted on exam        Shave Biopsy Procedure Note    Pre-operative Diagnosis:  Squamous cell/basal cell carcinoma    Plan:  1  Instructed to keep the wound dry and covered for 24 and clean thereafter  2  Warning signs of infection were reviewed  3  Recommended that the patient use OTC acetaminophen as needed for pain  4  Return  Pending results of biopsy(ies)    Locations:  Left posterior scalp right mid scalp    Indications:  Suspicious lesions    Anesthesia: Lidocaine 1% with epinephrine without added sodium bicarbonate    Procedure Details     Patient informed of the risks (including bleeding and infection) and benefits of the   procedure and Verbal informed consent obtained  The lesion and surrounding area were given a sterile prep using alcohol and draped in the usual sterile fashion  A Blue blade razor was used to obtain a specimen  Hemostasis achieved with aluminum chloride  Petrolatum and a sterile dressing applied  The specimen was sent for pathologic examination  The patient tolerated the procedure(s) well  Complications:  none  Assessment:     1  Unknown skin lesion     2  Screening for skin condition     3  Seborrheic keratosis     4   History of skin cancer           Plan:   Skin lesion possible squamous cell/basal cell carcinomas await results of biopsy may be amenable to curettage  Seborrheic keratosis patient reassured these are normal growths we acquire with age no treatment needed  History of skin cancer in no recurrence nothing else atypical sunblock recommended follow-up in 6 months  Screening for dermatologic disorders nothing else of concern noted on complete exam follow-up in 6 months advised patient is importance of six-month follow ups to make sure that we can find lesions before they become symptomatic or growing actively    Landon Patel MD  3/22/2019,10:43 AM    Portions of the record may have been created with voice recognition software   Occasional wrong word or "sound a like" substitutions may have occurred due to the inherent limitations of voice recognition software   Read the chart carefully and recognize, using context, where substitutions have occurred

## 2019-03-25 ENCOUNTER — TELEPHONE (OUTPATIENT)
Dept: INTERNAL MEDICINE CLINIC | Facility: CLINIC | Age: 84
End: 2019-03-25

## 2019-03-25 RX ORDER — VALSARTAN 80 MG/1
80 TABLET ORAL DAILY
Qty: 90 TABLET | Refills: 1 | Status: CANCELLED | OUTPATIENT
Start: 2019-03-25

## 2019-03-25 NOTE — TELEPHONE ENCOUNTER
Spoke with patient and prefers to continue on Valsartan 80 mg, he has enough for a week, he has an appt on 4/18

## 2019-03-25 NOTE — TELEPHONE ENCOUNTER
Patient was unaware that the Valsartan 80 mg was discontinued and candesartan 8 mg was ordered instead? Please review medication and call patient back

## 2019-03-26 DIAGNOSIS — I25.5 ISCHEMIC CARDIOMYOPATHY: Primary | ICD-10-CM

## 2019-03-26 DIAGNOSIS — I10 HYPERTENSION, UNSPECIFIED TYPE: ICD-10-CM

## 2019-03-26 RX ORDER — VALSARTAN 80 MG/1
80 TABLET ORAL DAILY
Qty: 90 TABLET | Refills: 2 | Status: SHIPPED | OUTPATIENT
Start: 2019-03-26 | End: 2019-06-26 | Stop reason: SDUPTHER

## 2019-04-09 ENCOUNTER — OFFICE VISIT (OUTPATIENT)
Dept: DERMATOLOGY | Facility: CLINIC | Age: 84
End: 2019-04-09
Payer: MEDICARE

## 2019-04-09 DIAGNOSIS — C44.41 BASAL CELL CARCINOMA (BCC) OF PARIETAL REGION OF SCALP: Primary | ICD-10-CM

## 2019-04-09 DIAGNOSIS — D04.4 SQUAMOUS CELL CARCINOMA IN SITU (SCCIS) OF SCALP: ICD-10-CM

## 2019-04-09 PROCEDURE — 17271 DSTR MAL LES S/N/H/F/G 0.6-1: CPT | Performed by: DERMATOLOGY

## 2019-04-09 PROCEDURE — 17270 DSTR MAL LES S/N/H/F/G .5 /<: CPT | Performed by: DERMATOLOGY

## 2019-05-15 ENCOUNTER — CLINICAL SUPPORT (OUTPATIENT)
Dept: DERMATOLOGY | Facility: CLINIC | Age: 84
End: 2019-05-15

## 2019-05-15 DIAGNOSIS — C44.41 BASAL CELL CARCINOMA OF SCALP: Primary | ICD-10-CM

## 2019-05-15 DIAGNOSIS — D04.4 SQUAMOUS CELL CARCINOMA IN SITU (SCCIS) OF SCALP: ICD-10-CM

## 2019-05-15 PROCEDURE — 1160F RVW MEDS BY RX/DR IN RCRD: CPT | Performed by: DERMATOLOGY

## 2019-05-15 PROCEDURE — 99024 POSTOP FOLLOW-UP VISIT: CPT | Performed by: DERMATOLOGY

## 2019-06-18 ENCOUNTER — APPOINTMENT (OUTPATIENT)
Dept: LAB | Facility: HOSPITAL | Age: 84
End: 2019-06-18
Attending: INTERNAL MEDICINE
Payer: MEDICARE

## 2019-06-18 DIAGNOSIS — Z95.5 HISTORY OF HEART ARTERY STENT: ICD-10-CM

## 2019-06-18 DIAGNOSIS — I25.10 2-VESSEL CORONARY ARTERY DISEASE: ICD-10-CM

## 2019-06-18 DIAGNOSIS — I10 HYPERTENSION, UNSPECIFIED TYPE: ICD-10-CM

## 2019-06-18 DIAGNOSIS — E55.9 VITAMIN D DEFICIENCY: ICD-10-CM

## 2019-06-18 DIAGNOSIS — R17 ELEVATED BILIRUBIN: ICD-10-CM

## 2019-06-18 DIAGNOSIS — I25.10 PRESENCE OF STENT IN CORONARY ARTERY IN PATIENT WITH CORONARY ARTERY DISEASE: ICD-10-CM

## 2019-06-18 DIAGNOSIS — Z95.5 PRESENCE OF STENT IN CORONARY ARTERY IN PATIENT WITH CORONARY ARTERY DISEASE: ICD-10-CM

## 2019-06-18 DIAGNOSIS — E78.5 HYPERLIPIDEMIA, UNSPECIFIED HYPERLIPIDEMIA TYPE: ICD-10-CM

## 2019-06-18 LAB
25(OH)D3 SERPL-MCNC: 31 NG/ML (ref 30–100)
ALBUMIN SERPL BCP-MCNC: 3.5 G/DL (ref 3.5–5)
ALP SERPL-CCNC: 97 U/L (ref 46–116)
ALT SERPL W P-5'-P-CCNC: 19 U/L (ref 12–78)
ANION GAP SERPL CALCULATED.3IONS-SCNC: 17 MMOL/L (ref 4–13)
AST SERPL W P-5'-P-CCNC: 23 U/L (ref 5–45)
BASOPHILS # BLD AUTO: 0.02 THOUSANDS/ΜL (ref 0–0.1)
BASOPHILS NFR BLD AUTO: 0 % (ref 0–1)
BILIRUB SERPL-MCNC: 1 MG/DL (ref 0.2–1)
BUN SERPL-MCNC: 19 MG/DL (ref 5–25)
CALCIUM SERPL-MCNC: 8.8 MG/DL (ref 8.3–10.1)
CHLORIDE SERPL-SCNC: 106 MMOL/L (ref 100–108)
CHOLEST SERPL-MCNC: 108 MG/DL (ref 50–200)
CO2 SERPL-SCNC: 27 MMOL/L (ref 21–32)
CREAT SERPL-MCNC: 1.01 MG/DL (ref 0.6–1.3)
EOSINOPHIL # BLD AUTO: 0.12 THOUSAND/ΜL (ref 0–0.61)
EOSINOPHIL NFR BLD AUTO: 2 % (ref 0–6)
ERYTHROCYTE [DISTWIDTH] IN BLOOD BY AUTOMATED COUNT: 12.8 % (ref 11.6–15.1)
GFR SERPL CREATININE-BSD FRML MDRD: 68 ML/MIN/1.73SQ M
GLUCOSE P FAST SERPL-MCNC: 96 MG/DL (ref 65–99)
HCT VFR BLD AUTO: 41.4 % (ref 36.5–49.3)
HDLC SERPL-MCNC: 69 MG/DL (ref 40–60)
HGB BLD-MCNC: 13.4 G/DL (ref 12–17)
IMM GRANULOCYTES # BLD AUTO: 0.02 THOUSAND/UL (ref 0–0.2)
IMM GRANULOCYTES NFR BLD AUTO: 0 % (ref 0–2)
LDLC SERPL CALC-MCNC: 32 MG/DL (ref 0–100)
LDLC SERPL DIRECT ASSAY-MCNC: 36 MG/DL (ref 0–100)
LYMPHOCYTES # BLD AUTO: 1.82 THOUSANDS/ΜL (ref 0.6–4.47)
LYMPHOCYTES NFR BLD AUTO: 29 % (ref 14–44)
MCH RBC QN AUTO: 30.9 PG (ref 26.8–34.3)
MCHC RBC AUTO-ENTMCNC: 32.4 G/DL (ref 31.4–37.4)
MCV RBC AUTO: 96 FL (ref 82–98)
MONOCYTES # BLD AUTO: 0.64 THOUSAND/ΜL (ref 0.17–1.22)
MONOCYTES NFR BLD AUTO: 10 % (ref 4–12)
NEUTROPHILS # BLD AUTO: 3.57 THOUSANDS/ΜL (ref 1.85–7.62)
NEUTS SEG NFR BLD AUTO: 59 % (ref 43–75)
NRBC BLD AUTO-RTO: 0 /100 WBCS
PLATELET # BLD AUTO: 177 THOUSANDS/UL (ref 149–390)
PMV BLD AUTO: 10.2 FL (ref 8.9–12.7)
POTASSIUM SERPL-SCNC: 4.3 MMOL/L (ref 3.5–5.3)
PROT SERPL-MCNC: 7 G/DL (ref 6.4–8.2)
RBC # BLD AUTO: 4.33 MILLION/UL (ref 3.88–5.62)
SODIUM SERPL-SCNC: 150 MMOL/L (ref 136–145)
TRIGL SERPL-MCNC: 33 MG/DL
TSH SERPL DL<=0.05 MIU/L-ACNC: 1.71 UIU/ML (ref 0.36–3.74)
WBC # BLD AUTO: 6.19 THOUSAND/UL (ref 4.31–10.16)

## 2019-06-18 PROCEDURE — 80053 COMPREHEN METABOLIC PANEL: CPT

## 2019-06-18 PROCEDURE — 83721 ASSAY OF BLOOD LIPOPROTEIN: CPT

## 2019-06-18 PROCEDURE — 82306 VITAMIN D 25 HYDROXY: CPT

## 2019-06-18 PROCEDURE — 84443 ASSAY THYROID STIM HORMONE: CPT

## 2019-06-18 PROCEDURE — 85025 COMPLETE CBC W/AUTO DIFF WBC: CPT

## 2019-06-18 PROCEDURE — 80061 LIPID PANEL: CPT

## 2019-06-18 PROCEDURE — 36415 COLL VENOUS BLD VENIPUNCTURE: CPT

## 2019-06-19 ENCOUNTER — OFFICE VISIT (OUTPATIENT)
Dept: DERMATOLOGY | Facility: CLINIC | Age: 84
End: 2019-06-19

## 2019-06-19 DIAGNOSIS — D04.4 SQUAMOUS CELL CARCINOMA IN SITU (SCCIS) OF SCALP: Primary | ICD-10-CM

## 2019-06-19 DIAGNOSIS — C44.41 BASAL CELL CARCINOMA OF SCALP: ICD-10-CM

## 2019-06-19 PROCEDURE — 99024 POSTOP FOLLOW-UP VISIT: CPT | Performed by: DERMATOLOGY

## 2019-06-26 ENCOUNTER — OFFICE VISIT (OUTPATIENT)
Dept: INTERNAL MEDICINE CLINIC | Facility: CLINIC | Age: 84
End: 2019-06-26
Payer: MEDICARE

## 2019-06-26 ENCOUNTER — APPOINTMENT (OUTPATIENT)
Dept: LAB | Facility: HOSPITAL | Age: 84
End: 2019-06-26
Attending: INTERNAL MEDICINE
Payer: MEDICARE

## 2019-06-26 VITALS
OXYGEN SATURATION: 98 % | TEMPERATURE: 97.9 F | HEIGHT: 65 IN | BODY MASS INDEX: 25.02 KG/M2 | DIASTOLIC BLOOD PRESSURE: 62 MMHG | SYSTOLIC BLOOD PRESSURE: 115 MMHG | HEART RATE: 76 BPM | RESPIRATION RATE: 18 BRPM | WEIGHT: 150.2 LBS

## 2019-06-26 DIAGNOSIS — Z23 NEED FOR SHINGLES VACCINE: ICD-10-CM

## 2019-06-26 DIAGNOSIS — I10 HYPERTENSION, UNSPECIFIED TYPE: ICD-10-CM

## 2019-06-26 DIAGNOSIS — I25.10 2-VESSEL CORONARY ARTERY DISEASE: ICD-10-CM

## 2019-06-26 DIAGNOSIS — E87.0 HYPERNATREMIA: ICD-10-CM

## 2019-06-26 DIAGNOSIS — E55.9 VITAMIN D DEFICIENCY: ICD-10-CM

## 2019-06-26 DIAGNOSIS — Z23 NEED FOR VACCINATION FOR PNEUMOCOCCUS: Primary | ICD-10-CM

## 2019-06-26 DIAGNOSIS — I25.5 ISCHEMIC CARDIOMYOPATHY: ICD-10-CM

## 2019-06-26 DIAGNOSIS — Z95.5 HISTORY OF HEART ARTERY STENT: ICD-10-CM

## 2019-06-26 DIAGNOSIS — R01.1 MURMUR, CARDIAC: ICD-10-CM

## 2019-06-26 DIAGNOSIS — E78.5 HYPERLIPIDEMIA, UNSPECIFIED HYPERLIPIDEMIA TYPE: ICD-10-CM

## 2019-06-26 LAB
ALBUMIN SERPL BCP-MCNC: 3.5 G/DL (ref 3.5–5)
ANION GAP SERPL CALCULATED.3IONS-SCNC: 10 MMOL/L (ref 4–13)
BUN SERPL-MCNC: 23 MG/DL (ref 5–25)
CALCIUM SERPL-MCNC: 8.7 MG/DL (ref 8.3–10.1)
CHLORIDE SERPL-SCNC: 106 MMOL/L (ref 100–108)
CO2 SERPL-SCNC: 27 MMOL/L (ref 21–32)
CREAT SERPL-MCNC: 0.96 MG/DL (ref 0.6–1.3)
GFR SERPL CREATININE-BSD FRML MDRD: 72 ML/MIN/1.73SQ M
GLUCOSE SERPL-MCNC: 83 MG/DL (ref 65–140)
PHOSPHATE SERPL-MCNC: 3.3 MG/DL (ref 2.3–4.1)
POTASSIUM SERPL-SCNC: 4.1 MMOL/L (ref 3.5–5.3)
SODIUM SERPL-SCNC: 143 MMOL/L (ref 136–145)

## 2019-06-26 PROCEDURE — 36415 COLL VENOUS BLD VENIPUNCTURE: CPT

## 2019-06-26 PROCEDURE — G0009 ADMIN PNEUMOCOCCAL VACCINE: HCPCS

## 2019-06-26 PROCEDURE — 80069 RENAL FUNCTION PANEL: CPT

## 2019-06-26 PROCEDURE — 99214 OFFICE O/P EST MOD 30 MIN: CPT | Performed by: INTERNAL MEDICINE

## 2019-06-26 PROCEDURE — 90670 PCV13 VACCINE IM: CPT

## 2019-06-26 RX ORDER — ATORVASTATIN CALCIUM 80 MG/1
80 TABLET, FILM COATED ORAL DAILY
Qty: 90 TABLET | Refills: 2 | Status: SHIPPED | OUTPATIENT
Start: 2019-06-26 | End: 2020-01-16 | Stop reason: SDUPTHER

## 2019-06-26 RX ORDER — METOPROLOL SUCCINATE 25 MG/1
12.5 TABLET, EXTENDED RELEASE ORAL DAILY
Qty: 90 TABLET | Refills: 2 | Status: SHIPPED | OUTPATIENT
Start: 2019-06-26 | End: 2020-01-16 | Stop reason: SDUPTHER

## 2019-06-26 RX ORDER — AMLODIPINE BESYLATE 5 MG/1
5 TABLET ORAL DAILY
Qty: 90 TABLET | Refills: 2 | Status: SHIPPED | OUTPATIENT
Start: 2019-06-26 | End: 2020-01-16 | Stop reason: SDUPTHER

## 2019-06-26 RX ORDER — VALSARTAN 80 MG/1
80 TABLET ORAL DAILY
Qty: 90 TABLET | Refills: 2 | Status: SHIPPED | OUTPATIENT
Start: 2019-06-26 | End: 2020-01-16 | Stop reason: SDUPTHER

## 2019-09-24 ENCOUNTER — OFFICE VISIT (OUTPATIENT)
Dept: DERMATOLOGY | Facility: CLINIC | Age: 84
End: 2019-09-24
Payer: MEDICARE

## 2019-09-24 DIAGNOSIS — Z85.828 HISTORY OF SKIN CANCER: ICD-10-CM

## 2019-09-24 DIAGNOSIS — Z13.89 SCREENING FOR SKIN CONDITION: ICD-10-CM

## 2019-09-24 DIAGNOSIS — L82.1 SEBORRHEIC KERATOSIS: Primary | ICD-10-CM

## 2019-09-24 PROCEDURE — 99213 OFFICE O/P EST LOW 20 MIN: CPT | Performed by: DERMATOLOGY

## 2019-09-24 NOTE — PROGRESS NOTES
[facility]  [address] [place] [address]  [place] [address]  [phone]  [phone]     MRN: [id number] : 1934  Encounter: [id number]  Patient Information: Neli Russo  Chief complaint:  Six-month check    History of present illness:  80-year-old male presents for overall skin check concerned about a growth on the right forearm as well his previous areas of skin cancer  Past Medical History:   Diagnosis Date    Abnormal weight loss     Last Assessed: 2014    Arthritis     Basal cell carcinoma     Last Assessed: 2016    Bursitis     left hip pain    Cancer (HCC)     BCA- back, left forearm, skin    Cardiomyopathy ([address] )     Chest discomfort     Last Assessed: 2016    Chest pain     Last Assessed: 2013    Coronary artery disease     Ecchymosis     Last Assessed: 2016    Exertional dyspnea     Last Assessed: 2016    Hepatic hemangioma     Herniation of lumbar intervertebral disc     Hyperlipidemia     Hypertension     Nonmelanoma skin cancer     Last Assessed: 12/15/2017    Pleural effusion     Bilateral; Last Assessed: 2016    Pulmonary nodule     multiple nodes;  Last Assessesd: 2016    Shortness of breath     Vitamin D deficiency      Past Surgical History:   Procedure Laterality Date    CARPAL TUNNEL RELEASE Bilateral     CORONARY ARTERY BYPASS GRAFT  2011    CORONARY ARTERY BYPASS GRAFT      CA REPAIR INCISIONAL HERNIA,REDUCIBLE Bilateral 2017    Procedure: LAPAROSCOPIC INGUINAL HERNIA REPAIR WITH MESH ;  Surgeon: Carmne Catherine MD;  Location: [facility];  Service: General     Social History   Social History     Substance and Sexual Activity   Alcohol Use Yes    Alcohol/week: 1 0 standard drinks    Types: 1 Glasses of wine per week    Comment: daily     Social History     Substance and Sexual Activity   Drug Use No     Social History     Tobacco Use   Smoking Status Former Smoker  Last attempt to quit: 1980    Years since quittin 4   Smokeless Tobacco Never Used   Tobacco Comment    quit date  per allscripts     Family History   Problem Relation Age of Onset    Heart disease Mother     Hypertension Mother      Meds/Allergies   No Known Allergies    Meds:  Prior to Admission medications    Medication Sig Start Date End Date Taking?  Authorizing Provider   amLODIPine (NORVASC) 5 mg tablet Take 1 tablet (5 mg total) by mouth daily 19  Yes Jania Cargo, DO   aspirin 81 MG tablet Take 2 tablets by mouth once    Yes Historical Provider, MD   atorvastatin (LIPITOR) 80 mg tablet Take 1 tablet (80 mg total) by mouth daily 19  Yes Jania Cargo, DO   B Complex Vitamins (VITAMIN B COMPLEX) TABS Take 1 tablet by mouth daily   Yes Historical Provider, MD   Cholecalciferol (VITAMIN D-3 PO) Take 2,000 Units by mouth 3 (three) times a day before meals   Yes Historical Provider, MD   co-enzyme Q-10 30 MG capsule Take 30 mg by mouth daily   Yes Historical Provider, MD   metoprolol succinate (TOPROL-XL) 25 mg 24 hr tablet Take 0 5 tablets (12 5 mg total) by mouth daily 19  Yes Jania Cargo, DO   nitroglycerin (NITROSTAT) 0 4 mg SL tablet Place 0 4 mg under the tongue every 5 (five) minutes as needed for chest pain   Yes Historical Provider, MD   omega-3-acid ethyl esters (LOVAZA) 1 g capsule Take 2 g by mouth daily   Yes Historical Provider, MD   valsartan (DIOVAN) 80 mg tablet Take 1 tablet (80 mg total) by mouth daily 19  Yes Jania Cargo, DO   acetaminophen-codeine (TYLENOL #3) 300-30 mg per tablet Take 1 tablet by mouth every 4 (four) hours as needed for moderate pain for up to 10 days  Patient not taking: Reported on 2019  Christie Garcia MD   hydrocortisone-pramoxine [facility]) 2 5-1 % rectal cream Insert into the rectum 14   Historical Provider, MD       Subjective:     Review of Systems:    General: negative for - chills, fatigue, fever,  weight gain or weight loss  Psychological: negative for - anxiety, behavioral disorder, concentration difficulties, decreased libido, depression, irritability, memory difficulties, mood swings, sleep disturbances or suicidal ideation  ENT: negative for - hearing difficulties , nasal congestion, nasal discharge, oral lesions, sinus pain, sneezing, sore throat  Allergy and Immunology: negative for - hives, insect bite sensitivity,  Hematological and Lymphatic: negative for - bleeding problems, blood clots,bruising, swollen lymph nodes  Endocrine: negative for - hair pattern changes, hot flashes, malaise/lethargy, mood swings, palpitations, polydipsia/polyuria, skin changes, temperature intolerance or unexpected weight change  Respiratory: negative for - cough, hemoptysis, orthopnea, shortness of breath, or wheezing  Cardiovascular: negative for - chest pain, dyspnea on exertion, edema,  Gastrointestinal: negative for - abdominal pain, nausea/vomiting  Genito-Urinary: negative for - dysuria, incontinence, irregular/heavy menses or urinary frequency/urgency  Musculoskeletal: negative for - gait disturbance, joint pain, joint stiffness, joint swelling, muscle pain, muscular weakness  Dermatological:  As in HPI  Neurological: negative for confusion, dizziness, headaches, impaired coordination/balance, memory loss, numbness/tingling, seizures, speech problems, tremors or weakness       Objective: There were no vitals taken for this visit      Physical Exam:    General Appearance:    Alert, cooperative, no distress   Head:    Normocephalic, without obvious abnormality, atraumatic           Skin:   A full skin exam was performed including scalp, head scalp, eyes, ears, nose, lips, neck, chest, axilla, abdomen, back, buttocks, bilateral upper extremities, bilateral lower extremities, hands, feet, fingers, toes, fingernails, and toenails normal keratotic papules greasy stuck on appearance previous sites of skin cancers well healed without recurrence nothing else remarkable noted on complete exam     Assessment:     1  Seborrheic keratosis     2  Screening for skin condition     3  History of skin cancer           Plan:   Seborrheic keratosis patient reassured these are normal growths we acquire with age no treatment needed  History of skin cancer in no recurrence nothing else atypical sunblock recommended follow-up in 6 months  Screening for dermatologic disorders nothing else of concern noted on complete exam follow-up in 6 months    Anita Oh MD  7/18/2340,45:91 AM    Portions of the record may have been created with voice recognition software   Occasional wrong word or "sound a like" substitutions may have occurred due to the inherent limitations of voice recognition software   Read the chart carefully and recognize, using context, where substitutions have occurred

## 2019-10-02 ENCOUNTER — IMMUNIZATIONS (OUTPATIENT)
Dept: INTERNAL MEDICINE CLINIC | Facility: CLINIC | Age: 84
End: 2019-10-02
Payer: MEDICARE

## 2019-10-02 DIAGNOSIS — Z23 ENCOUNTER FOR IMMUNIZATION: ICD-10-CM

## 2019-10-02 PROCEDURE — G0008 ADMIN INFLUENZA VIRUS VAC: HCPCS

## 2019-10-02 PROCEDURE — 90662 IIV NO PRSV INCREASED AG IM: CPT

## 2019-10-09 ENCOUNTER — OFFICE VISIT (OUTPATIENT)
Dept: INTERNAL MEDICINE CLINIC | Facility: CLINIC | Age: 84
End: 2019-10-09
Payer: MEDICARE

## 2019-10-09 VITALS
SYSTOLIC BLOOD PRESSURE: 135 MMHG | HEART RATE: 68 BPM | HEIGHT: 65 IN | DIASTOLIC BLOOD PRESSURE: 75 MMHG | BODY MASS INDEX: 24.93 KG/M2 | WEIGHT: 149.6 LBS | TEMPERATURE: 97.4 F | OXYGEN SATURATION: 98 % | RESPIRATION RATE: 16 BRPM

## 2019-10-09 DIAGNOSIS — I10 HYPERTENSION, UNSPECIFIED TYPE: Primary | ICD-10-CM

## 2019-10-09 DIAGNOSIS — E78.5 HYPERLIPIDEMIA, UNSPECIFIED HYPERLIPIDEMIA TYPE: ICD-10-CM

## 2019-10-09 DIAGNOSIS — R42 DIZZINESS: ICD-10-CM

## 2019-10-09 DIAGNOSIS — M17.11 PRIMARY OSTEOARTHRITIS OF RIGHT KNEE: ICD-10-CM

## 2019-10-09 DIAGNOSIS — M17.12 PRIMARY OSTEOARTHRITIS OF LEFT KNEE: ICD-10-CM

## 2019-10-09 DIAGNOSIS — E55.9 VITAMIN D DEFICIENCY: ICD-10-CM

## 2019-10-09 DIAGNOSIS — I25.10 2-VESSEL CORONARY ARTERY DISEASE: ICD-10-CM

## 2019-10-09 PROCEDURE — 99214 OFFICE O/P EST MOD 30 MIN: CPT | Performed by: INTERNAL MEDICINE

## 2019-10-09 NOTE — PROGRESS NOTES
Assessment/Plan:    1 patient with unsteady gait last for about an hour has resolved negative neuro exam in the office has had positional vertigo in the past will prescribed Epley maneuver if symptoms recur her will need to refer to Neurology  2  Hypertension is at goal  3  Patient with severe osteoarthritis of both knees will refer to orthopedics for further evaluation            Diagnoses and all orders for this visit:    Hypertension, unspecified type    Hyperlipidemia, unspecified hyperlipidemia type    Vitamin D deficiency    2-vessel coronary artery disease    Dizziness    Primary osteoarthritis of left knee  -     Ambulatory referral to Orthopedic Surgery; Future    Primary osteoarthritis of right knee  -     Ambulatory referral to Orthopedic Surgery; Future        The patient was counseled regarding instructions for management, risk factor reductions, patient and family education,impressions, risks and benefits of treatment options, side effects of medications, importance of compliance with treatment  The treatment plan was reviewed with the patient/guardian and patient/guardian understands and agrees with the treatment plan              Current Outpatient Medications:     amLODIPine (NORVASC) 5 mg tablet, Take 1 tablet (5 mg total) by mouth daily, Disp: 90 tablet, Rfl: 2    aspirin 81 MG tablet, Take 2 tablets by mouth once , Disp: , Rfl:     atorvastatin (LIPITOR) 80 mg tablet, Take 1 tablet (80 mg total) by mouth daily, Disp: 90 tablet, Rfl: 2    B Complex Vitamins (VITAMIN B COMPLEX) TABS, Take 1 tablet by mouth daily, Disp: , Rfl:     Cholecalciferol (VITAMIN D-3 PO), Take 2,000 Units by mouth 3 (three) times a day before meals, Disp: , Rfl:     co-enzyme Q-10 30 MG capsule, Take 30 mg by mouth daily, Disp: , Rfl:     hydrocortisone-pramoxine (ANALPRAM-HC) 2 5-1 % rectal cream, Insert into the rectum, Disp: , Rfl:     metoprolol succinate (TOPROL-XL) 25 mg 24 hr tablet, Take 0 5 tablets (12 5 mg total) by mouth daily, Disp: 90 tablet, Rfl: 2    nitroglycerin (NITROSTAT) 0 4 mg SL tablet, Place 0 4 mg under the tongue every 5 (five) minutes as needed for chest pain, Disp: , Rfl:     omega-3-acid ethyl esters (LOVAZA) 1 g capsule, Take 2 g by mouth daily, Disp: , Rfl:     valsartan (DIOVAN) 80 mg tablet, Take 1 tablet (80 mg total) by mouth daily, Disp: 90 tablet, Rfl: 2    Subjective:      Patient ID: Andie Rocha is a 80 y o  male  bp elevated yesterday unsteady balance vertigo lasted 1-2 hours, stiff neck, bp 144/70s 122/82 at times      The following portions of the patient's history were reviewed and updated as appropriate:   He has a past medical history of Abnormal weight loss, Arthritis, Basal cell carcinoma, Bursitis, Cancer (HCC), Cardiomyopathy ([address] ), Chest discomfort, Chest pain, Coronary artery disease, Ecchymosis, Exertional dyspnea, Hepatic hemangioma, Herniation of lumbar intervertebral disc, Hyperlipidemia, Hypertension, Nonmelanoma skin cancer, Pleural effusion, Pulmonary nodule, Shortness of breath, and Vitamin D deficiency  ,  does not have any pertinent problems on file  ,   has a past surgical history that includes Carpal tunnel release (Bilateral); Coronary artery bypass graft (03/24/2011); Coronary artery bypass graft; and pr repair incisional hernia,reducible (Bilateral, 4/11/2017)  ,  family history includes Heart disease in his mother; Hypertension in his mother  ,   reports that he quit smoking about 39 years ago  He has never used smokeless tobacco  He reports that he drinks about 1 0 standard drinks of alcohol per week  He reports that he does not use drugs  ,  has No Known Allergies       Review of Systems   Constitutional: Negative for appetite change, chills, fatigue, fever and unexpected weight change     HENT: Negative for congestion, ear pain, facial swelling, hearing loss, mouth sores, nosebleeds, postnasal drip, rhinorrhea, sinus pain, sore throat, trouble swallowing and voice change  Eyes: Negative for pain, discharge, redness and visual disturbance  Respiratory: Negative for apnea, chest tightness, shortness of breath, wheezing and stridor  Cardiovascular: Negative for chest pain, palpitations and leg swelling  Gastrointestinal: Negative for abdominal distention, abdominal pain, blood in stool, constipation, diarrhea and vomiting  Endocrine: Negative for cold intolerance, heat intolerance, polydipsia, polyphagia and polyuria  Genitourinary: Negative for difficulty urinating, dysuria, flank pain, frequency, genital sores, hematuria and urgency  Musculoskeletal: Negative for arthralgias and back pain  Skin: Negative for rash and wound  Allergic/Immunologic: Negative for environmental allergies, food allergies and immunocompromised state  Neurological: Positive for dizziness and light-headedness  Negative for tremors, seizures, syncope, facial asymmetry, speech difficulty, weakness, numbness and headaches  Hematological: Negative for adenopathy  Does not bruise/bleed easily  Psychiatric/Behavioral: Negative for agitation, behavioral problems, dysphoric mood, hallucinations, self-injury, sleep disturbance and suicidal ideas  The patient is not hyperactive            Objective:  /75 (BP Location: Left arm, Patient Position: Standing)   Pulse 68   Temp (!) 97 4 °F (36 3 °C) (Tympanic)   Resp 16   Ht 5' 5" (1 651 m)   Wt 67 9 kg (149 lb 9 6 oz)   SpO2 98%   BMI 24 89 kg/m²     Lab Review  Appointment on 06/26/2019   Component Date Value    Albumin 06/26/2019 3 5     Calcium 06/26/2019 8 7     Phosphorus 06/26/2019 3 3     Glucose 06/26/2019 83     BUN 06/26/2019 23     Creatinine 06/26/2019 0 96     Sodium 06/26/2019 143     Potassium 06/26/2019 4 1     Chloride 06/26/2019 106     CO2 06/26/2019 27     ANION GAP 06/26/2019 10     eGFR 06/26/2019 72    Appointment on 06/18/2019   Component Date Value    WBC 06/18/2019 6 19     RBC 06/18/2019 4 33     Hemoglobin 06/18/2019 13 4     Hematocrit 06/18/2019 41 4     MCV 06/18/2019 96     MCH 06/18/2019 30 9     MCHC 06/18/2019 32 4     RDW 06/18/2019 12 8     MPV 06/18/2019 10 2     Platelets 94/95/5141 177     nRBC 06/18/2019 0     Neutrophils Relative 06/18/2019 59     Immat GRANS % 06/18/2019 0     Lymphocytes Relative 06/18/2019 29     Monocytes Relative 06/18/2019 10     Eosinophils Relative 06/18/2019 2     Basophils Relative 06/18/2019 0     Neutrophils Absolute 06/18/2019 3 57     Immature Grans Absolute 06/18/2019 0 02     Lymphocytes Absolute 06/18/2019 1 82     Monocytes Absolute 06/18/2019 0 64     Eosinophils Absolute 06/18/2019 0 12     Basophils Absolute 06/18/2019 0 02     Sodium 06/18/2019 150*    Potassium 06/18/2019 4 3     Chloride 06/18/2019 106     CO2 06/18/2019 27     ANION GAP 06/18/2019 17*    BUN 06/18/2019 19     Creatinine 06/18/2019 1 01     Glucose, Fasting 06/18/2019 96     Calcium 06/18/2019 8 8     AST 06/18/2019 23     ALT 06/18/2019 19     Alkaline Phosphatase 06/18/2019 97     Total Protein 06/18/2019 7 0     Albumin 06/18/2019 3 5     Total Bilirubin 06/18/2019 1 00     eGFR 06/18/2019 68     Vit D, 25-Hydroxy 06/18/2019 31 0     TSH 3RD GENERATON 06/18/2019 1 712     Cholesterol 06/18/2019 108     Triglycerides 06/18/2019 33     HDL, Direct 06/18/2019 69*    LDL Calculated 06/18/2019 32     LDL Direct 06/18/2019 36          Imaging  @[id number]@     No orders to display     No results found for this or any previous visit  Physical Exam   Constitutional: He is oriented to person, place, and time  He appears well-developed  HENT:   Right Ear: External ear normal    Left Ear: External ear normal    Eyes: Right eye exhibits no discharge  Left eye exhibits no discharge  No scleral icterus  Neck: Carotid bruit is not present  No tracheal deviation present  No thyroid mass and no thyromegaly present  Cardiovascular: Normal rate, regular rhythm, normal heart sounds and intact distal pulses  Exam reveals no gallop and no friction rub  No murmur heard  Pulmonary/Chest: No respiratory distress  He has no wheezes  He has no rales  Musculoskeletal: He exhibits no edema  Lymphadenopathy:     He has no cervical adenopathy  Neurological: He is alert and oriented to person, place, and time  Coordination normal    Neg augusto castañeda   Psychiatric: He has a normal mood and affect  His behavior is normal  Judgment and thought content normal    Nursing note and vitals reviewed

## 2019-10-09 NOTE — PATIENT INSTRUCTIONS
Benign Paroxysmal Positional Vertigo   WHAT YOU NEED TO KNOW:   What is benign paroxysmal positional vertigo (BPPV)? BPPV is an inner ear condition that causes you to suddenly feel dizzy  Benign means it is not serious or life-threatening  BPPV is caused by a problem with the nerves and structure of your inner ear  BPPV happens when small pieces of calcium break loose and lump together in one of your inner ear canals  What are the signs and symptoms of BPPV? You may feel that you or the room is moving or spinning  Turning your head, rolling over in bed, getting up or lying down may lead to sudden vertigo  You may also have any of the following symptoms:  · Nystagmus (quick shaky eye movement that you cannot control)    · Nausea    · Poor balance and feeling unsteady when you walk  What increases my risk for BPPV? · Older age    · An injury or trauma to your head or neck    · Frequent ear infections    · Long-term bed rest    · A medical condition such as diabetes, high blood pressure, migraine headaches, or Ménière disease  How is BPPV diagnosed? Your healthcare provider will ask about your symptoms and examine you  Your healthcare provider can usually determine if you have BPPV by doing a few simple tests  He or she may have you move your head or body in certain ways  Tell him or her if you feel dizzy or nauseated during these movements  How is BPPV managed? · Your healthcare provider will teach you how to move your head and body to prevent symptoms  For example, he or she may teach you certain ways to move your head or body  These movements usually help relieve your symptoms and keep the dizziness from returning  The exercises help move the calcium pieces to a different part of your ear  Do the movements only as directed  · Vestibular and balance rehabilitation therapy (VBRT)  is used to teach you exercises to improve your balance and strength   VBRT may help decrease your dizziness and prevent injuries if you are at risk for falls  · Medicines  may be recommended or prescribed to treat dizziness or nausea  How can I help prevent my symptoms? · Try to avoid sudden head movements  Stand up and lie down slowly  · Raise and support your head when you lie down  Place pillows under your upper back and head or rest in a recliner  · Change your position often when you are lying down  Try not to lie with your head on the same side for long periods of time  Roll over slowly  · Wear protective gear  when you ride a bike or play sports  A helmet helps protect your head from injury  When should I seek immediate care? · You fall during a BPPV episode and are injured  · You have a severe headache that does not go away  · You have new changes in your vision or feel weak or confused  · You have problems hearing, or you have ringing or buzzing in your ears  When should I contact my healthcare provider? · Your BPPV symptoms do not go away or they return  · You have problems with your balance, or you are falling often  · You have new or increased nausea or vomiting with vertigo  · You feel anxious or depressed and do not want to leave your home  · You have questions or concerns about your condition or care  CARE AGREEMENT:   You have the right to help plan your care  Learn about your health condition and how it may be treated  Discuss treatment options with your caregivers to decide what care you want to receive  You always have the right to refuse treatment  The above information is an  only  It is not intended as medical advice for individual conditions or treatments  Talk to your doctor, nurse or pharmacist before following any medical regimen to see if it is safe and effective for you  © 2017 [employer] Information is for End User's use only and may not be sold, redistributed or otherwise used for commercial purposes   All illustrations and images included in CareNotes® are the copyrighted property of [employer]  or Beny Bartholomew  1 patient with unsteady gait last for about an hour has resolved negative neuro exam in the office has had positional vertigo in the past will prescribed Epley maneuver if symptoms recur her will need to refer to Neurology  2  Hypertension is at goal  3   Patient with severe osteoarthritis of both knees will refer to orthopedics for further evaluation

## 2019-11-24 ENCOUNTER — APPOINTMENT (EMERGENCY)
Dept: CT IMAGING | Facility: HOSPITAL | Age: 84
End: 2019-11-24
Payer: MEDICARE

## 2019-11-24 ENCOUNTER — HOSPITAL ENCOUNTER (EMERGENCY)
Facility: HOSPITAL | Age: 84
Discharge: HOME | End: 2019-11-24
Attending: EMERGENCY MEDICINE | Admitting: EMERGENCY MEDICINE
Payer: MEDICARE

## 2019-11-24 VITALS
DIASTOLIC BLOOD PRESSURE: 67 MMHG | RESPIRATION RATE: 17 BRPM | OXYGEN SATURATION: 97 % | HEART RATE: 89 BPM | SYSTOLIC BLOOD PRESSURE: 106 MMHG | WEIGHT: 150.35 LBS | TEMPERATURE: 97.6 F | BODY MASS INDEX: 25.02 KG/M2

## 2019-11-24 DIAGNOSIS — R10.84 GENERALIZED ABDOMINAL PAIN: Primary | ICD-10-CM

## 2019-11-24 LAB
ALBUMIN SERPL BCP-MCNC: 3.9 G/DL (ref 3.5–5)
ALP SERPL-CCNC: 102 U/L (ref 46–116)
ALT SERPL W P-5'-P-CCNC: 26 U/L (ref 12–78)
ANION GAP SERPL CALCULATED.3IONS-SCNC: 8 MMOL/L (ref 4–13)
AST SERPL W P-5'-P-CCNC: 23 U/L (ref 5–45)
BASOPHILS # BLD AUTO: 0.03 THOUSANDS/ΜL (ref 0–0.1)
BASOPHILS NFR BLD AUTO: 0 % (ref 0–1)
BILIRUB SERPL-MCNC: 0.9 MG/DL (ref 0.2–1)
BILIRUB UR QL STRIP: NEGATIVE
BUN SERPL-MCNC: 20 MG/DL (ref 5–25)
CALCIUM SERPL-MCNC: 8.9 MG/DL (ref 8.3–10.1)
CHLORIDE SERPL-SCNC: 101 MMOL/L (ref 100–108)
CLARITY UR: CLEAR
CO2 SERPL-SCNC: 29 MMOL/L (ref 21–32)
COLOR UR: YELLOW
CREAT SERPL-MCNC: 0.86 MG/DL (ref 0.6–1.3)
EOSINOPHIL # BLD AUTO: 0.02 THOUSAND/ΜL (ref 0–0.61)
EOSINOPHIL NFR BLD AUTO: 0 % (ref 0–6)
ERYTHROCYTE [DISTWIDTH] IN BLOOD BY AUTOMATED COUNT: 12.7 % (ref 11.6–15.1)
GFR SERPL CREATININE-BSD FRML MDRD: 79 ML/MIN/1.73SQ M
GLUCOSE SERPL-MCNC: 117 MG/DL (ref 65–140)
GLUCOSE UR STRIP-MCNC: NEGATIVE MG/DL
HCT VFR BLD AUTO: 42 % (ref 36.5–49.3)
HGB BLD-MCNC: 13.8 G/DL (ref 12–17)
HGB UR QL STRIP.AUTO: NEGATIVE
IMM GRANULOCYTES # BLD AUTO: 0.04 THOUSAND/UL (ref 0–0.2)
IMM GRANULOCYTES NFR BLD AUTO: 0 % (ref 0–2)
KETONES UR STRIP-MCNC: NEGATIVE MG/DL
LEUKOCYTE ESTERASE UR QL STRIP: NEGATIVE
LIPASE SERPL-CCNC: 179 U/L (ref 73–393)
LYMPHOCYTES # BLD AUTO: 1.2 THOUSANDS/ΜL (ref 0.6–4.47)
LYMPHOCYTES NFR BLD AUTO: 13 % (ref 14–44)
MCH RBC QN AUTO: 30.9 PG (ref 26.8–34.3)
MCHC RBC AUTO-ENTMCNC: 32.9 G/DL (ref 31.4–37.4)
MCV RBC AUTO: 94 FL (ref 82–98)
MONOCYTES # BLD AUTO: 0.41 THOUSAND/ΜL (ref 0.17–1.22)
MONOCYTES NFR BLD AUTO: 4 % (ref 4–12)
NEUTROPHILS # BLD AUTO: 7.67 THOUSANDS/ΜL (ref 1.85–7.62)
NEUTS SEG NFR BLD AUTO: 83 % (ref 43–75)
NITRITE UR QL STRIP: NEGATIVE
NRBC BLD AUTO-RTO: 0 /100 WBCS
PH UR STRIP.AUTO: 7 [PH]
PLATELET # BLD AUTO: 185 THOUSANDS/UL (ref 149–390)
PMV BLD AUTO: 9.4 FL (ref 8.9–12.7)
POTASSIUM SERPL-SCNC: 4.6 MMOL/L (ref 3.5–5.3)
PROT SERPL-MCNC: 7.3 G/DL (ref 6.4–8.2)
PROT UR STRIP-MCNC: NEGATIVE MG/DL
RBC # BLD AUTO: 4.46 MILLION/UL (ref 3.88–5.62)
SODIUM SERPL-SCNC: 138 MMOL/L (ref 136–145)
SP GR UR STRIP.AUTO: 1.02 (ref 1–1.03)
UROBILINOGEN UR QL STRIP.AUTO: 0.2 E.U./DL
WBC # BLD AUTO: 9.37 THOUSAND/UL (ref 4.31–10.16)

## 2019-11-24 PROCEDURE — 36415 COLL VENOUS BLD VENIPUNCTURE: CPT | Performed by: EMERGENCY MEDICINE

## 2019-11-24 PROCEDURE — 83690 ASSAY OF LIPASE: CPT | Performed by: EMERGENCY MEDICINE

## 2019-11-24 PROCEDURE — 96361 HYDRATE IV INFUSION ADD-ON: CPT

## 2019-11-24 PROCEDURE — 96374 THER/PROPH/DIAG INJ IV PUSH: CPT

## 2019-11-24 PROCEDURE — 99285 EMERGENCY DEPT VISIT HI MDM: CPT | Performed by: EMERGENCY MEDICINE

## 2019-11-24 PROCEDURE — 80053 COMPREHEN METABOLIC PANEL: CPT | Performed by: EMERGENCY MEDICINE

## 2019-11-24 PROCEDURE — 99284 EMERGENCY DEPT VISIT MOD MDM: CPT

## 2019-11-24 PROCEDURE — 81003 URINALYSIS AUTO W/O SCOPE: CPT | Performed by: EMERGENCY MEDICINE

## 2019-11-24 PROCEDURE — 85025 COMPLETE CBC W/AUTO DIFF WBC: CPT | Performed by: EMERGENCY MEDICINE

## 2019-11-24 PROCEDURE — 74177 CT ABD & PELVIS W/CONTRAST: CPT

## 2019-11-24 RX ORDER — MAGNESIUM HYDROXIDE/ALUMINUM HYDROXICE/SIMETHICONE 120; 1200; 1200 MG/30ML; MG/30ML; MG/30ML
30 SUSPENSION ORAL ONCE
Status: COMPLETED | OUTPATIENT
Start: 2019-11-24 | End: 2019-11-24

## 2019-11-24 RX ORDER — KETOROLAC TROMETHAMINE 30 MG/ML
15 INJECTION, SOLUTION INTRAMUSCULAR; INTRAVENOUS ONCE
Status: COMPLETED | OUTPATIENT
Start: 2019-11-24 | End: 2019-11-24

## 2019-11-24 RX ORDER — DICYCLOMINE HCL 20 MG
20 TABLET ORAL ONCE
Status: COMPLETED | OUTPATIENT
Start: 2019-11-24 | End: 2019-11-24

## 2019-11-24 RX ADMIN — IOHEXOL 100 ML: 350 INJECTION, SOLUTION INTRAVENOUS at 10:28

## 2019-11-24 RX ADMIN — ALUMINUM HYDROXIDE, MAGNESIUM HYDROXIDE, AND SIMETHICONE 30 ML: 200; 200; 20 SUSPENSION ORAL at 12:03

## 2019-11-24 RX ADMIN — SODIUM CHLORIDE 1000 ML: 0.9 INJECTION, SOLUTION INTRAVENOUS at 09:26

## 2019-11-24 RX ADMIN — KETOROLAC TROMETHAMINE 15 MG: 30 INJECTION, SOLUTION INTRAMUSCULAR at 12:01

## 2019-11-24 RX ADMIN — DICYCLOMINE HYDROCHLORIDE 20 MG: 20 TABLET ORAL at 12:04

## 2019-11-24 NOTE — ED PROVIDER NOTES
History  Chief Complaint   Patient presents with    Abdominal Pain     pt co of L abd pain onset this am, denies N/V/D       History provided by:  Patient  Abdominal Pain   Pain location:  Periumbilical  Pain radiates to:  Does not radiate  Pain severity:  Moderate  Onset quality:  Gradual  Duration:  1 day  Timing:  Constant  Progression:  Worsening  Chronicity:  New  Context: previous surgery (inguinal hernia repair and CABG)    Context: not eating, not retching, not sick contacts, not suspicious food intake and not trauma    Relieved by:  Nothing  Worsened by:  Nothing  Ineffective treatments: Bowel activity  Associated symptoms: no anorexia, no chest pain, no chills, no diarrhea, no dysuria, no fatigue, no fever, no hematemesis, no hematuria, no melena, no nausea and no vomiting    Risk factors: being elderly        Prior to Admission Medications   Prescriptions Last Dose Informant Patient Reported? Taking?    B Complex Vitamins (VITAMIN B COMPLEX) TABS  Self Yes No   Sig: Take 1 tablet by mouth daily   Cholecalciferol (VITAMIN D-3 PO)  Self Yes No   Sig: Take 2,000 Units by mouth 3 (three) times a day before meals   amLODIPine (NORVASC) 5 mg tablet  Self No No   Sig: Take 1 tablet (5 mg total) by mouth daily   aspirin 81 MG tablet  Self Yes No   Sig: Take 2 tablets by mouth once    atorvastatin (LIPITOR) 80 mg tablet  Self No No   Sig: Take 1 tablet (80 mg total) by mouth daily   co-enzyme Q-10 30 MG capsule  Self Yes No   Sig: Take 30 mg by mouth daily   hydrocortisone-pramoxine (ANALPRAM-HC) 2 5-1 % rectal cream  Self Yes No   Sig: Insert into the rectum   metoprolol succinate (TOPROL-XL) 25 mg 24 hr tablet  Self No No   Sig: Take 0 5 tablets (12 5 mg total) by mouth daily   nitroglycerin (NITROSTAT) 0 4 mg SL tablet  Self Yes No   Sig: Place 0 4 mg under the tongue every 5 (five) minutes as needed for chest pain   omega-3-acid ethyl esters (LOVAZA) 1 g capsule  Self Yes No   Sig: Take 2 g by mouth daily valsartan (DIOVAN) 80 mg tablet  Self No No   Sig: Take 1 tablet (80 mg total) by mouth daily      Facility-Administered Medications: None       Past Medical History:   Diagnosis Date    Abnormal weight loss     Last Assessed: 2014    Arthritis     Basal cell carcinoma     Last Assessed: 2016    Bursitis     left hip pain    Cancer (HCC)     BCA- back, left forearm, skin    Cardiomyopathy ([address] )     Chest discomfort     Last Assessed: 2016    Chest pain     Last Assessed: 2013    Coronary artery disease     Ecchymosis     Last Assessed: 2016    Exertional dyspnea     Last Assessed: 2016    Hepatic hemangioma     Herniation of lumbar intervertebral disc     Hyperlipidemia     Hypertension     Nonmelanoma skin cancer     Last Assessed: 12/15/2017    Pleural effusion     Bilateral; Last Assessed: 2016    Pulmonary nodule     multiple nodes; Last Assessesd: 2016    Shortness of breath     Vitamin D deficiency        Past Surgical History:   Procedure Laterality Date    CARPAL TUNNEL RELEASE Bilateral     CORONARY ARTERY BYPASS GRAFT  2011    CORONARY ARTERY BYPASS GRAFT      NM REPAIR INCISIONAL HERNIA,REDUCIBLE Bilateral 2017    Procedure: LAPAROSCOPIC INGUINAL HERNIA REPAIR WITH MESH ;  Surgeon: Daxa Adame MD;  Location: [facility];  Service: General       Family History   Problem Relation Age of Onset    Heart disease Mother     Hypertension Mother      I have reviewed and agree with the history as documented  Social History     Tobacco Use    Smoking status: Former Smoker     Last attempt to quit: 1980     Years since quittin 6    Smokeless tobacco: Never Used    Tobacco comment: quit date  per allscripts   Substance Use Topics    Alcohol use:  Yes     Alcohol/week: 1 0 standard drinks     Types: 1 Glasses of wine per week     Comment: daily    Drug use: No        Review of Systems   Constitutional: Negative for chills, fatigue and fever  Cardiovascular: Negative for chest pain  Gastrointestinal: Positive for abdominal pain  Negative for anorexia, diarrhea, hematemesis, melena, nausea and vomiting  Genitourinary: Negative for dysuria and hematuria  All other systems reviewed and are negative  Physical Exam  Physical Exam   Constitutional: He is oriented to person, place, and time  He appears well-developed and well-nourished  No distress  HENT:   Head: Normocephalic and atraumatic  Nose: Nose normal    Mouth/Throat: Oropharynx is clear and moist    Eyes: Conjunctivae and EOM are normal    Neck: Normal range of motion  Neck supple  Cardiovascular: Normal rate, regular rhythm and normal heart sounds  Pulmonary/Chest: Effort normal and breath sounds normal  No respiratory distress  Abdominal: Soft  There is generalized tenderness  There is no rigidity, no rebound and no guarding  Musculoskeletal: Normal range of motion  He exhibits no edema  Neurological: He is alert and oriented to person, place, and time  No cranial nerve deficit or sensory deficit  He exhibits normal muscle tone  Skin: Skin is warm and dry  He is not diaphoretic  Psychiatric: He has a normal mood and affect  Nursing note and vitals reviewed        Vital Signs  ED Triage Vitals   Temperature Pulse Respirations Blood Pressure SpO2   11/24/19 0901 11/24/19 0901 11/24/19 0901 11/24/19 0903 11/24/19 0901   97 6 °F (36 4 °C) 73 16 164/76 98 %      Temp Source Heart Rate Source Patient Position - Orthostatic VS BP Location FiO2 (%)   11/24/19 0901 11/24/19 0901 11/24/19 0901 11/24/19 0901 --   Oral Monitor Sitting Right arm       Pain Score       11/24/19 0901       6           Vitals:    11/24/19 1045 11/24/19 1115 11/24/19 1145 11/24/19 1201   BP: 136/78 146/89 121/95 140/67   Pulse: 90 83 92    Patient Position - Orthostatic VS: Sitting Lying Lying Standing         Visual Acuity      ED Medications  Medications   sodium chloride 0 9 % bolus 1,000 mL (0 mL Intravenous Stopped 11/24/19 1026)   iohexol (OMNIPAQUE) 350 MG/ML injection (MULTI-DOSE) 100 mL (100 mL Intravenous Given 11/24/19 1028)   ketorolac (TORADOL) injection 15 mg (15 mg Intravenous Given 11/24/19 1201)   dicyclomine (BENTYL) tablet 20 mg (20 mg Oral Given 11/24/19 1204)   aluminum-magnesium hydroxide-simethicone (MYLANTA) 200-200-20 mg/5 mL oral suspension 30 mL (30 mL Oral Given 11/24/19 1203)       Diagnostic Studies  Results Reviewed     Procedure Component Value Units Date/Time    Comprehensive metabolic panel [[id number]] Collected:  11/24/19 0909    Lab Status:  Final result Specimen:  Blood from Arm, Right Updated:  11/24/19 1003     Sodium 138 mmol/L      Potassium 4 6 mmol/L      Chloride 101 mmol/L      CO2 29 mmol/L      ANION GAP 8 mmol/L      BUN 20 mg/dL      Creatinine 0 86 mg/dL      Glucose 117 mg/dL      Calcium 8 9 mg/dL      AST 23 U/L      ALT 26 U/L      Alkaline Phosphatase 102 U/L      Total Protein 7 3 g/dL      Albumin 3 9 g/dL      Total Bilirubin 0 90 mg/dL      eGFR 79 ml/min/1 73sq m     Narrative:       [place] guidelines for Chronic Kidney Disease (CKD):     Stage 1 with normal or high GFR (GFR > 90 mL/min/1 73 square meters)    Stage 2 Mild CKD (GFR = 60-89 mL/min/1 73 square meters)    Stage 3A Moderate CKD (GFR = 45-59 mL/min/1 73 square meters)    Stage 3B Moderate CKD (GFR = 30-44 mL/min/1 73 square meters)    Stage 4 Severe CKD (GFR = 15-29 mL/min/1 73 square meters)    Stage 5 End Stage CKD (GFR <15 mL/min/1 73 square meters)  Note: GFR calculation is accurate only with a steady state creatinine    Lipase [[id number]]  (Normal) Collected:  11/24/19 0909    Lab Status:  Final result Specimen:  Blood from Arm, Right Updated:  11/24/19 1003     Lipase 179 u/L     UA w Reflex to Microscopic w Reflex to Culture [[id number]] Collected:  11/24/19 0914    Lab Status:  Final result Specimen:  Urine, Clean Catch Updated:  11/24/19 0921     Color, UA Yellow     Clarity, UA Clear     Specific [place], UA 1 020     pH, UA 7 0     Leukocytes, UA Negative     Nitrite, UA Negative     Protein, UA Negative mg/dl      Glucose, UA Negative mg/dl      Ketones, UA Negative mg/dl      Urobilinogen, UA 0 2 E U /dl      Bilirubin, UA Negative     Blood, UA Negative    CBC and differential [[id number]]  (Abnormal) Collected:  11/24/19 0909    Lab Status:  Final result Specimen:  Blood from Arm, Right Updated:  11/24/19 0917     WBC 9 37 Thousand/uL      RBC 4 46 Million/uL      Hemoglobin 13 8 g/dL      Hematocrit 42 0 %      MCV 94 fL      MCH 30 9 pg      MCHC 32 9 g/dL      RDW 12 7 %      MPV 9 4 fL      Platelets 772 Thousands/uL      nRBC 0 /100 WBCs      Neutrophils Relative 83 %      Immat GRANS % 0 %      Lymphocytes Relative 13 %      Monocytes Relative 4 %      Eosinophils Relative 0 %      Basophils Relative 0 %      Neutrophils Absolute 7 67 Thousands/µL      Immature Grans Absolute 0 04 Thousand/uL      Lymphocytes Absolute 1 20 Thousands/µL      Monocytes Absolute 0 41 Thousand/µL      Eosinophils Absolute 0 02 Thousand/µL      Basophils Absolute 0 03 Thousands/µL                  CT abdomen pelvis with contrast   Final Result by Dax Carrillo MD (11/24 1132)      1  Tiny gallstones or gravel identified within mildly distended gallbladder but without pericholecystic inflammation or ductal dilatation  2  Left colon diverticulosis without evidence of bowel obstruction or acute diverticulitis  Workstation performed: [id number]                    Procedures  Procedures       ED Course  ED Course as of Nov 24 1305   Chiqui Lynch Nov 24, 2019   1159 D/w pt his labs and CT scan and no clear cut reason for abd pain  He does not have localized RUQ pain  Pt just feels more bloated and if he could burp/fart or decompress he'd feel better  He admits to eating a "very large bowl" of beef and barley soup so is not sure if that did it  We will give him some GI medicine here and re-assess abd which is still not focally tender and has no rebound/guarding  Will try to have patient eat/drink  1301 Pt abd pain is improved  No focal tenderness/rebound/guarding  D/w them if he has any worsening or continued abd pain, pain that moves to one side, develop fever/vomiting/diarrhea or any new sx return to the ED immediately  Identification of Seniors at Risk      Most Recent Value   (ISAR) Identification of Seniors at Risk   Before the illness or injury that brought you to the Emergency, did you need someone to help you on a regular basis? 0 Filed at: 11/24/2019 0903   In the last 24 hours, have you needed more help than usual?  0 Filed at: 11/24/2019 3169   Have you been hospitalized for one or more nights during the past 6 months? 0 Filed at: 11/24/2019 0903   In general, do you see well?  0 Filed at: 11/24/2019 4070   In general, do you have serious problems with your memory? 0 Filed at: 11/24/2019 3264   Do you take more than three different medications every day?   1 Filed at: 11/24/2019 5579   ISAR Score  1 Filed at: 11/24/2019 0500                          [facility]  Number of Diagnoses or Management Options  Generalized abdominal pain: new and requires workup     Amount and/or Complexity of Data Reviewed  Clinical lab tests: ordered and reviewed  Tests in the radiology section of CPT®: ordered and reviewed    Risk of Complications, Morbidity, and/or Mortality  Presenting problems: high    Patient Progress  Patient progress: improved      Disposition  Final diagnoses:   Generalized abdominal pain     Time reflects when diagnosis was documented in both MDM as applicable and the Disposition within this note     Time User Action Codes Description Comment    11/24/2019  1:03 PM Betsey Briseno Add [R10 84] Generalized abdominal pain       ED Disposition     ED Disposition Condition Date/Time Comment    Discharge Stable Sun Nov 24, 2019  1:03 MEREDITH Parra discharge to home/self care  Follow-up Information     Follow up With Specialties Details Why Contact Info Additional [address] [facility] Emergency Department Emergency Medicine Go to  If symptoms worsen [address] [facility] ED, [address] [place] [address], [facility]          Patient's Medications   Discharge Prescriptions    No medications on file     No discharge procedures on file      ED Provider  Electronically Signed by           Sylvester Wylie MD  11/24/19 2231

## 2019-11-24 NOTE — ED NOTES
Pt  Reports decreased abdominal  Pain and tolerated PO challenge       Doc AGUILAR Arcos  11/24/19 8811

## 2019-11-27 ENCOUNTER — OFFICE VISIT (OUTPATIENT)
Dept: INTERNAL MEDICINE CLINIC | Facility: CLINIC | Age: 84
End: 2019-11-27
Payer: MEDICARE

## 2019-11-27 VITALS
TEMPERATURE: 98.9 F | RESPIRATION RATE: 16 BRPM | HEART RATE: 66 BPM | SYSTOLIC BLOOD PRESSURE: 120 MMHG | HEIGHT: 65 IN | WEIGHT: 148 LBS | BODY MASS INDEX: 24.66 KG/M2 | DIASTOLIC BLOOD PRESSURE: 65 MMHG | OXYGEN SATURATION: 98 %

## 2019-11-27 DIAGNOSIS — K80.20 CALCULUS OF GALLBLADDER WITHOUT CHOLECYSTITIS WITHOUT OBSTRUCTION: ICD-10-CM

## 2019-11-27 DIAGNOSIS — I25.5 ISCHEMIC CARDIOMYOPATHY: ICD-10-CM

## 2019-11-27 DIAGNOSIS — I25.119 ATHEROSCLEROSIS OF NATIVE CORONARY ARTERY WITH ANGINA PECTORIS, UNSPECIFIED WHETHER NATIVE OR TRANSPLANTED HEART (HCC): ICD-10-CM

## 2019-11-27 DIAGNOSIS — I10 HYPERTENSION, UNSPECIFIED TYPE: Primary | ICD-10-CM

## 2019-11-27 PROCEDURE — 99214 OFFICE O/P EST MOD 30 MIN: CPT | Performed by: INTERNAL MEDICINE

## 2019-11-27 PROCEDURE — 93000 ELECTROCARDIOGRAM COMPLETE: CPT | Performed by: INTERNAL MEDICINE

## 2019-11-27 NOTE — PROGRESS NOTES
Assessment/Plan:    1 patient with positive Castellon sign on exam gallstones on CT scan will get nuclear medicine scan and refer to Dr Brody Buck for further evaluation  2  Patient with irregular heartbeat no atrial fibrillation noted           Diagnoses and all orders for this visit:    Hypertension, unspecified type  -     POCT ECG; Future  -     NM hepatobiliary w rx; Future  -     POCT ECG    Calculus of gallbladder without cholecystitis without obstruction  -     NM hepatobiliary w rx; Future  -     Ambulatory referral to General Surgery; Future    Ischemic cardiomyopathy  -     POCT ECG    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, unspecified whether native or transplanted heart (HCC)  -     POCT ECG        The patient was counseled regarding instructions for management, risk factor reductions, patient and family education,impressions, risks and benefits of treatment options, side effects of medications, importance of compliance with treatment  The treatment plan was reviewed with the patient/guardian and patient/guardian understands and agrees with the treatment plan              Current Outpatient Medications:     amLODIPine (NORVASC) 5 mg tablet, Take 1 tablet (5 mg total) by mouth daily, Disp: 90 tablet, Rfl: 2    aspirin 81 MG tablet, Take 2 tablets by mouth once , Disp: , Rfl:     atorvastatin (LIPITOR) 80 mg tablet, Take 1 tablet (80 mg total) by mouth daily, Disp: 90 tablet, Rfl: 2    B Complex Vitamins (VITAMIN B COMPLEX) TABS, Take 1 tablet by mouth daily, Disp: , Rfl:     Cholecalciferol (VITAMIN D-3 PO), Take 2,000 Units by mouth 3 (three) times a day before meals, Disp: , Rfl:     co-enzyme Q-10 30 MG capsule, Take 30 mg by mouth daily, Disp: , Rfl:     hydrocortisone-pramoxine (ANALPRAM-HC) 2 5-1 % rectal cream, Insert into the rectum, Disp: , Rfl:     metoprolol succinate (TOPROL-XL) 25 mg 24 hr tablet, Take 0 5 tablets (12 5 mg total) by mouth daily, Disp: 90 tablet, Rfl: 2    nitroglycerin (NITROSTAT) 0 4 mg SL tablet, Place 0 4 mg under the tongue every 5 (five) minutes as needed for chest pain, Disp: , Rfl:     omega-3-acid ethyl esters (LOVAZA) 1 g capsule, Take 2 g by mouth daily, Disp: , Rfl:     valsartan (DIOVAN) 80 mg tablet, Take 1 tablet (80 mg total) by mouth daily, Disp: 90 tablet, Rfl: 2    Subjective:      Patient ID: Chris Burkett is a 80 y o  male  5 days ago woke up 230 am severe abdominal pain did not resolve to ER [place], CT abdomin constant abdominal pain, pain level 7, pain relieved with gas now pain 3-4      The following portions of the patient's history were reviewed and updated as appropriate:   He has a past medical history of Abnormal weight loss, Arthritis, Basal cell carcinoma, Bursitis, Cancer (HCC), Cardiomyopathy ([address] ), Chest discomfort, Chest pain, Coronary artery disease, Ecchymosis, Exertional dyspnea, Hepatic hemangioma, Herniation of lumbar intervertebral disc, Hyperlipidemia, Hypertension, Nonmelanoma skin cancer, Pleural effusion, Pulmonary nodule, Shortness of breath, and Vitamin D deficiency  ,  does not have any pertinent problems on file  ,   has a past surgical history that includes Carpal tunnel release (Bilateral); Coronary artery bypass graft (03/24/2011); Coronary artery bypass graft; and pr repair incisional hernia,reducible (Bilateral, 4/11/2017)  ,  family history includes Heart disease in his mother; Hypertension in his mother  ,   reports that he quit smoking about 39 years ago  He has never used smokeless tobacco  He reports that he drinks about 1 0 standard drinks of alcohol per week  He reports that he does not use drugs  ,  has No Known Allergies       Review of Systems   Constitutional: Negative for appetite change, chills, fatigue, fever and unexpected weight change     HENT: Negative for congestion, ear pain, facial swelling, hearing loss, mouth sores, nosebleeds, postnasal drip, rhinorrhea, sinus pain, sore throat, trouble swallowing and voice change  Eyes: Negative for pain, discharge, redness and visual disturbance  Respiratory: Negative for apnea, chest tightness, shortness of breath, wheezing and stridor  Cardiovascular: Negative for chest pain, palpitations and leg swelling  Gastrointestinal: Positive for abdominal pain  Negative for abdominal distention, blood in stool, constipation, diarrhea and vomiting  Endocrine: Negative for cold intolerance, heat intolerance, polydipsia, polyphagia and polyuria  Genitourinary: Negative for difficulty urinating, dysuria, flank pain, frequency, genital sores, hematuria and urgency  Musculoskeletal: Negative for arthralgias and back pain  Skin: Negative for rash and wound  Allergic/Immunologic: Negative for environmental allergies, food allergies and immunocompromised state  Neurological: Negative for dizziness, tremors, seizures, syncope, facial asymmetry, speech difficulty, weakness, light-headedness, numbness and headaches  Hematological: Negative for adenopathy  Does not bruise/bleed easily  Psychiatric/Behavioral: Negative for agitation, behavioral problems, dysphoric mood, hallucinations, self-injury, sleep disturbance and suicidal ideas  The patient is not hyperactive            Objective:  /65 (BP Location: Left arm, Patient Position: Sitting)   Pulse 66   Temp 98 9 °F (37 2 °C)   Resp 16   Ht 5' 5" (1 651 m)   Wt 67 1 kg (148 lb)   SpO2 98%   BMI 24 63 kg/m²     Lab Review  Admission on 11/24/2019, Discharged on 11/24/2019   Component Date Value    WBC 11/24/2019 9 37     RBC 11/24/2019 4 46     Hemoglobin 11/24/2019 13 8     Hematocrit 11/24/2019 42 0     MCV 11/24/2019 94     MCH 11/24/2019 30 9     MCHC 11/24/2019 32 9     RDW 11/24/2019 12 7     MPV 11/24/2019 9 4     Platelets 83/54/6819 185     nRBC 11/24/2019 0     Neutrophils Relative 11/24/2019 83*    Immat GRANS % 11/24/2019 0     Lymphocytes Relative 11/24/2019 13*    Monocytes Relative 11/24/2019 4     Eosinophils Relative 11/24/2019 0     Basophils Relative 11/24/2019 0     Neutrophils Absolute 11/24/2019 7 67*    Immature Grans Absolute 11/24/2019 0 04     Lymphocytes Absolute 11/24/2019 1 20     Monocytes Absolute 11/24/2019 0 41     Eosinophils Absolute 11/24/2019 0 02     Basophils Absolute 11/24/2019 0 03     Sodium 11/24/2019 138     Potassium 11/24/2019 4 6     Chloride 11/24/2019 101     CO2 11/24/2019 29     ANION GAP 11/24/2019 8     BUN 11/24/2019 20     Creatinine 11/24/2019 0 86     Glucose 11/24/2019 117     Calcium 11/24/2019 8 9     AST 11/24/2019 23     ALT 11/24/2019 26     Alkaline Phosphatase 11/24/2019 102     Total Protein 11/24/2019 7 3     Albumin 11/24/2019 3 9     Total Bilirubin 11/24/2019 0 90     eGFR 11/24/2019 79     Lipase 11/24/2019 179     Color, UA 11/24/2019 Yellow     Clarity, UA 11/24/2019 Clear     Specific Gravity, UA 11/24/2019 1 020     pH, UA 11/24/2019 7 0     Leukocytes, UA 11/24/2019 Negative     Nitrite, UA 11/24/2019 Negative     Protein, UA 11/24/2019 Negative     Glucose, UA 11/24/2019 Negative     Ketones, UA 11/24/2019 Negative     Urobilinogen, UA 11/24/2019 0 2     Bilirubin, UA 11/24/2019 Negative     Blood, UA 11/24/2019 Negative    Appointment on 06/26/2019   Component Date Value    Albumin 06/26/2019 3 5     Calcium 06/26/2019 8 7     Phosphorus 06/26/2019 3 3     Glucose 06/26/2019 83     BUN 06/26/2019 23     Creatinine 06/26/2019 0 96     Sodium 06/26/2019 143     Potassium 06/26/2019 4 1     Chloride 06/26/2019 106     CO2 06/26/2019 27     ANION GAP 06/26/2019 10     eGFR 06/26/2019 72    Appointment on 06/18/2019   Component Date Value    WBC 06/18/2019 6 19     RBC 06/18/2019 4 33     Hemoglobin 06/18/2019 13 4     Hematocrit 06/18/2019 41 4     MCV 06/18/2019 96     MCH 06/18/2019 30 9     MCHC 06/18/2019 32 4     RDW 06/18/2019 12 8     MPV 06/18/2019 10 2     Platelets 53/26/0631 177     nRBC 06/18/2019 0     Neutrophils Relative 06/18/2019 59     Immat GRANS % 06/18/2019 0     Lymphocytes Relative 06/18/2019 29     Monocytes Relative 06/18/2019 10     Eosinophils Relative 06/18/2019 2     Basophils Relative 06/18/2019 0     Neutrophils Absolute 06/18/2019 3 57     Immature Grans Absolute 06/18/2019 0 02     Lymphocytes Absolute 06/18/2019 1 82     Monocytes Absolute 06/18/2019 0 64     Eosinophils Absolute 06/18/2019 0 12     Basophils Absolute 06/18/2019 0 02     Sodium 06/18/2019 150*    Potassium 06/18/2019 4 3     Chloride 06/18/2019 106     CO2 06/18/2019 27     ANION GAP 06/18/2019 17*    BUN 06/18/2019 19     Creatinine 06/18/2019 1 01     Glucose, Fasting 06/18/2019 96     Calcium 06/18/2019 8 8     AST 06/18/2019 23     ALT 06/18/2019 19     Alkaline Phosphatase 06/18/2019 97     Total Protein 06/18/2019 7 0     Albumin 06/18/2019 3 5     Total Bilirubin 06/18/2019 1 00     eGFR 06/18/2019 68     Vit D, 25-Hydroxy 06/18/2019 31 0     TSH 3RD GENERATON 06/18/2019 1 712     Cholesterol 06/18/2019 108     Triglycerides 06/18/2019 33     HDL, Direct 06/18/2019 69*    LDL Calculated 06/18/2019 32     LDL Direct 06/18/2019 36          Imaging  @[email or web address]@     NM hepatobiliary w rx    (Results Pending)     No results found for this or any previous visit  Physical Exam   Constitutional: He is oriented to person, place, and time  He appears well-developed  HENT:   Right Ear: External ear normal    Left Ear: External ear normal    Eyes: Right eye exhibits no discharge  Left eye exhibits no discharge  No scleral icterus  Neck: Carotid bruit is not present  No tracheal deviation present  No thyroid mass and no thyromegaly present  Cardiovascular: Normal rate and intact distal pulses  An irregularly irregular rhythm present  Exam reveals no gallop and no friction rub     Murmur heard   Pulmonary/Chest: No respiratory distress  He has no wheezes  He has no rales  Abdominal: Bowel sounds are normal  He exhibits no shifting dullness, no distension, no pulsatile liver, no fluid wave, no abdominal bruit, no ascites, no pulsatile midline mass and no mass  There is no hepatosplenomegaly  There is no tenderness  There is positive Castellon's sign  There is no rigidity, no rebound, no guarding, no CVA tenderness and no tenderness at McBurney's point  Musculoskeletal: He exhibits no edema  Lymphadenopathy:     He has no cervical adenopathy  Neurological: He is alert and oriented to person, place, and time  Coordination normal    Psychiatric: He has a normal mood and affect  His behavior is normal  Judgment and thought content normal    Nursing note and vitals reviewed

## 2019-12-18 ENCOUNTER — TELEPHONE (OUTPATIENT)
Dept: INTERNAL MEDICINE CLINIC | Facility: CLINIC | Age: 84
End: 2019-12-18

## 2019-12-18 NOTE — TELEPHONE ENCOUNTER
Pt stated he cancelled his hepatobilory scan because he has not had a problem he will keep his appt with Dr Vinny Victoria

## 2020-01-10 ENCOUNTER — APPOINTMENT (OUTPATIENT)
Dept: LAB | Facility: HOSPITAL | Age: 85
End: 2020-01-10
Attending: INTERNAL MEDICINE
Payer: MEDICARE

## 2020-01-10 DIAGNOSIS — Z23 NEED FOR VACCINATION FOR PNEUMOCOCCUS: ICD-10-CM

## 2020-01-10 DIAGNOSIS — I25.5 ISCHEMIC CARDIOMYOPATHY: ICD-10-CM

## 2020-01-10 DIAGNOSIS — Z23 NEED FOR SHINGLES VACCINE: ICD-10-CM

## 2020-01-10 DIAGNOSIS — E78.5 HYPERLIPIDEMIA, UNSPECIFIED HYPERLIPIDEMIA TYPE: ICD-10-CM

## 2020-01-10 DIAGNOSIS — I25.10 2-VESSEL CORONARY ARTERY DISEASE: ICD-10-CM

## 2020-01-10 DIAGNOSIS — I10 HYPERTENSION, UNSPECIFIED TYPE: ICD-10-CM

## 2020-01-10 DIAGNOSIS — R01.1 MURMUR, CARDIAC: ICD-10-CM

## 2020-01-10 DIAGNOSIS — E87.0 HYPERNATREMIA: ICD-10-CM

## 2020-01-10 DIAGNOSIS — E55.9 VITAMIN D DEFICIENCY: ICD-10-CM

## 2020-01-10 LAB
ALBUMIN SERPL BCP-MCNC: 3.5 G/DL (ref 3.5–5)
ALP SERPL-CCNC: 106 U/L (ref 46–116)
ALT SERPL W P-5'-P-CCNC: 24 U/L (ref 12–78)
ANION GAP SERPL CALCULATED.3IONS-SCNC: 5 MMOL/L (ref 4–13)
AST SERPL W P-5'-P-CCNC: 27 U/L (ref 5–45)
BASOPHILS # BLD AUTO: 0.03 THOUSANDS/ΜL (ref 0–0.1)
BASOPHILS NFR BLD AUTO: 0 % (ref 0–1)
BILIRUB SERPL-MCNC: 0.9 MG/DL (ref 0.2–1)
BUN SERPL-MCNC: 19 MG/DL (ref 5–25)
CALCIUM SERPL-MCNC: 8.6 MG/DL (ref 8.3–10.1)
CHLORIDE SERPL-SCNC: 106 MMOL/L (ref 100–108)
CHOLEST SERPL-MCNC: 112 MG/DL (ref 50–200)
CO2 SERPL-SCNC: 31 MMOL/L (ref 21–32)
CREAT SERPL-MCNC: 0.88 MG/DL (ref 0.6–1.3)
EOSINOPHIL # BLD AUTO: 0.12 THOUSAND/ΜL (ref 0–0.61)
EOSINOPHIL NFR BLD AUTO: 2 % (ref 0–6)
ERYTHROCYTE [DISTWIDTH] IN BLOOD BY AUTOMATED COUNT: 12.8 % (ref 11.6–15.1)
GFR SERPL CREATININE-BSD FRML MDRD: 78 ML/MIN/1.73SQ M
GLUCOSE P FAST SERPL-MCNC: 99 MG/DL (ref 65–99)
HCT VFR BLD AUTO: 43.3 % (ref 36.5–49.3)
HDLC SERPL-MCNC: 69 MG/DL
HGB BLD-MCNC: 13.9 G/DL (ref 12–17)
IMM GRANULOCYTES # BLD AUTO: 0.02 THOUSAND/UL (ref 0–0.2)
IMM GRANULOCYTES NFR BLD AUTO: 0 % (ref 0–2)
LDLC SERPL CALC-MCNC: 37 MG/DL (ref 0–100)
LDLC SERPL DIRECT ASSAY-MCNC: 37 MG/DL (ref 0–100)
LYMPHOCYTES # BLD AUTO: 1.96 THOUSANDS/ΜL (ref 0.6–4.47)
LYMPHOCYTES NFR BLD AUTO: 29 % (ref 14–44)
MCH RBC QN AUTO: 30.8 PG (ref 26.8–34.3)
MCHC RBC AUTO-ENTMCNC: 32.1 G/DL (ref 31.4–37.4)
MCV RBC AUTO: 96 FL (ref 82–98)
MONOCYTES # BLD AUTO: 0.61 THOUSAND/ΜL (ref 0.17–1.22)
MONOCYTES NFR BLD AUTO: 9 % (ref 4–12)
NEUTROPHILS # BLD AUTO: 4.03 THOUSANDS/ΜL (ref 1.85–7.62)
NEUTS SEG NFR BLD AUTO: 60 % (ref 43–75)
NRBC BLD AUTO-RTO: 0 /100 WBCS
PLATELET # BLD AUTO: 196 THOUSANDS/UL (ref 149–390)
PMV BLD AUTO: 9.7 FL (ref 8.9–12.7)
POTASSIUM SERPL-SCNC: 4.2 MMOL/L (ref 3.5–5.3)
PROT SERPL-MCNC: 7.1 G/DL (ref 6.4–8.2)
RBC # BLD AUTO: 4.52 MILLION/UL (ref 3.88–5.62)
SODIUM SERPL-SCNC: 142 MMOL/L (ref 136–145)
TRIGL SERPL-MCNC: 29 MG/DL
TSH SERPL DL<=0.05 MIU/L-ACNC: 3.19 UIU/ML (ref 0.36–3.74)
WBC # BLD AUTO: 6.77 THOUSAND/UL (ref 4.31–10.16)

## 2020-01-10 PROCEDURE — 80061 LIPID PANEL: CPT

## 2020-01-10 PROCEDURE — 36415 COLL VENOUS BLD VENIPUNCTURE: CPT

## 2020-01-10 PROCEDURE — 84443 ASSAY THYROID STIM HORMONE: CPT

## 2020-01-10 PROCEDURE — 85025 COMPLETE CBC W/AUTO DIFF WBC: CPT

## 2020-01-10 PROCEDURE — 80053 COMPREHEN METABOLIC PANEL: CPT

## 2020-01-10 PROCEDURE — 83721 ASSAY OF BLOOD LIPOPROTEIN: CPT

## 2020-01-16 ENCOUNTER — OFFICE VISIT (OUTPATIENT)
Dept: INTERNAL MEDICINE CLINIC | Facility: CLINIC | Age: 85
End: 2020-01-16
Payer: MEDICARE

## 2020-01-16 VITALS
HEART RATE: 80 BPM | BODY MASS INDEX: 24.53 KG/M2 | DIASTOLIC BLOOD PRESSURE: 65 MMHG | HEIGHT: 65 IN | WEIGHT: 147.2 LBS | RESPIRATION RATE: 16 BRPM | TEMPERATURE: 97.7 F | SYSTOLIC BLOOD PRESSURE: 130 MMHG | OXYGEN SATURATION: 98 %

## 2020-01-16 DIAGNOSIS — I25.10 2-VESSEL CORONARY ARTERY DISEASE: ICD-10-CM

## 2020-01-16 DIAGNOSIS — E55.9 VITAMIN D DEFICIENCY: ICD-10-CM

## 2020-01-16 DIAGNOSIS — Z95.5 HISTORY OF HEART ARTERY STENT: ICD-10-CM

## 2020-01-16 DIAGNOSIS — K80.20 CALCULUS OF GALLBLADDER WITHOUT CHOLECYSTITIS WITHOUT OBSTRUCTION: ICD-10-CM

## 2020-01-16 DIAGNOSIS — I25.5 ISCHEMIC CARDIOMYOPATHY: ICD-10-CM

## 2020-01-16 DIAGNOSIS — I10 HYPERTENSION, UNSPECIFIED TYPE: ICD-10-CM

## 2020-01-16 DIAGNOSIS — Z00.00 MEDICARE ANNUAL WELLNESS VISIT, SUBSEQUENT: Primary | ICD-10-CM

## 2020-01-16 DIAGNOSIS — E78.5 HYPERLIPIDEMIA, UNSPECIFIED HYPERLIPIDEMIA TYPE: ICD-10-CM

## 2020-01-16 DIAGNOSIS — E87.0 HYPERNATREMIA: ICD-10-CM

## 2020-01-16 PROCEDURE — 99214 OFFICE O/P EST MOD 30 MIN: CPT | Performed by: INTERNAL MEDICINE

## 2020-01-16 PROCEDURE — 1123F ACP DISCUSS/DSCN MKR DOCD: CPT | Performed by: INTERNAL MEDICINE

## 2020-01-16 PROCEDURE — G0439 PPPS, SUBSEQ VISIT: HCPCS | Performed by: INTERNAL MEDICINE

## 2020-01-16 RX ORDER — AMLODIPINE BESYLATE 5 MG/1
5 TABLET ORAL DAILY
Qty: 90 TABLET | Refills: 2 | Status: SHIPPED | OUTPATIENT
Start: 2020-01-16 | End: 2020-09-01 | Stop reason: SDUPTHER

## 2020-01-16 RX ORDER — ATORVASTATIN CALCIUM 80 MG/1
80 TABLET, FILM COATED ORAL DAILY
Qty: 90 TABLET | Refills: 2 | Status: SHIPPED | OUTPATIENT
Start: 2020-01-16 | End: 2020-09-01 | Stop reason: SDUPTHER

## 2020-01-16 RX ORDER — VALSARTAN 80 MG/1
80 TABLET ORAL DAILY
Qty: 90 TABLET | Refills: 2 | Status: SHIPPED | OUTPATIENT
Start: 2020-01-16 | End: 2020-09-01 | Stop reason: SDUPTHER

## 2020-01-16 RX ORDER — METOPROLOL SUCCINATE 25 MG/1
12.5 TABLET, EXTENDED RELEASE ORAL DAILY
Qty: 90 TABLET | Refills: 2 | Status: SHIPPED | OUTPATIENT
Start: 2020-01-16 | End: 2020-09-01 | Stop reason: SDUPTHER

## 2020-01-16 NOTE — PROGRESS NOTES
Assessment and Plan:     Problem List Items Addressed This Visit     None           Preventive health issues were discussed with patient, and age appropriate screening tests were ordered as noted in patient's After Visit Summary  Personalized health advice and appropriate referrals for health education or preventive services given if needed, as noted in patient's After Visit Summary       History of Present Illness:     Patient presents for Medicare Annual Wellness visit    Patient Care Team:  Laurel Pate DO as PCP - General  Rudi Durant MD     Problem List:     Patient Active Problem List   Diagnosis    2-vessel coronary artery disease    Basal cell carcinoma of scalp    Cardiomyopathy ([address] )    History of heart artery stent    Cholelithiasis    Hyperlipidemia    Hypertension    Lumbar pain    Urinary urgency    Vitamin D deficiency    Primary osteoarthritis of right knee    Primary osteoarthritis of left knee    Presence of stent in coronary artery in patient with coronary artery disease    Epistaxis    Murmur, cardiac    Squamous cell carcinoma in situ (SCCIS) of scalp    Hypernatremia    Dizziness    Calculus of gallbladder without cholecystitis without obstruction    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 [facility])      Past Medical and Surgical History:     Past Medical History:   Diagnosis Date    Abnormal weight loss     Last Assessed: 2/24/2014    Arthritis     Basal cell carcinoma     Last Assessed: 8/16/2016    Bursitis     left hip pain    Cancer ([facility] )     BCA- back, left forearm, skin    Cardiomyopathy ([facility] [facility] )     Chest discomfort     Last Assessed: 2/23/2016    Chest pain     Last Assessed: 2/13/2013    Coronary artery disease     Ecchymosis     Last Assessed: 7/12/2016    Exertional dyspnea     Last Assessed: 2/23/2016    Hepatic hemangioma     Herniation of lumbar intervertebral disc     Hyperlipidemia     Hypertension     Nonmelanoma skin cancer     Last Assessed: 12/15/2017    Pleural effusion     Bilateral; Last Assessed: 2016    Pulmonary nodule     multiple nodes; Last Assessesd: 2016    Shortness of breath     Vitamin D deficiency      Past Surgical History:   Procedure Laterality Date    CARPAL TUNNEL RELEASE Bilateral     CORONARY ARTERY BYPASS GRAFT  2011    CORONARY ARTERY BYPASS GRAFT      MA REPAIR INCISIONAL HERNIA,REDUCIBLE Bilateral 2017    Procedure: LAPAROSCOPIC INGUINAL HERNIA REPAIR WITH MESH ;  Surgeon: Rc Mcdonald MD;  Location: [facility];  Service: General      Family History:     Family History   Problem Relation Age of Onset    Heart disease Mother     Hypertension Mother       Social History:     Social History     Socioeconomic History    Marital status: /Civil Union     Spouse name: None    Number of children: None    Years of education: None    Highest education level: None   Occupational History    None   Social Needs    Financial resource strain: None    Food insecurity:     Worry: None     Inability: None    Transportation needs:     Medical: None     Non-medical: None   Tobacco Use    Smoking status: Former Smoker     Last attempt to quit: 1980     Years since quittin 7    Smokeless tobacco: Never Used    Tobacco comment: quit date  per allscripts   Substance and Sexual Activity    Alcohol use:  Yes     Alcohol/week: 1 0 standard drinks     Types: 1 Glasses of wine per week     Comment: daily    Drug use: No    Sexual activity: Not Currently   Lifestyle    Physical activity:     Days per week: None     Minutes per session: None    Stress: None   Relationships    Social connections:     Talks on phone: None     Gets together: None     Attends [employer] service: None     Active member of club or organization: None     Attends meetings of clubs or organizations: None     Relationship status: None    Intimate partner violence:     Fear of current or ex partner: None     Emotionally abused: None     Physically abused: None     Forced sexual activity: None   Other Topics Concern    None   Social History Narrative    Caffeine use       Medications and Allergies:     Current Outpatient Medications   Medication Sig Dispense Refill    amLODIPine (NORVASC) 5 mg tablet Take 1 tablet (5 mg total) by mouth daily 90 tablet 2    aspirin 81 MG tablet Take 2 tablets by mouth once       atorvastatin (LIPITOR) 80 mg tablet Take 1 tablet (80 mg total) by mouth daily 90 tablet 2    B Complex Vitamins (VITAMIN B COMPLEX) TABS Take 1 tablet by mouth daily      Cholecalciferol (VITAMIN D-3 PO) Take 2,000 Units by mouth 3 (three) times a day before meals      co-enzyme Q-10 30 MG capsule Take 30 mg by mouth daily      hydrocortisone-pramoxine (ANALPRAM-HC) 2 5-1 % rectal cream Insert into the rectum      metoprolol succinate (TOPROL-XL) 25 mg 24 hr tablet Take 0 5 tablets (12 5 mg total) by mouth daily 90 tablet 2    nitroglycerin (NITROSTAT) 0 4 mg SL tablet Place 0 4 mg under the tongue every 5 (five) minutes as needed for chest pain      omega-3-acid ethyl esters (LOVAZA) 1 g capsule Take 2 g by mouth daily      valsartan (DIOVAN) 80 mg tablet Take 1 tablet (80 mg total) by mouth daily 90 tablet 2     No current facility-administered medications for this visit        No Known Allergies   Immunizations:     Immunization History   Administered Date(s) Administered    DT (pediatric) 03/08/2005    INFLUENZA 09/12/2018    Influenza Split High Dose Preservative Free IM 09/17/2014, 09/30/2015, 10/06/2016, 10/02/2017    Influenza TIV (IM) 10/01/2012    Influenza, high dose seasonal 0 5 mL 09/12/2018, 10/02/2019    Pneumococcal Conjugate 13-Valent 06/26/2019    Pneumococcal Polysaccharide PPV23 12/10/2004, 09/10/2010    TD (adult) Preservative Free 10/07/2015      Health Maintenance:         Topic Date Due    Hepatitis C Screening  Completed         Topic Date Due    DTaP,Tdap,and Td Vaccines (1 - Tdap) 09/14/1945      Medicare Health Risk Assessment:     Pulse 80   Temp 97 7 °F (36 5 °C) (Tympanic)   Resp 16   Ht 5' 5" (1 651 m)   Wt 66 8 kg (147 lb 3 2 oz)   SpO2 98%   BMI 24 50 kg/m²      Mayela Guardado is here for his Subsequent Wellness visit  Health Risk Assessment:   Patient rates overall health as good  Patient feels that their physical health rating is same  Eyesight was rated as same  Hearing was rated as same  Patient feels that their emotional and mental health rating is same  Pain experienced in the last 7 days has been none  Patient states that he has experienced no weight loss or gain in last 6 months  Depression Screening:   PHQ-2 Score: 0      Fall Risk Screening: In the past year, patient has experienced: no history of falling in past year      Home Safety:  Patient has trouble with stairs inside or outside of their home  Patient has working smoke alarms and has working carbon monoxide detector  Home safety hazards include: none  Nutrition:   Current diet is Regular  Medications:   Patient is currently taking over-the-counter supplements  OTC medications include: see medication list  Patient is able to manage medications  Activities of Daily Living (ADLs)/Instrumental Activities of Daily Living (IADLs):   Walk and transfer into and out of bed and chair?: Yes  Dress and groom yourself?: Yes    Bathe or shower yourself?: Yes    Feed yourself?  Yes  Do your laundry/housekeeping?: Yes  Manage your money, pay your bills and track your expenses?: Yes  Make your own meals?: Yes    Do your own shopping?: Yes    Previous Hospitalizations:   Any hospitalizations or ED visits within the last 12 months?: Yes    How many hospitalizations have you had in the last year?: 1-2    Advance Care Planning:   Living will: No    Durable POA for healthcare: No    Advanced directive: Yes    Advanced directive counseling given: Yes    Five wishes given: Yes    Patient declined ACP directive: No    End of Life Decisions reviewed with patient: Yes    Provider agrees with end of life decisions: Yes      Comments: Keep on life support    Cognitive Screening:   Provider or family/friend/caregiver concerned regarding cognition?: No    PREVENTIVE SCREENINGS      Cardiovascular Screening:    General: Screening Not Indicated and History Lipid Disorder      Diabetes Screening:     General: Screening Current      Colorectal Cancer Screening:     General: Screening Not Indicated      Prostate Cancer Screening:    General: Screening Not Indicated      Osteoporosis Screening:    General: Screening Not Indicated      Abdominal Aortic Aneurysm (AAA) Screening:    Risk factors include: tobacco use        Lung Cancer Screening:     General: Screening Not Indicated      Hepatitis C Screening:    General: Screening Current      Vega Pratt DO

## 2020-01-16 NOTE — PATIENT INSTRUCTIONS
Medicare Preventive Visit Patient Instructions  Thank you for completing your Welcome to Medicare Visit or Medicare Annual Wellness Visit today  Your next wellness visit will be due in one year (1/16/2021)  The screening/preventive services that you may require over the next 5-10 years are detailed below  Some tests may not apply to you based off risk factors and/or age  Screening tests ordered at today's visit but not completed yet may show as past due  Also, please note that scanned in results may not display below  Preventive Screenings:  Service Recommendations Previous Testing/Comments   Colorectal Cancer Screening  · Colonoscopy    · Fecal Occult Blood Test (FOBT)/Fecal Immunochemical Test (FIT)  · Fecal DNA/Cologuard Test  · Flexible Sigmoidoscopy Age: 54-65 years old   Colonoscopy: every 10 years (May be performed more frequently if at higher risk)  OR  FOBT/FIT: every 1 year  OR  Cologuard: every 3 years  OR  Sigmoidoscopy: every 5 years  Screening may be recommended earlier than age 48 if at higher risk for colorectal cancer  Also, an individualized decision between you and your healthcare provider will decide whether screening between the ages of 74-80 would be appropriate   Colonoscopy: Not on file  FOBT/FIT: Not on file  Cologuard: Not on file  Sigmoidoscopy: Not on file    Screening Not Indicated     Prostate Cancer Screening Individualized decision between patient and health care provider in men between ages of 53-78   Medicare will cover every 12 months beginning on the day after your 50th birthday PSA: 1 3 ng/mL     Screening Not Indicated     Hepatitis C Screening Once for adults born between 1945 and 1965  More frequently in patients at high risk for Hepatitis C Hep C Antibody: 12/28/2017    Screening Current   Diabetes Screening 1-2 times per year if you're at risk for diabetes or have pre-diabetes Fasting glucose: 99 mg/dL   A1C: No results in last 5 years    Screening Current   Cholesterol Screening Once every 5 years if you don't have a lipid disorder  May order more often based on risk factors  Lipid panel: 01/10/2020    Screening Not Indicated  History Lipid Disorder      Other Preventive Screenings Covered by Medicare:  1  Abdominal Aortic Aneurysm (AAA) Screening: covered once if your at risk  You're considered to be at risk if you have a family history of AAA or a male between the age of 73-68 who smoking at least 100 cigarettes in your lifetime  2  Lung Cancer Screening: covers low dose CT scan once per year if you meet all of the following conditions: (1) Age 50-69; (2) No signs or symptoms of lung cancer; (3) Current smoker or have quit smoking within the last 15 years; (4) You have a tobacco smoking history of at least 30 pack years (packs per day x number of years you smoked); (5) You get a written order from a healthcare provider  3  Glaucoma Screening: covered annually if you're considered high risk: (1) You have diabetes OR (2) Family history of glaucoma OR (3)  aged 48 and older OR (3)  American aged 72 and older  3  Osteoporosis Screening: covered every 2 years if you meet one of the following conditions: (1) Have a vertebral abnormality; (2) On glucocorticoid therapy for more than 3 months; (3) Have primary hyperparathyroidism; (4) On osteoporosis medications and need to assess response to drug therapy  5  HIV Screening: covered annually if you're between the age of 12-76  Also covered annually if you are younger than 13 and older than 72 with risk factors for HIV infection  For pregnant patients, it is covered up to 3 times per pregnancy      Immunizations:  Immunization Recommendations   Influenza Vaccine Annual influenza vaccination during flu season is recommended for all persons aged >= 6 months who do not have contraindications   Pneumococcal Vaccine (Prevnar and Pneumovax)  * Prevnar = PCV13  * Pneumovax = PPSV23 Adults 25-60 years old: 1-3 doses may be recommended based on certain risk factors  Adults 72 years old: Prevnar (PCV13) vaccine recommended followed by Pneumovax (PPSV23) vaccine  If already received PPSV23 since turning 65, then PCV13 recommended at least one year after PPSV23 dose  Hepatitis B Vaccine 3 dose series if at intermediate or high risk (ex: diabetes, end stage renal disease, liver disease)   Tetanus (Td) Vaccine - COST NOT COVERED BY MEDICARE PART B Following completion of primary series, a booster dose should be given every 10 years to maintain immunity against tetanus  Td may also be given as tetanus wound prophylaxis  Tdap Vaccine - COST NOT COVERED BY MEDICARE PART B Recommended at least once for all adults  For pregnant patients, recommended with each pregnancy  Shingles Vaccine (Shingrix) - COST NOT COVERED BY MEDICARE PART B  2 shot series recommended in those aged 48 and above     Health Maintenance Due:      Topic Date Due    Hepatitis C Screening  Completed     Immunizations Due:      Topic Date Due    DTaP,Tdap,and Td Vaccines (1 - Tdap) 09/14/1945     Advance Directives   What are advance directives? Advance directives are legal documents that state your wishes and plans for medical care  These plans are made ahead of time in case you lose your ability to make decisions for yourself  Advance directives can apply to any medical decision, such as the treatments you want, and if you want to donate organs  What are the types of advance directives? There are many types of advance directives, and each state has rules about how to use them  You may choose a combination of any of the following:  · Living will: This is a written record of the treatment you want  You can also choose which treatments you do not want, which to limit, and which to stop at a certain time  This includes surgery, medicine, IV fluid, and tube feedings  · Durable power of  for healthcare [facility]):   This is a written record that states who you want to make healthcare choices for you when you are unable to make them for yourself  This person, called a proxy, is usually a family member or a friend  You may choose more than 1 proxy  · Do not resuscitate (DNR) order:  A DNR order is used in case your heart stops beating or you stop breathing  It is a request not to have certain forms of treatment, such as CPR  A DNR order may be included in other types of advance directives  · Medical directive: This covers the care that you want if you are in a coma, near death, or unable to make decisions for yourself  You can list the treatments you want for each condition  Treatment may include pain medicine, surgery, blood transfusions, dialysis, IV or tube feedings, and a ventilator (breathing machine)  · Values history: This document has questions about your views, beliefs, and how you feel and think about life  This information can help others choose the care that you would choose  Why are advance directives important? An advance directive helps you control your care  Although spoken wishes may be used, it is better to have your wishes written down  Spoken wishes can be misunderstood, or not followed  Treatments may be given even if you do not want them  An advance directive may make it easier for your family to make difficult choices about your care  © Copyright [employer] 2018 Information is for End User's use only and may not be sold, redistributed or otherwise used for commercial purposes   All illustrations and images included in CareNotes® are the copyrighted property of A D A M , [employer]  or [address] [place], [place] [address]      1 patient with abdominal pain and small gallstones has not had symptoms for over a month exam today was unremarkable he does have rare right flank pain but does have increased gas will be getting hepatobiliary scan to see if his gallbladder is disease if not will hold referral to surgeon however if he develops abdominal pain he will need to see Dr Del Delcid  2  Patient with cardiac murmur echocardiogram by Dr Daiana Mclaughlin pending  3  History of coronary disease patient is on beta-blocker aspirin and statin  4  Hyperlipidemia LDL is at goal will recheck in 6 months  5  Hypertension is at goal    Gallstones   WHAT YOU NEED TO KNOW:   What are gallstones? Gallstones, also called cholelithiasis, are hard substances that form in your gallbladder or bile duct  Your gallbladder and bile duct are located on the right side of your abdomen, near your liver  Your gallbladder stores bile, which helps break down the fat that you eat  Your gallbladder also helps remove certain chemicals from your body  What causes gallstones? Gallstones develop when your gallbladder does not empty correctly  Stones can form from different bile materials  The following may increase your risk:  · Obesity    · Pregnancy    · Having a family member with gallstones    · Diabetes or previous surgery of the intestines    · Rapid weight loss    · Certain medicines, such as estrogen, antibiotics, and cholesterol-lowering medicines  What are the signs and symptoms of gallstones? The most common symptom is severe, constant pain in the right upper abdomen  It is usually just below the ribcage  The pain may also be felt in the right shoulder and between the shoulder blades  You may also have any of the following:  · Nausea and vomiting    · Feeling bloated    · Pale bowel movements    · Dark urine  How are gallstones diagnosed? · Blood tests  may show signs of infection or inflammation  · An abdominal ultrasound  uses sound waves to show pictures of your abdomen on a monitor  · A liver and gallbladder scan  may also be called a HIDA scan  You are given a small amount of radioactive dye in your IV and pictures are taken by a scanner  Your healthcare provider looks at the pictures to see if your liver and gallbladder are working normally      · An ERCP  is also called an endoscopic retrograde cholangiopancreatography  This test is done during an endoscopy to find stones, tumors, or other problems  Dye is put into the endoscopy tube  The dye helps your pancreas and bile ducts show up better on x-rays  Tell the healthcare provider if you have ever had an allergic reaction to contrast dye  If you have stones, they may be removed during ERCP  · Oral cholecystography  is a test to look at your gallbladder and its ducts (passages)  You will take pills that have a special dye in them  Then x-rays are taken over time  The dye makes your gallbladder and its ducts show up on the x-rays  This may make it easier for your healthcare provider to see any stones or swelling in your gallbladder  Tell the healthcare provider if you have ever had an allergic reaction to contrast dye  It is also very important to tell your healthcare provider if there is any chance you could be pregnant  Your healthcare provider will tell you what you can and cannot eat before the test  It is important to follow your healthcare provider's instructions or the test may not work  How are gallstones treated? · Antinausea medicine  may be given to calm your stomach and to help prevent vomiting  · Prescription pain medicine  may be given  Ask your healthcare provider how to take this medicine safely  · A cholecystectomy  is surgery to remove your gallbladder  When should I contact my healthcare provider? · You have nausea and are vomiting  · Your urine is dark  · You have malgorzata-colored bowel movements  · You have questions or concerns about your condition or care  When should I seek immediate care or call 911? · You have a fever and chills  · Your eyes or skin turn yellow  · You have severe pain in your upper abdomen, just below the right ribcage  CARE AGREEMENT:   You have the right to help plan your care  Learn about your health condition and how it may be treated   Discuss treatment options with your caregivers to decide what care you want to receive  You always have the right to refuse treatment  The above information is an  only  It is not intended as medical advice for individual conditions or treatments  Talk to your doctor, nurse or pharmacist before following any medical regimen to see if it is safe and effective for you  © 2017 [address] Information is for End User's use only and may not be sold, redistributed or otherwise used for commercial purposes  All illustrations and images included in CareNotes® are the copyrighted property of A D [employer] M [employer] Inc  or Beny Bartholomew

## 2020-01-16 NOTE — PROGRESS NOTES
Assessment/Plan:    1 patient with abdominal pain and small gallstones has not had symptoms for over a month exam today was unremarkable he does have rare right flank pain but does have increased gas will be getting hepatobiliary scan to see if his gallbladder is disease if not will hold referral to surgeon however if he develops abdominal pain he will need to see Dr Lawanda Patel  2  Patient with cardiac murmur echocardiogram by Dr Rafiq Light pending  3  History of coronary disease patient is on beta-blocker aspirin and statin  4  Hyperlipidemia LDL is at goal will recheck in 6 months  5  Hypertension is at goal           Diagnoses and all orders for this visit:    Medicare annual wellness visit, subsequent  -     CBC and differential; Future  -     Comprehensive metabolic panel; Future  -     LDL cholesterol, direct; Future  -     Lipid Panel with Direct LDL reflex; Future    Hypertension, unspecified type  -     CBC and differential; Future  -     Comprehensive metabolic panel; Future  -     LDL cholesterol, direct; Future  -     Lipid Panel with Direct LDL reflex; Future  -     amLODIPine (NORVASC) 5 mg tablet; Take 1 tablet (5 mg total) by mouth daily  -     metoprolol succinate (TOPROL-XL) 25 mg 24 hr tablet; Take 0 5 tablets (12 5 mg total) by mouth daily  -     valsartan (DIOVAN) 80 mg tablet; Take 1 tablet (80 mg total) by mouth daily    Hyperlipidemia, unspecified hyperlipidemia type  -     CBC and differential; Future  -     Comprehensive metabolic panel; Future  -     LDL cholesterol, direct; Future  -     Lipid Panel with Direct LDL reflex; Future  -     atorvastatin (LIPITOR) 80 mg tablet; Take 1 tablet (80 mg total) by mouth daily    History of heart artery stent  -     CBC and differential; Future  -     Comprehensive metabolic panel; Future  -     LDL cholesterol, direct; Future  -     Lipid Panel with Direct LDL reflex;  Future    Hypernatremia  -     CBC and differential; Future  -     Comprehensive metabolic panel; Future  -     LDL cholesterol, direct; Future  -     Lipid Panel with Direct LDL reflex; Future    Vitamin D deficiency  -     CBC and differential; Future  -     Comprehensive metabolic panel; Future  -     LDL cholesterol, direct; Future  -     Lipid Panel with Direct LDL reflex; Future  -     Vitamin D 25 hydroxy; Future    2-vessel coronary artery disease  -     CBC and differential; Future  -     Comprehensive metabolic panel; Future  -     LDL cholesterol, direct; Future  -     Lipid Panel with Direct LDL reflex; Future  -     metoprolol succinate (TOPROL-XL) 25 mg 24 hr tablet; Take 0 5 tablets (12 5 mg total) by mouth daily    Ischemic cardiomyopathy  -     CBC and differential; Future  -     Comprehensive metabolic panel; Future  -     LDL cholesterol, direct; Future  -     Lipid Panel with Direct LDL reflex; Future  -     valsartan (DIOVAN) 80 mg tablet; Take 1 tablet (80 mg total) by mouth daily    Calculus of gallbladder without cholecystitis without obstruction        The patient was counseled regarding instructions for management, risk factor reductions, patient and family education,impressions, risks and benefits of treatment options, side effects of medications, importance of compliance with treatment  The treatment plan was reviewed with the patient/guardian and patient/guardian understands and agrees with the treatment plan              Current Outpatient Medications:     amLODIPine (NORVASC) 5 mg tablet, Take 1 tablet (5 mg total) by mouth daily, Disp: 90 tablet, Rfl: 2    aspirin 81 MG tablet, Take 2 tablets by mouth once , Disp: , Rfl:     atorvastatin (LIPITOR) 80 mg tablet, Take 1 tablet (80 mg total) by mouth daily, Disp: 90 tablet, Rfl: 2    B Complex Vitamins (VITAMIN B COMPLEX) TABS, Take 1 tablet by mouth daily, Disp: , Rfl:     Cholecalciferol (VITAMIN D-3 PO), Take 2,000 Units by mouth 3 (three) times a day before meals, Disp: , Rfl:     co-enzyme Q-10 30 MG capsule, Take 30 mg by mouth daily, Disp: , Rfl:     hydrocortisone-pramoxine (ANALPRAM-HC) 2 5-1 % rectal cream, Insert into the rectum, Disp: , Rfl:     metoprolol succinate (TOPROL-XL) 25 mg 24 hr tablet, Take 0 5 tablets (12 5 mg total) by mouth daily, Disp: 90 tablet, Rfl: 2    nitroglycerin (NITROSTAT) 0 4 mg SL tablet, Place 0 4 mg under the tongue every 5 (five) minutes as needed for chest pain, Disp: , Rfl:     omega-3-acid ethyl esters (LOVAZA) 1 g capsule, Take 2 g by mouth daily, Disp: , Rfl:     valsartan (DIOVAN) 80 mg tablet, Take 1 tablet (80 mg total) by mouth daily, Disp: 90 tablet, Rfl: 2    Subjective:      Patient ID: Andie Rocha is a 80 y o  male  Tolerating medication well, much less abdominal pain      The following portions of the patient's history were reviewed and updated as appropriate:   He has a past medical history of Abnormal weight loss, Arthritis, Basal cell carcinoma, Bursitis, Cancer ([facility] [address] ), Cardiomyopathy ([facility] [address] ), Chest discomfort, Chest pain, Coronary artery disease, Ecchymosis, Exertional dyspnea, Hepatic hemangioma, Herniation of lumbar intervertebral disc, Hyperlipidemia, Hypertension, Nonmelanoma skin cancer, Pleural effusion, Pulmonary nodule, Shortness of breath, and Vitamin D deficiency  ,  does not have any pertinent problems on file  ,   has a past surgical history that includes Carpal tunnel release (Bilateral); Coronary artery bypass graft (03/24/2011); Coronary artery bypass graft; and pr repair incisional hernia,reducible (Bilateral, 4/11/2017)  ,  family history includes Heart disease in his mother; Hypertension in his mother  ,   reports that he quit smoking about 39 years ago  He has never used smokeless tobacco  He reports that he drinks about 1 0 standard drinks of alcohol per week  He reports that he does not use drugs  ,  has No Known Allergies       Review of Systems   Constitutional: Negative for appetite change, chills, fatigue, fever and unexpected weight change  HENT: Negative for congestion, ear pain, facial swelling, hearing loss, mouth sores, nosebleeds, postnasal drip, rhinorrhea, sinus pain, sore throat, trouble swallowing and voice change  Eyes: Negative for pain, discharge, redness and visual disturbance  Respiratory: Negative for apnea, chest tightness, shortness of breath, wheezing and stridor  Cardiovascular: Negative for chest pain, palpitations and leg swelling  Gastrointestinal: Positive for abdominal pain  Negative for abdominal distention, blood in stool, constipation, diarrhea and vomiting  Endocrine: Negative for cold intolerance, heat intolerance, polydipsia, polyphagia and polyuria  Genitourinary: Negative for difficulty urinating, dysuria, flank pain, frequency, genital sores, hematuria and urgency  Musculoskeletal: Negative for arthralgias and back pain  Skin: Negative for rash and wound  Allergic/Immunologic: Negative for environmental allergies, food allergies and immunocompromised state  Neurological: Negative for dizziness, tremors, seizures, syncope, facial asymmetry, speech difficulty, weakness, light-headedness, numbness and headaches  Hematological: Negative for adenopathy  Does not bruise/bleed easily  Psychiatric/Behavioral: Negative for agitation, behavioral problems, dysphoric mood, hallucinations, self-injury, sleep disturbance and suicidal ideas  The patient is not hyperactive            Objective:  /65 (BP Location: Left arm, Patient Position: Sitting)   Pulse 80   Temp 97 7 °F (36 5 °C) (Tympanic)   Resp 16   Ht 5' 5" (1 651 m)   Wt 66 8 kg (147 lb 3 2 oz)   SpO2 98%   BMI 24 50 kg/m²     Lab Review  Appointment on 01/10/2020   Component Date Value    WBC 01/10/2020 6 77     RBC 01/10/2020 4 52     Hemoglobin 01/10/2020 13 9     Hematocrit 01/10/2020 43 3     MCV 01/10/2020 96     MCH 01/10/2020 30 8     MCHC 01/10/2020 32 1     RDW 01/10/2020 12 8     MPV 01/10/2020 9 7  Platelets 27/06/6246 196     nRBC 01/10/2020 0     Neutrophils Relative 01/10/2020 60     Immat GRANS % 01/10/2020 0     Lymphocytes Relative 01/10/2020 29     Monocytes Relative 01/10/2020 9     Eosinophils Relative 01/10/2020 2     Basophils Relative 01/10/2020 0     Neutrophils Absolute 01/10/2020 4 03     Immature Grans Absolute 01/10/2020 0 02     Lymphocytes Absolute 01/10/2020 1 96     Monocytes Absolute 01/10/2020 0 61     Eosinophils Absolute 01/10/2020 0 12     Basophils Absolute 01/10/2020 0 03     Sodium 01/10/2020 142     Potassium 01/10/2020 4 2     Chloride 01/10/2020 106     CO2 01/10/2020 31     ANION GAP 01/10/2020 5     BUN 01/10/2020 19     Creatinine 01/10/2020 0 88     Glucose, Fasting 01/10/2020 99     Calcium 01/10/2020 8 6     AST 01/10/2020 27     ALT 01/10/2020 24     Alkaline Phosphatase 01/10/2020 106     Total Protein 01/10/2020 7 1     Albumin 01/10/2020 3 5     Total Bilirubin 01/10/2020 0 90     eGFR 01/10/2020 78     Cholesterol 01/10/2020 112     Triglycerides 01/10/2020 29     HDL, Direct 01/10/2020 69     LDL Calculated 01/10/2020 37     LDL Direct 01/10/2020 37     TSH 3RD GENERATON 01/10/2020 3 194    Admission on 11/24/2019, Discharged on 11/24/2019   Component Date Value    WBC 11/24/2019 9 37     RBC 11/24/2019 4 46     Hemoglobin 11/24/2019 13 8     Hematocrit 11/24/2019 42 0     MCV 11/24/2019 94     MCH 11/24/2019 30 9     MCHC 11/24/2019 32 9     RDW 11/24/2019 12 7     MPV 11/24/2019 9 4     Platelets 01/86/2140 185     nRBC 11/24/2019 0     Neutrophils Relative 11/24/2019 83*    Immat GRANS % 11/24/2019 0     Lymphocytes Relative 11/24/2019 13*    Monocytes Relative 11/24/2019 4     Eosinophils Relative 11/24/2019 0     Basophils Relative 11/24/2019 0     Neutrophils Absolute 11/24/2019 7 67*    Immature Grans Absolute 11/24/2019 0 04     Lymphocytes Absolute 11/24/2019 1 20     Monocytes Absolute 11/24/2019 0 41     Eosinophils Absolute 11/24/2019 0 02     Basophils Absolute 11/24/2019 0 03     Sodium 11/24/2019 138     Potassium 11/24/2019 4 6     Chloride 11/24/2019 101     CO2 11/24/2019 29     ANION GAP 11/24/2019 8     BUN 11/24/2019 20     Creatinine 11/24/2019 0 86     Glucose 11/24/2019 117     Calcium 11/24/2019 8 9     AST 11/24/2019 23     ALT 11/24/2019 26     Alkaline Phosphatase 11/24/2019 102     Total Protein 11/24/2019 7 3     Albumin 11/24/2019 3 9     Total Bilirubin 11/24/2019 0 90     eGFR 11/24/2019 79     Lipase 11/24/2019 179     Color, UA 11/24/2019 Yellow     Clarity, UA 11/24/2019 Clear     Specific Gravity, UA 11/24/2019 1 020     pH, UA 11/24/2019 7 0     Leukocytes, UA 11/24/2019 Negative     Nitrite, UA 11/24/2019 Negative     Protein, UA 11/24/2019 Negative     Glucose, UA 11/24/2019 Negative     Ketones, UA 11/24/2019 Negative     Urobilinogen, UA 11/24/2019 0 2     Bilirubin, UA 11/24/2019 Negative     Blood, UA 11/24/2019 Negative          Imaging  @[id number]@     No orders to display     No results found for this or any previous visit  Physical Exam   Constitutional: He is oriented to person, place, and time  He appears well-developed  HENT:   Right Ear: External ear normal    Left Ear: External ear normal    Eyes: Right eye exhibits no discharge  Left eye exhibits no discharge  No scleral icterus  Neck: Carotid bruit is not present  No tracheal deviation present  No thyroid mass and no thyromegaly present  Cardiovascular: Normal rate, regular rhythm and intact distal pulses  Exam reveals no gallop and no friction rub  Murmur heard  Pulmonary/Chest: No respiratory distress  He has no wheezes  He has no rales  Abdominal: Soft  Bowel sounds are normal  He exhibits no distension, no pulsatile liver, no fluid wave, no ascites, no pulsatile midline mass and no mass  Musculoskeletal: He exhibits no edema     Lymphadenopathy:     He has no cervical adenopathy  Neurological: He is alert and oriented to person, place, and time  Coordination normal    Psychiatric: He has a normal mood and affect  His behavior is normal  Judgment and thought content normal    Nursing note and vitals reviewed

## 2020-01-16 NOTE — LETTER
January 16, 2020     Areli Carbajal MD  [address] [facility] [address]    Patient: Batsheva Nettles   YOB: 1934   Date of Visit: 1/16/2020       Dear Dr Akosua Hawkins: Thank you for referring Batsheva Nettles to me for evaluation  Below are my notes for this consultation  If you have questions, please do not hesitate to call me  I look forward to following your patient along with you  Sincerely,        Leonel So DO        CC: No Recipients  Leonel So DO  1/16/2020 10:28 AM  Sign at close encounter  Assessment/Plan:    1 patient with abdominal pain and small gallstones has not had symptoms for over a month exam today was unremarkable he does have rare right flank pain but does have increased gas will be getting hepatobiliary scan to see if his gallbladder is disease if not will hold referral to surgeon however if he develops abdominal pain he will need to see Dr Nan Boykin  2  Patient with cardiac murmur echocardiogram by Dr Akosua Hawkins pending  3  History of coronary disease patient is on beta-blocker aspirin and statin  4  Hyperlipidemia LDL is at goal will recheck in 6 months  5  Hypertension is at goal           Diagnoses and all orders for this visit:    Medicare annual wellness visit, subsequent  -     CBC and differential; Future  -     Comprehensive metabolic panel; Future  -     LDL cholesterol, direct; Future  -     Lipid Panel with Direct LDL reflex; Future    Hypertension, unspecified type  -     CBC and differential; Future  -     Comprehensive metabolic panel; Future  -     LDL cholesterol, direct; Future  -     Lipid Panel with Direct LDL reflex; Future  -     amLODIPine (NORVASC) 5 mg tablet; Take 1 tablet (5 mg total) by mouth daily  -     metoprolol succinate (TOPROL-XL) 25 mg 24 hr tablet; Take 0 5 tablets (12 5 mg total) by mouth daily  -     valsartan (DIOVAN) 80 mg tablet;  Take 1 tablet (80 mg total) by mouth daily    Hyperlipidemia, unspecified hyperlipidemia type  -     CBC and differential; Future  -     Comprehensive metabolic panel; Future  -     LDL cholesterol, direct; Future  -     Lipid Panel with Direct LDL reflex; Future  -     atorvastatin (LIPITOR) 80 mg tablet; Take 1 tablet (80 mg total) by mouth daily    History of heart artery stent  -     CBC and differential; Future  -     Comprehensive metabolic panel; Future  -     LDL cholesterol, direct; Future  -     Lipid Panel with Direct LDL reflex; Future    Hypernatremia  -     CBC and differential; Future  -     Comprehensive metabolic panel; Future  -     LDL cholesterol, direct; Future  -     Lipid Panel with Direct LDL reflex; Future    Vitamin D deficiency  -     CBC and differential; Future  -     Comprehensive metabolic panel; Future  -     LDL cholesterol, direct; Future  -     Lipid Panel with Direct LDL reflex; Future  -     Vitamin D 25 hydroxy; Future    2-vessel coronary artery disease  -     CBC and differential; Future  -     Comprehensive metabolic panel; Future  -     LDL cholesterol, direct; Future  -     Lipid Panel with Direct LDL reflex; Future  -     metoprolol succinate (TOPROL-XL) 25 mg 24 hr tablet; Take 0 5 tablets (12 5 mg total) by mouth daily    Ischemic cardiomyopathy  -     CBC and differential; Future  -     Comprehensive metabolic panel; Future  -     LDL cholesterol, direct; Future  -     Lipid Panel with Direct LDL reflex; Future  -     valsartan (DIOVAN) 80 mg tablet; Take 1 tablet (80 mg total) by mouth daily    Calculus of gallbladder without cholecystitis without obstruction        The patient was counseled regarding instructions for management, risk factor reductions, patient and family education,impressions, risks and benefits of treatment options, side effects of medications, importance of compliance with treatment   The treatment plan was reviewed with the patient/guardian and patient/guardian understands and agrees with the treatment plan             Current Outpatient Medications:     amLODIPine (NORVASC) 5 mg tablet, Take 1 tablet (5 mg total) by mouth daily, Disp: 90 tablet, Rfl: 2    aspirin 81 MG tablet, Take 2 tablets by mouth once , Disp: , Rfl:     atorvastatin (LIPITOR) 80 mg tablet, Take 1 tablet (80 mg total) by mouth daily, Disp: 90 tablet, Rfl: 2    B Complex Vitamins (VITAMIN B COMPLEX) TABS, Take 1 tablet by mouth daily, Disp: , Rfl:     Cholecalciferol (VITAMIN D-3 PO), Take 2,000 Units by mouth 3 (three) times a day before meals, Disp: , Rfl:     co-enzyme Q-10 30 MG capsule, Take 30 mg by mouth daily, Disp: , Rfl:     hydrocortisone-pramoxine (ANALPRAM-HC) 2 5-1 % rectal cream, Insert into the rectum, Disp: , Rfl:     metoprolol succinate (TOPROL-XL) 25 mg 24 hr tablet, Take 0 5 tablets (12 5 mg total) by mouth daily, Disp: 90 tablet, Rfl: 2    nitroglycerin (NITROSTAT) 0 4 mg SL tablet, Place 0 4 mg under the tongue every 5 (five) minutes as needed for chest pain, Disp: , Rfl:     omega-3-acid ethyl esters (LOVAZA) 1 g capsule, Take 2 g by mouth daily, Disp: , Rfl:     valsartan (DIOVAN) 80 mg tablet, Take 1 tablet (80 mg total) by mouth daily, Disp: 90 tablet, Rfl: 2    Subjective:      Patient ID: Gregorio Marcano is a 80 y o  male  Tolerating medication well, much less abdominal pain      The following portions of the patient's history were reviewed and updated as appropriate:   He has a past medical history of Abnormal weight loss, Arthritis, Basal cell carcinoma, Bursitis, Cancer ([address] ), Cardiomyopathy ([address] ), Chest discomfort, Chest pain, Coronary artery disease, Ecchymosis, Exertional dyspnea, Hepatic hemangioma, Herniation of lumbar intervertebral disc, Hyperlipidemia, Hypertension, Nonmelanoma skin cancer, Pleural effusion, Pulmonary nodule, Shortness of breath, and Vitamin D deficiency  ,  does not have any pertinent problems on file  ,   has a past surgical history that includes Carpal tunnel release (Bilateral); Coronary artery bypass graft (03/24/2011); Coronary artery bypass graft; and pr repair incisional hernia,reducible (Bilateral, 4/11/2017)  ,  family history includes Heart disease in his mother; Hypertension in his mother  ,   reports that he quit smoking about 39 years ago  He has never used smokeless tobacco  He reports that he drinks about 1 0 standard drinks of alcohol per week  He reports that he does not use drugs  ,  has No Known Allergies       Review of Systems   Constitutional: Negative for appetite change, chills, fatigue, fever and unexpected weight change  HENT: Negative for congestion, ear pain, facial swelling, hearing loss, mouth sores, nosebleeds, postnasal drip, rhinorrhea, sinus pain, sore throat, trouble swallowing and voice change  Eyes: Negative for pain, discharge, redness and visual disturbance  Respiratory: Negative for apnea, chest tightness, shortness of breath, wheezing and stridor  Cardiovascular: Negative for chest pain, palpitations and leg swelling  Gastrointestinal: Positive for abdominal pain  Negative for abdominal distention, blood in stool, constipation, diarrhea and vomiting  Endocrine: Negative for cold intolerance, heat intolerance, polydipsia, polyphagia and polyuria  Genitourinary: Negative for difficulty urinating, dysuria, flank pain, frequency, genital sores, hematuria and urgency  Musculoskeletal: Negative for arthralgias and back pain  Skin: Negative for rash and wound  Allergic/Immunologic: Negative for environmental allergies, food allergies and immunocompromised state  Neurological: Negative for dizziness, tremors, seizures, syncope, facial asymmetry, speech difficulty, weakness, light-headedness, numbness and headaches  Hematological: Negative for adenopathy  Does not bruise/bleed easily     Psychiatric/Behavioral: Negative for agitation, behavioral problems, dysphoric mood, hallucinations, self-injury, sleep disturbance and suicidal ideas  The patient is not hyperactive            Objective:  /65 (BP Location: Left arm, Patient Position: Sitting)   Pulse 80   Temp 97 7 °F (36 5 °C) (Tympanic)   Resp 16   Ht 5' 5" (1 651 m)   Wt 66 8 kg (147 lb 3 2 oz)   SpO2 98%   BMI 24 50 kg/m²      Lab Review  Appointment on 01/10/2020   Component Date Value    WBC 01/10/2020 6 77     RBC 01/10/2020 4 52     Hemoglobin 01/10/2020 13 9     Hematocrit 01/10/2020 43 3     MCV 01/10/2020 96     MCH 01/10/2020 30 8     MCHC 01/10/2020 32 1     RDW 01/10/2020 12 8     MPV 01/10/2020 9 7     Platelets 74/46/9688 196     nRBC 01/10/2020 0     Neutrophils Relative 01/10/2020 60     Immat GRANS % 01/10/2020 0     Lymphocytes Relative 01/10/2020 29     Monocytes Relative 01/10/2020 9     Eosinophils Relative 01/10/2020 2     Basophils Relative 01/10/2020 0     Neutrophils Absolute 01/10/2020 4 03     Immature Grans Absolute 01/10/2020 0 02     Lymphocytes Absolute 01/10/2020 1 96     Monocytes Absolute 01/10/2020 0 61     Eosinophils Absolute 01/10/2020 0 12     Basophils Absolute 01/10/2020 0 03     Sodium 01/10/2020 142     Potassium 01/10/2020 4 2     Chloride 01/10/2020 106     CO2 01/10/2020 31     ANION GAP 01/10/2020 5     BUN 01/10/2020 19     Creatinine 01/10/2020 0 88     Glucose, Fasting 01/10/2020 99     Calcium 01/10/2020 8 6     AST 01/10/2020 27     ALT 01/10/2020 24     Alkaline Phosphatase 01/10/2020 106     Total Protein 01/10/2020 7 1     Albumin 01/10/2020 3 5     Total Bilirubin 01/10/2020 0 90     eGFR 01/10/2020 78     Cholesterol 01/10/2020 112     Triglycerides 01/10/2020 29     HDL, Direct 01/10/2020 69     LDL Calculated 01/10/2020 37     LDL Direct 01/10/2020 37     TSH 3RD GENERATON 01/10/2020 3 194    Admission on 11/24/2019, Discharged on 11/24/2019   Component Date Value    WBC 11/24/2019 9 37     RBC 11/24/2019 4 46     Hemoglobin 11/24/2019 13 8     Hematocrit 11/24/2019 42 0     MCV 11/24/2019 94     MCH 11/24/2019 30 9     MCHC 11/24/2019 32 9     RDW 11/24/2019 12 7     MPV 11/24/2019 9 4     Platelets 75/49/0977 185     nRBC 11/24/2019 0     Neutrophils Relative 11/24/2019 83*    Immat GRANS % 11/24/2019 0     Lymphocytes Relative 11/24/2019 13*    Monocytes Relative 11/24/2019 4     Eosinophils Relative 11/24/2019 0     Basophils Relative 11/24/2019 0     Neutrophils Absolute 11/24/2019 7 67*    Immature Grans Absolute 11/24/2019 0 04     Lymphocytes Absolute 11/24/2019 1 20     Monocytes Absolute 11/24/2019 0 41     Eosinophils Absolute 11/24/2019 0 02     Basophils Absolute 11/24/2019 0 03     Sodium 11/24/2019 138     Potassium 11/24/2019 4 6     Chloride 11/24/2019 101     CO2 11/24/2019 29     ANION GAP 11/24/2019 8     BUN 11/24/2019 20     Creatinine 11/24/2019 0 86     Glucose 11/24/2019 117     Calcium 11/24/2019 8 9     AST 11/24/2019 23     ALT 11/24/2019 26     Alkaline Phosphatase 11/24/2019 102     Total Protein 11/24/2019 7 3     Albumin 11/24/2019 3 9     Total Bilirubin 11/24/2019 0 90     eGFR 11/24/2019 79     Lipase 11/24/2019 179     Color, UA 11/24/2019 Yellow     Clarity, UA 11/24/2019 Clear     Specific Gravity, UA 11/24/2019 1 020     pH, UA 11/24/2019 7 0     Leukocytes, UA 11/24/2019 Negative     Nitrite, UA 11/24/2019 Negative     Protein, UA 11/24/2019 Negative     Glucose, UA 11/24/2019 Negative     Ketones, UA 11/24/2019 Negative     Urobilinogen, UA 11/24/2019 0 2     Bilirubin, UA 11/24/2019 Negative     Blood, UA 11/24/2019 Negative          Imaging  @[email or web address]@     No orders to display     No results found for this or any previous visit  Physical Exam   Constitutional: He is oriented to person, place, and time  He appears well-developed  HENT:   Right Ear: External ear normal    Left Ear: External ear normal    Eyes: Right eye exhibits no discharge   Left eye exhibits no discharge  No scleral icterus  Neck: Carotid bruit is not present  No tracheal deviation present  No thyroid mass and no thyromegaly present  Cardiovascular: Normal rate, regular rhythm and intact distal pulses  Exam reveals no gallop and no friction rub  Murmur heard  Pulmonary/Chest: No respiratory distress  He has no wheezes  He has no rales  Abdominal: Soft  Bowel sounds are normal  He exhibits no distension, no pulsatile liver, no fluid wave, no ascites, no pulsatile midline mass and no mass  Musculoskeletal: He exhibits no edema  Lymphadenopathy:     He has no cervical adenopathy  Neurological: He is alert and oriented to person, place, and time  Coordination normal    Psychiatric: He has a normal mood and affect  His behavior is normal  Judgment and thought content normal    Nursing note and vitals reviewed  Sin Kat DO  1/16/2020 10:07 AM  Sign at close encounter   Assessment and Plan:     Problem List Items Addressed This Visit     None           Preventive health issues were discussed with patient, and age appropriate screening tests were ordered as noted in patient's After Visit Summary  Personalized health advice and appropriate referrals for health education or preventive services given if needed, as noted in patient's After Visit Summary       History of Present Illness:     Patient presents for Medicare Annual Wellness visit    Patient Care Team:  Sin Kat DO as PCP - General  Sayda Celis MD     Problem List:     Patient Active Problem List   Diagnosis    2-vessel coronary artery disease    Basal cell carcinoma of scalp    Cardiomyopathy (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 [facility])      Past Medical and Surgical History:     Past Medical History:   Diagnosis Date    Abnormal weight loss     Last Assessed: 2/24/2014    Arthritis     Basal cell carcinoma     Last Assessed: 8/16/2016    Bursitis     left hip pain    Cancer (HCC)     BCA- back, left forearm, skin    Cardiomyopathy ([address] )     Chest discomfort     Last Assessed: 2/23/2016    Chest pain     Last Assessed: 2/13/2013    Coronary artery disease     Ecchymosis     Last Assessed: 7/12/2016    Exertional dyspnea     Last Assessed: 2/23/2016    Hepatic hemangioma     Herniation of lumbar intervertebral disc     Hyperlipidemia     Hypertension     Nonmelanoma skin cancer     Last Assessed: 12/15/2017    Pleural effusion     Bilateral; Last Assessed: 4/26/2016    Pulmonary nodule     multiple nodes;  Last Assessesd: 4/26/2016    Shortness of breath     Vitamin D deficiency      Past Surgical History:   Procedure Laterality Date    CARPAL TUNNEL RELEASE Bilateral     CORONARY ARTERY BYPASS GRAFT  03/24/2011    CORONARY ARTERY BYPASS GRAFT      VA REPAIR INCISIONAL HERNIA,REDUCIBLE Bilateral 4/11/2017    Procedure: LAPAROSCOPIC INGUINAL HERNIA REPAIR WITH MESH ;  Surgeon: Juan C Cardozo MD;  Location: [facility];  Service: General      Family History:     Family History   Problem Relation Age of Onset    Heart disease Mother     Hypertension Mother       Social History:     Social History     Socioeconomic History    Marital status: /Civil Union     Spouse name: None    Number of children: None    Years of education: None    Highest education level: None   Occupational History    None   Social Needs    Financial resource strain: None    Food insecurity: Worry: None     Inability: None    Transportation needs:     Medical: None     Non-medical: None   Tobacco Use    Smoking status: Former Smoker     Last attempt to quit: 1980     Years since quittin 7    Smokeless tobacco: Never Used    Tobacco comment: quit date  per allscripts   Substance and Sexual Activity    Alcohol use:  Yes     Alcohol/week: 1 0 standard drinks     Types: 1 Glasses of wine per week     Comment: daily    Drug use: No    Sexual activity: Not Currently   Lifestyle    Physical activity:     Days per week: None     Minutes per session: None    Stress: None   Relationships    Social connections:     Talks on phone: None     Gets together: None     Attends Jain service: None     Active member of club or organization: None     Attends meetings of clubs or organizations: None     Relationship status: None    Intimate partner violence:     Fear of current or ex partner: None     Emotionally abused: None     Physically abused: None     Forced sexual activity: None   Other Topics Concern    None   Social History Narrative    Caffeine use       Medications and Allergies:     Current Outpatient Medications   Medication Sig Dispense Refill    amLODIPine (NORVASC) 5 mg tablet Take 1 tablet (5 mg total) by mouth daily 90 tablet 2    aspirin 81 MG tablet Take 2 tablets by mouth once       atorvastatin (LIPITOR) 80 mg tablet Take 1 tablet (80 mg total) by mouth daily 90 tablet 2    B Complex Vitamins (VITAMIN B COMPLEX) TABS Take 1 tablet by mouth daily      Cholecalciferol (VITAMIN D-3 PO) Take 2,000 Units by mouth 3 (three) times a day before meals      co-enzyme Q-10 30 MG capsule Take 30 mg by mouth daily      hydrocortisone-pramoxine (ANALPRAM-HC) 2 5-1 % rectal cream Insert into the rectum      metoprolol succinate (TOPROL-XL) 25 mg 24 hr tablet Take 0 5 tablets (12 5 mg total) by mouth daily 90 tablet 2    nitroglycerin (NITROSTAT) 0 4 mg SL tablet Place 0 4 mg under the tongue every 5 (five) minutes as needed for chest pain      omega-3-acid ethyl esters (LOVAZA) 1 g capsule Take 2 g by mouth daily      valsartan (DIOVAN) 80 mg tablet Take 1 tablet (80 mg total) by mouth daily 90 tablet 2     No current facility-administered medications for this visit  No Known Allergies   Immunizations:     Immunization History   Administered Date(s) Administered    DT (pediatric) 03/08/2005    INFLUENZA 09/12/2018    Influenza Split High Dose Preservative Free IM 09/17/2014, 09/30/2015, 10/06/2016, 10/02/2017    Influenza TIV (IM) 10/01/2012    Influenza, high dose seasonal 0 5 mL 09/12/2018, 10/02/2019    Pneumococcal Conjugate 13-Valent 06/26/2019    Pneumococcal Polysaccharide PPV23 12/10/2004, 09/10/2010    TD (adult) Preservative Free 10/07/2015      Health Maintenance:         Topic Date Due    Hepatitis C Screening  Completed         Topic Date Due    DTaP,Tdap,and Td Vaccines (1 - Tdap) 09/14/1945      Medicare Health Risk Assessment:     Pulse 80   Temp 97 7 °F (36 5 °C) (Tympanic)   Resp 16   Ht 5' 5" (1 651 m)   Wt 66 8 kg (147 lb 3 2 oz)   SpO2 98%   BMI 24 50 kg/m²       Reggie Crews is here for his Subsequent Wellness visit  Health Risk Assessment:   Patient rates overall health as good  Patient feels that their physical health rating is same  Eyesight was rated as same  Hearing was rated as same  Patient feels that their emotional and mental health rating is same  Pain experienced in the last 7 days has been none  Patient states that he has experienced no weight loss or gain in last 6 months  Depression Screening:   PHQ-2 Score: 0      Fall Risk Screening: In the past year, patient has experienced: no history of falling in past year      Home Safety:  Patient has trouble with stairs inside or outside of their home  Patient has working smoke alarms and has working carbon monoxide detector  Home safety hazards include: none       Nutrition: Current diet is Regular  Medications:   Patient is currently taking over-the-counter supplements  OTC medications include: see medication list  Patient is able to manage medications  Activities of Daily Living (ADLs)/Instrumental Activities of Daily Living (IADLs):   Walk and transfer into and out of bed and chair?: Yes  Dress and groom yourself?: Yes    Bathe or shower yourself?: Yes    Feed yourself?  Yes  Do your laundry/housekeeping?: Yes  Manage your money, pay your bills and track your expenses?: Yes  Make your own meals?: Yes    Do your own shopping?: Yes    Previous Hospitalizations:   Any hospitalizations or ED visits within the last 12 months?: Yes    How many hospitalizations have you had in the last year?: 1-2    Advance Care Planning:   Living will: No    Durable POA for healthcare: No    Advanced directive: Yes    Advanced directive counseling given: Yes    Five wishes given: Yes    Patient declined ACP directive: No    End of Life Decisions reviewed with patient: Yes    Provider agrees with end of life decisions: Yes      Comments: Keep on life support    Cognitive Screening:   Provider or family/friend/caregiver concerned regarding cognition?: No    PREVENTIVE SCREENINGS      Cardiovascular Screening:    General: Screening Not Indicated and History Lipid Disorder      Diabetes Screening:     General: Screening Current      Colorectal Cancer Screening:     General: Screening Not Indicated      Prostate Cancer Screening:    General: Screening Not Indicated      Osteoporosis Screening:    General: Screening Not Indicated      Abdominal Aortic Aneurysm (AAA) Screening:    Risk factors include: tobacco use        Lung Cancer Screening:     General: Screening Not Indicated      Hepatitis C Screening:    General: Screening Current      Katelynn Del Real DO

## 2020-04-06 ENCOUNTER — OFFICE VISIT (OUTPATIENT)
Dept: INTERNAL MEDICINE CLINIC | Facility: CLINIC | Age: 85
End: 2020-04-06
Payer: MEDICARE

## 2020-04-06 DIAGNOSIS — R21 RASH: Primary | ICD-10-CM

## 2020-04-06 DIAGNOSIS — I25.119 ATHEROSCLEROSIS OF NATIVE CORONARY ARTERY WITH ANGINA PECTORIS, UNSPECIFIED WHETHER NATIVE OR TRANSPLANTED HEART (HCC): ICD-10-CM

## 2020-04-06 DIAGNOSIS — I42.9 CARDIOMYOPATHY, UNSPECIFIED TYPE (HCC): ICD-10-CM

## 2020-04-06 PROCEDURE — 1036F TOBACCO NON-USER: CPT | Performed by: NURSE PRACTITIONER

## 2020-04-06 PROCEDURE — 99213 OFFICE O/P EST LOW 20 MIN: CPT | Performed by: NURSE PRACTITIONER

## 2020-04-06 PROCEDURE — 3075F SYST BP GE 130 - 139MM HG: CPT | Performed by: NURSE PRACTITIONER

## 2020-04-06 PROCEDURE — 1160F RVW MEDS BY RX/DR IN RCRD: CPT | Performed by: NURSE PRACTITIONER

## 2020-04-06 PROCEDURE — 4040F PNEUMOC VAC/ADMIN/RCVD: CPT | Performed by: NURSE PRACTITIONER

## 2020-04-06 PROCEDURE — 3078F DIAST BP <80 MM HG: CPT | Performed by: NURSE PRACTITIONER

## 2020-04-06 RX ORDER — PREDNISONE 10 MG/1
TABLET ORAL
Qty: 30 TABLET | Refills: 0 | Status: SHIPPED | OUTPATIENT
Start: 2020-04-06 | End: 2020-04-06 | Stop reason: SDUPTHER

## 2020-04-06 RX ORDER — PREDNISONE 10 MG/1
TABLET ORAL
Qty: 30 TABLET | Refills: 0 | Status: SHIPPED | OUTPATIENT
Start: 2020-04-06 | End: 2020-09-01

## 2020-07-14 ENCOUNTER — APPOINTMENT (OUTPATIENT)
Dept: LAB | Facility: HOSPITAL | Age: 85
End: 2020-07-14
Attending: INTERNAL MEDICINE
Payer: MEDICARE

## 2020-07-14 DIAGNOSIS — I10 HYPERTENSION, UNSPECIFIED TYPE: ICD-10-CM

## 2020-07-14 DIAGNOSIS — E87.0 HYPERNATREMIA: ICD-10-CM

## 2020-07-14 DIAGNOSIS — E78.5 HYPERLIPIDEMIA, UNSPECIFIED HYPERLIPIDEMIA TYPE: ICD-10-CM

## 2020-07-14 DIAGNOSIS — I25.10 2-VESSEL CORONARY ARTERY DISEASE: ICD-10-CM

## 2020-07-14 DIAGNOSIS — E55.9 VITAMIN D DEFICIENCY: ICD-10-CM

## 2020-07-14 DIAGNOSIS — Z00.00 MEDICARE ANNUAL WELLNESS VISIT, SUBSEQUENT: ICD-10-CM

## 2020-07-14 DIAGNOSIS — Z95.5 HISTORY OF HEART ARTERY STENT: ICD-10-CM

## 2020-07-14 DIAGNOSIS — I25.5 ISCHEMIC CARDIOMYOPATHY: ICD-10-CM

## 2020-07-14 LAB
25(OH)D3 SERPL-MCNC: 38.7 NG/ML (ref 30–100)
ALBUMIN SERPL BCP-MCNC: 3.4 G/DL (ref 3.5–5)
ALP SERPL-CCNC: 92 U/L (ref 46–116)
ALT SERPL W P-5'-P-CCNC: 22 U/L (ref 12–78)
ANION GAP SERPL CALCULATED.3IONS-SCNC: 8 MMOL/L (ref 4–13)
AST SERPL W P-5'-P-CCNC: 22 U/L (ref 5–45)
BASOPHILS # BLD AUTO: 0.02 THOUSANDS/ΜL (ref 0–0.1)
BASOPHILS NFR BLD AUTO: 0 % (ref 0–1)
BILIRUB SERPL-MCNC: 0.9 MG/DL (ref 0.2–1)
BUN SERPL-MCNC: 15 MG/DL (ref 5–25)
CALCIUM SERPL-MCNC: 8.4 MG/DL (ref 8.3–10.1)
CHLORIDE SERPL-SCNC: 106 MMOL/L (ref 100–108)
CHOLEST SERPL-MCNC: 103 MG/DL (ref 50–200)
CO2 SERPL-SCNC: 28 MMOL/L (ref 21–32)
CREAT SERPL-MCNC: 0.81 MG/DL (ref 0.6–1.3)
EOSINOPHIL # BLD AUTO: 0.15 THOUSAND/ΜL (ref 0–0.61)
EOSINOPHIL NFR BLD AUTO: 2 % (ref 0–6)
ERYTHROCYTE [DISTWIDTH] IN BLOOD BY AUTOMATED COUNT: 12.6 % (ref 11.6–15.1)
GFR SERPL CREATININE-BSD FRML MDRD: 81 ML/MIN/1.73SQ M
GLUCOSE P FAST SERPL-MCNC: 95 MG/DL (ref 65–99)
HCT VFR BLD AUTO: 41.1 % (ref 36.5–49.3)
HDLC SERPL-MCNC: 60 MG/DL
HGB BLD-MCNC: 13.2 G/DL (ref 12–17)
IMM GRANULOCYTES # BLD AUTO: 0.03 THOUSAND/UL (ref 0–0.2)
IMM GRANULOCYTES NFR BLD AUTO: 0 % (ref 0–2)
LDLC SERPL CALC-MCNC: 37 MG/DL (ref 0–100)
LDLC SERPL DIRECT ASSAY-MCNC: 36 MG/DL (ref 0–100)
LYMPHOCYTES # BLD AUTO: 2.11 THOUSANDS/ΜL (ref 0.6–4.47)
LYMPHOCYTES NFR BLD AUTO: 29 % (ref 14–44)
MCH RBC QN AUTO: 30.9 PG (ref 26.8–34.3)
MCHC RBC AUTO-ENTMCNC: 32.1 G/DL (ref 31.4–37.4)
MCV RBC AUTO: 96 FL (ref 82–98)
MONOCYTES # BLD AUTO: 0.61 THOUSAND/ΜL (ref 0.17–1.22)
MONOCYTES NFR BLD AUTO: 8 % (ref 4–12)
NEUTROPHILS # BLD AUTO: 4.37 THOUSANDS/ΜL (ref 1.85–7.62)
NEUTS SEG NFR BLD AUTO: 61 % (ref 43–75)
NRBC BLD AUTO-RTO: 0 /100 WBCS
PLATELET # BLD AUTO: 177 THOUSANDS/UL (ref 149–390)
PMV BLD AUTO: 10.5 FL (ref 8.9–12.7)
POTASSIUM SERPL-SCNC: 4.6 MMOL/L (ref 3.5–5.3)
PROT SERPL-MCNC: 6.6 G/DL (ref 6.4–8.2)
RBC # BLD AUTO: 4.27 MILLION/UL (ref 3.88–5.62)
SODIUM SERPL-SCNC: 142 MMOL/L (ref 136–145)
TRIGL SERPL-MCNC: 32 MG/DL
WBC # BLD AUTO: 7.29 THOUSAND/UL (ref 4.31–10.16)

## 2020-07-14 PROCEDURE — 82306 VITAMIN D 25 HYDROXY: CPT

## 2020-07-14 PROCEDURE — 36415 COLL VENOUS BLD VENIPUNCTURE: CPT

## 2020-07-14 PROCEDURE — 80053 COMPREHEN METABOLIC PANEL: CPT

## 2020-07-14 PROCEDURE — 83721 ASSAY OF BLOOD LIPOPROTEIN: CPT

## 2020-07-14 PROCEDURE — 80061 LIPID PANEL: CPT

## 2020-07-14 PROCEDURE — 85025 COMPLETE CBC W/AUTO DIFF WBC: CPT

## 2020-09-01 ENCOUNTER — OFFICE VISIT (OUTPATIENT)
Dept: INTERNAL MEDICINE CLINIC | Facility: CLINIC | Age: 85
End: 2020-09-01
Payer: MEDICARE

## 2020-09-01 VITALS
SYSTOLIC BLOOD PRESSURE: 120 MMHG | OXYGEN SATURATION: 98 % | WEIGHT: 148 LBS | TEMPERATURE: 97.6 F | HEART RATE: 79 BPM | BODY MASS INDEX: 24.66 KG/M2 | HEIGHT: 65 IN | DIASTOLIC BLOOD PRESSURE: 70 MMHG | RESPIRATION RATE: 16 BRPM

## 2020-09-01 DIAGNOSIS — I10 HYPERTENSION, UNSPECIFIED TYPE: ICD-10-CM

## 2020-09-01 DIAGNOSIS — I25.10 2-VESSEL CORONARY ARTERY DISEASE: ICD-10-CM

## 2020-09-01 DIAGNOSIS — Z95.5 PRESENCE OF STENT IN CORONARY ARTERY IN PATIENT WITH CORONARY ARTERY DISEASE: ICD-10-CM

## 2020-09-01 DIAGNOSIS — E78.5 HYPERLIPIDEMIA, UNSPECIFIED HYPERLIPIDEMIA TYPE: ICD-10-CM

## 2020-09-01 DIAGNOSIS — I25.10 PRESENCE OF STENT IN CORONARY ARTERY IN PATIENT WITH CORONARY ARTERY DISEASE: ICD-10-CM

## 2020-09-01 DIAGNOSIS — K80.20 CALCULUS OF GALLBLADDER WITHOUT CHOLECYSTITIS WITHOUT OBSTRUCTION: ICD-10-CM

## 2020-09-01 DIAGNOSIS — K64.9 HEMORRHOIDS, UNSPECIFIED HEMORRHOID TYPE: ICD-10-CM

## 2020-09-01 DIAGNOSIS — I25.5 ISCHEMIC CARDIOMYOPATHY: ICD-10-CM

## 2020-09-01 DIAGNOSIS — E55.9 VITAMIN D DEFICIENCY: Primary | ICD-10-CM

## 2020-09-01 PROCEDURE — 99213 OFFICE O/P EST LOW 20 MIN: CPT | Performed by: INTERNAL MEDICINE

## 2020-09-01 RX ORDER — METOPROLOL SUCCINATE 25 MG/1
12.5 TABLET, EXTENDED RELEASE ORAL DAILY
Qty: 90 TABLET | Refills: 2 | Status: SHIPPED | OUTPATIENT
Start: 2020-09-01 | End: 2020-09-08 | Stop reason: SDUPTHER

## 2020-09-01 RX ORDER — NITROGLYCERIN 0.4 MG/1
0.4 TABLET SUBLINGUAL
Qty: 100 TABLET | Refills: 2 | Status: SHIPPED | OUTPATIENT
Start: 2020-09-01 | End: 2022-03-07 | Stop reason: SDUPTHER

## 2020-09-01 RX ORDER — AMLODIPINE BESYLATE 5 MG/1
5 TABLET ORAL DAILY
Qty: 90 TABLET | Refills: 2 | Status: SHIPPED | OUTPATIENT
Start: 2020-09-01 | End: 2021-06-01 | Stop reason: SDUPTHER

## 2020-09-01 RX ORDER — VALSARTAN 80 MG/1
80 TABLET ORAL DAILY
Qty: 90 TABLET | Refills: 2 | Status: SHIPPED | OUTPATIENT
Start: 2020-09-01 | End: 2020-09-08 | Stop reason: SDUPTHER

## 2020-09-01 RX ORDER — ATORVASTATIN CALCIUM 80 MG/1
80 TABLET, FILM COATED ORAL DAILY
Qty: 90 TABLET | Refills: 2 | Status: SHIPPED | OUTPATIENT
Start: 2020-09-01 | End: 2021-06-01 | Stop reason: SDUPTHER

## 2020-09-01 RX ORDER — HYDROCORTISONE ACETATE PRAMOXINE HCL 2.5; 1 G/100G; G/100G
CREAM TOPICAL 3 TIMES DAILY
Qty: 30 G | Refills: 2 | Status: SHIPPED | OUTPATIENT
Start: 2020-09-01

## 2020-09-01 RX ORDER — NITROGLYCERIN 0.4 MG/1
0.4 TABLET SUBLINGUAL
Qty: 100 TABLET | Refills: 2 | Status: SHIPPED | OUTPATIENT
Start: 2020-09-01 | End: 2020-09-01 | Stop reason: SDUPTHER

## 2020-09-01 NOTE — LETTER
September 1, 2020     MD Gwyn Gaxi[address]  [address] [place] [address], [address] [place] [address]    Patient: Michelle Sandoval   YOB: 1934   Date of Visit: 9/1/2020       Dear Dr Karime Guerrero: Thank you for referring Michelle Sandoval to me for evaluation  Below are my notes for this consultation  If you have questions, please do not hesitate to call me  I look forward to following your patient along with you  Sincerely,        Stephan Glover DO        CC: No Recipients  Stephan Glover DO  9/1/2020 12:49 PM  Sign when Signing Visit  Assessment/Plan:    1 hypertension is at goal  2  Patient with coronary artery disease status post stent patient is on high-dose statin beta-blocker and baby aspirin has good follow-up with Dr Karime Guerrero  3  Patient with occasional hemorrhoids prescription given  4  Cholelithiasis is asymptomatic   5  Hyperlipidemia is at goal  6  Vitamin-D deficiency is at goal will need to see a physician in 6 months         Diagnoses and all orders for this visit:    Vitamin D deficiency    Ischemic cardiomyopathy  -     valsartan (DIOVAN) 80 mg tablet; Take 1 tablet (80 mg total) by mouth daily  -     nitroglycerin (NITROSTAT) 0 4 mg SL tablet; Place 1 tablet (0 4 mg total) under the tongue every 5 (five) minutes as needed for chest pain    Hypertension, unspecified type  -     valsartan (DIOVAN) 80 mg tablet; Take 1 tablet (80 mg total) by mouth daily  -     metoprolol succinate (TOPROL-XL) 25 mg 24 hr tablet; Take 0 5 tablets (12 5 mg total) by mouth daily  -     amLODIPine (NORVASC) 5 mg tablet; Take 1 tablet (5 mg total) by mouth daily    2-vessel coronary artery disease  -     metoprolol succinate (TOPROL-XL) 25 mg 24 hr tablet; Take 0 5 tablets (12 5 mg total) by mouth daily    Hyperlipidemia, unspecified hyperlipidemia type  -     atorvastatin (LIPITOR) 80 mg tablet;  Take 1 tablet (80 mg total) by mouth daily    Presence of stent in coronary artery in patient with coronary artery disease    Hemorrhoids, unspecified hemorrhoid type  -     hydrocortisone-pramoxine (ANALPRAM-HC) 2 5-1 % rectal cream; Apply topically 3 (three) times a day    Calculus of gallbladder without cholecystitis without obstruction        The patient was counseled regarding instructions for management, risk factor reductions, patient and family education,impressions, risks and benefits of treatment options, side effects of medications, importance of compliance with treatment  The treatment plan was reviewed with the patient/guardian and patient/guardian understands and agrees with the treatment plan              Current Outpatient Medications:     amLODIPine (NORVASC) 5 mg tablet, Take 1 tablet (5 mg total) by mouth daily, Disp: 90 tablet, Rfl: 2    aspirin 81 MG tablet, Take 2 tablets by mouth once , Disp: , Rfl:     atorvastatin (LIPITOR) 80 mg tablet, Take 1 tablet (80 mg total) by mouth daily, Disp: 90 tablet, Rfl: 2    B Complex Vitamins (VITAMIN B COMPLEX) TABS, Take 1 tablet by mouth daily, Disp: , Rfl:     Cholecalciferol (VITAMIN D-3 PO), Take 2,000 Units by mouth 3 (three) times a day before meals, Disp: , Rfl:     co-enzyme Q-10 30 MG capsule, Take 30 mg by mouth daily, Disp: , Rfl:     hydrocortisone 2 5 % cream, Apply topically 2 (two) times a day, Disp: 30 g, Rfl: 1    hydrocortisone-pramoxine (ANALPRAM-HC) 2 5-1 % rectal cream, Insert into the rectum, Disp: , Rfl:     metoprolol succinate (TOPROL-XL) 25 mg 24 hr tablet, Take 0 5 tablets (12 5 mg total) by mouth daily, Disp: 90 tablet, Rfl: 2    nitroglycerin (NITROSTAT) 0 4 mg SL tablet, Place 1 tablet (0 4 mg total) under the tongue every 5 (five) minutes as needed for chest pain, Disp: 100 tablet, Rfl: 2    omega-3-acid ethyl esters (LOVAZA) 1 g capsule, Take 2 g by mouth daily, Disp: , Rfl:     valsartan (DIOVAN) 80 mg tablet, Take 1 tablet (80 mg total) by mouth daily, Disp: 90 tablet, Rfl: 2    hydrocortisone-pramoxine [facility]) 2 5-1 % rectal cream, Apply topically 3 (three) times a day, Disp: 30 g, Rfl: 2    Subjective:      Patient ID: Raymundo Low is a 80 y o  male  No chest pain      The following portions of the patient's history were reviewed and updated as appropriate:   He has a past medical history of Abnormal weight loss, Arthritis, Basal cell carcinoma, Bursitis, Cancer ([address] ), Cardiomyopathy ([facility] [address] ), Chest discomfort, Chest pain, Coronary artery disease, Ecchymosis, Exertional dyspnea, Hepatic hemangioma, Herniation of lumbar intervertebral disc, Hyperlipidemia, Hypertension, Nonmelanoma skin cancer, Pleural effusion, Pulmonary nodule, Shortness of breath, and Vitamin D deficiency  ,  does not have any pertinent problems on file  ,   has a past surgical history that includes Carpal tunnel release (Bilateral); Coronary artery bypass graft (03/24/2011); Coronary artery bypass graft; and pr repair incisional hernia,reducible (Bilateral, 4/11/2017)  ,  family history includes Heart disease in his mother; Hypertension in his mother  ,   reports that he quit smoking about 40 years ago  He has never used smokeless tobacco  He reports current alcohol use of about 1 0 standard drinks of alcohol per week  He reports that he does not use drugs  ,  has No Known Allergies       Review of Systems   Constitutional: Negative for appetite change, chills, fatigue, fever and unexpected weight change  HENT: Negative for congestion, ear pain, facial swelling, hearing loss, mouth sores, nosebleeds, postnasal drip, rhinorrhea, sinus pain, sore throat, trouble swallowing and voice change  Eyes: Negative for pain, discharge, redness and visual disturbance  Respiratory: Negative for apnea, chest tightness, shortness of breath, wheezing and stridor  Cardiovascular: Negative for chest pain, palpitations and leg swelling     Gastrointestinal: Negative for abdominal distention, abdominal pain, blood in stool, constipation, diarrhea and vomiting  Endocrine: Negative for cold intolerance, heat intolerance, polydipsia, polyphagia and polyuria  Genitourinary: Negative for difficulty urinating, dysuria, flank pain, frequency, genital sores, hematuria and urgency  Musculoskeletal: Negative for arthralgias and back pain  Skin: Negative for rash and wound  Allergic/Immunologic: Negative for environmental allergies, food allergies and immunocompromised state  Neurological: Negative for dizziness, tremors, seizures, syncope, facial asymmetry, speech difficulty, weakness, light-headedness, numbness and headaches  Hematological: Negative for adenopathy  Does not bruise/bleed easily  Psychiatric/Behavioral: Negative for agitation, behavioral problems, dysphoric mood, hallucinations, self-injury, sleep disturbance and suicidal ideas  The patient is not hyperactive            Objective:  /70 (BP Location: Left arm, Patient Position: Sitting)   Pulse 79   Temp 97 6 °F (36 4 °C)   Resp 16   Ht 5' 5" (1 651 m)   Wt 67 1 kg (148 lb)   SpO2 98%   BMI 24 63 kg/m²     Lab Review  Appointment on 07/14/2020   Component Date Value    WBC 07/14/2020 7 29     RBC 07/14/2020 4 27     Hemoglobin 07/14/2020 13 2     Hematocrit 07/14/2020 41 1     MCV 07/14/2020 96     MCH 07/14/2020 30 9     MCHC 07/14/2020 32 1     RDW 07/14/2020 12 6     MPV 07/14/2020 10 5     Platelets 27/61/5693 177     nRBC 07/14/2020 0     Neutrophils Relative 07/14/2020 61     Immat GRANS % 07/14/2020 0     Lymphocytes Relative 07/14/2020 29     Monocytes Relative 07/14/2020 8     Eosinophils Relative 07/14/2020 2     Basophils Relative 07/14/2020 0     Neutrophils Absolute 07/14/2020 4 37     Immature Grans Absolute 07/14/2020 0 03     Lymphocytes Absolute 07/14/2020 2 11     Monocytes Absolute 07/14/2020 0 61     Eosinophils Absolute 07/14/2020 0 15     Basophils Absolute 07/14/2020 0 02     Sodium 07/14/2020 142     Potassium 07/14/2020 4 6     Chloride 07/14/2020 106     CO2 07/14/2020 28     ANION GAP 07/14/2020 8     BUN 07/14/2020 15     Creatinine 07/14/2020 0 81     Glucose, Fasting 07/14/2020 95     Calcium 07/14/2020 8 4     AST 07/14/2020 22     ALT 07/14/2020 22     Alkaline Phosphatase 07/14/2020 92     Total Protein 07/14/2020 6 6     Albumin 07/14/2020 3 4*    Total Bilirubin 07/14/2020 0 90     eGFR 07/14/2020 81     LDL Direct 07/14/2020 36     Cholesterol 07/14/2020 103     Triglycerides 07/14/2020 32     HDL, Direct 07/14/2020 60     LDL Calculated 07/14/2020 37     Vit D, 25-Hydroxy 07/14/2020 38 7          Imaging  @[email or web address]@     No orders to display     No results found for this or any previous visit  Physical Exam  Vitals signs and nursing note reviewed  Constitutional:       Appearance: He is well-developed  HENT:      Right Ear: External ear normal       Left Ear: External ear normal    Eyes:      General: No scleral icterus  Right eye: No discharge  Left eye: No discharge  Neck:      Thyroid: No thyroid mass or thyromegaly  Vascular: No carotid bruit  Trachea: No tracheal deviation  Cardiovascular:      Rate and Rhythm: Normal rate and regular rhythm  Heart sounds: Normal heart sounds  No murmur  No friction rub  No gallop  Pulmonary:      Effort: No respiratory distress  Breath sounds: No wheezing or rales  Lymphadenopathy:      Cervical: No cervical adenopathy  Neurological:      Mental Status: He is alert and oriented to person, place, and time  Coordination: Coordination normal    Psychiatric:         Behavior: Behavior normal          Thought Content:  Thought content normal          Judgment: Judgment normal

## 2020-09-01 NOTE — PROGRESS NOTES
Assessment/Plan:    1 hypertension is at goal  2  Patient with coronary artery disease status post stent patient is on high-dose statin beta-blocker and baby aspirin has good follow-up with Dr Ary Stuart  3  Patient with occasional hemorrhoids prescription given  4  Cholelithiasis is asymptomatic   5  Hyperlipidemia is at goal  6  Vitamin-D deficiency is at goal will need to see a physician in 6 months         Diagnoses and all orders for this visit:    Vitamin D deficiency    Ischemic cardiomyopathy  -     valsartan (DIOVAN) 80 mg tablet; Take 1 tablet (80 mg total) by mouth daily  -     nitroglycerin (NITROSTAT) 0 4 mg SL tablet; Place 1 tablet (0 4 mg total) under the tongue every 5 (five) minutes as needed for chest pain    Hypertension, unspecified type  -     valsartan (DIOVAN) 80 mg tablet; Take 1 tablet (80 mg total) by mouth daily  -     metoprolol succinate (TOPROL-XL) 25 mg 24 hr tablet; Take 0 5 tablets (12 5 mg total) by mouth daily  -     amLODIPine (NORVASC) 5 mg tablet; Take 1 tablet (5 mg total) by mouth daily    2-vessel coronary artery disease  -     metoprolol succinate (TOPROL-XL) 25 mg 24 hr tablet; Take 0 5 tablets (12 5 mg total) by mouth daily    Hyperlipidemia, unspecified hyperlipidemia type  -     atorvastatin (LIPITOR) 80 mg tablet; Take 1 tablet (80 mg total) by mouth daily    Presence of stent in coronary artery in patient with coronary artery disease    Hemorrhoids, unspecified hemorrhoid type  -     hydrocortisone-pramoxine (ANALPRAM-HC) 2 5-1 % rectal cream; Apply topically 3 (three) times a day    Calculus of gallbladder without cholecystitis without obstruction        The patient was counseled regarding instructions for management, risk factor reductions, patient and family education,impressions, risks and benefits of treatment options, side effects of medications, importance of compliance with treatment   The treatment plan was reviewed with the patient/guardian and patient/guardian understands and agrees with the treatment plan  Current Outpatient Medications:     amLODIPine (NORVASC) 5 mg tablet, Take 1 tablet (5 mg total) by mouth daily, Disp: 90 tablet, Rfl: 2    aspirin 81 MG tablet, Take 2 tablets by mouth once , Disp: , Rfl:     atorvastatin (LIPITOR) 80 mg tablet, Take 1 tablet (80 mg total) by mouth daily, Disp: 90 tablet, Rfl: 2    B Complex Vitamins (VITAMIN B COMPLEX) TABS, Take 1 tablet by mouth daily, Disp: , Rfl:     Cholecalciferol (VITAMIN D-3 PO), Take 2,000 Units by mouth 3 (three) times a day before meals, Disp: , Rfl:     co-enzyme Q-10 30 MG capsule, Take 30 mg by mouth daily, Disp: , Rfl:     hydrocortisone 2 5 % cream, Apply topically 2 (two) times a day, Disp: 30 g, Rfl: 1    hydrocortisone-pramoxine (ANALPRAM-HC) 2 5-1 % rectal cream, Insert into the rectum, Disp: , Rfl:     metoprolol succinate (TOPROL-XL) 25 mg 24 hr tablet, Take 0 5 tablets (12 5 mg total) by mouth daily, Disp: 90 tablet, Rfl: 2    nitroglycerin (NITROSTAT) 0 4 mg SL tablet, Place 1 tablet (0 4 mg total) under the tongue every 5 (five) minutes as needed for chest pain, Disp: 100 tablet, Rfl: 2    omega-3-acid ethyl esters (LOVAZA) 1 g capsule, Take 2 g by mouth daily, Disp: , Rfl:     valsartan (DIOVAN) 80 mg tablet, Take 1 tablet (80 mg total) by mouth daily, Disp: 90 tablet, Rfl: 2    hydrocortisone-pramoxine (ANALPRAM-HC) 2 5-1 % rectal cream, Apply topically 3 (three) times a day, Disp: 30 g, Rfl: 2    Subjective:      Patient ID: Trena Snellen is a 80 y o  male      No chest pain      The following portions of the patient's history were reviewed and updated as appropriate:   He has a past medical history of Abnormal weight loss, Arthritis, Basal cell carcinoma, Bursitis, Cancer ([address] ), Cardiomyopathy ([address] ), Chest discomfort, Chest pain, Coronary artery disease, Ecchymosis, Exertional dyspnea, Hepatic hemangioma, Herniation of lumbar intervertebral disc, Hyperlipidemia, Hypertension, Nonmelanoma skin cancer, Pleural effusion, Pulmonary nodule, Shortness of breath, and Vitamin D deficiency  ,  does not have any pertinent problems on file  ,   has a past surgical history that includes Carpal tunnel release (Bilateral); Coronary artery bypass graft (03/24/2011); Coronary artery bypass graft; and pr repair incisional hernia,reducible (Bilateral, 4/11/2017)  ,  family history includes Heart disease in his mother; Hypertension in his mother  ,   reports that he quit smoking about 40 years ago  He has never used smokeless tobacco  He reports current alcohol use of about 1 0 standard drinks of alcohol per week  He reports that he does not use drugs  ,  has No Known Allergies       Review of Systems   Constitutional: Negative for appetite change, chills, fatigue, fever and unexpected weight change  HENT: Negative for congestion, ear pain, facial swelling, hearing loss, mouth sores, nosebleeds, postnasal drip, rhinorrhea, sinus pain, sore throat, trouble swallowing and voice change  Eyes: Negative for pain, discharge, redness and visual disturbance  Respiratory: Negative for apnea, chest tightness, shortness of breath, wheezing and stridor  Cardiovascular: Negative for chest pain, palpitations and leg swelling  Gastrointestinal: Negative for abdominal distention, abdominal pain, blood in stool, constipation, diarrhea and vomiting  Endocrine: Negative for cold intolerance, heat intolerance, polydipsia, polyphagia and polyuria  Genitourinary: Negative for difficulty urinating, dysuria, flank pain, frequency, genital sores, hematuria and urgency  Musculoskeletal: Negative for arthralgias and back pain  Skin: Negative for rash and wound  Allergic/Immunologic: Negative for environmental allergies, food allergies and immunocompromised state     Neurological: Negative for dizziness, tremors, seizures, syncope, facial asymmetry, speech difficulty, weakness, light-headedness, numbness and headaches  Hematological: Negative for adenopathy  Does not bruise/bleed easily  Psychiatric/Behavioral: Negative for agitation, behavioral problems, dysphoric mood, hallucinations, self-injury, sleep disturbance and suicidal ideas  The patient is not hyperactive            Objective:  /70 (BP Location: Left arm, Patient Position: Sitting)   Pulse 79   Temp 97 6 °F (36 4 °C)   Resp 16   Ht 5' 5" (1 651 m)   Wt 67 1 kg (148 lb)   SpO2 98%   BMI 24 63 kg/m²     Lab Review  Appointment on 07/14/2020   Component Date Value    WBC 07/14/2020 7 29     RBC 07/14/2020 4 27     Hemoglobin 07/14/2020 13 2     Hematocrit 07/14/2020 41 1     MCV 07/14/2020 96     MCH 07/14/2020 30 9     MCHC 07/14/2020 32 1     RDW 07/14/2020 12 6     MPV 07/14/2020 10 5     Platelets 18/17/8146 177     nRBC 07/14/2020 0     Neutrophils Relative 07/14/2020 61     Immat GRANS % 07/14/2020 0     Lymphocytes Relative 07/14/2020 29     Monocytes Relative 07/14/2020 8     Eosinophils Relative 07/14/2020 2     Basophils Relative 07/14/2020 0     Neutrophils Absolute 07/14/2020 4 37     Immature Grans Absolute 07/14/2020 0 03     Lymphocytes Absolute 07/14/2020 2 11     Monocytes Absolute 07/14/2020 0 61     Eosinophils Absolute 07/14/2020 0 15     Basophils Absolute 07/14/2020 0 02     Sodium 07/14/2020 142     Potassium 07/14/2020 4 6     Chloride 07/14/2020 106     CO2 07/14/2020 28     ANION GAP 07/14/2020 8     BUN 07/14/2020 15     Creatinine 07/14/2020 0 81     Glucose, Fasting 07/14/2020 95     Calcium 07/14/2020 8 4     AST 07/14/2020 22     ALT 07/14/2020 22     Alkaline Phosphatase 07/14/2020 92     Total Protein 07/14/2020 6 6     Albumin 07/14/2020 3 4*    Total Bilirubin 07/14/2020 0 90     eGFR 07/14/2020 81     LDL Direct 07/14/2020 36     Cholesterol 07/14/2020 103     Triglycerides 07/14/2020 32     HDL, Direct 07/14/2020 60     LDL Calculated 07/14/2020 37     Vit D, 25-Hydroxy 07/14/2020 38 7          Imaging  @[email or web address]@     No orders to display     No results found for this or any previous visit  Physical Exam  Vitals signs and nursing note reviewed  Constitutional:       Appearance: He is well-developed  HENT:      Right Ear: External ear normal       Left Ear: External ear normal    Eyes:      General: No scleral icterus  Right eye: No discharge  Left eye: No discharge  Neck:      Thyroid: No thyroid mass or thyromegaly  Vascular: No carotid bruit  Trachea: No tracheal deviation  Cardiovascular:      Rate and Rhythm: Normal rate and regular rhythm  Heart sounds: Normal heart sounds  No murmur  No friction rub  No gallop  Pulmonary:      Effort: No respiratory distress  Breath sounds: No wheezing or rales  Lymphadenopathy:      Cervical: No cervical adenopathy  Neurological:      Mental Status: He is alert and oriented to person, place, and time  Coordination: Coordination normal    Psychiatric:         Behavior: Behavior normal          Thought Content:  Thought content normal          Judgment: Judgment normal

## 2020-09-01 NOTE — PATIENT INSTRUCTIONS
1 hypertension is at goal  2  Patient with coronary artery disease status post stent patient is on high-dose statin beta-blocker and baby aspirin has good follow-up with Dr Mitzi Salazar  3  Patient with occasional hemorrhoids prescription given  4  Cholelithiasis is asymptomatic   5  Hyperlipidemia is at goal  6   Vitamin-D deficiency is at goal will need to see a physician in 6 months

## 2020-09-04 ENCOUNTER — OFFICE VISIT (OUTPATIENT)
Dept: INTERNAL MEDICINE CLINIC | Facility: CLINIC | Age: 85
End: 2020-09-04
Payer: MEDICARE

## 2020-09-04 VITALS
HEART RATE: 86 BPM | RESPIRATION RATE: 16 BRPM | TEMPERATURE: 97.8 F | BODY MASS INDEX: 24.49 KG/M2 | WEIGHT: 147 LBS | HEIGHT: 65 IN | SYSTOLIC BLOOD PRESSURE: 110 MMHG | DIASTOLIC BLOOD PRESSURE: 62 MMHG | OXYGEN SATURATION: 97 %

## 2020-09-04 DIAGNOSIS — I10 HYPERTENSION, UNSPECIFIED TYPE: Primary | ICD-10-CM

## 2020-09-04 DIAGNOSIS — F41.9 ANXIETY: ICD-10-CM

## 2020-09-04 PROCEDURE — 99214 OFFICE O/P EST MOD 30 MIN: CPT | Performed by: NURSE PRACTITIONER

## 2020-09-04 RX ORDER — HYDROXYZINE HYDROCHLORIDE 25 MG/1
25 TABLET, FILM COATED ORAL EVERY 6 HOURS PRN
Qty: 90 TABLET | Refills: 0 | Status: SHIPPED | OUTPATIENT
Start: 2020-09-04 | End: 2020-09-04 | Stop reason: SDUPTHER

## 2020-09-04 RX ORDER — HYDROXYZINE HYDROCHLORIDE 25 MG/1
25 TABLET, FILM COATED ORAL EVERY 6 HOURS PRN
Qty: 90 TABLET | Refills: 0 | Status: SHIPPED | OUTPATIENT
Start: 2020-09-04

## 2020-09-04 NOTE — PROGRESS NOTES
Assessment/Plan:    1  Hypertension at goal  Please bring your blood pressure machine to your next visit with Dr Avis Lawton this month  Has good follow up with Dr Mcdermott Doctor  2  Anxiety- We discussed the importance of limiting watching the news on television, as that appears to be a trigger  You can take Hydroxyzine as needed for anxiety  Follow up with me as needed and with Dr Avis Lawton as scheduled this month  Diagnoses and all orders for this visit:    Hypertension, unspecified type    Anxiety  -     Discontinue: hydrOXYzine HCL (ATARAX) 25 mg tablet; Take 1 tablet (25 mg total) by mouth every 6 (six) hours as needed for anxiety  -     hydrOXYzine HCL (ATARAX) 25 mg tablet; Take 1 tablet (25 mg total) by mouth every 6 (six) hours as needed for anxiety        The patient was counseled regarding instructions for management, risk factor reductions, patient and family education,impressions, risks and benefits of treatment options, side effects of medications, importance of compliance with treatment  The treatment plan was reviewed with the patient/guardian and patient/guardian understands and agrees with the treatment plan              Current Outpatient Medications:     amLODIPine (NORVASC) 5 mg tablet, Take 1 tablet (5 mg total) by mouth daily, Disp: 90 tablet, Rfl: 2    aspirin 81 MG tablet, Take 2 tablets by mouth once , Disp: , Rfl:     atorvastatin (LIPITOR) 80 mg tablet, Take 1 tablet (80 mg total) by mouth daily, Disp: 90 tablet, Rfl: 2    B Complex Vitamins (VITAMIN B COMPLEX) TABS, Take 1 tablet by mouth daily, Disp: , Rfl:     Cholecalciferol (VITAMIN D-3 PO), Take 2,000 Units by mouth 3 (three) times a day before meals, Disp: , Rfl:     co-enzyme Q-10 30 MG capsule, Take 30 mg by mouth daily, Disp: , Rfl:     hydrocortisone 2 5 % cream, Apply topically 2 (two) times a day, Disp: 30 g, Rfl: 1    hydrocortisone-pramoxine (ANALPRAM-HC) 2 5-1 % rectal cream, Apply topically 3 (three) times a day, Disp: 30 g, Rfl: 2    metoprolol succinate (TOPROL-XL) 25 mg 24 hr tablet, Take 0 5 tablets (12 5 mg total) by mouth daily, Disp: 90 tablet, Rfl: 2    nitroglycerin (NITROSTAT) 0 4 mg SL tablet, Place 1 tablet (0 4 mg total) under the tongue every 5 (five) minutes as needed for chest pain, Disp: 100 tablet, Rfl: 2    omega-3-acid ethyl esters (LOVAZA) 1 g capsule, Take 2 g by mouth daily, Disp: , Rfl:     valsartan (DIOVAN) 80 mg tablet, Take 1 tablet (80 mg total) by mouth daily, Disp: 90 tablet, Rfl: 2    hydrocortisone-pramoxine (ANALPRAM-HC) 2 5-1 % rectal cream, Insert into the rectum, Disp: , Rfl:     hydrOXYzine HCL (ATARAX) 25 mg tablet, Take 1 tablet (25 mg total) by mouth every 6 (six) hours as needed for anxiety, Disp: 90 tablet, Rfl: 0    Subjective:      Patient ID: Rianna Jacobson is a 80 y o  male  Having a few periods he describes as "anxiety" when he checks his blood pressure SBP is 150 and heart rate is a little higher(around 100) than his norm in 80s  He does note that when he was younger, he had similar episodes  It usually occurs when he is not active, watching tv and "thinking about things" and "stuck at home"  The episodes resolve on their own  Denies chest pain or any other associated symptoms  The following portions of the patient's history were reviewed and updated as appropriate:   He has a past medical history of Abnormal weight loss, Arthritis, Basal cell carcinoma, Bursitis, Cancer ([facility] [address] ), Cardiomyopathy ([facility] [address] ), Chest discomfort, Chest pain, Coronary artery disease, Ecchymosis, Exertional dyspnea, Hepatic hemangioma, Herniation of lumbar intervertebral disc, Hyperlipidemia, Hypertension, Nonmelanoma skin cancer, Pleural effusion, Pulmonary nodule, Shortness of breath, and Vitamin D deficiency  ,  does not have any pertinent problems on file  ,   has a past surgical history that includes Carpal tunnel release (Bilateral); Coronary artery bypass graft (03/24/2011);  Coronary artery bypass graft; and pr repair incisional hernia,reducible (Bilateral, 4/11/2017)  ,  family history includes Heart disease in his mother; Hypertension in his mother  ,   reports that he quit smoking about 40 years ago  He has never used smokeless tobacco  He reports current alcohol use of about 1 0 standard drinks of alcohol per week  He reports that he does not use drugs  ,  has No Known Allergies       Review of Systems   Constitutional: Negative  Respiratory: Negative  Cardiovascular: Negative  Fast heart rate  Musculoskeletal: Negative  Psychiatric/Behavioral: Negative            Objective:  /62   Pulse 86   Temp 97 8 °F (36 6 °C)   Resp 16   Ht 5' 5" (1 651 m)   Wt 66 7 kg (147 lb)   SpO2 97%   BMI 24 46 kg/m²     Lab Review  Appointment on 07/14/2020   Component Date Value    WBC 07/14/2020 7 29     RBC 07/14/2020 4 27     Hemoglobin 07/14/2020 13 2     Hematocrit 07/14/2020 41 1     MCV 07/14/2020 96     MCH 07/14/2020 30 9     MCHC 07/14/2020 32 1     RDW 07/14/2020 12 6     MPV 07/14/2020 10 5     Platelets 01/67/1280 177     nRBC 07/14/2020 0     Neutrophils Relative 07/14/2020 61     Immat GRANS % 07/14/2020 0     Lymphocytes Relative 07/14/2020 29     Monocytes Relative 07/14/2020 8     Eosinophils Relative 07/14/2020 2     Basophils Relative 07/14/2020 0     Neutrophils Absolute 07/14/2020 4 37     Immature Grans Absolute 07/14/2020 0 03     Lymphocytes Absolute 07/14/2020 2 11     Monocytes Absolute 07/14/2020 0 61     Eosinophils Absolute 07/14/2020 0 15     Basophils Absolute 07/14/2020 0 02     Sodium 07/14/2020 142     Potassium 07/14/2020 4 6     Chloride 07/14/2020 106     CO2 07/14/2020 28     ANION GAP 07/14/2020 8     BUN 07/14/2020 15     Creatinine 07/14/2020 0 81     Glucose, Fasting 07/14/2020 95     Calcium 07/14/2020 8 4     AST 07/14/2020 22     ALT 07/14/2020 22     Alkaline Phosphatase 07/14/2020 92     Total Protein 07/14/2020 6 6     Albumin 07/14/2020 3 4*    Total Bilirubin 07/14/2020 0 90     eGFR 07/14/2020 81     LDL Direct 07/14/2020 36     Cholesterol 07/14/2020 103     Triglycerides 07/14/2020 32     HDL, Direct 07/14/2020 60     LDL Calculated 07/14/2020 37     Vit D, 25-Hydroxy 07/14/2020 38 7         Imaging: No results found  Physical Exam  Constitutional:       Appearance: He is well-developed  Cardiovascular:      Rate and Rhythm: Normal rate and regular rhythm  Heart sounds: Normal heart sounds  Pulmonary:      Effort: Pulmonary effort is normal       Breath sounds: Normal breath sounds  Musculoskeletal: Normal range of motion  Neurological:      Mental Status: He is alert and oriented to person, place, and time  Deep Tendon Reflexes: Reflexes are normal and symmetric  Psychiatric:         Behavior: Behavior normal          Thought Content:  Thought content normal          Judgment: Judgment normal

## 2020-09-04 NOTE — PATIENT INSTRUCTIONS
1  Hypertension at goal  Please bring your blood pressure machine to your next visit with Dr Janelle Soliz this month  Has good follow up with Dr Cristina Christopher  2  Anxiety- We discussed the importance of limiting watching the news on television, as that appears to be a trigger  You can take Hydroxyzine as needed for anxiety  Follow up with me as needed and with Dr Janelle Soliz as scheduled this month

## 2020-09-08 ENCOUNTER — OFFICE VISIT (OUTPATIENT)
Dept: INTERNAL MEDICINE CLINIC | Facility: CLINIC | Age: 85
End: 2020-09-08
Payer: MEDICARE

## 2020-09-08 VITALS
RESPIRATION RATE: 16 BRPM | TEMPERATURE: 98.6 F | OXYGEN SATURATION: 96 % | WEIGHT: 147 LBS | BODY MASS INDEX: 24.49 KG/M2 | DIASTOLIC BLOOD PRESSURE: 85 MMHG | HEIGHT: 65 IN | SYSTOLIC BLOOD PRESSURE: 145 MMHG | HEART RATE: 74 BPM

## 2020-09-08 DIAGNOSIS — I25.10 2-VESSEL CORONARY ARTERY DISEASE: ICD-10-CM

## 2020-09-08 DIAGNOSIS — E55.9 VITAMIN D DEFICIENCY: ICD-10-CM

## 2020-09-08 DIAGNOSIS — I49.9 IRREGULAR HEART BEAT: ICD-10-CM

## 2020-09-08 DIAGNOSIS — F41.9 ANXIETY: ICD-10-CM

## 2020-09-08 DIAGNOSIS — I10 HYPERTENSION, UNSPECIFIED TYPE: Primary | ICD-10-CM

## 2020-09-08 DIAGNOSIS — I25.5 ISCHEMIC CARDIOMYOPATHY: ICD-10-CM

## 2020-09-08 DIAGNOSIS — R01.1 MURMUR, CARDIAC: ICD-10-CM

## 2020-09-08 DIAGNOSIS — K80.20 CALCULUS OF GALLBLADDER WITHOUT CHOLECYSTITIS WITHOUT OBSTRUCTION: ICD-10-CM

## 2020-09-08 DIAGNOSIS — E78.5 HYPERLIPIDEMIA, UNSPECIFIED HYPERLIPIDEMIA TYPE: ICD-10-CM

## 2020-09-08 PROCEDURE — 93000 ELECTROCARDIOGRAM COMPLETE: CPT | Performed by: INTERNAL MEDICINE

## 2020-09-08 PROCEDURE — 99214 OFFICE O/P EST MOD 30 MIN: CPT | Performed by: INTERNAL MEDICINE

## 2020-09-08 RX ORDER — METOPROLOL SUCCINATE 25 MG/1
25 TABLET, EXTENDED RELEASE ORAL DAILY
Qty: 90 TABLET | Refills: 2 | Status: SHIPPED | OUTPATIENT
Start: 2020-09-08 | End: 2021-06-01 | Stop reason: SDUPTHER

## 2020-09-08 RX ORDER — VALSARTAN 160 MG/1
160 TABLET ORAL DAILY
Qty: 90 TABLET | Refills: 2 | Status: SHIPPED | OUTPATIENT
Start: 2020-09-08 | End: 2020-11-12 | Stop reason: DRUGHIGH

## 2020-09-08 NOTE — PATIENT INSTRUCTIONS
1 hypertension not at goal having some palpitation heart rate is elevated we will increase metoprolol to 25 mg a whole tablet a day out from half a tablet a day and will increase valsartan to 160 will see him back in 4 weeks his blood pressure machine is accurate

## 2020-09-08 NOTE — LETTER
September 8, 2020     MD [address]  [address] [address]    Patient: Benedicto Soliz   YOB: 1934   Date of Visit: 9/8/2020       Dear Dr Adis Pena: Thank you for referring Benedicto Soliz to me for evaluation  Below are my notes for this consultation  If you have questions, please do not hesitate to call me  I look forward to following your patient along with you  Sincerely,        Vivienne Mercado DO        CC: No Recipients  Vivienne Mercado DO  9/8/2020  2:39 PM  Sign when Signing Visit  Assessment/Plan:   1 hypertension not at goal having some palpitation heart rate is elevated we will increase metoprolol to 25 mg a whole tablet a day out from half a tablet a day and will increase valsartan to 160 will see him back in 4 weeks his blood pressure machine is accurate           Diagnoses and all orders for this visit:    Hyperlipidemia, unspecified hyperlipidemia type    Hypertension, unspecified type  -     valsartan (DIOVAN) 160 mg tablet; Take 1 tablet (160 mg total) by mouth daily  -     metoprolol succinate (TOPROL-XL) 25 mg 24 hr tablet; Take 1 tablet (25 mg total) by mouth daily    Vitamin D deficiency    Calculus of gallbladder without cholecystitis without obstruction    Anxiety    Irregular heart beat    Murmur, cardiac    Ischemic cardiomyopathy  -     valsartan (DIOVAN) 160 mg tablet; Take 1 tablet (160 mg total) by mouth daily    2-vessel coronary artery disease  -     metoprolol succinate (TOPROL-XL) 25 mg 24 hr tablet; Take 1 tablet (25 mg total) by mouth daily        The patient was counseled regarding instructions for management, risk factor reductions, patient and family education,impressions, risks and benefits of treatment options, side effects of medications, importance of compliance with treatment   The treatment plan was reviewed with the patient/guardian and patient/guardian understands and agrees with the treatment plan             Current Outpatient Medications:     amLODIPine (NORVASC) 5 mg tablet, Take 1 tablet (5 mg total) by mouth daily, Disp: 90 tablet, Rfl: 2    aspirin 81 MG tablet, Take 2 tablets by mouth once , Disp: , Rfl:     atorvastatin (LIPITOR) 80 mg tablet, Take 1 tablet (80 mg total) by mouth daily, Disp: 90 tablet, Rfl: 2    B Complex Vitamins (VITAMIN B COMPLEX) TABS, Take 1 tablet by mouth daily, Disp: , Rfl:     Cholecalciferol (VITAMIN D-3 PO), Take 2,000 Units by mouth 3 (three) times a day before meals, Disp: , Rfl:     co-enzyme Q-10 30 MG capsule, Take 30 mg by mouth daily, Disp: , Rfl:     hydrocortisone 2 5 % cream, Apply topically 2 (two) times a day, Disp: 30 g, Rfl: 1    hydrocortisone-pramoxine (ANALPRAM-HC) 2 5-1 % rectal cream, Insert into the rectum, Disp: , Rfl:     hydrocortisone-pramoxine (ANALPRAM-HC) 2 5-1 % rectal cream, Apply topically 3 (three) times a day, Disp: 30 g, Rfl: 2    hydrOXYzine HCL (ATARAX) 25 mg tablet, Take 1 tablet (25 mg total) by mouth every 6 (six) hours as needed for anxiety, Disp: 90 tablet, Rfl: 0    metoprolol succinate (TOPROL-XL) 25 mg 24 hr tablet, Take 1 tablet (25 mg total) by mouth daily, Disp: 90 tablet, Rfl: 2    nitroglycerin (NITROSTAT) 0 4 mg SL tablet, Place 1 tablet (0 4 mg total) under the tongue every 5 (five) minutes as needed for chest pain, Disp: 100 tablet, Rfl: 2    omega-3-acid ethyl esters (LOVAZA) 1 g capsule, Take 2 g by mouth daily, Disp: , Rfl:     valsartan (DIOVAN) 160 mg tablet, Take 1 tablet (160 mg total) by mouth daily, Disp: 90 tablet, Rfl: 2    Subjective:      Patient ID: Elijah Velazco is a 80 y o  male      Getting shaking, got bp cuff, at breakfast gets an anxious feeling, 15-20  Lasts 15-20 minutes, gave medication, bp elevated 150s /80s 147/97 p 92, bp machine omron      The following portions of the patient's history were reviewed and updated as appropriate:   He has a past medical history of Abnormal weight loss, Arthritis, Basal cell carcinoma, Bursitis, Cancer ([facility] [address] ), Cardiomyopathy ([facility] [address] ), Chest discomfort, Chest pain, Coronary artery disease, Ecchymosis, Exertional dyspnea, Hepatic hemangioma, Herniation of lumbar intervertebral disc, Hyperlipidemia, Hypertension, Nonmelanoma skin cancer, Pleural effusion, Pulmonary nodule, Shortness of breath, and Vitamin D deficiency  ,  does not have any pertinent problems on file  ,   has a past surgical history that includes Carpal tunnel release (Bilateral); Coronary artery bypass graft (03/24/2011); Coronary artery bypass graft; and pr repair incisional hernia,reducible (Bilateral, 4/11/2017)  ,  family history includes Heart disease in his mother; Hypertension in his mother  ,   reports that he quit smoking about 40 years ago  He has never used smokeless tobacco  He reports current alcohol use of about 1 0 standard drinks of alcohol per week  He reports that he does not use drugs  ,  has No Known Allergies       Review of Systems   Constitutional: Negative for appetite change, chills, fatigue, fever and unexpected weight change  HENT: Negative for congestion, ear pain, facial swelling, hearing loss, mouth sores, nosebleeds, postnasal drip, rhinorrhea, sinus pain, sore throat, trouble swallowing and voice change  Eyes: Negative for pain, discharge, redness and visual disturbance  Respiratory: Negative for apnea, chest tightness, shortness of breath, wheezing and stridor  Cardiovascular: Negative for chest pain, palpitations and leg swelling  Gastrointestinal: Negative for abdominal distention, abdominal pain, blood in stool, constipation, diarrhea and vomiting  Endocrine: Negative for cold intolerance, heat intolerance, polydipsia, polyphagia and polyuria  Genitourinary: Negative for difficulty urinating, dysuria, flank pain, frequency, genital sores, hematuria and urgency  Musculoskeletal: Negative for arthralgias and back pain     Skin: Negative for rash and wound  Allergic/Immunologic: Negative for environmental allergies, food allergies and immunocompromised state  Neurological: Negative for dizziness, tremors, seizures, syncope, facial asymmetry, speech difficulty, weakness, light-headedness, numbness and headaches  Hematological: Negative for adenopathy  Does not bruise/bleed easily  Psychiatric/Behavioral: Positive for agitation  Negative for behavioral problems, dysphoric mood, hallucinations, self-injury, sleep disturbance and suicidal ideas  The patient is not hyperactive            Objective:  /85 (BP Location: Left arm, Patient Position: Sitting)   Pulse 74   Temp 98 6 °F (37 °C)   Resp 16   Ht 5' 5" (1 651 m)   Wt 66 7 kg (147 lb)   SpO2 96%   BMI 24 46 kg/m²     Lab Review  Appointment on 07/14/2020   Component Date Value    WBC 07/14/2020 7 29     RBC 07/14/2020 4 27     Hemoglobin 07/14/2020 13 2     Hematocrit 07/14/2020 41 1     MCV 07/14/2020 96     MCH 07/14/2020 30 9     MCHC 07/14/2020 32 1     RDW 07/14/2020 12 6     MPV 07/14/2020 10 5     Platelets 62/11/9245 177     nRBC 07/14/2020 0     Neutrophils Relative 07/14/2020 61     Immat GRANS % 07/14/2020 0     Lymphocytes Relative 07/14/2020 29     Monocytes Relative 07/14/2020 8     Eosinophils Relative 07/14/2020 2     Basophils Relative 07/14/2020 0     Neutrophils Absolute 07/14/2020 4 37     Immature Grans Absolute 07/14/2020 0 03     Lymphocytes Absolute 07/14/2020 2 11     Monocytes Absolute 07/14/2020 0 61     Eosinophils Absolute 07/14/2020 0 15     Basophils Absolute 07/14/2020 0 02     Sodium 07/14/2020 142     Potassium 07/14/2020 4 6     Chloride 07/14/2020 106     CO2 07/14/2020 28     ANION GAP 07/14/2020 8     BUN 07/14/2020 15     Creatinine 07/14/2020 0 81     Glucose, Fasting 07/14/2020 95     Calcium 07/14/2020 8 4     AST 07/14/2020 22     ALT 07/14/2020 22     Alkaline Phosphatase 07/14/2020 92     Total Protein 07/14/2020 6  6     Albumin 07/14/2020 3 4*    Total Bilirubin 07/14/2020 0 90     eGFR 07/14/2020 81     LDL Direct 07/14/2020 36     Cholesterol 07/14/2020 103     Triglycerides 07/14/2020 32     HDL, Direct 07/14/2020 60     LDL Calculated 07/14/2020 37     Vit D, 25-Hydroxy 07/14/2020 38 7          Imaging  @[id number]@     No orders to display     No results found for this or any previous visit  Physical Exam  Vitals signs and nursing note reviewed  Constitutional:       Appearance: He is well-developed  HENT:      Right Ear: External ear normal       Left Ear: External ear normal    Eyes:      General: No scleral icterus  Right eye: No discharge  Left eye: No discharge  Neck:      Thyroid: No thyroid mass or thyromegaly  Vascular: No carotid bruit  Trachea: No tracheal deviation  Cardiovascular:      Rate and Rhythm: Normal rate  Rhythm irregular  Heart sounds: Murmur present  No friction rub  No gallop  Pulmonary:      Effort: No respiratory distress  Breath sounds: No wheezing or rales  Lymphadenopathy:      Cervical: No cervical adenopathy  Neurological:      Mental Status: He is alert and oriented to person, place, and time  Coordination: Coordination normal    Psychiatric:         Behavior: Behavior normal          Thought Content:  Thought content normal          Judgment: Judgment normal

## 2020-09-08 NOTE — PROGRESS NOTES
Assessment/Plan:   1 hypertension not at goal having some palpitation heart rate is elevated we will increase metoprolol to 25 mg a whole tablet a day out from half a tablet a day and will increase valsartan to 160 will see him back in 4 weeks his blood pressure machine is accurate           Diagnoses and all orders for this visit:    Hyperlipidemia, unspecified hyperlipidemia type    Hypertension, unspecified type  -     valsartan (DIOVAN) 160 mg tablet; Take 1 tablet (160 mg total) by mouth daily  -     metoprolol succinate (TOPROL-XL) 25 mg 24 hr tablet; Take 1 tablet (25 mg total) by mouth daily    Vitamin D deficiency    Calculus of gallbladder without cholecystitis without obstruction    Anxiety    Irregular heart beat    Murmur, cardiac    Ischemic cardiomyopathy  -     valsartan (DIOVAN) 160 mg tablet; Take 1 tablet (160 mg total) by mouth daily    2-vessel coronary artery disease  -     metoprolol succinate (TOPROL-XL) 25 mg 24 hr tablet; Take 1 tablet (25 mg total) by mouth daily        The patient was counseled regarding instructions for management, risk factor reductions, patient and family education,impressions, risks and benefits of treatment options, side effects of medications, importance of compliance with treatment  The treatment plan was reviewed with the patient/guardian and patient/guardian understands and agrees with the treatment plan              Current Outpatient Medications:     amLODIPine (NORVASC) 5 mg tablet, Take 1 tablet (5 mg total) by mouth daily, Disp: 90 tablet, Rfl: 2    aspirin 81 MG tablet, Take 2 tablets by mouth once , Disp: , Rfl:     atorvastatin (LIPITOR) 80 mg tablet, Take 1 tablet (80 mg total) by mouth daily, Disp: 90 tablet, Rfl: 2    B Complex Vitamins (VITAMIN B COMPLEX) TABS, Take 1 tablet by mouth daily, Disp: , Rfl:     Cholecalciferol (VITAMIN D-3 PO), Take 2,000 Units by mouth 3 (three) times a day before meals, Disp: , Rfl:     co-enzyme Q-10 30 MG capsule, Take 30 mg by mouth daily, Disp: , Rfl:     hydrocortisone 2 5 % cream, Apply topically 2 (two) times a day, Disp: 30 g, Rfl: 1    hydrocortisone-pramoxine (ANALPRAM-HC) 2 5-1 % rectal cream, Insert into the rectum, Disp: , Rfl:     hydrocortisone-pramoxine (ANALPRAM-HC) 2 5-1 % rectal cream, Apply topically 3 (three) times a day, Disp: 30 g, Rfl: 2    hydrOXYzine HCL (ATARAX) 25 mg tablet, Take 1 tablet (25 mg total) by mouth every 6 (six) hours as needed for anxiety, Disp: 90 tablet, Rfl: 0    metoprolol succinate (TOPROL-XL) 25 mg 24 hr tablet, Take 1 tablet (25 mg total) by mouth daily, Disp: 90 tablet, Rfl: 2    nitroglycerin (NITROSTAT) 0 4 mg SL tablet, Place 1 tablet (0 4 mg total) under the tongue every 5 (five) minutes as needed for chest pain, Disp: 100 tablet, Rfl: 2    omega-3-acid ethyl esters (LOVAZA) 1 g capsule, Take 2 g by mouth daily, Disp: , Rfl:     valsartan (DIOVAN) 160 mg tablet, Take 1 tablet (160 mg total) by mouth daily, Disp: 90 tablet, Rfl: 2    Subjective:      Patient ID: Rhianna Looney is a 80 y o  male  Getting shaking, got bp cuff, at breakfast gets an anxious feeling, 15-20  Lasts 15-20 minutes, gave medication, bp elevated 150s /80s 147/97 p 92, bp machine omron      The following portions of the patient's history were reviewed and updated as appropriate:   He has a past medical history of Abnormal weight loss, Arthritis, Basal cell carcinoma, Bursitis, Cancer (HCC), Cardiomyopathy ([address] ), Chest discomfort, Chest pain, Coronary artery disease, Ecchymosis, Exertional dyspnea, Hepatic hemangioma, Herniation of lumbar intervertebral disc, Hyperlipidemia, Hypertension, Nonmelanoma skin cancer, Pleural effusion, Pulmonary nodule, Shortness of breath, and Vitamin D deficiency  ,  does not have any pertinent problems on file  ,   has a past surgical history that includes Carpal tunnel release (Bilateral); Coronary artery bypass graft (03/24/2011);  Coronary artery bypass graft; and pr repair incisional hernia,reducible (Bilateral, 4/11/2017)  ,  family history includes Heart disease in his mother; Hypertension in his mother  ,   reports that he quit smoking about 40 years ago  He has never used smokeless tobacco  He reports current alcohol use of about 1 0 standard drinks of alcohol per week  He reports that he does not use drugs  ,  has No Known Allergies       Review of Systems   Constitutional: Negative for appetite change, chills, fatigue, fever and unexpected weight change  HENT: Negative for congestion, ear pain, facial swelling, hearing loss, mouth sores, nosebleeds, postnasal drip, rhinorrhea, sinus pain, sore throat, trouble swallowing and voice change  Eyes: Negative for pain, discharge, redness and visual disturbance  Respiratory: Negative for apnea, chest tightness, shortness of breath, wheezing and stridor  Cardiovascular: Negative for chest pain, palpitations and leg swelling  Gastrointestinal: Negative for abdominal distention, abdominal pain, blood in stool, constipation, diarrhea and vomiting  Endocrine: Negative for cold intolerance, heat intolerance, polydipsia, polyphagia and polyuria  Genitourinary: Negative for difficulty urinating, dysuria, flank pain, frequency, genital sores, hematuria and urgency  Musculoskeletal: Negative for arthralgias and back pain  Skin: Negative for rash and wound  Allergic/Immunologic: Negative for environmental allergies, food allergies and immunocompromised state  Neurological: Negative for dizziness, tremors, seizures, syncope, facial asymmetry, speech difficulty, weakness, light-headedness, numbness and headaches  Hematological: Negative for adenopathy  Does not bruise/bleed easily  Psychiatric/Behavioral: Positive for agitation  Negative for behavioral problems, dysphoric mood, hallucinations, self-injury, sleep disturbance and suicidal ideas  The patient is not hyperactive            Objective:  /85 (BP Location: Left arm, Patient Position: Sitting)   Pulse 74   Temp 98 6 °F (37 °C)   Resp 16   Ht 5' 5" (1 651 m)   Wt 66 7 kg (147 lb)   SpO2 96%   BMI 24 46 kg/m²     Lab Review  Appointment on 07/14/2020   Component Date Value    WBC 07/14/2020 7 29     RBC 07/14/2020 4 27     Hemoglobin 07/14/2020 13 2     Hematocrit 07/14/2020 41 1     MCV 07/14/2020 96     MCH 07/14/2020 30 9     MCHC 07/14/2020 32 1     RDW 07/14/2020 12 6     MPV 07/14/2020 10 5     Platelets 08/27/0733 177     nRBC 07/14/2020 0     Neutrophils Relative 07/14/2020 61     Immat GRANS % 07/14/2020 0     Lymphocytes Relative 07/14/2020 29     Monocytes Relative 07/14/2020 8     Eosinophils Relative 07/14/2020 2     Basophils Relative 07/14/2020 0     Neutrophils Absolute 07/14/2020 4 37     Immature Grans Absolute 07/14/2020 0 03     Lymphocytes Absolute 07/14/2020 2 11     Monocytes Absolute 07/14/2020 0 61     Eosinophils Absolute 07/14/2020 0 15     Basophils Absolute 07/14/2020 0 02     Sodium 07/14/2020 142     Potassium 07/14/2020 4 6     Chloride 07/14/2020 106     CO2 07/14/2020 28     ANION GAP 07/14/2020 8     BUN 07/14/2020 15     Creatinine 07/14/2020 0 81     Glucose, Fasting 07/14/2020 95     Calcium 07/14/2020 8 4     AST 07/14/2020 22     ALT 07/14/2020 22     Alkaline Phosphatase 07/14/2020 92     Total Protein 07/14/2020 6 6     Albumin 07/14/2020 3 4*    Total Bilirubin 07/14/2020 0 90     eGFR 07/14/2020 81     LDL Direct 07/14/2020 36     Cholesterol 07/14/2020 103     Triglycerides 07/14/2020 32     HDL, Direct 07/14/2020 60     LDL Calculated 07/14/2020 37     Vit D, 25-Hydroxy 07/14/2020 38 7          Imaging  @[email or web address]@     No orders to display     No results found for this or any previous visit  Physical Exam  Vitals signs and nursing note reviewed  Constitutional:       Appearance: He is well-developed     HENT:      Right Ear: External ear normal  Left Ear: External ear normal    Eyes:      General: No scleral icterus  Right eye: No discharge  Left eye: No discharge  Neck:      Thyroid: No thyroid mass or thyromegaly  Vascular: No carotid bruit  Trachea: No tracheal deviation  Cardiovascular:      Rate and Rhythm: Normal rate  Rhythm irregular  Heart sounds: Murmur present  No friction rub  No gallop  Pulmonary:      Effort: No respiratory distress  Breath sounds: No wheezing or rales  Lymphadenopathy:      Cervical: No cervical adenopathy  Neurological:      Mental Status: He is alert and oriented to person, place, and time  Coordination: Coordination normal    Psychiatric:         Behavior: Behavior normal          Thought Content:  Thought content normal          Judgment: Judgment normal

## 2020-10-13 ENCOUNTER — OFFICE VISIT (OUTPATIENT)
Dept: INTERNAL MEDICINE CLINIC | Facility: CLINIC | Age: 85
End: 2020-10-13
Payer: MEDICARE

## 2020-10-13 VITALS
OXYGEN SATURATION: 98 % | DIASTOLIC BLOOD PRESSURE: 70 MMHG | TEMPERATURE: 98.6 F | RESPIRATION RATE: 16 BRPM | HEIGHT: 65 IN | HEART RATE: 77 BPM | WEIGHT: 146.6 LBS | SYSTOLIC BLOOD PRESSURE: 130 MMHG | BODY MASS INDEX: 24.43 KG/M2

## 2020-10-13 DIAGNOSIS — F41.9 ANXIETY: ICD-10-CM

## 2020-10-13 DIAGNOSIS — Z23 NEED FOR IMMUNIZATION AGAINST INFLUENZA: Primary | ICD-10-CM

## 2020-10-13 PROCEDURE — 99213 OFFICE O/P EST LOW 20 MIN: CPT | Performed by: INTERNAL MEDICINE

## 2020-10-13 PROCEDURE — G0008 ADMIN INFLUENZA VIRUS VAC: HCPCS

## 2020-10-13 PROCEDURE — 90662 IIV NO PRSV INCREASED AG IM: CPT

## 2020-10-27 ENCOUNTER — OFFICE VISIT (OUTPATIENT)
Dept: INTERNAL MEDICINE CLINIC | Facility: CLINIC | Age: 85
End: 2020-10-27
Payer: MEDICARE

## 2020-10-27 VITALS
RESPIRATION RATE: 16 BRPM | SYSTOLIC BLOOD PRESSURE: 132 MMHG | WEIGHT: 149 LBS | BODY MASS INDEX: 24.83 KG/M2 | OXYGEN SATURATION: 94 % | TEMPERATURE: 98.6 F | DIASTOLIC BLOOD PRESSURE: 78 MMHG | HEIGHT: 65 IN | HEART RATE: 55 BPM

## 2020-10-27 DIAGNOSIS — M54.50 LUMBAR PAIN: ICD-10-CM

## 2020-10-27 DIAGNOSIS — T14.8XXA ABRASION OF SKIN: Primary | ICD-10-CM

## 2020-10-27 PROCEDURE — 99213 OFFICE O/P EST LOW 20 MIN: CPT | Performed by: INTERNAL MEDICINE

## 2020-11-12 ENCOUNTER — OFFICE VISIT (OUTPATIENT)
Dept: INTERNAL MEDICINE CLINIC | Facility: CLINIC | Age: 85
End: 2020-11-12
Payer: MEDICARE

## 2020-11-12 VITALS
HEIGHT: 65 IN | RESPIRATION RATE: 16 BRPM | WEIGHT: 150 LBS | SYSTOLIC BLOOD PRESSURE: 118 MMHG | TEMPERATURE: 98.6 F | OXYGEN SATURATION: 99 % | DIASTOLIC BLOOD PRESSURE: 80 MMHG | BODY MASS INDEX: 24.99 KG/M2 | HEART RATE: 75 BPM

## 2020-11-12 DIAGNOSIS — R60.0 LOCALIZED EDEMA: Primary | ICD-10-CM

## 2020-11-12 DIAGNOSIS — I10 HYPERTENSION, UNSPECIFIED TYPE: ICD-10-CM

## 2020-11-12 PROCEDURE — 99214 OFFICE O/P EST MOD 30 MIN: CPT | Performed by: NURSE PRACTITIONER

## 2020-11-12 RX ORDER — VALSARTAN 80 MG/1
80 TABLET ORAL DAILY
Qty: 90 TABLET | Refills: 1 | Status: SHIPPED | OUTPATIENT
Start: 2020-11-12 | End: 2021-06-01 | Stop reason: SDUPTHER

## 2020-11-30 ENCOUNTER — TELEPHONE (OUTPATIENT)
Dept: INTERNAL MEDICINE CLINIC | Facility: CLINIC | Age: 85
End: 2020-11-30

## 2020-11-30 ENCOUNTER — CLINICAL SUPPORT (OUTPATIENT)
Dept: INTERNAL MEDICINE CLINIC | Facility: CLINIC | Age: 85
End: 2020-11-30

## 2020-11-30 VITALS — SYSTOLIC BLOOD PRESSURE: 118 MMHG | TEMPERATURE: 98.2 F | DIASTOLIC BLOOD PRESSURE: 66 MMHG

## 2020-11-30 DIAGNOSIS — I10 HYPERTENSION, UNSPECIFIED TYPE: Primary | ICD-10-CM

## 2021-01-14 ENCOUNTER — LAB (OUTPATIENT)
Dept: LAB | Facility: HOSPITAL | Age: 86
End: 2021-01-14
Payer: MEDICARE

## 2021-01-14 DIAGNOSIS — R60.0 LOCALIZED EDEMA: ICD-10-CM

## 2021-01-14 DIAGNOSIS — I10 HYPERTENSION, UNSPECIFIED TYPE: ICD-10-CM

## 2021-01-14 LAB
ALBUMIN SERPL BCP-MCNC: 3.5 G/DL (ref 3.5–5)
ALP SERPL-CCNC: 101 U/L (ref 46–116)
ALT SERPL W P-5'-P-CCNC: 27 U/L (ref 12–78)
ANION GAP SERPL CALCULATED.3IONS-SCNC: 5 MMOL/L (ref 4–13)
AST SERPL W P-5'-P-CCNC: 24 U/L (ref 5–45)
BASOPHILS # BLD AUTO: 0.04 THOUSANDS/ΜL (ref 0–0.1)
BASOPHILS NFR BLD AUTO: 1 % (ref 0–1)
BILIRUB SERPL-MCNC: 0.8 MG/DL (ref 0.2–1)
BUN SERPL-MCNC: 22 MG/DL (ref 5–25)
CALCIUM SERPL-MCNC: 8.7 MG/DL (ref 8.3–10.1)
CHLORIDE SERPL-SCNC: 105 MMOL/L (ref 100–108)
CHOLEST SERPL-MCNC: 105 MG/DL (ref 50–200)
CO2 SERPL-SCNC: 31 MMOL/L (ref 21–32)
CREAT SERPL-MCNC: 1.1 MG/DL (ref 0.6–1.3)
EOSINOPHIL # BLD AUTO: 0.12 THOUSAND/ΜL (ref 0–0.61)
EOSINOPHIL NFR BLD AUTO: 2 % (ref 0–6)
ERYTHROCYTE [DISTWIDTH] IN BLOOD BY AUTOMATED COUNT: 12.4 % (ref 11.6–15.1)
GFR SERPL CREATININE-BSD FRML MDRD: 60 ML/MIN/1.73SQ M
GLUCOSE P FAST SERPL-MCNC: 100 MG/DL (ref 65–99)
HCT VFR BLD AUTO: 44.2 % (ref 36.5–49.3)
HDLC SERPL-MCNC: 66 MG/DL
HGB BLD-MCNC: 14.4 G/DL (ref 12–17)
IMM GRANULOCYTES # BLD AUTO: 0.02 THOUSAND/UL (ref 0–0.2)
IMM GRANULOCYTES NFR BLD AUTO: 0 % (ref 0–2)
LDLC SERPL CALC-MCNC: 32 MG/DL (ref 0–100)
LYMPHOCYTES # BLD AUTO: 2.09 THOUSANDS/ΜL (ref 0.6–4.47)
LYMPHOCYTES NFR BLD AUTO: 30 % (ref 14–44)
MCH RBC QN AUTO: 30.8 PG (ref 26.8–34.3)
MCHC RBC AUTO-ENTMCNC: 32.6 G/DL (ref 31.4–37.4)
MCV RBC AUTO: 95 FL (ref 82–98)
MONOCYTES # BLD AUTO: 0.62 THOUSAND/ΜL (ref 0.17–1.22)
MONOCYTES NFR BLD AUTO: 9 % (ref 4–12)
NEUTROPHILS # BLD AUTO: 4.05 THOUSANDS/ΜL (ref 1.85–7.62)
NEUTS SEG NFR BLD AUTO: 58 % (ref 43–75)
NONHDLC SERPL-MCNC: 39 MG/DL
NRBC BLD AUTO-RTO: 0 /100 WBCS
PLATELET # BLD AUTO: 181 THOUSANDS/UL (ref 149–390)
PMV BLD AUTO: 9.8 FL (ref 8.9–12.7)
POTASSIUM SERPL-SCNC: 5 MMOL/L (ref 3.5–5.3)
PROT SERPL-MCNC: 7.1 G/DL (ref 6.4–8.2)
RBC # BLD AUTO: 4.67 MILLION/UL (ref 3.88–5.62)
SODIUM SERPL-SCNC: 141 MMOL/L (ref 136–145)
TRIGL SERPL-MCNC: 34 MG/DL
WBC # BLD AUTO: 6.94 THOUSAND/UL (ref 4.31–10.16)

## 2021-01-14 PROCEDURE — 80053 COMPREHEN METABOLIC PANEL: CPT

## 2021-01-14 PROCEDURE — 85025 COMPLETE CBC W/AUTO DIFF WBC: CPT

## 2021-01-14 PROCEDURE — 36415 COLL VENOUS BLD VENIPUNCTURE: CPT

## 2021-01-14 PROCEDURE — 80061 LIPID PANEL: CPT

## 2021-01-25 ENCOUNTER — OFFICE VISIT (OUTPATIENT)
Dept: INTERNAL MEDICINE CLINIC | Facility: CLINIC | Age: 86
End: 2021-01-25
Payer: MEDICARE

## 2021-01-25 VITALS
BODY MASS INDEX: 24.66 KG/M2 | DIASTOLIC BLOOD PRESSURE: 64 MMHG | WEIGHT: 148 LBS | SYSTOLIC BLOOD PRESSURE: 118 MMHG | TEMPERATURE: 98.1 F | OXYGEN SATURATION: 94 % | HEIGHT: 65 IN | HEART RATE: 68 BPM

## 2021-01-25 DIAGNOSIS — E78.5 HYPERLIPIDEMIA, UNSPECIFIED HYPERLIPIDEMIA TYPE: ICD-10-CM

## 2021-01-25 DIAGNOSIS — I25.10 2-VESSEL CORONARY ARTERY DISEASE: ICD-10-CM

## 2021-01-25 DIAGNOSIS — I10 HYPERTENSION, UNSPECIFIED TYPE: Primary | ICD-10-CM

## 2021-01-25 DIAGNOSIS — M17.11 PRIMARY OSTEOARTHRITIS OF RIGHT KNEE: ICD-10-CM

## 2021-01-25 DIAGNOSIS — M17.12 PRIMARY OSTEOARTHRITIS OF LEFT KNEE: ICD-10-CM

## 2021-01-25 DIAGNOSIS — Z00.00 MEDICARE ANNUAL WELLNESS VISIT, SUBSEQUENT: ICD-10-CM

## 2021-01-25 DIAGNOSIS — F41.9 ANXIETY: ICD-10-CM

## 2021-01-25 PROCEDURE — G0439 PPPS, SUBSEQ VISIT: HCPCS | Performed by: NURSE PRACTITIONER

## 2021-01-25 PROCEDURE — 1123F ACP DISCUSS/DSCN MKR DOCD: CPT | Performed by: NURSE PRACTITIONER

## 2021-01-25 PROCEDURE — 99214 OFFICE O/P EST MOD 30 MIN: CPT | Performed by: NURSE PRACTITIONER

## 2021-01-25 NOTE — PROGRESS NOTES
Assessment and Plan:     Problem List Items Addressed This Visit        Cardiovascular and Mediastinum    2-vessel coronary artery disease    Hypertension - Primary       Musculoskeletal and Integument    Primary osteoarthritis of right knee    Primary osteoarthritis of left knee       Other    Hyperlipidemia    Medicare annual wellness visit, subsequent    Anxiety            Preventive health issues were discussed with patient, and age appropriate screening tests were ordered as noted in patient's After Visit Summary  Personalized health advice and appropriate referrals for health education or preventive services given if needed, as noted in patient's After Visit Summary       History of Present Illness:     Patient presents for Medicare Annual Wellness visit    Patient Care Team:  Carolyn Burch as PCP - General (Internal Medicine)  Kieran Yeboah MD     Problem List:     Patient Active Problem List   Diagnosis    2-vessel coronary artery disease    Basal cell carcinoma of scalp    Cardiomyopathy ([facility] [address] [address] )    History of heart artery stent    Cholelithiasis    Hyperlipidemia    Hypertension    Lumbar pain    Urinary urgency    Vitamin D deficiency    Primary osteoarthritis of right knee    Primary osteoarthritis of left knee    Presence of stent in coronary artery in patient with coronary artery disease    Epistaxis    Murmur, cardiac    Squamous cell carcinoma in situ (SCCIS) of scalp    Dizziness    Calculus of gallbladder without cholecystitis without obstruction    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 ([facility] [address] )    Medicare annual wellness visit, subsequent    Rash    Anxiety    Irregular heart beat    Abrasion of skin    Localized edema      Past Medical and Surgical History:     Past Medical History:   Diagnosis Date    Abnormal weight loss     Last Assessed: 2/24/2014    Arthritis     Basal cell carcinoma     Last Assessed: 8/16/2016    Bursitis     left hip pain    Cancer ([facility] )     BCA- back, left forearm, skin    Cardiomyopathy ([facility] )     Chest discomfort     Last Assessed: 2016    Chest pain     Last Assessed: 2013    Coronary artery disease     Ecchymosis     Last Assessed: 2016    Exertional dyspnea     Last Assessed: 2016    Hepatic hemangioma     Herniation of lumbar intervertebral disc     Hyperlipidemia     Hypertension     Nonmelanoma skin cancer     Last Assessed: 12/15/2017    Pleural effusion     Bilateral; Last Assessed: 2016    Pulmonary nodule     multiple nodes; Last Assessesd: 2016    Shortness of breath     Vitamin D deficiency      Past Surgical History:   Procedure Laterality Date    CARPAL TUNNEL RELEASE Bilateral     CORONARY ARTERY BYPASS GRAFT  2011    CORONARY ARTERY BYPASS GRAFT      AK REPAIR INCISIONAL HERNIA,REDUCIBLE Bilateral 2017    Procedure: LAPAROSCOPIC INGUINAL HERNIA REPAIR WITH MESH ;  Surgeon: Elaina Mckenzie MD;  Location: [facility];  Service: General      Family History:     Family History   Problem Relation Age of Onset    Heart disease Mother     Hypertension Mother       Social History:        Social History     Socioeconomic History    Marital status: /Civil Union     Spouse name: None    Number of children: None    Years of education: None    Highest education level: None   Occupational History    None   Social Needs    Financial resource strain: None    Food insecurity     Worry: None     Inability: None    Transportation needs     Medical: None     Non-medical: None   Tobacco Use    Smoking status: Former Smoker     Quit date: 1980     Years since quittin 8    Smokeless tobacco: Never Used    Tobacco comment: quit date  per allscripts   Substance and Sexual Activity    Alcohol use:  Yes     Alcohol/week: 1 0 standard drinks     Types: 1 Glasses of wine per week     Comment: daily    Drug use: No    Sexual activity: Not Currently   Lifestyle    Physical activity     Days per week: None     Minutes per session: None    Stress: None   Relationships    Social connections     Talks on phone: None     Gets together: None     Attends L[employer] service: None     Active member of club or organization: None     Attends meetings of clubs or organizations: None     Relationship status: None    Intimate partner violence     Fear of current or ex partner: None     Emotionally abused: None     Physically abused: None     Forced sexual activity: None   Other Topics Concern    None   Social History Narrative    Caffeine use      Medications and Allergies:     Current Outpatient Medications   Medication Sig Dispense Refill    amLODIPine (NORVASC) 5 mg tablet Take 1 tablet (5 mg total) by mouth daily 90 tablet 2    aspirin 81 MG tablet Take 2 tablets by mouth once       atorvastatin (LIPITOR) 80 mg tablet Take 1 tablet (80 mg total) by mouth daily 90 tablet 2    B Complex Vitamins (VITAMIN B COMPLEX) TABS Take 1 tablet by mouth daily      Cholecalciferol (VITAMIN D-3 PO) Take 2,000 Units by mouth 3 (three) times a day before meals      co-enzyme Q-10 30 MG capsule Take 30 mg by mouth daily      hydrocortisone 2 5 % cream Apply topically 2 (two) times a day 30 g 1    hydrocortisone-pramoxine (ANALPRAM-HC) 2 5-1 % rectal cream Insert into the rectum      hydrocortisone-pramoxine (ANALPRAM-HC) 2 5-1 % rectal cream Apply topically 3 (three) times a day 30 g 2    hydrOXYzine HCL (ATARAX) 25 mg tablet Take 1 tablet (25 mg total) by mouth every 6 (six) hours as needed for anxiety 90 tablet 0    metoprolol succinate (TOPROL-XL) 25 mg 24 hr tablet Take 1 tablet (25 mg total) by mouth daily 90 tablet 2    nitroglycerin (NITROSTAT) 0 4 mg SL tablet Place 1 tablet (0 4 mg total) under the tongue every 5 (five) minutes as needed for chest pain 100 tablet 2    omega-3-acid ethyl esters (LOVAZA) 1 g capsule Take 2 g by mouth daily      valsartan (DIOVAN) 80 mg tablet Take 1 tablet (80 mg total) by mouth daily 90 tablet 1     No current facility-administered medications for this visit  No Known Allergies   Immunizations:     Immunization History   Administered Date(s) Administered    DT (pediatric) 03/08/2005    INFLUENZA 09/12/2018    Influenza Split High Dose Preservative Free IM 09/17/2014, 09/30/2015, 10/06/2016, 10/02/2017    Influenza, high dose seasonal 0 7 mL 09/12/2018, 10/02/2019, 10/13/2020    Influenza, seasonal, injectable 10/01/2012    Pneumococcal Conjugate 13-Valent 06/26/2019    Pneumococcal Polysaccharide PPV23 12/10/2004, 09/10/2010    TD (adult) Preservative Free 10/07/2015      Health Maintenance:         Topic Date Due    Hepatitis C Screening  Completed         Topic Date Due    DTaP,Tdap,and Td Vaccines (1 - Tdap) 09/14/1955      Medicare Health Risk Assessment:     /64   Pulse 68   Temp 98 1 °F (36 7 °C)   Ht 5' 5" (1 651 m)   Wt 67 1 kg (148 lb)   SpO2 94%   BMI 24 63 kg/m²      Rosalba Rubalcava is here for his Subsequent Wellness visit  Health Risk Assessment:   Patient rates overall health as good  Patient feels that their physical health rating is same  Eyesight was rated as same  Hearing was rated as same  Patient feels that their emotional and mental health rating is same  Pain experienced in the last 7 days has been none  Patient states that he has experienced no weight loss or gain in last 6 months  Depression Screening:   PHQ-2 Score: 0      Fall Risk Screening: In the past year, patient has experienced: no history of falling in past year      Home Safety:  Patient has trouble with stairs inside or outside of their home  Patient has working smoke alarms and has working carbon monoxide detector  Home safety hazards include: none  Knee problems    Nutrition:   Current diet is Regular  Medications:   Patient is currently taking over-the-counter supplements   OTC medications include: see medication list  Patient is able to manage medications  Activities of Daily Living (ADLs)/Instrumental Activities of Daily Living (IADLs):   Walk and transfer into and out of bed and chair?: Yes  Dress and groom yourself?: Yes    Bathe or shower yourself?: Yes    Feed yourself? Yes  Do your laundry/housekeeping?: Yes  Manage your money, pay your bills and track your expenses?: Yes  Make your own meals?: Yes    Do your own shopping?: Yes    Previous Hospitalizations:   Any hospitalizations or ED visits within the last 12 months?: No      Advance Care Planning:   Living will: No    Durable POA for healthcare: No    Advanced directive: No      PREVENTIVE SCREENINGS      Cardiovascular Screening:    General: Screening Not Indicated and History Lipid Disorder      Diabetes Screening:     General: Screening Current      Colorectal Cancer Screening:     General: Screening Not Indicated      Prostate Cancer Screening:    General: Screening Not Indicated      Osteoporosis Screening:    General: Risks and Benefits Discussed      Abdominal Aortic Aneurysm (AAA) Screening:    Risk factors include: tobacco use        Lung Cancer Screening:     General: Screening Not Indicated      Hepatitis C Screening:    General: Screening Current    Other Counseling Topics:   Car/seat belt/driving safety and sunscreen  Assessment/Plan:    1  Medicare wellness completed  2  Coronary artery disease- Stable on statin, aspirin and beta blocker  3  Hypertension at goal   4  Hyperlipidemia- at goal Continuestatin  5  Osteoarthritis bilateral knees- Will be holding off on orthopedic referral   6  Anxiety- Stable  Takes 1/2 tab Hydroxyzine sparingly  7  Right sided scalp tenderness- Seems to be resolving  Exam unremarkable, will monitor  Follow up in six months, labs prior       Diagnoses and all orders for this visit:    Hypertension, unspecified type    Medicare annual wellness visit, subsequent    2-vessel coronary artery disease    Primary osteoarthritis of right knee    Primary osteoarthritis of left knee    Hyperlipidemia, unspecified hyperlipidemia type    Anxiety        The patient was counseled regarding instructions for management, risk factor reductions, patient and family education,impressions, risks and benefits of treatment options, side effects of medications, importance of compliance with treatment  The treatment plan was reviewed with the patient/guardian and patient/guardian understands and agrees with the treatment plan          Current Outpatient Medications:     amLODIPine (NORVASC) 5 mg tablet, Take 1 tablet (5 mg total) by mouth daily, Disp: 90 tablet, Rfl: 2    aspirin 81 MG tablet, Take 2 tablets by mouth once , Disp: , Rfl:     atorvastatin (LIPITOR) 80 mg tablet, Take 1 tablet (80 mg total) by mouth daily, Disp: 90 tablet, Rfl: 2    B Complex Vitamins (VITAMIN B COMPLEX) TABS, Take 1 tablet by mouth daily, Disp: , Rfl:     Cholecalciferol (VITAMIN D-3 PO), Take 2,000 Units by mouth 3 (three) times a day before meals, Disp: , Rfl:     co-enzyme Q-10 30 MG capsule, Take 30 mg by mouth daily, Disp: , Rfl:     hydrocortisone 2 5 % cream, Apply topically 2 (two) times a day, Disp: 30 g, Rfl: 1    hydrocortisone-pramoxine (ANALPRAM-HC) 2 5-1 % rectal cream, Insert into the rectum, Disp: , Rfl:     hydrocortisone-pramoxine (ANALPRAM-HC) 2 5-1 % rectal cream, Apply topically 3 (three) times a day, Disp: 30 g, Rfl: 2    hydrOXYzine HCL (ATARAX) 25 mg tablet, Take 1 tablet (25 mg total) by mouth every 6 (six) hours as needed for anxiety, Disp: 90 tablet, Rfl: 0    metoprolol succinate (TOPROL-XL) 25 mg 24 hr tablet, Take 1 tablet (25 mg total) by mouth daily, Disp: 90 tablet, Rfl: 2    nitroglycerin (NITROSTAT) 0 4 mg SL tablet, Place 1 tablet (0 4 mg total) under the tongue every 5 (five) minutes as needed for chest pain, Disp: 100 tablet, Rfl: 2    omega-3-acid ethyl esters (LOVAZA) 1 g capsule, Take 2 g by mouth daily, Disp: , Rfl:     valsartan (DIOVAN) 80 mg tablet, Take 1 tablet (80 mg total) by mouth daily, Disp: 90 tablet, Rfl: 1    Subjective:      Patient ID: Dylan Menendez is a 80 y o  male  Here for follow up  A few weeks ago he noticed  Some pain on the right side of his head, which was tender to touch  He did not develop any rash at that time  The following portions of the patient's history were reviewed and updated as appropriate:   He has a past medical history of Abnormal weight loss, Arthritis, Basal cell carcinoma, Bursitis, Cancer ([facility] ), Cardiomyopathy ([facility] ), Chest discomfort, Chest pain, Coronary artery disease, Ecchymosis, Exertional dyspnea, Hepatic hemangioma, Herniation of lumbar intervertebral disc, Hyperlipidemia, Hypertension, Nonmelanoma skin cancer, Pleural effusion, Pulmonary nodule, Shortness of breath, and Vitamin D deficiency  ,  does not have any pertinent problems on file  ,   has a past surgical history that includes Carpal tunnel release (Bilateral); Coronary artery bypass graft (03/24/2011); Coronary artery bypass graft; and pr repair incisional hernia,reducible (Bilateral, 4/11/2017)  ,  family history includes Heart disease in his mother; Hypertension in his mother  ,   reports that he quit smoking about 40 years ago  He has never used smokeless tobacco  He reports current alcohol use of about 1 0 standard drinks of alcohol per week  He reports that he does not use drugs  ,  has No Known Allergies       Review of Systems   Constitutional: Negative  Respiratory: Negative  Cardiovascular: Negative  Musculoskeletal: Negative  Psychiatric/Behavioral: Negative            Objective:  /64   Pulse 68   Temp 98 1 °F (36 7 °C)   Ht 5' 5" (1 651 m)   Wt 67 1 kg (148 lb)   SpO2 94%   BMI 24 63 kg/m²     Lab Review  Lab on 01/14/2021   Component Date Value    WBC 01/14/2021 6 94     RBC 01/14/2021 4 67     Hemoglobin 01/14/2021 14 4     Hematocrit 01/14/2021 44 2     MCV 01/14/2021 95     MCH 01/14/2021 30 8     MCHC 01/14/2021 32 6     RDW 01/14/2021 12 4     MPV 01/14/2021 9 8     Platelets 98[birth year]1917 181     nRBC 01/14/2021 0     Neutrophils Relative 01/14/2021 58     Immat GRANS % 01/14/2021 0     Lymphocytes Relative 01/14/2021 30     Monocytes Relative 01/14/2021 9     Eosinophils Relative 01/14/2021 2     Basophils Relative 01/14/2021 1     Neutrophils Absolute 01/14/2021 4 05     Immature Grans Absolute 01/14/2021 0 02     Lymphocytes Absolute 01/14/2021 2 09     Monocytes Absolute 01/14/2021 0 62     Eosinophils Absolute 01/14/2021 0 12     Basophils Absolute 01/14/2021 0 04     Sodium 01/14/2021 141     Potassium 01/14/2021 5 0     Chloride 01/14/2021 105     CO2 01/14/2021 31     ANION GAP 01/14/2021 5     BUN 01/14/2021 22     Creatinine 01/14/2021 1 10     Glucose, Fasting 01/14/2021 100*    Calcium 01/14/2021 8 7     AST 01/14/2021 24     ALT 01/14/2021 27     Alkaline Phosphatase 01/14/2021 101     Total Protein 01/14/2021 7 1     Albumin 01/14/2021 3 5     Total Bilirubin 01/14/2021 0 80     eGFR 01/14/2021 60     Cholesterol 01/14/2021 105     Triglycerides 01/14/2021 34     HDL, Direct 01/14/2021 66     LDL Calculated 01/14/2021 32     Non-HDL-Chol (CHOL-HDL) 01/14/2021 39         Imaging: No results found  Physical Exam  Constitutional:       Appearance: He is well-developed  Cardiovascular:      Rate and Rhythm: Normal rate and regular rhythm  Heart sounds: Normal heart sounds  Pulmonary:      Effort: Pulmonary effort is normal       Breath sounds: Normal breath sounds  Musculoskeletal: Normal range of motion  Skin:     Findings: No bruising, erythema or rash  Neurological:      Mental Status: He is alert and oriented to person, place, and time  Deep Tendon Reflexes: Reflexes are normal and symmetric  Psychiatric:         Behavior: Behavior normal          Thought Content:  Thought content normal  Judgment: Judgment normal              [place], [address]

## 2021-01-25 NOTE — PATIENT INSTRUCTIONS
Medicare Preventive Visit Patient Instructions  Thank you for completing your Welcome to Medicare Visit or Medicare Annual Wellness Visit today  Your next wellness visit will be due in one year (1/25/2022)  The screening/preventive services that you may require over the next 5-10 years are detailed below  Some tests may not apply to you based off risk factors and/or age  Screening tests ordered at today's visit but not completed yet may show as past due  Also, please note that scanned in results may not display below  Preventive Screenings:  Service Recommendations Previous Testing/Comments   Colorectal Cancer Screening  · Colonoscopy    · Fecal Occult Blood Test (FOBT)/Fecal Immunochemical Test (FIT)  · Fecal DNA/Cologuard Test  · Flexible Sigmoidoscopy Age: 54-65 years old   Colonoscopy: every 10 years (May be performed more frequently if at higher risk)  OR  FOBT/FIT: every 1 year  OR  Cologuard: every 3 years  OR  Sigmoidoscopy: every 5 years  Screening may be recommended earlier than age 48 if at higher risk for colorectal cancer  Also, an individualized decision between you and your healthcare provider will decide whether screening between the ages of 74-80 would be appropriate   Colonoscopy: Not on file  FOBT/FIT: Not on file  Cologuard: Not on file  Sigmoidoscopy: Not on file    Screening Not Indicated     Prostate Cancer Screening Individualized decision between patient and health care provider in men between ages of 53-78   Medicare will cover every 12 months beginning on the day after your 50th birthday PSA: No results in last 5 years     Screening Not Indicated     Hepatitis C Screening Once for adults born between 1945 and 1965  More frequently in patients at high risk for Hepatitis C Hep C Antibody: 12/28/2017    Screening Current   Diabetes Screening 1-2 times per year if you're at risk for diabetes or have pre-diabetes Fasting glucose: 100 mg/dL   A1C: No results in last 5 years    Screening Current   Cholesterol Screening Once every 5 years if you don't have a lipid disorder  May order more often based on risk factors  Lipid panel: 01/14/2021    Screening Not Indicated  History Lipid Disorder      Other Preventive Screenings Covered by Medicare:  1  Abdominal Aortic Aneurysm (AAA) Screening: covered once if your at risk  You're considered to be at risk if you have a family history of AAA or a male between the age of 73-68 who smoking at least 100 cigarettes in your lifetime  2  Lung Cancer Screening: covers low dose CT scan once per year if you meet all of the following conditions: (1) Age 50-69; (2) No signs or symptoms of lung cancer; (3) Current smoker or have quit smoking within the last 15 years; (4) You have a tobacco smoking history of at least 30 pack years (packs per day x number of years you smoked); (5) You get a written order from a healthcare provider  3  Glaucoma Screening: covered annually if you're considered high risk: (1) You have diabetes OR (2) Family history of glaucoma OR (3)  aged 48 and older OR (3)  American aged 72 and older  3  Osteoporosis Screening: covered every 2 years if you meet one of the following conditions: (1) Have a vertebral abnormality; (2) On glucocorticoid therapy for more than 3 months; (3) Have primary hyperparathyroidism; (4) On osteoporosis medications and need to assess response to drug therapy  5  HIV Screening: covered annually if you're between the age of 12-76  Also covered annually if you are younger than 13 and older than 72 with risk factors for HIV infection  For pregnant patients, it is covered up to 3 times per pregnancy      Immunizations:  Immunization Recommendations   Influenza Vaccine Annual influenza vaccination during flu season is recommended for all persons aged >= 6 months who do not have contraindications   Pneumococcal Vaccine (Prevnar and Pneumovax)  * Prevnar = PCV13  * Pneumovax = PPSV23 Adults 19-64 years old: 1-3 doses may be recommended based on certain risk factors  Adults 72 years old: Prevnar (PCV13) vaccine recommended followed by Pneumovax (PPSV23) vaccine  If already received PPSV23 since turning 65, then PCV13 recommended at least one year after PPSV23 dose  Hepatitis B Vaccine 3 dose series if at intermediate or high risk (ex: diabetes, end stage renal disease, liver disease)   Tetanus (Td) Vaccine - COST NOT COVERED BY MEDICARE PART B Following completion of primary series, a booster dose should be given every 10 years to maintain immunity against tetanus  Td may also be given as tetanus wound prophylaxis  Tdap Vaccine - COST NOT COVERED BY MEDICARE PART B Recommended at least once for all adults  For pregnant patients, recommended with each pregnancy  Shingles Vaccine (Shingrix) - COST NOT COVERED BY MEDICARE PART B  2 shot series recommended in those aged 48 and above     Health Maintenance Due:      Topic Date Due    Hepatitis C Screening  Completed     Immunizations Due:      Topic Date Due    DTaP,Tdap,and Td Vaccines (1 - Tdap) 09/14/1955     Advance Directives   What are advance directives? Advance directives are legal documents that state your wishes and plans for medical care  These plans are made ahead of time in case you lose your ability to make decisions for yourself  Advance directives can apply to any medical decision, such as the treatments you want, and if you want to donate organs  What are the types of advance directives? There are many types of advance directives, and each state has rules about how to use them  You may choose a combination of any of the following:  · Living will: This is a written record of the treatment you want  You can also choose which treatments you do not want, which to limit, and which to stop at a certain time  This includes surgery, medicine, IV fluid, and tube feedings  · Durable power of  for healthcare [facility]):   This is a written record that states who you want to make healthcare choices for you when you are unable to make them for yourself  This person, called a proxy, is usually a family member or a friend  You may choose more than 1 proxy  · Do not resuscitate (DNR) order:  A DNR order is used in case your heart stops beating or you stop breathing  It is a request not to have certain forms of treatment, such as CPR  A DNR order may be included in other types of advance directives  · Medical directive: This covers the care that you want if you are in a coma, near death, or unable to make decisions for yourself  You can list the treatments you want for each condition  Treatment may include pain medicine, surgery, blood transfusions, dialysis, IV or tube feedings, and a ventilator (breathing machine)  · Values history: This document has questions about your views, beliefs, and how you feel and think about life  This information can help others choose the care that you would choose  Why are advance directives important? An advance directive helps you control your care  Although spoken wishes may be used, it is better to have your wishes written down  Spoken wishes can be misunderstood, or not followed  Treatments may be given even if you do not want them  An advance directive may make it easier for your family to make difficult choices about your care  © Copyright [employer] 2018 Information is for End User's use only and may not be sold, redistributed or otherwise used for commercial purposes  All illustrations and images included in CareNotes® are the copyrighted property of A D A M , Inc  or [facility]    1  Medicare wellness completed  2  Coronary artery disease- Stable on statin, aspirin and beta blocker  3  Hypertension at goal   4  Hyperlipidemia- at goal Continuestatin  5  Osteoarthritis bilateral knees- Will be holding off on orthopedic referral   6  Anxiety- Stable   Takes 1/2 tab Hydroxyzine sparingly  7  Right sided scalp tenderness- Seems to be resolving  Exam unremarkable, will monitor  Follow up in six months, labs prior

## 2021-02-12 DIAGNOSIS — Z23 ENCOUNTER FOR IMMUNIZATION: ICD-10-CM

## 2021-03-09 ENCOUNTER — TELEPHONE (OUTPATIENT)
Dept: INTERNAL MEDICINE CLINIC | Facility: CLINIC | Age: 86
End: 2021-03-09

## 2021-03-09 NOTE — TELEPHONE ENCOUNTER
FYI  Patient states [facility] pharmacy was going to fax a request for losartan in place of the valsartan  They state it would be cheaper for him  He does not want to switch to this medication  He would like to stay on the valsartan because it is working for him  He did tell them not to request it, he would rather pay the higher price      Call back #[phone]

## 2021-06-01 DIAGNOSIS — I10 HYPERTENSION, UNSPECIFIED TYPE: ICD-10-CM

## 2021-06-01 DIAGNOSIS — I25.10 2-VESSEL CORONARY ARTERY DISEASE: ICD-10-CM

## 2021-06-01 DIAGNOSIS — R60.0 LOCALIZED EDEMA: ICD-10-CM

## 2021-06-01 DIAGNOSIS — E78.5 HYPERLIPIDEMIA, UNSPECIFIED HYPERLIPIDEMIA TYPE: ICD-10-CM

## 2021-06-01 NOTE — TELEPHONE ENCOUNTER
Amlodipine 5 mg tablet-take 1 tablet by mouth daily    Metoprolol 25 mg-take 1 tablet by mouth daily    Valsartan 80 mg tablet-take 1 tablet by mouth daily    Atorvastatin 80 mg tablet-take 1 tablet by mouth daily    [facility]

## 2021-06-02 RX ORDER — VALSARTAN 80 MG/1
80 TABLET ORAL DAILY
Qty: 90 TABLET | Refills: 1 | Status: SHIPPED | OUTPATIENT
Start: 2021-06-02 | End: 2022-01-05

## 2021-06-02 RX ORDER — METOPROLOL SUCCINATE 25 MG/1
25 TABLET, EXTENDED RELEASE ORAL DAILY
Qty: 90 TABLET | Refills: 2 | Status: SHIPPED | OUTPATIENT
Start: 2021-06-02 | End: 2022-02-21 | Stop reason: SDUPTHER

## 2021-06-02 RX ORDER — ATORVASTATIN CALCIUM 80 MG/1
80 TABLET, FILM COATED ORAL DAILY
Qty: 90 TABLET | Refills: 2 | Status: SHIPPED | OUTPATIENT
Start: 2021-06-02 | End: 2022-02-21 | Stop reason: SDUPTHER

## 2021-06-02 RX ORDER — AMLODIPINE BESYLATE 5 MG/1
5 TABLET ORAL DAILY
Qty: 90 TABLET | Refills: 2 | Status: SHIPPED | OUTPATIENT
Start: 2021-06-02 | End: 2022-02-21 | Stop reason: SDUPTHER

## 2021-08-02 ENCOUNTER — TELEPHONE (OUTPATIENT)
Dept: INTERNAL MEDICINE CLINIC | Facility: CLINIC | Age: 86
End: 2021-08-02

## 2021-08-03 ENCOUNTER — APPOINTMENT (OUTPATIENT)
Dept: LAB | Facility: HOSPITAL | Age: 86
End: 2021-08-03
Payer: MEDICARE

## 2021-08-03 DIAGNOSIS — E55.9 VITAMIN D DEFICIENCY: ICD-10-CM

## 2021-08-03 DIAGNOSIS — I10 HYPERTENSION, UNSPECIFIED TYPE: ICD-10-CM

## 2021-08-03 DIAGNOSIS — E78.5 HYPERLIPIDEMIA, UNSPECIFIED HYPERLIPIDEMIA TYPE: ICD-10-CM

## 2021-08-03 LAB
25(OH)D3 SERPL-MCNC: 51.7 NG/ML (ref 30–100)
ALBUMIN SERPL BCP-MCNC: 3.6 G/DL (ref 3.5–5)
ALP SERPL-CCNC: 95 U/L (ref 46–116)
ALT SERPL W P-5'-P-CCNC: 22 U/L (ref 12–78)
ANION GAP SERPL CALCULATED.3IONS-SCNC: 5 MMOL/L (ref 4–13)
AST SERPL W P-5'-P-CCNC: 22 U/L (ref 5–45)
BASOPHILS # BLD AUTO: 0.03 THOUSANDS/ΜL (ref 0–0.1)
BASOPHILS NFR BLD AUTO: 1 % (ref 0–1)
BILIRUB SERPL-MCNC: 0.94 MG/DL (ref 0.2–1)
BUN SERPL-MCNC: 19 MG/DL (ref 5–25)
CALCIUM SERPL-MCNC: 8.7 MG/DL (ref 8.3–10.1)
CHLORIDE SERPL-SCNC: 105 MMOL/L (ref 100–108)
CHOLEST SERPL-MCNC: 123 MG/DL (ref 50–200)
CO2 SERPL-SCNC: 29 MMOL/L (ref 21–32)
CREAT SERPL-MCNC: 1.05 MG/DL (ref 0.6–1.3)
EOSINOPHIL # BLD AUTO: 0.1 THOUSAND/ΜL (ref 0–0.61)
EOSINOPHIL NFR BLD AUTO: 2 % (ref 0–6)
ERYTHROCYTE [DISTWIDTH] IN BLOOD BY AUTOMATED COUNT: 12.8 % (ref 11.6–15.1)
GFR SERPL CREATININE-BSD FRML MDRD: 64 ML/MIN/1.73SQ M
GLUCOSE P FAST SERPL-MCNC: 96 MG/DL (ref 65–99)
HCT VFR BLD AUTO: 42.2 % (ref 36.5–49.3)
HDLC SERPL-MCNC: 66 MG/DL
HGB BLD-MCNC: 13.6 G/DL (ref 12–17)
IMM GRANULOCYTES # BLD AUTO: 0.02 THOUSAND/UL (ref 0–0.2)
IMM GRANULOCYTES NFR BLD AUTO: 0 % (ref 0–2)
LDLC SERPL CALC-MCNC: 51 MG/DL (ref 0–100)
LYMPHOCYTES # BLD AUTO: 1.57 THOUSANDS/ΜL (ref 0.6–4.47)
LYMPHOCYTES NFR BLD AUTO: 26 % (ref 14–44)
MCH RBC QN AUTO: 30.6 PG (ref 26.8–34.3)
MCHC RBC AUTO-ENTMCNC: 32.2 G/DL (ref 31.4–37.4)
MCV RBC AUTO: 95 FL (ref 82–98)
MONOCYTES # BLD AUTO: 0.63 THOUSAND/ΜL (ref 0.17–1.22)
MONOCYTES NFR BLD AUTO: 11 % (ref 4–12)
NEUTROPHILS # BLD AUTO: 3.63 THOUSANDS/ΜL (ref 1.85–7.62)
NEUTS SEG NFR BLD AUTO: 60 % (ref 43–75)
NONHDLC SERPL-MCNC: 57 MG/DL
NRBC BLD AUTO-RTO: 0 /100 WBCS
PLATELET # BLD AUTO: 174 THOUSANDS/UL (ref 149–390)
PMV BLD AUTO: 9.9 FL (ref 8.9–12.7)
POTASSIUM SERPL-SCNC: 4.6 MMOL/L (ref 3.5–5.3)
PROT SERPL-MCNC: 7 G/DL (ref 6.4–8.2)
RBC # BLD AUTO: 4.45 MILLION/UL (ref 3.88–5.62)
SODIUM SERPL-SCNC: 139 MMOL/L (ref 136–145)
TRIGL SERPL-MCNC: 32 MG/DL
TSH SERPL DL<=0.05 MIU/L-ACNC: 2.48 UIU/ML (ref 0.36–3.74)
WBC # BLD AUTO: 5.98 THOUSAND/UL (ref 4.31–10.16)

## 2021-08-03 PROCEDURE — 80061 LIPID PANEL: CPT

## 2021-08-03 PROCEDURE — 82306 VITAMIN D 25 HYDROXY: CPT

## 2021-08-03 PROCEDURE — 84443 ASSAY THYROID STIM HORMONE: CPT

## 2021-08-03 PROCEDURE — 80053 COMPREHEN METABOLIC PANEL: CPT

## 2021-08-03 PROCEDURE — 85025 COMPLETE CBC W/AUTO DIFF WBC: CPT

## 2021-08-03 PROCEDURE — 36415 COLL VENOUS BLD VENIPUNCTURE: CPT

## 2021-08-09 ENCOUNTER — OFFICE VISIT (OUTPATIENT)
Dept: INTERNAL MEDICINE CLINIC | Facility: CLINIC | Age: 86
End: 2021-08-09
Payer: MEDICARE

## 2021-08-09 VITALS
BODY MASS INDEX: 24.32 KG/M2 | OXYGEN SATURATION: 98 % | RESPIRATION RATE: 16 BRPM | TEMPERATURE: 98.6 F | HEART RATE: 75 BPM | WEIGHT: 146 LBS | DIASTOLIC BLOOD PRESSURE: 60 MMHG | HEIGHT: 65 IN | SYSTOLIC BLOOD PRESSURE: 110 MMHG

## 2021-08-09 DIAGNOSIS — I25.119 ATHEROSCLEROSIS OF NATIVE CORONARY ARTERY WITH ANGINA PECTORIS, UNSPECIFIED WHETHER NATIVE OR TRANSPLANTED HEART (HCC): ICD-10-CM

## 2021-08-09 DIAGNOSIS — E78.5 HYPERLIPIDEMIA, UNSPECIFIED HYPERLIPIDEMIA TYPE: ICD-10-CM

## 2021-08-09 DIAGNOSIS — I25.10 2-VESSEL CORONARY ARTERY DISEASE: Primary | ICD-10-CM

## 2021-08-09 DIAGNOSIS — I10 HYPERTENSION, UNSPECIFIED TYPE: ICD-10-CM

## 2021-08-09 DIAGNOSIS — I42.9 CARDIOMYOPATHY, UNSPECIFIED TYPE (HCC): ICD-10-CM

## 2021-08-09 PROBLEM — R21 RASH: Status: RESOLVED | Noted: 2020-04-06 | Resolved: 2021-08-09

## 2021-08-09 PROBLEM — T14.8XXA ABRASION OF SKIN: Status: RESOLVED | Noted: 2020-10-27 | Resolved: 2021-08-09

## 2021-08-09 PROBLEM — R42 DIZZINESS: Status: RESOLVED | Noted: 2019-10-09 | Resolved: 2021-08-09

## 2021-08-09 PROCEDURE — 99214 OFFICE O/P EST MOD 30 MIN: CPT | Performed by: NURSE PRACTITIONER

## 2021-08-09 NOTE — PATIENT INSTRUCTIONS
1  Coronary artery disease- Continue beta-blocker, statin  Followed by cardiology, Dr Shanika Lezama, due for echocardiogram   2  Hypertension- At goal   3  Hyperlipidemia- At goal   4  Bilateral ankle swelling at times- Continue compression stockings  Follow up in six months, labs prior

## 2021-08-09 NOTE — PROGRESS NOTES
Assessment/Plan:    1  Coronary artery disease- Continue beta-blocker, statin  Followed by cardiology, Dr Shanika Lezama, due for echocardiogram   2  Hypertension- At goal   3  Hyperlipidemia- At goal   4  Bilateral ankle swelling at times- Continue compression stockings  Follow up in six months, labs prior  Diagnoses and all orders for this visit:    2-vessel coronary artery disease    Hypertension, unspecified type  -     CBC and differential; Future  -     Comprehensive metabolic panel; Future  -     TSH, 3rd generation with Free T4 reflex; Future    Hyperlipidemia, unspecified hyperlipidemia type  -     Flax OIL; Take 1 capsule by mouth daily  -     Lipid panel; Future    Cardiomyopathy, unspecified type ([address] )    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, unspecified whether native or transplanted heart [facility])        The patient was counseled regarding instructions for management, risk factor reductions, patient and family education,impressions, risks and benefits of treatment options, side effects of medications, importance of compliance with treatment  The treatment plan was reviewed with the patient/guardian and patient/guardian understands and agrees with the treatment plan          Current Outpatient Medications:     amLODIPine (NORVASC) 5 mg tablet, Take 1 tablet (5 mg total) by mouth daily, Disp: 90 tablet, Rfl: 2    aspirin 81 MG tablet, Take 2 tablets by mouth once , Disp: , Rfl:     atorvastatin (LIPITOR) 80 mg tablet, Take 1 tablet (80 mg total) by mouth daily, Disp: 90 tablet, Rfl: 2    B Complex Vitamins (VITAMIN B COMPLEX) TABS, Take 1 tablet by mouth daily, Disp: , Rfl:     Cholecalciferol (VITAMIN D-3 PO), Take 2,000 Units by mouth 3 (three) times a day before meals, Disp: , Rfl:     co-enzyme Q-10 30 MG capsule, Take 30 mg by mouth daily, Disp: , Rfl:     hydrocortisone 2 5 % cream, Apply topically 2 (two) times a day, Disp: 30 g, Rfl: 1    hydrocortisone-pramoxine [facility]) 2 5-1 % rectal cream, Apply topically 3 (three) times a day, Disp: 30 g, Rfl: 2    hydrOXYzine HCL (ATARAX) 25 mg tablet, Take 1 tablet (25 mg total) by mouth every 6 (six) hours as needed for anxiety, Disp: 90 tablet, Rfl: 0    metoprolol succinate (TOPROL-XL) 25 mg 24 hr tablet, Take 1 tablet (25 mg total) by mouth daily, Disp: 90 tablet, Rfl: 2    nitroglycerin (NITROSTAT) 0 4 mg SL tablet, Place 1 tablet (0 4 mg total) under the tongue every 5 (five) minutes as needed for chest pain, Disp: 100 tablet, Rfl: 2    omega-3-acid ethyl esters (LOVAZA) 1 g capsule, Take 2 g by mouth daily, Disp: , Rfl:     valsartan (DIOVAN) 80 mg tablet, Take 1 tablet (80 mg total) by mouth daily, Disp: 90 tablet, Rfl: 1    Flax OIL, Take 1 capsule by mouth daily, Disp: 90 mL, Rfl: 3    Subjective:      Patient ID: Modena Councilman is a 80 y o  male  Here for follow up  No complaints today  The following portions of the patient's history were reviewed and updated as appropriate:   He has a past medical history of Abnormal weight loss, Arthritis, Basal cell carcinoma, Bursitis, Cancer ([address] ), Cardiomyopathy ([address] ), Chest discomfort, Chest pain, Coronary artery disease, Ecchymosis, Exertional dyspnea, Hepatic hemangioma, Herniation of lumbar intervertebral disc, Hyperlipidemia, Hypertension, Nonmelanoma skin cancer, Pleural effusion, Pulmonary nodule, Shortness of breath, and Vitamin D deficiency  ,  does not have any pertinent problems on file  ,   has a past surgical history that includes Carpal tunnel release (Bilateral); Coronary artery bypass graft (03/24/2011); Coronary artery bypass graft; and pr repair incisional hernia,reducible (Bilateral, 4/11/2017)  ,  family history includes Heart disease in his mother; Hypertension in his mother  ,   reports that he quit smoking about 41 years ago  He has never used smokeless tobacco  He reports current alcohol use of about 1 0 standard drinks of alcohol per week   He reports that he does not use drugs  ,  has No Known Allergies       Review of Systems   Constitutional: Negative  Respiratory: Negative  Cardiovascular: Negative  Musculoskeletal: Negative  Psychiatric/Behavioral: Negative            Objective:  /60   Pulse 75   Temp 98 6 °F (37 °C)   Resp 16   Ht 5' 5" (1 651 m)   Wt 66 2 kg (146 lb)   SpO2 98%   BMI 24 30 kg/m²     Lab Review  Appointment on 08/03/2021   Component Date Value    WBC 08/03/2021 5 98     RBC 08/03/2021 4 45     Hemoglobin 08/03/2021 13 6     Hematocrit 08/03/2021 42 2     MCV 08/03/2021 95     MCH 08/03/2021 30 6     MCHC 08/03/2021 32 2     RDW 08/03/2021 12 8     MPV 08/03/2021 9 9     Platelets 38/97/1362 174     nRBC 08/03/2021 0     Neutrophils Relative 08/03/2021 60     Immat GRANS % 08/03/2021 0     Lymphocytes Relative 08/03/2021 26     Monocytes Relative 08/03/2021 11     Eosinophils Relative 08/03/2021 2     Basophils Relative 08/03/2021 1     Neutrophils Absolute 08/03/2021 3 63     Immature Grans Absolute 08/03/2021 0 02     Lymphocytes Absolute 08/03/2021 1 57     Monocytes Absolute 08/03/2021 0 63     Eosinophils Absolute 08/03/2021 0 10     Basophils Absolute 08/03/2021 0 03     Sodium 08/03/2021 139     Potassium 08/03/2021 4 6     Chloride 08/03/2021 105     CO2 08/03/2021 29     ANION GAP 08/03/2021 5     BUN 08/03/2021 19     Creatinine 08/03/2021 1 05     Glucose, Fasting 08/03/2021 96     Calcium 08/03/2021 8 7     AST 08/03/2021 22     ALT 08/03/2021 22     Alkaline Phosphatase 08/03/2021 95     Total Protein 08/03/2021 7 0     Albumin 08/03/2021 3 6     Total Bilirubin 08/03/2021 0 94     eGFR 08/03/2021 64     Cholesterol 08/03/2021 123     Triglycerides 08/03/2021 32     HDL, Direct 08/03/2021 66     LDL Calculated 08/03/2021 51     Non-HDL-Chol (CHOL-HDL) 08/03/2021 57     TSH 3RD GENERATON 08/03/2021 2 478     Vit D, 25-Hydroxy 08/03/2021 51 7 Imaging: No results found  Physical Exam  Constitutional:       Appearance: He is well-developed  Cardiovascular:      Rate and Rhythm: Normal rate and regular rhythm  Heart sounds: Normal heart sounds  Pulmonary:      Effort: Pulmonary effort is normal       Breath sounds: Normal breath sounds  Musculoskeletal:         General: Normal range of motion  Neurological:      Mental Status: He is alert and oriented to person, place, and time  Deep Tendon Reflexes: Reflexes are normal and symmetric  Psychiatric:         Behavior: Behavior normal          Thought Content:  Thought content normal          Judgment: Judgment normal

## 2021-11-15 ENCOUNTER — CLINICAL SUPPORT (OUTPATIENT)
Dept: INTERNAL MEDICINE CLINIC | Facility: CLINIC | Age: 86
End: 2021-11-15
Payer: MEDICARE

## 2021-11-15 DIAGNOSIS — Z23 NEED FOR INFLUENZA VACCINATION: Primary | ICD-10-CM

## 2021-11-15 PROCEDURE — G0008 ADMIN INFLUENZA VIRUS VAC: HCPCS

## 2021-11-15 PROCEDURE — 90662 IIV NO PRSV INCREASED AG IM: CPT

## 2022-01-05 DIAGNOSIS — R60.0 LOCALIZED EDEMA: ICD-10-CM

## 2022-01-05 DIAGNOSIS — I10 HYPERTENSION, UNSPECIFIED TYPE: ICD-10-CM

## 2022-01-05 RX ORDER — VALSARTAN 80 MG/1
TABLET ORAL
Qty: 90 TABLET | Refills: 1 | Status: SHIPPED | OUTPATIENT
Start: 2022-01-05 | End: 2022-02-21 | Stop reason: SDUPTHER

## 2022-02-14 ENCOUNTER — APPOINTMENT (OUTPATIENT)
Dept: LAB | Facility: HOSPITAL | Age: 87
End: 2022-02-14
Payer: MEDICARE

## 2022-02-14 DIAGNOSIS — I10 HYPERTENSION, UNSPECIFIED TYPE: ICD-10-CM

## 2022-02-14 DIAGNOSIS — E78.5 HYPERLIPIDEMIA, UNSPECIFIED HYPERLIPIDEMIA TYPE: ICD-10-CM

## 2022-02-14 LAB
ALBUMIN SERPL BCP-MCNC: 3.6 G/DL (ref 3.5–5)
ALP SERPL-CCNC: 105 U/L (ref 46–116)
ALT SERPL W P-5'-P-CCNC: 26 U/L (ref 12–78)
ANION GAP SERPL CALCULATED.3IONS-SCNC: 5 MMOL/L (ref 4–13)
AST SERPL W P-5'-P-CCNC: 32 U/L (ref 5–45)
BASOPHILS # BLD AUTO: 0.02 THOUSANDS/ΜL (ref 0–0.1)
BASOPHILS NFR BLD AUTO: 0 % (ref 0–1)
BILIRUB SERPL-MCNC: 0.89 MG/DL (ref 0.2–1)
BUN SERPL-MCNC: 22 MG/DL (ref 5–25)
CALCIUM SERPL-MCNC: 8.7 MG/DL (ref 8.3–10.1)
CHLORIDE SERPL-SCNC: 105 MMOL/L (ref 100–108)
CHOLEST SERPL-MCNC: 125 MG/DL
CO2 SERPL-SCNC: 30 MMOL/L (ref 21–32)
CREAT SERPL-MCNC: 0.92 MG/DL (ref 0.6–1.3)
EOSINOPHIL # BLD AUTO: 0.13 THOUSAND/ΜL (ref 0–0.61)
EOSINOPHIL NFR BLD AUTO: 2 % (ref 0–6)
ERYTHROCYTE [DISTWIDTH] IN BLOOD BY AUTOMATED COUNT: 13 % (ref 11.6–15.1)
GFR SERPL CREATININE-BSD FRML MDRD: 74 ML/MIN/1.73SQ M
GLUCOSE P FAST SERPL-MCNC: 98 MG/DL (ref 65–99)
HCT VFR BLD AUTO: 42.8 % (ref 36.5–49.3)
HDLC SERPL-MCNC: 69 MG/DL
HGB BLD-MCNC: 13.7 G/DL (ref 12–17)
IMM GRANULOCYTES # BLD AUTO: 0 THOUSAND/UL (ref 0–0.2)
IMM GRANULOCYTES NFR BLD AUTO: 0 % (ref 0–2)
LDLC SERPL CALC-MCNC: 46 MG/DL (ref 0–100)
LYMPHOCYTES # BLD AUTO: 1.65 THOUSANDS/ΜL (ref 0.6–4.47)
LYMPHOCYTES NFR BLD AUTO: 31 % (ref 14–44)
MCH RBC QN AUTO: 30.2 PG (ref 26.8–34.3)
MCHC RBC AUTO-ENTMCNC: 32 G/DL (ref 31.4–37.4)
MCV RBC AUTO: 95 FL (ref 82–98)
MONOCYTES # BLD AUTO: 0.51 THOUSAND/ΜL (ref 0.17–1.22)
MONOCYTES NFR BLD AUTO: 10 % (ref 4–12)
NEUTROPHILS # BLD AUTO: 3.01 THOUSANDS/ΜL (ref 1.85–7.62)
NEUTS SEG NFR BLD AUTO: 57 % (ref 43–75)
NONHDLC SERPL-MCNC: 56 MG/DL
NRBC BLD AUTO-RTO: 0 /100 WBCS
PLATELET # BLD AUTO: 181 THOUSANDS/UL (ref 149–390)
PMV BLD AUTO: 9.9 FL (ref 8.9–12.7)
POTASSIUM SERPL-SCNC: 4.7 MMOL/L (ref 3.5–5.3)
PROT SERPL-MCNC: 7 G/DL (ref 6.4–8.2)
RBC # BLD AUTO: 4.53 MILLION/UL (ref 3.88–5.62)
SODIUM SERPL-SCNC: 140 MMOL/L (ref 136–145)
TRIGL SERPL-MCNC: 48 MG/DL
TSH SERPL DL<=0.05 MIU/L-ACNC: 1.94 UIU/ML (ref 0.36–3.74)
WBC # BLD AUTO: 5.32 THOUSAND/UL (ref 4.31–10.16)

## 2022-02-14 PROCEDURE — 80053 COMPREHEN METABOLIC PANEL: CPT

## 2022-02-14 PROCEDURE — 36415 COLL VENOUS BLD VENIPUNCTURE: CPT

## 2022-02-14 PROCEDURE — 80061 LIPID PANEL: CPT

## 2022-02-14 PROCEDURE — 85025 COMPLETE CBC W/AUTO DIFF WBC: CPT

## 2022-02-14 PROCEDURE — 84443 ASSAY THYROID STIM HORMONE: CPT

## 2022-02-21 ENCOUNTER — OFFICE VISIT (OUTPATIENT)
Dept: INTERNAL MEDICINE CLINIC | Facility: CLINIC | Age: 87
End: 2022-02-21
Payer: MEDICARE

## 2022-02-21 VITALS
OXYGEN SATURATION: 99 % | DIASTOLIC BLOOD PRESSURE: 58 MMHG | WEIGHT: 148.4 LBS | TEMPERATURE: 96.9 F | RESPIRATION RATE: 16 BRPM | BODY MASS INDEX: 24.72 KG/M2 | HEART RATE: 77 BPM | SYSTOLIC BLOOD PRESSURE: 100 MMHG | HEIGHT: 65 IN

## 2022-02-21 DIAGNOSIS — I25.119 ATHEROSCLEROSIS OF NATIVE CORONARY ARTERY WITH ANGINA PECTORIS, UNSPECIFIED WHETHER NATIVE OR TRANSPLANTED HEART (HCC): ICD-10-CM

## 2022-02-21 DIAGNOSIS — R60.0 LOCALIZED EDEMA: ICD-10-CM

## 2022-02-21 DIAGNOSIS — J44.9 CHRONIC OBSTRUCTIVE PULMONARY DISEASE, UNSPECIFIED COPD TYPE (HCC): Primary | ICD-10-CM

## 2022-02-21 DIAGNOSIS — I10 HYPERTENSION, UNSPECIFIED TYPE: ICD-10-CM

## 2022-02-21 DIAGNOSIS — E55.9 VITAMIN D DEFICIENCY: ICD-10-CM

## 2022-02-21 DIAGNOSIS — I42.9 CARDIOMYOPATHY, UNSPECIFIED TYPE (HCC): ICD-10-CM

## 2022-02-21 DIAGNOSIS — E78.5 HYPERLIPIDEMIA, UNSPECIFIED HYPERLIPIDEMIA TYPE: ICD-10-CM

## 2022-02-21 DIAGNOSIS — Z00.00 MEDICARE ANNUAL WELLNESS VISIT, SUBSEQUENT: ICD-10-CM

## 2022-02-21 DIAGNOSIS — I25.10 2-VESSEL CORONARY ARTERY DISEASE: ICD-10-CM

## 2022-02-21 PROCEDURE — 99214 OFFICE O/P EST MOD 30 MIN: CPT | Performed by: NURSE PRACTITIONER

## 2022-02-21 PROCEDURE — G0439 PPPS, SUBSEQ VISIT: HCPCS | Performed by: NURSE PRACTITIONER

## 2022-02-21 RX ORDER — AMLODIPINE BESYLATE 5 MG/1
5 TABLET ORAL DAILY
Qty: 90 TABLET | Refills: 2 | Status: SHIPPED | OUTPATIENT
Start: 2022-02-21

## 2022-02-21 RX ORDER — METOPROLOL SUCCINATE 25 MG/1
25 TABLET, EXTENDED RELEASE ORAL DAILY
Qty: 90 TABLET | Refills: 2 | Status: SHIPPED | OUTPATIENT
Start: 2022-02-21

## 2022-02-21 RX ORDER — ATORVASTATIN CALCIUM 80 MG/1
80 TABLET, FILM COATED ORAL DAILY
Qty: 90 TABLET | Refills: 2 | Status: SHIPPED | OUTPATIENT
Start: 2022-02-21

## 2022-02-21 RX ORDER — VALSARTAN 80 MG/1
80 TABLET ORAL DAILY
Qty: 90 TABLET | Refills: 2 | Status: SHIPPED | OUTPATIENT
Start: 2022-02-21

## 2022-02-21 NOTE — PROGRESS NOTES
Assessment/Plan:    Medicare wellness completed  1  Right shoulder pain, improving- Not interested in xray at this time, will call me if pain does not resolve  2  Hypertension- At goal  Continue present medication  3  Coronary artery disease- Continue beta-blocker, statin, aspirin  4  Hyperlipidemia- At goal      Follow up in six months, labs prior  Diagnoses and all orders for this visit:    Chronic obstructive pulmonary disease, unspecified COPD type ([place] [address] )    2-vessel coronary artery disease  -     metoprolol succinate (TOPROL-XL) 25 mg 24 hr tablet; Take 1 tablet (25 mg total) by mouth daily    Hypertension, unspecified type  -     metoprolol succinate (TOPROL-XL) 25 mg 24 hr tablet; Take 1 tablet (25 mg total) by mouth daily  -     valsartan (DIOVAN) 80 mg tablet; Take 1 tablet (80 mg total) by mouth daily  -     amLODIPine (NORVASC) 5 mg tablet; Take 1 tablet (5 mg total) by mouth daily  -     CBC and differential; Future  -     Comprehensive metabolic panel; Future  -     TSH, 3rd generation with Free T4 reflex; Future    Localized edema  -     valsartan (DIOVAN) 80 mg tablet; Take 1 tablet (80 mg total) by mouth daily    Hyperlipidemia, unspecified hyperlipidemia type  -     atorvastatin (LIPITOR) 80 mg tablet; Take 1 tablet (80 mg total) by mouth daily  -     Lipid panel; Future    Cardiomyopathy, unspecified type ([place] [address] )    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, unspecified whether native or transplanted heart ([place] [address] )    Vitamin D deficiency  -     Vitamin D 25 hydroxy; Future    Medicare annual wellness visit, subsequent        The patient was counseled regarding instructions for management, risk factor reductions, patient and family education,impressions, risks and benefits of treatment options, side effects of medications, importance of compliance with treatment   The treatment plan was reviewed with the patient/guardian and patient/guardian understands and agrees with the treatment plan  Current Outpatient Medications:     amLODIPine (NORVASC) 5 mg tablet, Take 1 tablet (5 mg total) by mouth daily, Disp: 90 tablet, Rfl: 2    aspirin 81 MG tablet, Take 2 tablets by mouth once , Disp: , Rfl:     atorvastatin (LIPITOR) 80 mg tablet, Take 1 tablet (80 mg total) by mouth daily, Disp: 90 tablet, Rfl: 2    B Complex Vitamins (VITAMIN B COMPLEX) TABS, Take 1 tablet by mouth daily, Disp: , Rfl:     Cholecalciferol (VITAMIN D-3 PO), Take 2,000 Units by mouth 3 (three) times a day before meals, Disp: , Rfl:     co-enzyme Q-10 30 MG capsule, Take 30 mg by mouth daily, Disp: , Rfl:     Flax OIL, Take 1 capsule by mouth daily, Disp: 90 mL, Rfl: 3    hydrocortisone 2 5 % cream, Apply topically 2 (two) times a day, Disp: 30 g, Rfl: 1    hydrocortisone-pramoxine (ANALPRAM-HC) 2 5-1 % rectal cream, Apply topically 3 (three) times a day, Disp: 30 g, Rfl: 2    hydrOXYzine HCL (ATARAX) 25 mg tablet, Take 1 tablet (25 mg total) by mouth every 6 (six) hours as needed for anxiety, Disp: 90 tablet, Rfl: 0    metoprolol succinate (TOPROL-XL) 25 mg 24 hr tablet, Take 1 tablet (25 mg total) by mouth daily, Disp: 90 tablet, Rfl: 2    nitroglycerin (NITROSTAT) 0 4 mg SL tablet, Place 1 tablet (0 4 mg total) under the tongue every 5 (five) minutes as needed for chest pain, Disp: 100 tablet, Rfl: 2    omega-3-acid ethyl esters (LOVAZA) 1 g capsule, Take 2 g by mouth daily, Disp: , Rfl:     valsartan (DIOVAN) 80 mg tablet, Take 1 tablet (80 mg total) by mouth daily, Disp: 90 tablet, Rfl: 2    Subjective:      Patient ID: Brock Friedman is a 80 y o  male  Here for follow up  Having some pain in his right shoulder, started when he was carrying his garbage can about 2 months ago  It seems to be getting better         The following portions of the patient's history were reviewed and updated as appropriate:   He has a past medical history of Abnormal weight loss, Arthritis, Basal cell carcinoma, Bursitis, [address] ([facility] [address] ), Cardiomyopathy ([facility] [address] ), Chest discomfort, Chest pain, Coronary artery disease, Ecchymosis, Exertional dyspnea, Hepatic hemangioma, Herniation of lumbar intervertebral disc, Hyperlipidemia, Hypertension, Nonmelanoma skin cancer, Pleural effusion, Pulmonary nodule, Shortness of breath, and Vitamin D deficiency  ,  does not have any pertinent problems on file  ,   has a past surgical history that includes Carpal tunnel release (Bilateral); Coronary artery bypass graft (03/24/2011); Coronary artery bypass graft; and pr repair incisional hernia,reducible (Bilateral, 4/11/2017)  ,  family history includes Heart disease in his mother; Hypertension in his mother  ,   reports that he quit smoking about 41 years ago  He has never used smokeless tobacco  He reports current alcohol use of about 1 0 standard drink of alcohol per week  He reports that he does not use drugs  ,  has No Known Allergies       Review of Systems   Constitutional: Negative  Respiratory: Negative  Cardiovascular: Negative  Musculoskeletal:        R shoulder pain   Psychiatric/Behavioral: Negative            Objective:  /58 (BP Location: Left arm, Patient Position: Sitting, Cuff Size: Adult)   Pulse 77   Temp (!) 96 9 °F (36 1 °C) (Tympanic)   Resp 16   Ht 5' 5" (1 651 m)   Wt 67 3 kg (148 lb 6 4 oz)   SpO2 99%   BMI 24 70 kg/m²     Lab Review  Appointment on 02/14/2022   Component Date Value    WBC 02/14/2022 5 32     RBC 02/14/2022 4 53     Hemoglobin 02/14/2022 13 7     Hematocrit 02/14/2022 42 8     MCV 02/14/2022 95     MCH 02/14/2022 30 2     MCHC 02/14/2022 32 0     RDW 02/14/2022 13 0     MPV 02/14/2022 9 9     Platelets 33/64/6081 181     nRBC 02/14/2022 0     Neutrophils Relative 02/14/2022 57     Immat GRANS % 02/14/2022 0     Lymphocytes Relative 02/14/2022 31     Monocytes Relative 02/14/2022 10     Eosinophils Relative 02/14/2022 2     Basophils Relative 02/14/2022 0     Neutrophils Absolute 02/14/2022 3 01     Immature Grans Absolute 02/14/2022 0 00     Lymphocytes Absolute 02/14/2022 1 65     Monocytes Absolute 02/14/2022 0 51     Eosinophils Absolute 02/14/2022 0 13     Basophils Absolute 02/14/2022 0 02     Sodium 02/14/2022 140     Potassium 02/14/2022 4 7     Chloride 02/14/2022 105     CO2 02/14/2022 30     ANION GAP 02/14/2022 5     BUN 02/14/2022 22     Creatinine 02/14/2022 0 92     Glucose, Fasting 02/14/2022 98     Calcium 02/14/2022 8 7     AST 02/14/2022 32     ALT 02/14/2022 26     Alkaline Phosphatase 02/14/2022 105     Total Protein 02/14/2022 7 0     Albumin 02/14/2022 3 6     Total Bilirubin 02/14/2022 0 89     eGFR 02/14/2022 74     Cholesterol 02/14/2022 125     Triglycerides 02/14/2022 48     HDL, Direct 02/14/2022 69     LDL Calculated 02/14/2022 46     Non-HDL-Chol (CHOL-HDL) 02/14/2022 56     TSH 3RD GENERATON 02/14/2022 1 944         Imaging: No results found  Physical Exam  Constitutional:       Appearance: He is well-developed  Cardiovascular:      Rate and Rhythm: Normal rate and regular rhythm  Heart sounds: Normal heart sounds  Pulmonary:      Effort: Pulmonary effort is normal       Breath sounds: Normal breath sounds  Musculoskeletal:      Comments: Decreased ROM R shoulder  Neurological:      Mental Status: He is alert and oriented to person, place, and time  Deep Tendon Reflexes: Reflexes are normal and symmetric  Psychiatric:         Behavior: Behavior normal          Thought Content:  Thought content normal          Judgment: Judgment normal

## 2022-02-21 NOTE — PROGRESS NOTES
Assessment and Plan:     Problem List Items Addressed This Visit     None          Depression Screening and Follow-up Plan: Patient was screened for depression during today's encounter  They screened negative with a PHQ-2 score of 0  Preventive health issues were discussed with patient, and age appropriate screening tests were ordered as noted in patient's After Visit Summary  Personalized health advice and appropriate referrals for health education or preventive services given if needed, as noted in patient's After Visit Summary       History of Present Illness:     Patient presents for Medicare Annual Wellness visit    Patient Care Team:  Maribel Jacobson as PCP - General (Internal Medicine)  Ronaldo Vazquez MD     Problem List:     Patient Active Problem List   Diagnosis    2-vessel coronary artery disease    Basal cell carcinoma of scalp    Cardiomyopathy ([address] )    History of heart artery stent    Cholelithiasis    Hyperlipidemia    Hypertension    Lumbar pain    Urinary urgency    Vitamin D deficiency    Primary osteoarthritis of right knee    Primary osteoarthritis of left knee    Presence of stent in coronary artery in patient with coronary artery disease    Epistaxis    Murmur, cardiac    Squamous cell carcinoma in situ (SCCIS) of scalp    Calculus of gallbladder without cholecystitis without obstruction    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 ([address] )    Medicare annual wellness visit, subsequent    Anxiety    Irregular heart beat    Localized edema      Past Medical and Surgical History:     Past Medical History:   Diagnosis Date    Abnormal weight loss     Last Assessed: 2/24/2014    Arthritis     Basal cell carcinoma     Last Assessed: 8/16/2016    Bursitis     left hip pain    Cancer ([address] )     BCA- back, left forearm, skin    Cardiomyopathy ([address] )     Chest discomfort     Last Assessed: 2/23/2016    Chest pain     Last Assessed: 2/13/2013    Coronary artery disease     Ecchymosis     Last Assessed: 2016    Exertional dyspnea     Last Assessed: 2016    Hepatic hemangioma     Herniation of lumbar intervertebral disc     Hyperlipidemia     Hypertension     Nonmelanoma skin cancer     Last Assessed: 12/15/2017    Pleural effusion     Bilateral; Last Assessed: 2016    Pulmonary nodule     multiple nodes; Last Assessesd: 2016    Shortness of breath     Vitamin D deficiency      Past Surgical History:   Procedure Laterality Date    CARPAL TUNNEL RELEASE Bilateral     CORONARY ARTERY BYPASS GRAFT  2011    CORONARY ARTERY BYPASS GRAFT      CT REPAIR INCISIONAL HERNIA,REDUCIBLE Bilateral 2017    Procedure: LAPAROSCOPIC INGUINAL HERNIA REPAIR WITH MESH ;  Surgeon: Merline Dunnings, MD;  Location: [facility] [place];  Service: General      Family History:     Family History   Problem Relation Age of Onset    Heart disease Mother     Hypertension Mother       Social History:     Social History     Socioeconomic History    Marital status: /Civil Union     Spouse name: None    Number of children: None    Years of education: None    Highest education level: None   Occupational History    None   Tobacco Use    Smoking status: Former Smoker     Quit date: 1980     Years since quittin 8    Smokeless tobacco: Never Used    Tobacco comment: quit date  per allscripts   Substance and Sexual Activity    Alcohol use:  Yes     Alcohol/week: 1 0 standard drink     Types: 1 Glasses of wine per week     Comment: daily    Drug use: No    Sexual activity: Not Currently   Other Topics Concern    None   Social History Narrative    Caffeine use     Social Determinants of Health     Financial Resource Strain: Not on file   Food Insecurity: Not on file   Transportation Needs: Not on file   Physical Activity: Not on file   Stress: Not on file   Social Connections: Not on file   Intimate Partner Violence: Not on file Housing Stability: Not on file      Medications and Allergies:     Current Outpatient Medications   Medication Sig Dispense Refill    amLODIPine (NORVASC) 5 mg tablet Take 1 tablet (5 mg total) by mouth daily 90 tablet 2    aspirin 81 MG tablet Take 2 tablets by mouth once       atorvastatin (LIPITOR) 80 mg tablet Take 1 tablet (80 mg total) by mouth daily 90 tablet 2    B Complex Vitamins (VITAMIN B COMPLEX) TABS Take 1 tablet by mouth daily      Cholecalciferol (VITAMIN D-3 PO) Take 2,000 Units by mouth 3 (three) times a day before meals      co-enzyme Q-10 30 MG capsule Take 30 mg by mouth daily      Flax OIL Take 1 capsule by mouth daily 90 mL 3    hydrocortisone 2 5 % cream Apply topically 2 (two) times a day 30 g 1    hydrocortisone-pramoxine (ANALPRAM-HC) 2 5-1 % rectal cream Apply topically 3 (three) times a day 30 g 2    hydrOXYzine HCL (ATARAX) 25 mg tablet Take 1 tablet (25 mg total) by mouth every 6 (six) hours as needed for anxiety 90 tablet 0    metoprolol succinate (TOPROL-XL) 25 mg 24 hr tablet Take 1 tablet (25 mg total) by mouth daily 90 tablet 2    nitroglycerin (NITROSTAT) 0 4 mg SL tablet Place 1 tablet (0 4 mg total) under the tongue every 5 (five) minutes as needed for chest pain 100 tablet 2    omega-3-acid ethyl esters (LOVAZA) 1 g capsule Take 2 g by mouth daily      valsartan (DIOVAN) 80 mg tablet TAKE 1 TABLET BY MOUTH EVERY DAY 90 tablet 1     No current facility-administered medications for this visit       No Known Allergies   Immunizations:     Immunization History   Administered Date(s) Administered    DT (pediatric) 03/08/2005    INFLUENZA 09/12/2018    Influenza Split High Dose Preservative Free IM 09/17/2014, 09/30/2015, 10/06/2016, 10/02/2017    Influenza, high dose seasonal 0 7 mL 09/12/2018, 10/02/2019, 10/13/2020, 11/15/2021    Influenza, seasonal, injectable 10/01/2012    Pneumococcal Conjugate 13-Valent 06/26/2019    Pneumococcal Polysaccharide PPV23 12/10/2004, 09/10/2010    TD (adult) Preservative Free 10/07/2015      Health Maintenance:         Topic Date Due    Hepatitis C Screening  Completed         Topic Date Due    COVID-19 Vaccine (1) Never done    DTaP,Tdap,and Td Vaccines (1 - Tdap) 10/08/2015      Medicare Health Risk Assessment:     Ht 5' 5" (1 651 m)   BMI 24 30 kg/m²      Ewa Mckeon is here for his Subsequent Wellness visit  Health Risk Assessment:   Patient rates overall health as good  Patient feels that their physical health rating is same  Patient is satisfied with their life  Eyesight was rated as same  Hearing was rated as same  Patient feels that their emotional and mental health rating is same  Patients states they are never, rarely angry  Patient states they are never, rarely unusually tired/fatigued  Pain experienced in the last 7 days has been some  Patient states that he has experienced no weight loss or gain in last 6 months  Depression Screening:   PHQ-2 Score: 0      Fall Risk Screening: In the past year, patient has experienced: history of falling in past year    Number of falls: 1  Injured during fall?: Yes    Feels unsteady when standing or walking?: No    Worried about falling?: Yes      Home Safety:  Patient does not have trouble with stairs inside or outside of their home  Patient has working smoke alarms and has working carbon monoxide detector  Home safety hazards include: none  Nutrition:   Current diet is Regular  Medications:   Patient is not currently taking any over-the-counter supplements  Patient is able to manage medications  Activities of Daily Living (ADLs)/Instrumental Activities of Daily Living (IADLs):   Walk and transfer into and out of bed and chair?: Yes  Dress and groom yourself?: Yes    Bathe or shower yourself?: Yes    Feed yourself?  Yes  Do your laundry/housekeeping?: Yes  Manage your money, pay your bills and track your expenses?: Yes  Make your own meals?: Yes    Do your own shopping?: Yes    Previous Hospitalizations:   Any hospitalizations or ED visits within the last 12 months?: No      Advance Care Planning:   Living will: No    Advanced directive: No      PREVENTIVE SCREENINGS      Cardiovascular Screening:    General: Screening Not Indicated and History Lipid Disorder      Diabetes Screening:     General: Screening Current      Colorectal Cancer Screening:     General: Screening Not Indicated      Prostate Cancer Screening:    General: Screening Not Indicated      Osteoporosis Screening:    General: Risks and Benefits Discussed      Abdominal Aortic Aneurysm (AAA) Screening:    Risk factors include: tobacco use        General: Risks and Benefits Discussed      Lung Cancer Screening:     General: Screening Not Indicated      Hepatitis C Screening:    General: Screening Current      CORY Cm

## 2022-02-21 NOTE — PATIENT INSTRUCTIONS
Medicare wellness completed  1  Right shoulder pain, improving- Not interested in xray at this time, will call me if pain does not resolve  2  Hypertension- At goal  Continue present medication  3  Coronary artery disease- Continue beta-blocker, statin, aspirin  4  Hyperlipidemia- At goal      Follow up in six months, labs prior

## 2022-03-07 DIAGNOSIS — I25.5 ISCHEMIC CARDIOMYOPATHY: ICD-10-CM

## 2022-03-07 RX ORDER — NITROGLYCERIN 0.4 MG/1
0.4 TABLET SUBLINGUAL
Qty: 25 TABLET | Refills: 4 | Status: SHIPPED | OUTPATIENT
Start: 2022-03-07

## 2022-03-07 NOTE — TELEPHONE ENCOUNTER
Patient is requesting a refill on nitroglycerin 0 4 mg SL tablet, place 1 tablet (0 4 mg total) under the tongue every 5 (five) minutes as needed for chest pain  He is requesting a quantity of 25 with 4 refills  He doesn't want 100 all at one time  He only uses in emergency situation       [facility]/[place]    Call back #[phone]

## 2022-06-01 ENCOUNTER — OFFICE VISIT (OUTPATIENT)
Dept: INTERNAL MEDICINE CLINIC | Facility: CLINIC | Age: 87
End: 2022-06-01
Payer: MEDICARE

## 2022-06-01 VITALS
WEIGHT: 148 LBS | RESPIRATION RATE: 14 BRPM | HEART RATE: 83 BPM | DIASTOLIC BLOOD PRESSURE: 62 MMHG | TEMPERATURE: 98.6 F | SYSTOLIC BLOOD PRESSURE: 110 MMHG | BODY MASS INDEX: 24.66 KG/M2 | HEIGHT: 65 IN

## 2022-06-01 DIAGNOSIS — R21 RASH: ICD-10-CM

## 2022-06-01 PROCEDURE — 99213 OFFICE O/P EST LOW 20 MIN: CPT | Performed by: NURSE PRACTITIONER

## 2022-06-01 NOTE — PROGRESS NOTES
Assessment/Plan:    Rash appears to have resolved- Hydrocortisone cream refilled unless rash recurs  Follow up as scheduled  Diagnoses and all orders for this visit:    Rash  -     hydrocortisone 2 5 % cream; Apply topically 2 (two) times a day    The patient was counseled regarding instructions for management, risk factor reductions, patient and family education,impressions, risks and benefits of treatment options, side effects of medications, importance of compliance with treatment  The treatment plan was reviewed with the patient/guardian and patient/guardian understands and agrees with the treatment plan          Current Outpatient Medications:     amLODIPine (NORVASC) 5 mg tablet, Take 1 tablet (5 mg total) by mouth daily, Disp: 90 tablet, Rfl: 2    aspirin 81 MG tablet, Take 2 tablets by mouth once , Disp: , Rfl:     atorvastatin (LIPITOR) 80 mg tablet, Take 1 tablet (80 mg total) by mouth daily, Disp: 90 tablet, Rfl: 2    B Complex Vitamins (VITAMIN B COMPLEX) TABS, Take 1 tablet by mouth daily, Disp: , Rfl:     Cholecalciferol (VITAMIN D-3 PO), Take 2,000 Units by mouth 3 (three) times a day before meals, Disp: , Rfl:     co-enzyme Q-10 30 MG capsule, Take 30 mg by mouth daily, Disp: , Rfl:     Flax OIL, Take 1 capsule by mouth daily, Disp: 90 mL, Rfl: 3    hydrocortisone 2 5 % cream, Apply topically 2 (two) times a day, Disp: 30 g, Rfl: 1    hydrocortisone-pramoxine (ANALPRAM-HC) 2 5-1 % rectal cream, Apply topically 3 (three) times a day, Disp: 30 g, Rfl: 2    hydrOXYzine HCL (ATARAX) 25 mg tablet, Take 1 tablet (25 mg total) by mouth every 6 (six) hours as needed for anxiety, Disp: 90 tablet, Rfl: 0    metoprolol succinate (TOPROL-XL) 25 mg 24 hr tablet, Take 1 tablet (25 mg total) by mouth daily, Disp: 90 tablet, Rfl: 2    nitroglycerin (NITROSTAT) 0 4 mg SL tablet, Place 1 tablet (0 4 mg total) under the tongue every 5 (five) minutes as needed for chest pain, Disp: 25 tablet, Rfl: 4   omega-3-acid ethyl esters (LOVAZA) 1 g capsule, Take 2 g by mouth daily, Disp: , Rfl:     valsartan (DIOVAN) 80 mg tablet, Take 1 tablet (80 mg total) by mouth daily, Disp: 90 tablet, Rfl: 2    Subjective:      Patient ID: Fracisco Powers is a 80 y o  male  Last week he noticed a rash on his right ankle  He applied Hydrocortisone cream and it appeared to resolve  The following portions of the patient's history were reviewed and updated as appropriate:   He has a past medical history of Abnormal weight loss, Arthritis, Basal cell carcinoma, Bursitis, Cancer ([facility] [address] Cardiomyopathy ([facility] [address] ), Chest discomfort, Chest pain, Coronary artery disease, Ecchymosis, Exertional dyspnea, Hepatic hemangioma, Herniation of lumbar intervertebral disc, Hyperlipidemia, Hypertension, Nonmelanoma skin cancer, Pleural effusion, Pulmonary nodule, Shortness of breath, and Vitamin D deficiency  ,  does not have any pertinent problems on file  ,   has a past surgical history that includes Carpal tunnel release (Bilateral); Coronary artery bypass graft (03/24/2011); Coronary artery bypass graft; and pr repair incisional hernia,reducible (Bilateral, 4/11/2017)  ,  family history includes Heart disease in his mother; Hypertension in his mother  ,   reports that he quit smoking about 42 years ago  He has never used smokeless tobacco  He reports current alcohol use of about 1 0 standard drink of alcohol per week  He reports that he does not use drugs  ,  has No Known Allergies       Review of Systems   Constitutional: Negative  Respiratory: Negative  Cardiovascular: Negative  Musculoskeletal: Negative  Skin: Positive for rash  Psychiatric/Behavioral: Negative            Objective:  /62 (BP Location: Left arm, Patient Position: Sitting, Cuff Size: Standard)   Pulse 83   Temp 98 6 °F (37 °C) (Tympanic)   Resp 14   Ht 5' 5" (1 651 m)   Wt 67 1 kg (148 lb)   BMI 24 63 kg/m²     Lab Review  No visits with results within 2 Month(s) from this visit  Latest known visit with results is:   Appointment on 02/14/2022   Component Date Value    WBC 02/14/2022 5 32     RBC 02/14/2022 4 53     Hemoglobin 02/14/2022 13 7     Hematocrit 02/14/2022 42 8     MCV 02/14/2022 95     MCH 02/14/2022 30 2     MCHC 02/14/2022 32 0     RDW 02/14/2022 13 0     MPV 02/14/2022 9 9     Platelets 91/18/4334 181     nRBC 02/14/2022 0     Neutrophils Relative 02/14/2022 57     Immat GRANS % 02/14/2022 0     Lymphocytes Relative 02/14/2022 31     Monocytes Relative 02/14/2022 10     Eosinophils Relative 02/14/2022 2     Basophils Relative 02/14/2022 0     Neutrophils Absolute 02/14/2022 3 01     Immature Grans Absolute 02/14/2022 0 00     Lymphocytes Absolute 02/14/2022 1 65     Monocytes Absolute 02/14/2022 0 51     Eosinophils Absolute 02/14/2022 0 13     Basophils Absolute 02/14/2022 0 02     Sodium 02/14/2022 140     Potassium 02/14/2022 4 7     Chloride 02/14/2022 105     CO2 02/14/2022 30     ANION GAP 02/14/2022 5     BUN 02/14/2022 22     Creatinine 02/14/2022 0 92     Glucose, Fasting 02/14/2022 98     Calcium 02/14/2022 8 7     AST 02/14/2022 32     ALT 02/14/2022 26     Alkaline Phosphatase 02/14/2022 105     Total Protein 02/14/2022 7 0     Albumin 02/14/2022 3 6     Total Bilirubin 02/14/2022 0 89     eGFR 02/14/2022 74     Cholesterol 02/14/2022 125     Triglycerides 02/14/2022 48     HDL, Direct 02/14/2022 69     LDL Calculated 02/14/2022 46     Non-HDL-Chol (CHOL-HDL) 02/14/2022 56     TSH 3RD GENERATON 02/14/2022 1 944         Imaging: No results found  Physical Exam  Constitutional:       Appearance: He is well-developed  Cardiovascular:      Rate and Rhythm: Normal rate and regular rhythm  Heart sounds: Normal heart sounds  Pulmonary:      Effort: Pulmonary effort is normal       Breath sounds: Normal breath sounds  Musculoskeletal:         General: Normal range of motion     Skin: Findings: No rash  Neurological:      Mental Status: He is alert and oriented to person, place, and time  Deep Tendon Reflexes: Reflexes are normal and symmetric  Psychiatric:         Behavior: Behavior normal          Thought Content:  Thought content normal          Judgment: Judgment normal

## 2022-06-01 NOTE — PATIENT INSTRUCTIONS
Rash appears to have resolved- Hydrocortisone cream refilled unless rash recurs  Follow up as scheduled

## 2022-06-08 ENCOUNTER — TELEPHONE (OUTPATIENT)
Dept: INTERNAL MEDICINE CLINIC | Facility: CLINIC | Age: 87
End: 2022-06-08

## 2022-06-08 DIAGNOSIS — M25.512 CHRONIC PAIN OF BOTH SHOULDERS: Primary | ICD-10-CM

## 2022-06-08 DIAGNOSIS — M25.511 CHRONIC PAIN OF BOTH SHOULDERS: Primary | ICD-10-CM

## 2022-06-08 DIAGNOSIS — G89.29 CHRONIC PAIN OF BOTH SHOULDERS: Primary | ICD-10-CM

## 2022-06-08 NOTE — TELEPHONE ENCOUNTER
Patient was into see you in Feb and you asked if he wanted to have xray jeanna on his right shoulder and he said not right now  But, patient would now like to have them done now on both shoulders  Patient said if you needed to see him prior to ordering the xray's if would come in  Please advise and call patient either way

## 2022-06-09 ENCOUNTER — HOSPITAL ENCOUNTER (OUTPATIENT)
Dept: RADIOLOGY | Facility: HOSPITAL | Age: 87
Discharge: HOME | End: 2022-06-09
Payer: MEDICARE

## 2022-06-09 DIAGNOSIS — M25.512 CHRONIC PAIN OF BOTH SHOULDERS: ICD-10-CM

## 2022-06-09 DIAGNOSIS — G89.29 CHRONIC PAIN OF BOTH SHOULDERS: ICD-10-CM

## 2022-06-09 DIAGNOSIS — M25.511 CHRONIC PAIN OF BOTH SHOULDERS: ICD-10-CM

## 2022-06-09 PROCEDURE — 73030 X-RAY EXAM OF SHOULDER: CPT

## 2022-06-15 DIAGNOSIS — M25.512 CHRONIC PAIN OF BOTH SHOULDERS: Primary | ICD-10-CM

## 2022-06-15 DIAGNOSIS — M25.511 CHRONIC PAIN OF BOTH SHOULDERS: Primary | ICD-10-CM

## 2022-06-15 DIAGNOSIS — G89.29 CHRONIC PAIN OF BOTH SHOULDERS: Primary | ICD-10-CM

## 2022-06-20 ENCOUNTER — EVALUATION (OUTPATIENT)
Dept: PHYSICAL THERAPY | Facility: CLINIC | Age: 87
End: 2022-06-20
Payer: MEDICARE

## 2022-06-20 DIAGNOSIS — G89.29 CHRONIC PAIN OF BOTH SHOULDERS: ICD-10-CM

## 2022-06-20 DIAGNOSIS — M25.511 CHRONIC PAIN OF BOTH SHOULDERS: ICD-10-CM

## 2022-06-20 DIAGNOSIS — M25.512 CHRONIC PAIN OF BOTH SHOULDERS: ICD-10-CM

## 2022-06-20 PROCEDURE — 97110 THERAPEUTIC EXERCISES: CPT | Performed by: PHYSICAL THERAPIST

## 2022-06-20 PROCEDURE — 97161 PT EVAL LOW COMPLEX 20 MIN: CPT | Performed by: PHYSICAL THERAPIST

## 2022-06-20 NOTE — PROGRESS NOTES
PT Evaluation     Today's date: 2022  Patient name: Tigre Benjamin  : 1934  MRN: [id number]  Referring provider: CORY Marte  Dx:   Encounter Diagnosis     ICD-10-CM    1  Chronic pain of both shoulders  M25 511 Ambulatory Referral to Physical Therapy    G89 29     M25 512                   Assessment  Assessment details: Tigre Benjamin is a 80 y o  male presenting as an outpatient to [facility] PT w/ c/o b/l shoulder pain (L worse vs R)  In addition to pain, pt presents w/ impaired posture, hypertonicity of surrounding musculature, decreased ROM, and decreased strength (not formally assessed due to pain) significantly limiting pt's functional ability  Pt will benefit from skilled PT services to address the above deficits in order to max function to allow pt to achieve goals in PT  Thank you for the referral of this pt  Impairments: abnormal muscle tone, abnormal or restricted ROM, activity intolerance, impaired physical strength, lacks appropriate home exercise program, pain with function and poor posture   Understanding of Dx/Px/POC: good   Prognosis: good    Goals  ST  Pain decreased by 25% in 4-6 weeks  2  ROM increased by 25% in 4-6 weeks  LT  Decrease pain to 1-2/10 at worst by d/c   2  Increase ROM to [facility] for all deficient movements by d/c   3  Strength increased to 5 for all deficient muscle groups by d/c   4  IADL performance increased to max function by d/c   5  Recreational performance increased to max function by d/c        Plan  Planned modality interventions: cryotherapy  Other planned modality interventions: other modalities PRN  Planned therapy interventions: ADL retraining, IADL retraining, flexibility, functional ROM exercises, graded exercise, home exercise program, manual therapy, joint mobilization, neuromuscular re-education, patient education, postural training, strengthening, therapeutic exercise, therapeutic activities and abdominal trunk stabilization  Other planned therapy interventions: other interventions PRN  Frequency: 1-2x/week  Duration in weeks: 4  Plan of Care beginning date: 6/20/2022  Plan of Care expiration date: 7/18/2022  Treatment plan discussed with: patient        Subjective Evaluation    History of Present Illness  Mechanism of injury: Pt reports to IE w/ c/o b/l shoulder pain (L worse vs R)  He reports that R shoulder pain began 3-4 months ago and L shoulder pain started 1-1 5 months ago  Pt reports that R shoulder pain started first while carrying trash can up the driveway and he "swung it" causing R shoulder pain  He believes that L shoulder pain is a result of compensation  Pt reports that imaging of R shoulder demonstrated arthritis and L shoulder demonstrated RTC tendonitis  Pt sleeps in a recliner for the past approximate month  Pt denies any associated c/s pain and/or numbness/parasthesias  Pain  Pain scale: R shoulder: 0/10; L shoulder: 5/10  Pain scale at highest: R shoulder: 2/10; L shoulder: 9/10  Location: b/l shoulders  Relieving factors: rest  Exacerbated by: R shoulder: lifting heavy weight (full trash can) ; L shoulder: driving, elevating b/l UE, quick, jerky movements, sleeping, lifting, functional ER, functional IR  Social Support  Lives with: spouse    Employment status: not working  Hand dominance: right      Diagnostic Tests  Abnormal x-ray: 6/9: R- Mild osteoarthritis of the glenohumeral and acromioclavicular joints ; L - RTC tendonitis    Patient Goals  Patient goals for therapy: decreased pain, independence with ADLs/IADLs, increased motion and increased strength          Objective     Active Range of Motion   Left Shoulder   Flexion: 0 (unable) degrees with pain  Abduction: 65 degrees with pain  External rotation 45°: 54 degrees with pain  Internal rotation 45°: 20 degrees with pain    Right Shoulder   Flexion: 170 (unable) degrees with pain  Abduction: 155 degrees with pain  External rotation 45°: 85 degrees with pain  Internal rotation 45°: 49 degrees with pain    Additional Active Range of Motion Details  Gross c/s ROM level of restriction (L/R):   flexion: moderate  extension: maximal  Rotation: minimal/minimal    Passive Range of Motion   Left Shoulder   Flexion: 160 degrees with pain  Abduction: 78 degrees with pain  External rotation 45°: 54 degrees with pain  Internal rotation 45°: 20 degrees with pain    Strength/Myotome Testing     Additional Strength Details  DNT strength due to time constraints    General Comments:      Shoulder Comments   Observation/Posture:  Pt sits w/ fwd, rounded shoulders w/ fwd head    Palpation: Hypertonicity t/o b/l UT, scap    c/s special tests:   sharp-ethan: negative  vertebral artery: negative  spurlings:negative    Shoulder Special Tests (L/R):   Dyane [place] : positive/positive  Empty Can :positive/positive  Neer Impingement : negative/negative  Speed's Test : unable to actively lift LUE/negative                Daily Treatment Diary    EPOC: 7/18/22  Precautions: HTN- takes meds;  Hx of CABG (has nitro); possible cardiomyopathy  Co- Morbidities:   Patient Active Problem List   Diagnosis    2-vessel coronary artery disease    Basal cell carcinoma of scalp    [facility]    History of heart artery stent    Cholelithiasis    Hyperlipidemia    Hypertension    Lumbar pain    Urinary urgency    Vitamin D deficiency    Primary osteoarthritis of right knee    Primary osteoarthritis of left knee    Presence of stent in coronary artery in patient with coronary artery disease    Epistaxis    Murmur, cardiac    Squamous cell carcinoma in situ (SCCIS) of scalp    Calculus of gallbladder without cholecystitis without obstruction    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 [facility]    Medicare annual wellness visit, subsequent    Anxiety    Irregular heart beat    Localized edema    Chronic obstructive pulmonary disease, unspecified COPD type ([facility] [address] )    Chronic pain of both shoulders         Manual  6/20          GHJ mobs                    Total Time            Exercise Diary 6/20         THEREX       B/l shoulder PROM       Pt ed 8' HEP instruct/handout      Pendulums   m/l &a/p           UT stretch           Posterior shoulder rolls           Supine AA sh flexion           Supine AA sh ER           Standing cane IR/extension           NEURO RE-ED           Scapular squeeze           Mod prone rows            Mod prone ext            TB resisted UT strengthening             LPD/rows                                                                                                              Modalities  6/20         CP  10'

## 2022-06-22 ENCOUNTER — APPOINTMENT (OUTPATIENT)
Dept: PHYSICAL THERAPY | Facility: CLINIC | Age: 87
End: 2022-06-22
Payer: MEDICARE

## 2022-06-22 NOTE — PROGRESS NOTES
Daily Note     Today's date: 2022  Patient name: Dexter Mccray  : 1934  MRN: [id number]  Referring provider: CORY Orta  Dx: No diagnosis found  Subjective: pt reports      Objective: See treatment diary below      Assessment: Tolerated treatment well  Patient would benefit from continued PT  Plan: Continue per plan of care  Daily Treatment Diary    EPOC: 22  Precautions: HTN- takes meds;  Hx of CABG (has nitro); possible cardiomyopathy  Co- Morbidities:   Patient Active Problem List   Diagnosis    2-vessel coronary artery disease    Basal cell carcinoma of scalp    Cardiomyopathy ([facility] [address] [facility]    History of heart artery stent    Cholelithiasis    Hyperlipidemia    Hypertension    Lumbar pain    Urinary urgency    Vitamin D deficiency    Primary osteoarthritis of right knee    Primary osteoarthritis of left knee    Presence of stent in coronary artery in patient with coronary artery disease    Epistaxis    Murmur, cardiac    Squamous cell carcinoma in situ (SCCIS) of scalp    Calculus of gallbladder without cholecystitis without obstruction    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 [facility]    Medicare annual wellness visit, subsequent    Anxiety    Irregular heart beat    Localized edema    Chronic obstructive pulmonary disease, unspecified COPD type (HCC)    Chronic pain of both shoulders         Manual            GHJ mobs                    Total Time            Exercise Diary        THEREX       B/l shoulder PROM       Pt ed 8' HEP instruct/handout      Pendulums   m/l &a/p           UT stretch           Posterior shoulder rolls           Supine AA sh flexion           Supine AA sh ER           Standing cane IR/extension           NEURO RE-ED           Scapular squeeze           Mod prone rows            Mod prone ext            TB resisted UT strengthening             LPD/rows Modalities  6/20         CP  10'

## 2022-06-27 ENCOUNTER — OFFICE VISIT (OUTPATIENT)
Dept: PHYSICAL THERAPY | Facility: CLINIC | Age: 87
End: 2022-06-27
Payer: MEDICARE

## 2022-06-27 DIAGNOSIS — M25.511 CHRONIC PAIN OF BOTH SHOULDERS: Primary | ICD-10-CM

## 2022-06-27 DIAGNOSIS — M25.512 CHRONIC PAIN OF BOTH SHOULDERS: Primary | ICD-10-CM

## 2022-06-27 DIAGNOSIS — G89.29 CHRONIC PAIN OF BOTH SHOULDERS: Primary | ICD-10-CM

## 2022-06-27 PROCEDURE — 97112 NEUROMUSCULAR REEDUCATION: CPT

## 2022-06-27 PROCEDURE — 97110 THERAPEUTIC EXERCISES: CPT

## 2022-06-27 NOTE — PROGRESS NOTES
Daily Note     Today's date: 2022  Patient name: Doyne Hodgkins  : 1934  MRN: [id number]  Referring provider: CORY Neff  Dx:   Encounter Diagnosis     ICD-10-CM    1  Chronic pain of both shoulders  M25 511     G89 29     M25 512        Start Time: 1420          Subjective: pt reports his shoulders are about the same  Sometimes the shoulders are fine but other times he will make a movement and have pain  Objective: See treatment diary below      Assessment: Tolerated treatment well  Patient would benefit from continued PT  Cueing to avoid UT elevation t/o session  No pain reported w/ exercises t/o session except w/ slight discomfort in L shoulder w/ wall washes scaption  Pt has slight fwd rolled shoulder posture, improved post cueing  Good mobility w/ PROM  Plan: Continue per plan of care  Daily Treatment Diary    EPOC: 22  Precautions: HTN- takes meds;  Hx of CABG (has nitro); possible cardiomyopathy  Co- Morbidities:   Patient Active Problem List   Diagnosis    2-vessel coronary artery disease    Basal cell carcinoma of scalp    [facility]omy[facility]    History of heart artery stent    Cholelithiasis    Hyperlipidemia    Hypertension    Lumbar pain    Urinary urgency    Vitamin D deficiency    Primary osteoarthritis of right knee    Primary osteoarthritis of left knee    Presence of stent in coronary artery in patient with coronary artery disease    Epistaxis    Murmur, cardiac    Squamous cell carcinoma in situ (SCCIS) of scalp    Calculus of gallbladder without cholecystitis without obstruction    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 ([facility] )    Medicare annual wellness visit, subsequent    Anxiety    Irregular heart beat    Localized edema    Chronic obstructive pulmonary disease, unspecified COPD type (HCC)    Chronic pain of both shoulders         Manual            GHJ mobs                    Total Time Exercise Diary 6/20 6/27     THEREX       B/l shoulder PROM   JH    Pt ed 8' HEP instruct/handout      Pendulums   m/l &a/p           UT stretch      10" 10x     Pulleys flexion   2'    Wall washes   Flexion/scap 10x 5" ea side    Posterior shoulder rolls      2x10     Supine AA sh flexion           Supine AA sh ER           Standing cane IR/extension      10" 10x     NEURO RE-ED           Scapular squeeze      10" 10x     Mod prone rows            Mod prone ext            TB resisted UT strengthening             LPD/rows      gtb 2x10 ea      UBE retro      2'                                                                                            Modalities  6/20         CP  10'

## 2022-06-29 ENCOUNTER — OFFICE VISIT (OUTPATIENT)
Dept: PHYSICAL THERAPY | Facility: CLINIC | Age: 87
End: 2022-06-29
Payer: MEDICARE

## 2022-06-29 DIAGNOSIS — M25.512 CHRONIC PAIN OF BOTH SHOULDERS: Primary | ICD-10-CM

## 2022-06-29 DIAGNOSIS — G89.29 CHRONIC PAIN OF BOTH SHOULDERS: Primary | ICD-10-CM

## 2022-06-29 DIAGNOSIS — M25.511 CHRONIC PAIN OF BOTH SHOULDERS: Primary | ICD-10-CM

## 2022-06-29 PROCEDURE — 97110 THERAPEUTIC EXERCISES: CPT

## 2022-06-29 PROCEDURE — 97112 NEUROMUSCULAR REEDUCATION: CPT

## 2022-06-29 NOTE — PROGRESS NOTES
Daily Note     Today's date: 2022  Patient name: Concepción Sheppard  : 1934  MRN: [id number]  Referring provider: CORY Shi  Dx:   Encounter Diagnosis     ICD-10-CM    1  Chronic pain of both shoulders  M25 511     G89 29     M25 512        Start Time: 1230  Stop Time: 1315  Total time in clinic (min): 45 minutes    Subjective: pt reports he felt good post last session  Stated that his shoulders feel a bit sore today due to mowing the lawn yesterday  Objective: See treatment diary below      Assessment: Tolerated treatment well  Patient would benefit from continued PT  Pt continues to have some difficulty raising L arm OH independently  Unable to tolerate supine cane flexion due to L shoulder pain and difficulty raising OH, tolerated HHA better  Cueing for proper posture w/ shoulder isometrics  Plan: Continue per plan of care  Daily Treatment Diary    EPOC: 22  Precautions: HTN- takes meds;  Hx of CABG (has nitro); possible cardiomyopathy  Co- Morbidities:   Patient Active Problem List   Diagnosis    2-vessel coronary artery disease    Basal cell carcinoma of scalp    [facility]    History of heart artery stent    Cholelithiasis    Hyperlipidemia    Hypertension    Lumbar pain    Urinary urgency    Vitamin D deficiency    Primary osteoarthritis of right knee    Primary osteoarthritis of left knee    Presence of stent in coronary artery in patient with coronary artery disease    Epistaxis    Murmur, cardiac    Squamous cell carcinoma in situ (SCCIS) of scalp    Calculus of gallbladder without cholecystitis without obstruction    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 ([facility] [facility] )    Medicare annual wellness visit, subsequent    Anxiety    Irregular heart beat    Localized edema    Chronic obstructive pulmonary disease, unspecified COPD type (HCC)    Chronic pain of both shoulders         Manual            GHJ mobs Total Time            Exercise Diary 6/20 6/27 6/27   THEREX       B/l shoulder PROM   [facility] JORDIN JH   Pt ed 8' HEP instruct/handout      Pendulums   m/l &a/p           UT stretch      10" 10x  10" 10x   Pulleys flexion   2' 2'   Wall washes   Flexion/scap 10x 5" ea side Flexion/scap 10x 5" ea side   Posterior shoulder rolls      2x10   2x10   Supine AA sh flexion        HHA 10" 10x   Shoulder isometrics       5" 10x   Standing cane IR/extension      10" 10x     NEURO RE-ED           Scapular squeeze      10" 10x   10" 10x2   Mod prone rows            Mod prone ext            TB resisted UT strengthening             LPD/rows      gtb 2x10 ea   gtb 2x10 ea    UBE retro      2'  2'                                                                                          Modalities  6/20         CP  10'

## 2022-07-05 ENCOUNTER — OFFICE VISIT (OUTPATIENT)
Dept: PHYSICAL THERAPY | Facility: CLINIC | Age: 87
End: 2022-07-05
Payer: MEDICARE

## 2022-07-05 DIAGNOSIS — G89.29 CHRONIC PAIN OF BOTH SHOULDERS: Primary | ICD-10-CM

## 2022-07-05 DIAGNOSIS — M25.511 CHRONIC PAIN OF BOTH SHOULDERS: Primary | ICD-10-CM

## 2022-07-05 DIAGNOSIS — M25.512 CHRONIC PAIN OF BOTH SHOULDERS: Primary | ICD-10-CM

## 2022-07-05 PROCEDURE — 97112 NEUROMUSCULAR REEDUCATION: CPT

## 2022-07-05 PROCEDURE — 97110 THERAPEUTIC EXERCISES: CPT

## 2022-07-05 NOTE — PROGRESS NOTES
Daily Note     Today's date: 2022  Patient name: Michelle Sandoval  : 1934  MRN: [id number]  Referring provider: CORY Coker  Dx:   Encounter Diagnosis     ICD-10-CM    1  Chronic pain of both shoulders  M25 511     G89 29     M25 512        Start Time: 1145          Subjective: pt reports his L shoulder was bothering him post last session for a few days and started to feel better yesterday  Objective: See treatment diary below      Assessment: Tolerated treatment well  Patient would benefit from continued PT  Pt had soreness in L shoulder t/o session but completed full POC  Added shoulder 4 way w/ cueing for correct form and to keep elbow by his side w/ IR/ER  Cueing for correct posture t/o session  Added supine serratus punch w/ good tolerance  Plan: Continue per plan of care  Daily Treatment Diary    EPOC: 22  Precautions: HTN- takes meds;  Hx of CABG (has nitro); possible cardiomyopathy  Co- Morbidities:   Patient Active Problem List   Diagnosis    2-vessel coronary artery disease    Basal cell carcinoma of scalp    Cardiomyopathy ([facility] )    History of heart artery stent    Cholelithiasis    Hyperlipidemia    Hypertension    Lumbar pain    Urinary urgency    Vitamin D deficiency    Primary osteoarthritis of right knee    Primary osteoarthritis of left knee    Presence of stent in coronary artery in patient with coronary artery disease    Epistaxis    Murmur, cardiac    Squamous cell carcinoma in situ (SCCIS) of scalp    Calculus of gallbladder without cholecystitis without obstruction    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 [facility]    Medicare annual wellness visit, subsequent    Anxiety    Irregular heart beat    Localized edema    Chronic obstructive pulmonary disease, unspecified COPD type ([facility] )    Chronic pain of both shoulders         Manual            GHJ mobs                    Total Time            Exercise Diary  6/27   THEREX       B/l shoulder PROM    JH   Pt ed       Pendulums   m/l &a/p        UT stretch  10" 10x    10" 10x   Pulleys flexion 2'/2'   2'   Wall washes Flexion/scap 10x2 5" ea side   Flexion/scap 10x 5" ea side   Posterior shoulder rolls      2x10   Supine AA sh flexion  HHA 10" 10x    HHA 10" 10x   Shoulder isometrics Pink/ytb 10x   5" 10x   Standing cane IR/extension        NEURO RE-ED        Scapular squeeze      10" 10x2   Mod prone rows         Mod prone ext         TB resisted UT strengthening          LPD/rows gtb 2x10 ea     gtb 2x10 ea    UBE retro 2/2    2'    serratus punch supine 2x10 0#                                                                Modalities  6/20         CP  10'

## 2022-07-07 ENCOUNTER — OFFICE VISIT (OUTPATIENT)
Dept: PHYSICAL THERAPY | Facility: CLINIC | Age: 87
End: 2022-07-07
Payer: MEDICARE

## 2022-07-07 DIAGNOSIS — G89.29 CHRONIC PAIN OF BOTH SHOULDERS: Primary | ICD-10-CM

## 2022-07-07 DIAGNOSIS — M25.512 CHRONIC PAIN OF BOTH SHOULDERS: Primary | ICD-10-CM

## 2022-07-07 DIAGNOSIS — M25.511 CHRONIC PAIN OF BOTH SHOULDERS: Primary | ICD-10-CM

## 2022-07-07 PROCEDURE — 97112 NEUROMUSCULAR REEDUCATION: CPT

## 2022-07-07 PROCEDURE — 97110 THERAPEUTIC EXERCISES: CPT

## 2022-07-07 NOTE — PROGRESS NOTES
Daily Note     Today's date: 2022  Patient name: Nishant Murdock  : 1934  MRN: [id number]  Referring provider: CORY Pa  Dx:   Encounter Diagnosis     ICD-10-CM    1  Chronic pain of both shoulders  M25 511     G89 29     M25 512        Start Time: 1230          Subjective: pt reports his shoulders are feeling better but still has some difficulty w/ reaching [place], mainly in the L shoulder  Objective: See treatment diary below      Assessment: Tolerated treatment well  Patient would benefit from continued PT  Verbal and tactile cueing for correct posture w/ shoulder 4 way  Able to tolerate reaching [place] w/ minimal difficulty  Unable to tolerate resisted ER on L side, tolerated isometric walks w/ L for ER better  Plan: Continue per plan of care  Daily Treatment Diary    EPOC: 22  Precautions: HTN- takes meds;  Hx of CABG (has nitro); possible cardiomyopathy  Co- Morbidities:   Patient Active Problem List   Diagnosis    2-vessel coronary artery disease    Basal cell carcinoma of scalp    Cardiomyopathy ([facility] )    History of heart artery stent    Cholelithiasis    Hyperlipidemia    Hypertension    Lumbar pain    Urinary urgency    Vitamin D deficiency    Primary osteoarthritis of right knee    Primary osteoarthritis of left knee    Presence of stent in coronary artery in patient with coronary artery disease    Epistaxis    Murmur, cardiac    Squamous cell carcinoma in situ (SCCIS) of scalp    Calculus of gallbladder without cholecystitis without obstruction    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 ([facility]    Medicare annual wellness visit, subsequent    Anxiety    Irregular heart beat    Localized edema    Chronic obstructive pulmonary disease, unspecified COPD type (HCC)    Chronic pain of both shoulders         Manual            GHJ mobs                    Total Time            Exercise Diary      THEREX       B/l shoulder PROM       Pt ed       Pendulums   m/l &a/p       UT stretch  10" 10x      Pulleys flexion 2'/2' 2/2     Wall washes Flexion/scap 10x2 5" ea side Flexion/scap 10x2 5" ea side     Posterior shoulder rolls       Supine AA sh flexion  HHA 10" 10x  HHA 10" 10x     Shoulder isometrics Pink/ytb 10x Pink/ytb 10x     Standing cane IR/extension       NEURO RE-ED       Scapular squeeze  10" 10x     Mod prone rows  15x      Mod prone ext  15x      TB resisted UT strengthening         LPD/rows gtb 2x10 ea gtb 3x10 ea      UBE retro 2/2 2/2      serratus punch supine 2x10 0# 2x10 0#      reaching at [employer]   various levels 3 rounds each arm                                                   Modalities  6/20         CP  10'

## 2022-07-11 ENCOUNTER — OFFICE VISIT (OUTPATIENT)
Dept: PHYSICAL THERAPY | Facility: CLINIC | Age: 87
End: 2022-07-11
Payer: MEDICARE

## 2022-07-11 DIAGNOSIS — M25.512 CHRONIC PAIN OF BOTH SHOULDERS: Primary | ICD-10-CM

## 2022-07-11 DIAGNOSIS — M25.511 CHRONIC PAIN OF BOTH SHOULDERS: Primary | ICD-10-CM

## 2022-07-11 DIAGNOSIS — G89.29 CHRONIC PAIN OF BOTH SHOULDERS: Primary | ICD-10-CM

## 2022-07-11 PROCEDURE — 97112 NEUROMUSCULAR REEDUCATION: CPT

## 2022-07-11 PROCEDURE — 97110 THERAPEUTIC EXERCISES: CPT

## 2022-07-11 NOTE — PROGRESS NOTES
Daily Note     Today's date: 2022  Patient name: Roberto Kinsey  : 1934  MRN: [id number]  Referring provider: Claudetta Field, CRNP  Dx:   Encounter Diagnosis     ICD-10-CM    1  Chronic pain of both shoulders  M25 511     G89 29     M25 512        Start Time: 1225  Stop Time: 1305  Total time in clinic (min): 40 minutes    Subjective: pt reports feeling a little sore today  Objective: See treatment diary below      Assessment: Tolerated treatment well  Patient would benefit from continued PT  Regressed to isometrics due to pt having continued difficulty w/ using banded resistance  Pt tolerated exercise better  Able to increased time on UBE w/ good tolerance  Plan: Continue per plan of care  Daily Treatment Diary    EPOC: 22  Precautions: HTN- takes meds;  Hx of CABG (has nitro); possible cardiomyopathy  Co- Morbidities:   Patient Active Problem List   Diagnosis    2-vessel coronary artery disease    Basal cell carcinoma of scalp    [facility]    History of heart artery stent    Cholelithiasis    Hyperlipidemia    Hypertension    Lumbar pain    Urinary urgency    Vitamin D deficiency    Primary osteoarthritis of right knee    Primary osteoarthritis of left knee    Presence of stent in coronary artery in patient with coronary artery disease    Epistaxis    Murmur, cardiac    Squamous cell carcinoma in situ (SCCIS) of scalp    Calculus of gallbladder without cholecystitis without obstruction    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 ([facility] )    Medicare annual wellness visit, subsequent    Anxiety    Irregular heart beat    Localized edema    Chronic obstructive pulmonary disease, unspecified COPD type (HCC)    Chronic pain of both shoulders         Manual            GHJ mobs                    Total Time            Exercise Diary     THEREX       B/l shoulder PROM       Pt ed       Pendulums   m/l &a/p       UT stretch  10" 10x  10" 10x    Pulleys flexion 2'/2' 2/2 2/2    Wall washes Flexion/scap 10x2 5" ea side Flexion/scap 10x2 5" ea side Flexion/scap 10x2 5" ea side    Posterior shoulder rolls       Supine AA sh flexion  HHA 10" 10x  HHA 10" 10x     Shoulder isometrics Pink/ytb 10x Pink/ytb 10x 15x ea no band    Standing cane IR/extension       NEURO RE-ED       Scapular squeeze  10" 10x 10" 10x    Mod prone rows  15x      Mod prone ext  15x      TB resisted UT strengthening         LPD/rows gtb 2x10 ea gtb 3x10 ea      UBE retro 2/2 2/2 3/3     serratus punch supine 2x10 0# 2x10 0#      reaching at [employer]   various levels 3 rounds each arm   various levels 3 rounds each arm                                                  Modalities  6/20         CP  10'

## 2022-07-13 ENCOUNTER — OFFICE VISIT (OUTPATIENT)
Dept: PHYSICAL THERAPY | Facility: CLINIC | Age: 87
End: 2022-07-13
Payer: MEDICARE

## 2022-07-13 DIAGNOSIS — M25.512 CHRONIC PAIN OF BOTH SHOULDERS: Primary | ICD-10-CM

## 2022-07-13 DIAGNOSIS — M25.511 CHRONIC PAIN OF BOTH SHOULDERS: Primary | ICD-10-CM

## 2022-07-13 DIAGNOSIS — G89.29 CHRONIC PAIN OF BOTH SHOULDERS: Primary | ICD-10-CM

## 2022-07-13 PROCEDURE — 97110 THERAPEUTIC EXERCISES: CPT

## 2022-07-13 PROCEDURE — 97112 NEUROMUSCULAR REEDUCATION: CPT

## 2022-07-13 NOTE — PROGRESS NOTES
Daily Note     Today's date: 2022  Patient name: Booker Malagon  : 1934  MRN: [id number]  Referring provider: CORY Dyson  Dx:   Encounter Diagnosis     ICD-10-CM    1  Chronic pain of both shoulders  M25 511     G89 29     M25 512        Start Time: 1230          Subjective: pt reports improvement in shoulders  Stated he is able to reach up into cupboard w/ L shoulder easier  Objective: See treatment diary below      Assessment: Tolerated treatment well  Patient would benefit from continued PT  Pt demonstrated improved ability to reach L shoulder [place]  Unable to do supine flexion on L side due to difficulty and pain  Able to progress to [facility] shoulder press w/ good tolerance  Plan: Continue per plan of care  Daily Treatment Diary    EPOC: 22  Precautions: HTN- takes meds;  Hx of CABG (has nitro); possible cardiomyopathy  Co- Morbidities:   Patient Active Problem List   Diagnosis    2-vessel coronary artery disease    Basal cell carcinoma of scalp    [facility]    History of heart artery stent    Cholelithiasis    Hyperlipidemia    Hypertension    Lumbar pain    Urinary urgency    Vitamin D deficiency    Primary osteoarthritis of right knee    Primary osteoarthritis of left knee    Presence of stent in coronary artery in patient with coronary artery disease    Epistaxis    Murmur, cardiac    Squamous cell carcinoma in situ (SCCIS) of scalp    Calculus of gallbladder without cholecystitis without obstruction    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 ([facility] )    Medicare annual wellness visit, subsequent    Anxiety    Irregular heart beat    Localized edema    Chronic obstructive pulmonary disease, unspecified COPD type (HCC)    Chronic pain of both shoulders         Manual            GHJ mobs                    Total Time            Exercise Diary    THEREX       B/l shoulder PROM       Pt ed       Shoulder press    10x   UT stretch  10" 10x  10" 10x    Pulleys flexion 2'/2' 2/2 2/2 2/2   Wall washes Flexion/scap 10x2 5" ea side Flexion/scap 10x2 5" ea side Flexion/scap 10x2 5" ea side Flexion/scap 10x2 5" ea side   Supine flexion AROM    Focus on eccentric 15x R only + SL ER BL 15x   Supine AA sh flexion  HHA 10" 10x  HHA 10" 10x     Shoulder isometrics Pink/ytb 10x Pink/ytb 10x 15x ea no band 15x ea no band   Standing cane IR/extension       NEURO RE-ED       Scapular squeeze  10" 10x 10" 10x    Mod prone rows  15x      Mod prone ext  15x      TB resisted UT strengthening         LPD/rows gtb 2x10 ea gtb 3x10 ea  gtb 3x10 ea    UBE retro 2/2 2/2 3/3 3/3    serratus punch supine 2x10 0# 2x10 0#      reaching at [employer]   various levels 3 rounds each arm   various levels 3 rounds each arm   various levels 5 rounds each arm   1# R hand; 0# L hand                                              Modalities  6/20         CP  10'

## 2022-07-18 ENCOUNTER — OFFICE VISIT (OUTPATIENT)
Dept: PHYSICAL THERAPY | Facility: CLINIC | Age: 87
End: 2022-07-18
Payer: MEDICARE

## 2022-07-18 DIAGNOSIS — G89.29 CHRONIC PAIN OF BOTH SHOULDERS: Primary | ICD-10-CM

## 2022-07-18 DIAGNOSIS — M25.511 CHRONIC PAIN OF BOTH SHOULDERS: Primary | ICD-10-CM

## 2022-07-18 DIAGNOSIS — M25.512 CHRONIC PAIN OF BOTH SHOULDERS: Primary | ICD-10-CM

## 2022-07-18 PROCEDURE — 97110 THERAPEUTIC EXERCISES: CPT | Performed by: PHYSICAL THERAPIST

## 2022-07-18 NOTE — PROGRESS NOTES
Daily Note     Today's date: 2022  Patient name: Rianna Jacobson  : 1934  MRN: [id number]  Referring provider: CORY Coronado  Dx:   Encounter Diagnosis     ICD-10-CM    1  Chronic pain of both shoulders  M25 511     G89 29     M25 512                   Subjective: Pt w/o any new complaints to report  Objective: See treatment diary below      Assessment: Ex tolerated well today  Plan to continue to progress strengthening as able  Plan: Continue per plan of care  Daily Treatment Diary    EPOC: 22  Precautions: HTN- takes meds;  Hx of CABG (has nitro); possible cardiomyopathy  Co- Morbidities:   Patient Active Problem List   Diagnosis    2-vessel coronary artery disease    Basal cell carcinoma of scalp    [facility]omyopathy [facility]    History of heart artery stent    Cholelithiasis    Hyperlipidemia    Hypertension    Lumbar pain    Urinary urgency    Vitamin D deficiency    Primary osteoarthritis of right knee    Primary osteoarthritis of left knee    Presence of stent in coronary artery in patient with coronary artery disease    Epistaxis    Murmur, cardiac    Squamous cell carcinoma in situ (SCCIS) of scalp    Calculus of gallbladder without cholecystitis without obstruction    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 ([facility] )    Medicare annual wellness visit, subsequent    Anxiety    Irregular heart beat    Localized edema    Chronic obstructive pulmonary disease, unspecified COPD type (HCC)    Chronic pain of both shoulders         Manual          GHJ mobs  RB                  Total Time   4'         Exercise Diary      THEREX          B/l shoulder PROM     10'     Pt ed          Shoulder press    10x      UT stretch  10" 10x  10" 10x       Pulleys flexion 2'/2' 2/2 2/2 2/2 3'/3'     Wall washes Flexion/scap 10x2 5" ea side Flexion/scap 10x2 5" ea side Flexion/scap 10x2 5" ea side Flexion/scap 10x2 5" ea side Supine flexion AROM    Focus on eccentric 15x R only + SL ER BL 15x NV     Supine AA sh flexion  HHA 10" 10x  HHA 10" 10x   10"x10 HHA     Shoulder isometrics Pink/ytb 10x Pink/ytb 10x 15x ea no band 15x ea no band      Standing cane IR/extension          NEURO RE-ED          Scapular squeeze  10" 10x 10" 10x  HEP     Mod prone rows  15x   NV      Mod prone ext  15x   NV      TB resisted UT strengthening      NV      LPD/rows gtb 2x10 ea gtb 3x10 ea  gtb 3x10 ea GTB 3x10/BTB 3x10      UBE retro 2/2 2/2 3/3 3/3 3'/3'      serratus punch supine 2x10 0# 2x10 0#         reaching at [employer]   various levels 3 rounds each arm   various levels 3 rounds each arm   various levels 5 rounds each arm  1# R hand; 0# L hand       wayne walk outs           wayne pallof press                                              Modalities  6/20         CP  10'                                                      Daily Note     Today's date: 2022  Patient name: Antoinette Apley  : 1934  MRN: [id number]  Referring provider: CORY Vincent  Dx:   Encounter Diagnosis     ICD-10-CM    1  Chronic pain of both shoulders  M25 511     G89 29     M25 512                   Subjective: pt reports improvement in shoulders  Stated he is able to reach up into cupboard w/ L shoulder easier  Objective: See treatment diary below      Assessment: Tolerated treatment well  Patient would benefit from continued PT  Pt demonstrated improved ability to reach L shoulder [place]  Unable to do supine flexion on L side due to difficulty and pain  Able to progress to [place] shoulder press w/ good tolerance  Plan: Continue per plan of care  Daily Treatment Diary    EPOC: 22  Precautions: HTN- takes meds;  Hx of CABG (has nitro); possible cardiomyopathy  Co- Morbidities:   Patient Active Problem List   Diagnosis    2-vessel coronary artery disease    Basal cell carcinoma of scalp    Cardiomyopathy ([address] )    History of heart artery stent  Cholelithiasis    Hyperlipidemia    Hypertension    Lumbar pain    Urinary urgency    Vitamin D deficiency    Primary osteoarthritis of right knee    Primary osteoarthritis of left knee    Presence of stent in coronary artery in patient with coronary artery disease    Epistaxis    Murmur, cardiac    Squamous cell carcinoma in situ (SCCIS) of scalp    Calculus of gallbladder without cholecystitis without obstruction    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 ([facility]    Medicare annual wellness visit, subsequent    Anxiety    Irregular heart beat    Localized edema    Chronic obstructive pulmonary disease, unspecified COPD type (HCC)    Chronic pain of both shoulders         Manual  6/20          GHJ mobs                    Total Time            Exercise Diary 7/5 7/7 7/11 7/13   THEREX       B/l shoulder PROM       Pt ed       Shoulder press    10x   UT stretch  10" 10x  10" 10x    Pulleys flexion 2'/2' 2/2 2/2 2/2   Wall washes Flexion/scap 10x2 5" ea side Flexion/scap 10x2 5" ea side Flexion/scap 10x2 5" ea side Flexion/scap 10x2 5" ea side   Supine flexion AROM    Focus on eccentric 15x R only + SL ER BL 15x   Supine AA sh flexion  HHA 10" 10x  HHA 10" 10x     Shoulder isometrics Pink/ytb 10x Pink/ytb 10x 15x ea no band 15x ea no band   Standing cane IR/extension       NEURO RE-ED       Scapular squeeze  10" 10x 10" 10x    Mod prone rows  15x      Mod prone ext  15x      TB resisted UT strengthening         LPD/rows gtb 2x10 ea gtb 3x10 ea  gtb 3x10 ea    UBE retro 2/2 2/2 3/3 3/3    serratus punch supine 2x10 0# 2x10 0#      reaching at [employer]   various levels 3 rounds each arm   various levels 3 rounds each arm   various levels 5 rounds each arm   1# R hand; 0# L hand                                              Modalities  6/20         CP  10'

## 2022-07-20 ENCOUNTER — OFFICE VISIT (OUTPATIENT)
Dept: INTERNAL MEDICINE CLINIC | Facility: CLINIC | Age: 87
End: 2022-07-20
Payer: MEDICARE

## 2022-07-20 ENCOUNTER — EVALUATION (OUTPATIENT)
Dept: PHYSICAL THERAPY | Facility: CLINIC | Age: 87
End: 2022-07-20
Payer: MEDICARE

## 2022-07-20 VITALS
RESPIRATION RATE: 14 BRPM | WEIGHT: 148 LBS | OXYGEN SATURATION: 97 % | TEMPERATURE: 98.5 F | HEIGHT: 65 IN | BODY MASS INDEX: 24.66 KG/M2 | DIASTOLIC BLOOD PRESSURE: 62 MMHG | SYSTOLIC BLOOD PRESSURE: 118 MMHG | HEART RATE: 72 BPM

## 2022-07-20 DIAGNOSIS — G89.29 CHRONIC PAIN OF BOTH SHOULDERS: Primary | ICD-10-CM

## 2022-07-20 DIAGNOSIS — M25.511 CHRONIC PAIN OF BOTH SHOULDERS: Primary | ICD-10-CM

## 2022-07-20 DIAGNOSIS — L98.9 SKIN LESION: Primary | ICD-10-CM

## 2022-07-20 DIAGNOSIS — M25.512 CHRONIC PAIN OF BOTH SHOULDERS: Primary | ICD-10-CM

## 2022-07-20 PROCEDURE — 97110 THERAPEUTIC EXERCISES: CPT | Performed by: PHYSICAL THERAPIST

## 2022-07-20 PROCEDURE — 99213 OFFICE O/P EST LOW 20 MIN: CPT | Performed by: NURSE PRACTITIONER

## 2022-07-20 PROCEDURE — 97112 NEUROMUSCULAR REEDUCATION: CPT | Performed by: PHYSICAL THERAPIST

## 2022-07-20 NOTE — PROGRESS NOTES
Assessment/Plan:    Skin lesion on scalp- Referred to dermatology  Follow up as scheduled  Diagnoses and all orders for this visit:    Skin lesion  -     Ambulatory Referral to Dermatology; Future        The patient was counseled regarding instructions for management, risk factor reductions, patient and family education,impressions, risks and benefits of treatment options, side effects of medications, importance of compliance with treatment  The treatment plan was reviewed with the patient/guardian and patient/guardian understands and agrees with the treatment plan              Current Outpatient Medications:     amLODIPine (NORVASC) 5 mg tablet, Take 1 tablet (5 mg total) by mouth daily, Disp: 90 tablet, Rfl: 2    aspirin 81 MG tablet, Take 2 tablets by mouth once , Disp: , Rfl:     atorvastatin (LIPITOR) 80 mg tablet, Take 1 tablet (80 mg total) by mouth daily, Disp: 90 tablet, Rfl: 2    B Complex Vitamins (VITAMIN B COMPLEX) TABS, Take 1 tablet by mouth daily, Disp: , Rfl:     Cholecalciferol (VITAMIN D-3 PO), Take 2,000 Units by mouth 3 (three) times a day before meals, Disp: , Rfl:     co-enzyme Q-10 30 MG capsule, Take 30 mg by mouth daily, Disp: , Rfl:     Flax OIL, Take 1 capsule by mouth daily, Disp: 90 mL, Rfl: 3    hydrocortisone 2 5 % cream, Apply topically 2 (two) times a day, Disp: 30 g, Rfl: 1    hydrocortisone-pramoxine (ANALPRAM-HC) 2 5-1 % rectal cream, Apply topically 3 (three) times a day, Disp: 30 g, Rfl: 2    hydrOXYzine HCL (ATARAX) 25 mg tablet, Take 1 tablet (25 mg total) by mouth every 6 (six) hours as needed for anxiety, Disp: 90 tablet, Rfl: 0    metoprolol succinate (TOPROL-XL) 25 mg 24 hr tablet, Take 1 tablet (25 mg total) by mouth daily, Disp: 90 tablet, Rfl: 2    nitroglycerin (NITROSTAT) 0 4 mg SL tablet, Place 1 tablet (0 4 mg total) under the tongue every 5 (five) minutes as needed for chest pain, Disp: 25 tablet, Rfl: 4    omega-3-acid ethyl esters (LOVAZA) 1 g capsule, Take 2 g by mouth daily, Disp: , Rfl:     valsartan (DIOVAN) 80 mg tablet, Take 1 tablet (80 mg total) by mouth daily, Disp: 90 tablet, Rfl: 2    Subjective:      Patient ID: Khushboo Weber is a 80 y o  male  Jesus Feast is here with concern over a dry, scaly brown lesion on his scalp  The following portions of the patient's history were reviewed and updated as appropriate:   He has a past medical history of Abnormal weight loss, Arthritis, Basal cell carcinoma, Bursitis, Cancer ([address] ), Cardiomyopathy ([address] ), Chest discomfort, Chest pain, Coronary artery disease, Ecchymosis, Exertional dyspnea, Hepatic hemangioma, Herniation of lumbar intervertebral disc, Hyperlipidemia, Hypertension, Nonmelanoma skin cancer, Pleural effusion, Pulmonary nodule, Shortness of breath, and Vitamin D deficiency  ,  does not have any pertinent problems on file  ,   has a past surgical history that includes Carpal tunnel release (Bilateral); Coronary artery bypass graft (03/24/2011); Coronary artery bypass graft; and pr repair incisional hernia,reducible (Bilateral, 4/11/2017)  ,  family history includes Heart disease in his mother; Hypertension in his mother  ,   reports that he quit smoking about 42 years ago  He has never used smokeless tobacco  He reports current alcohol use of about 1 0 standard drink of alcohol per week  He reports that he does not use drugs  ,  is allergic to other       Review of Systems   Constitutional: Negative  Respiratory: Negative  Cardiovascular: Negative  Musculoskeletal: Negative  Skin:        Skin lesion   Psychiatric/Behavioral: Negative  Objective:  /62 (BP Location: Left arm, Patient Position: Sitting, Cuff Size: Adult)   Pulse 72   Temp 98 5 °F (36 9 °C) (Tympanic)   Resp 14   Ht 5' 5" (1 651 m)   Wt 67 1 kg (148 lb)   SpO2 97%   BMI 24 63 kg/m²     Lab Review  No visits with results within 2 Month(s) from this visit     Latest known visit with results is: Appointment on 02/14/2022   Component Date Value    WBC 02/14/2022 5 32     RBC 02/14/2022 4 53     Hemoglobin 02/14/2022 13 7     Hematocrit 02/14/2022 42 8     MCV 02/14/2022 95     MCH 02/14/2022 30 2     MCHC 02/14/2022 32 0     RDW 02/14/2022 13 0     MPV 02/14/2022 9 9     Platelets 60/78/4225 181     nRBC 02/14/2022 0     Neutrophils Relative 02/14/2022 57     Immat GRANS % 02/14/2022 0     Lymphocytes Relative 02/14/2022 31     Monocytes Relative 02/14/2022 10     Eosinophils Relative 02/14/2022 2     Basophils Relative 02/14/2022 0     Neutrophils Absolute 02/14/2022 3 01     Immature Grans Absolute 02/14/2022 0 00     Lymphocytes Absolute 02/14/2022 1 65     Monocytes Absolute 02/14/2022 0 51     Eosinophils Absolute 02/14/2022 0 13     Basophils Absolute 02/14/2022 0 02     Sodium 02/14/2022 140     Potassium 02/14/2022 4 7     Chloride 02/14/2022 105     CO2 02/14/2022 30     ANION GAP 02/14/2022 5     BUN 02/14/2022 22     Creatinine 02/14/2022 0 92     Glucose, Fasting 02/14/2022 98     Calcium 02/14/2022 8 7     AST 02/14/2022 32     ALT 02/14/2022 26     Alkaline Phosphatase 02/14/2022 105     Total Protein 02/14/2022 7 0     Albumin 02/14/2022 3 6     Total Bilirubin 02/14/2022 0 89     eGFR 02/14/2022 74     Cholesterol 02/14/2022 125     Triglycerides 02/14/2022 48     HDL, Direct 02/14/2022 69     LDL Calculated 02/14/2022 46     Non-HDL-Chol (CHOL-HDL) 02/14/2022 56     TSH 3RD GENERATON 02/14/2022 1 944         Imaging: No results found  Physical Exam  Constitutional:       Appearance: He is well-developed  Cardiovascular:      Rate and Rhythm: Normal rate and regular rhythm  Heart sounds: Normal heart sounds  Pulmonary:      Effort: Pulmonary effort is normal       Breath sounds: Normal breath sounds  Musculoskeletal:         General: Normal range of motion  Skin:     Findings: Lesion present        Comments: Brown, crusty, scaly lesion approx 2 cm x 1 cm noted to right side of scalp  Neurological:      Mental Status: He is alert and oriented to person, place, and time  Deep Tendon Reflexes: Reflexes are normal and symmetric  Psychiatric:         Behavior: Behavior normal          Thought Content:  Thought content normal          Judgment: Judgment normal

## 2022-07-20 NOTE — PROGRESS NOTES
PT Re-Evaluation     Today's date: 2022  Patient name: Selene Roldan  : 1934  MRN: [id number]  Referring provider: CORY Neal  Dx:   Encounter Diagnosis     ICD-10-CM    1  Chronic pain of both shoulders  M25 511     G89 29     M25 512                   Assessment  Assessment details: Selene Roldan is an 80 y o  male being treated as an outpatient at [place] [address] PT w/ initial c/o b/l shoulder pain (L worse vs R)  Since IE, pt has made steady gains in pain reduction, ROM, and strength (compared to IE via function), but continues to remain limited from max function  Pt will continue to benefit from skilled PT services in order to max function to allow pt to achieve goals in PT  Thank you  Impairments: abnormal muscle tone, abnormal or restricted ROM, activity intolerance, impaired physical strength, pain with function and poor posture   Understanding of Dx/Px/POC: good   Prognosis: good    Goals  ST  Pain decreased by 25% in 4-6 weeks  - Met  2  ROM increased by 25% in 4-6 weeks  - PROGRESSING    LT  Decrease pain to 1-2/10 at worst by d/c - PROGRESSING  2  Increase ROM to [facility] for all deficient movements by d/c  - PROGRESSING  3  Strength increased to 5 for all deficient muscle groups by d/c - PROGRESSING  4  IADL performance increased to max function by d/c - PROGRESSING  5   Recreational performance increased to max function by d/c - PROGRESSING      Plan  Planned modality interventions: cryotherapy  Other planned modality interventions: other modalities PRN  Planned therapy interventions: ADL retraining, IADL retraining, flexibility, functional ROM exercises, graded exercise, home exercise program, manual therapy, joint mobilization, neuromuscular re-education, patient education, postural training, strengthening, therapeutic exercise, therapeutic activities and abdominal trunk stabilization  Other planned therapy interventions: other interventions PRN  Frequency: 1-2x/week  Duration in weeks: 4  Plan of Care beginning date: 7/20/2022  Plan of Care expiration date: 8/17/2022  Treatment plan discussed with: patient        Subjective Evaluation    History of Present Illness  Mechanism of injury: CURRENT LEVEL (7/20/22)  Pt reports progress since IE- he feels approximately 75-80% recovered  He can now tolerate driving better- states that he can turn steering wheel w/ LUE while looking in the opposite direction when backing up which he was unable to do previously  Although still painful/limited, pt reports significant improvement in activities involving shoulder elevation, performing quick/jerky movements, w/ sleeping, w/ lifting, and w/ functional IR/ER  He no longer has pain sleeping in recliner, but has not yet attempted sleeping in bed  Pt states that he is now able to lift 1/2 gallon of milk into fridge and mow lawn (w/ self propelled ) w/o much difficulty  He continues to have the most pain performing activities which involve reaching into abduction  INITIAL LEVEL (6/20/22)  Pt reports to IE w/ c/o b/l shoulder pain (L worse vs R)  He reports that R shoulder pain began 3-4 months ago and L shoulder pain started 1-1 5 months ago  Pt reports that R shoulder pain started first while carrying trash can up the driveway and he "swung it" causing R shoulder pain  He believes that L shoulder pain is a result of compensation  Pt reports that imaging of R shoulder demonstrated arthritis and L shoulder demonstrated RTC tendonitis  Pt sleeps in a recliner for the past approximate month  Pt denies any associated c/s pain and/or numbness/parasthesias  Pain  Pain scale: R shoulder: 0/10; L shoulder: 0/10  Pain scale at highest: R shoulder: 0/10; L shoulder: 7/10  Location: b/l shoulders  Relieving factors: rest  Exacerbated by: L shoulder: elevating b/l UE, quick, jerky movements, sleeping, lifting      Social Support  Lives with: spouse    Employment status: not working  Hand dominance: right      Diagnostic Tests  Abnormal x-ray: 6/9: R- Mild osteoarthritis of the glenohumeral and acromioclavicular joints ; L - RTC tendonitis  Patient Goals  Patient goals for therapy: decreased pain, independence with ADLs/IADLs, increased motion and increased strength (PROGRESSING)          Objective     Active Range of Motion   Left Shoulder   Flexion: 170 degrees with pain  Abduction: 140 degrees with pain  External rotation 45°: 72 degrees with pain  Internal rotation 45°: 75 degrees     Right Shoulder   Flexion: 176 degrees with pain  Abduction: 170 degrees   External rotation 45°: 85 degrees   Internal rotation 45°: 68 degrees     Passive Range of Motion   Left Shoulder   Flexion: 170 degrees with pain  Abduction: 145 degrees with pain  External rotation 45°: 68 degrees with pain  Internal rotation 45°: 78 degrees     Strength/Myotome Testing     Left Shoulder     Planes of Motion   Flexion: 4 (*pain)   Abduction: 3+ (*pain)   External rotation at 45°: 3- (*pain)   Internal rotation at 45°: 4+     Right Shoulder     Planes of Motion   Flexion: 4+   Abduction: 4+   External rotation at 45°: 4+ (*min pain)   Internal rotation at 45°: 5     Additional Strength Details  DNT strength due to time constraints    General Comments:      Shoulder Comments   Observation/Posture:  Pt sits w/ fwd, rounded shoulders w/ fwd head     Palpation: Hypertonicity t/o b/l UT, scap    c/s special tests (assessed at IE):   sharp-ethan: negative  vertebral artery: negative  spurlings:negative    Shoulder Special Tests (L/R- assessed at IE):   Jaida Doing : positive/positive  Empty Can :positive/positive  Neer Impingement : negative/negative  Speed's Test : unable to actively lift LUE/negative               Daily Treatment Diary    EPOC: 8/17/22  Precautions: HTN- takes meds;  Hx of CABG (has nitro); possible cardiomyopathy  Co- Morbidities:   Patient Active Problem List   Diagnosis    2-vessel coronary artery disease    Basal cell carcinoma of scalp    Cardiomyopathy ([facility] [address] [facility]    History of heart artery stent    Cholelithiasis    Hyperlipidemia    Hypertension    Lumbar pain    Urinary urgency    Vitamin D deficiency    Primary osteoarthritis of right knee    Primary osteoarthritis of left knee    Presence of stent in coronary artery in patient with coronary artery disease    Epistaxis    Murmur, cardiac    Squamous cell carcinoma in situ (SCCIS) of scalp    Calculus of gallbladder without cholecystitis without obstruction    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 ([facility] [address] )    Medicare annual wellness visit, subsequent    Anxiety    Irregular heart beat    Localized edema    Chronic obstructive pulmonary disease, unspecified COPD type (HCC)    Chronic pain of both shoulders         Manual  6/20 7/18 7/20      GHJ mobs  RB RB           4'      Total Time   4'         Exercise Diary 7/5 7/7 7/11 7/13 7/18 7/20    THEREX          Progress Note      19'    B/l shoulder PROM     10' 10'    Pt ed          Shoulder press    10x      UT stretch  10" 10x  10" 10x       Pulleys flexion 2'/2' 2/2 2/2 2/2 3'/3' 3'/3'    Wall washes Flexion/scap 10x2 5" ea side Flexion/scap 10x2 5" ea side Flexion/scap 10x2 5" ea side Flexion/scap 10x2 5" ea side      Supine flexion AROM    Focus on eccentric 15x R only + SL ER BL 15x NV     Supine AA sh flexion  HHA 10" 10x  HHA 10" 10x   10"x10 HHA     Shoulder isometrics Pink/ytb 10x Pink/ytb 10x 15x ea no band 15x ea no band      Standing cane IR/extension          NEURO RE-ED          Scapular squeeze  10" 10x 10" 10x  HEP     Mod prone rows  15x   NV      Mod prone ext  15x   NV      TB resisted UT strengthening      NV      LPD/rows gtb 2x10 ea gtb 3x10 ea  gtb 3x10 ea GTB 3x10/BTB 3x10      UBE retro 2/2 2/2 3/3 3/3 3'/3' 3'/3'     serratus punch supine 2x10 0# 2x10 0#         reaching at [employer]   various levels 3 rounds each arm   various levels 3 rounds each arm   various levels 5 rounds each arm   1# R hand; 0# L hand       wayne walk outs           wayne pallof press                                              Modalities  6/20         CP  10'

## 2022-07-25 ENCOUNTER — OFFICE VISIT (OUTPATIENT)
Dept: PHYSICAL THERAPY | Facility: CLINIC | Age: 87
End: 2022-07-25
Payer: MEDICARE

## 2022-07-25 DIAGNOSIS — G89.29 CHRONIC PAIN OF BOTH SHOULDERS: Primary | ICD-10-CM

## 2022-07-25 DIAGNOSIS — M25.512 CHRONIC PAIN OF BOTH SHOULDERS: Primary | ICD-10-CM

## 2022-07-25 DIAGNOSIS — M25.511 CHRONIC PAIN OF BOTH SHOULDERS: Primary | ICD-10-CM

## 2022-07-25 PROCEDURE — 97112 NEUROMUSCULAR REEDUCATION: CPT

## 2022-07-25 PROCEDURE — 97110 THERAPEUTIC EXERCISES: CPT

## 2022-07-25 NOTE — PROGRESS NOTES
Daily Note     Today's date: 2022  Patient name: Nishant Murdock  : 1934  MRN: [id number]  Referring provider: CORY Pa  Dx:   Encounter Diagnosis     ICD-10-CM    1  Chronic pain of both shoulders  M25 511     G89 29     M25 512        Start Time: 1225          Subjective: pt reports no new complaints upon arrival        Objective: See treatment diary below      Assessment: Tolerated treatment well  Patient would benefit from continued PT  Able to progress resistance w/ rows and SA rows w/ good tolerance, no reported increased pain  No UT elevation noted  Able to tolerated resisted rows and shoulder ext w/ no difficulty  Pain in L shoulder w/ standing scaption  Plan: Continue per plan of care  Daily Treatment Diary    EPOC: 22  Precautions: HTN- takes meds;  Hx of CABG (has nitro); possible cardiomyopathy  Co- Morbidities:   Patient Active Problem List   Diagnosis    2-vessel coronary artery disease    Basal cell carcinoma of scalp    Cardiomyopathy ([facility] [address] [facility]    History of heart artery stent    Cholelithiasis    Hyperlipidemia    Hypertension    Lumbar pain    Urinary urgency    Vitamin D deficiency    Primary osteoarthritis of right knee    Primary osteoarthritis of left knee    Presence of stent in coronary artery in patient with coronary artery disease    Epistaxis    Murmur, cardiac    Squamous cell carcinoma in situ (SCCIS) of scalp    Calculus of gallbladder without cholecystitis without obstruction    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 UT stretch  10" 10x  10" 10x       Pulleys flexion 2'/2' 2/2 2/2 2/2 3'/3' 3/3    Wall washes Flexion/scap 10x2 5" ea side Flexion/scap 10x2 5" ea side Flexion/scap 10x2 5" ea side Flexion/scap 10x2 5" ea side      Supine flexion AROM    Focus on eccentric 15x R only + SL ER BL 15x NV     Supine AA sh flexion  HHA 10" 10x  HHA 10" 10x   10"x10 HHA     Shoulder isometrics Pink/ytb 10x Pink/ytb 10x 15x ea no band 15x ea no band  20x    Standing cane IR/extension          NEURO RE-ED          Scapular squeeze  10" 10x 10" 10x  HEP     Mod prone rows  15x   NV 2# 2x10 3#     Mod prone ext  15x   NV 2# 2x10 3#    TB resisted UT strengthening      NV      LPD/rows gtb 2x10 ea gtb 3x10 ea  gtb 3x10 ea GTB 3x10/BTB 3x10 OTB 3x10 ea     UBE retro 2/2 2/2 3/3 3/3 3'/3' 3/3     serratus punch supine 2x10 0# 2x10 0#         reaching at [employer]   various levels 3 rounds each arm   various levels 3 rounds each arm   various levels 5 rounds each arm   1# R hand; 0# L hand       wayne walk outs           wayne pallof press                                              Modalities  6/20         CP  10'

## 2022-07-27 ENCOUNTER — OFFICE VISIT (OUTPATIENT)
Dept: PHYSICAL THERAPY | Facility: CLINIC | Age: 87
End: 2022-07-27
Payer: MEDICARE

## 2022-07-27 DIAGNOSIS — G89.29 CHRONIC PAIN OF BOTH SHOULDERS: Primary | ICD-10-CM

## 2022-07-27 DIAGNOSIS — M25.511 CHRONIC PAIN OF BOTH SHOULDERS: Primary | ICD-10-CM

## 2022-07-27 DIAGNOSIS — M25.512 CHRONIC PAIN OF BOTH SHOULDERS: Primary | ICD-10-CM

## 2022-07-27 PROCEDURE — 97110 THERAPEUTIC EXERCISES: CPT

## 2022-07-27 PROCEDURE — 97112 NEUROMUSCULAR REEDUCATION: CPT

## 2022-07-27 NOTE — PROGRESS NOTES
Daily Note     Today's date: 2022  Patient name: Jonnathan Berry  : 1934  MRN: [id number]  Referring provider: CORY Santos  Dx:   Encounter Diagnosis     ICD-10-CM    1  Chronic pain of both shoulders  M25 511     G89 29     M25 512        Start Time: 1225  Stop Time: 1305  Total time in clinic (min): 40 minutes    Subjective: pt reports having no new complaints upon arrival        Objective: See treatment diary below      Assessment: Tolerated treatment well  Patient would benefit from continued PT  Able to add resistance w/ standing shoulder flexion w/ good tolerance, cueing for form  Improved ER strength on L shoulder  Plan: Continue per plan of care  Daily Treatment Diary    EPOC: 22  Precautions: HTN- takes meds;  Hx of CABG (has nitro); possible cardiomyopathy  Co- Morbidities:   Patient Active Problem List   Diagnosis    2-vessel coronary artery disease    Basal cell carcinoma of scalp    Cardiomyopathy ([facility] [address] [facility]    History of heart artery stent    Cholelithiasis    Hyperlipidemia    Hypertension    Lumbar pain    Urinary urgency    Vitamin D deficiency    Primary osteoarthritis of right knee    Primary osteoarthritis of left knee    Presence of stent in coronary artery in patient with coronary artery disease    Epistaxis    Murmur, cardiac    Squamous cell carcinoma in situ (SCCIS) of scalp    Calculus of gallbladder without cholecystitis without obstruction    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 2x10 flexion 1#    UT stretch  10" 10x  10" 10x       Pulleys flexion 2'/2' 2/2 2/2 2/2 3'/3' 3/3 2/2   Wall washes Flexion/scap 10x2 5" ea side Flexion/scap 10x2 5" ea side Flexion/scap 10x2 5" ea side Flexion/scap 10x2 5" ea side   Flexion/scap 10x2 5" ea side   Supine flexion AROM    Focus on eccentric 15x R only + SL ER BL 15x NV     Supine AA sh flexion  HHA 10" 10x  HHA 10" 10x   10"x10 HHA     Shoulder isometrics Pink/ytb 10x Pink/ytb 10x 15x ea no band 15x ea no band  20x ytb ext;flex 20x; pink IR/ER 10x   Standing cane IR/extension          NEURO RE-ED          Scapular squeeze  10" 10x 10" 10x  HEP     Mod prone rows  15x   NV 2# 2x10 3# 2x10    Mod prone ext  15x   NV 2# 2x10 3# 2x10    TB resisted UT strengthening      NV      LPD/rows gtb 2x10 ea gtb 3x10 ea  gtb 3x10 ea GTB 3x10/BTB 3x10 OTB 3x10 ea OTB 3x10 ea    UBE retro 2/2 2/2 3/3 3/3 3'/3' 3/3 3/3    serratus punch supine 2x10 0# 2x10 0#         reaching at [employer]   various levels 3 rounds each arm   various levels 3 rounds each arm   various levels 5 rounds each arm   1# R hand; 0# L hand       wayne walk outs           wayne pallof press                                              Modalities  6/20         CP  10'

## 2022-08-01 ENCOUNTER — OFFICE VISIT (OUTPATIENT)
Dept: PHYSICAL THERAPY | Facility: CLINIC | Age: 87
End: 2022-08-01
Payer: MEDICARE

## 2022-08-01 DIAGNOSIS — M25.512 CHRONIC PAIN OF BOTH SHOULDERS: Primary | ICD-10-CM

## 2022-08-01 DIAGNOSIS — G89.29 CHRONIC PAIN OF BOTH SHOULDERS: Primary | ICD-10-CM

## 2022-08-01 DIAGNOSIS — M25.511 CHRONIC PAIN OF BOTH SHOULDERS: Primary | ICD-10-CM

## 2022-08-01 PROCEDURE — 97112 NEUROMUSCULAR REEDUCATION: CPT | Performed by: PHYSICAL THERAPIST

## 2022-08-01 PROCEDURE — 97110 THERAPEUTIC EXERCISES: CPT | Performed by: PHYSICAL THERAPIST

## 2022-08-01 NOTE — PROGRESS NOTES
Daily Note     Today's date: 2022  Patient name: Rhianna Looney  : 1934  MRN: [id number]  Referring provider: CORY Logan  Dx:   Encounter Diagnosis     ICD-10-CM    1  Chronic pain of both shoulders  M25 511     G89 29     M25 512                   Subjective: pt w/o any complaints- states that he is making steady progress  Objective: See treatment diary below      Assessment: Ex tolerated fairly well today w/ progressions as below  Pt did have some difficulty maintaining proper technique w/ TB resisted flexion especially w/ L shoulder as this was difficult for him  Plan: Continue per plan of care  Daily Treatment Diary    EPOC: 22  Precautions: HTN- takes meds;  Hx of CABG (has nitro); possible cardiomyopathy  Co- Morbidities:   Patient Active Problem List   Diagnosis    2-vessel coronary artery disease    Basal cell carcinoma of scalp    Cardiomyopathy ([facility] [address] [facility]    History of heart artery stent    Cholelithiasis    Hyperlipidemia    Hypertension    Lumbar pain    Urinary urgency    Vitamin D deficiency    Primary osteoarthritis of right knee    Primary osteoarthritis of left knee    Presence of stent in coronary artery in patient with coronary artery disease    Epistaxis    Murmur, cardiac    Squamous cell carcinoma in situ (SCCIS) of scalp    Calculus of gallbladder without cholecystitis without obstruction    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    Medicare annual wellness visit, subsequent    Anxiety    Irregular heart beat    Localized edema    Chronic obstructive pulmonary disease, unspecified COPD type (HCC)    Chronic pain of both shoulders         Manual        GHJ mobs  RB RB                 Total Time   4'  4'       Exercise Diary      THEREX             B/l shoulder PROM     10'   8'     Pt ed             Standing shoulder flexion/scaption       2x10;scaption 6x p! 2x10 flexion 1#       UT stretch  10" 10x  10" 10x          Pulleys flexion 2'/2' 2/2 2/2 2/2 3'/3' 3/3 2/2 3'/3'     Wall washes Flexion/scap 10x2 5" ea side Flexion/scap 10x2 5" ea side Flexion/scap 10x2 5" ea side Flexion/scap 10x2 5" ea side   Flexion/scap 10x2 5" ea side Flex/scap 10x2 5" ea side     Supine flexion AROM    Focus on eccentric 15x R only + SL ER BL 15x NV        Supine AA sh flexion  HHA 10" 10x  HHA 10" 10x   10"x10 HHA        Shoulder isometrics Pink/ytb 10x Pink/ytb 10x 15x ea no band 15x ea no band  20x ytb ext;flex 20x; pink IR/ER 10x YTB flex 10x ea b/l; YTB ext 3x10 ea b/l ; PTB IR/ER 15x ea b/l      Standing cane IR/extension             NEURO RE-ED             Scapular squeeze  10" 10x 10" 10x  HEP        Mod prone rows  15x   NV 2# 2x10 3# 2x10 3# 2x10      Mod prone ext  15x   NV 2# 2x10 3# 2x10 3# 2x10      TB resisted UT strengthening      NV         LPD/rows gtb 2x10 ea gtb 3x10 ea  gtb 3x10 ea GTB 3x10/BTB 3x10 OTB 3x10 ea OTB 3x10 ea [place] 8# 3x10/8# 3x10      UBE retro 2/2 2/2 3/3 3/3 3'/3' 3/3 3/3 3'/3'      serratus punch supine 2x10 0# 2x10 0#            reaching at [employer]   various levels 3 rounds each arm   various levels 3 rounds each arm   various levels 5 rounds each arm   1# R hand; 0# L hand          wayne walk outs              wayne pallof press              ball on wall scap stab        0# cw/ccw 20x ea dir b/l                                     Modalities  6/20         CP  10'

## 2022-08-03 ENCOUNTER — OFFICE VISIT (OUTPATIENT)
Dept: PHYSICAL THERAPY | Facility: CLINIC | Age: 87
End: 2022-08-03
Payer: MEDICARE

## 2022-08-03 DIAGNOSIS — G89.29 CHRONIC PAIN OF BOTH SHOULDERS: ICD-10-CM

## 2022-08-03 DIAGNOSIS — M25.511 CHRONIC PAIN OF BOTH SHOULDERS: ICD-10-CM

## 2022-08-03 DIAGNOSIS — M25.512 CHRONIC PAIN OF BOTH SHOULDERS: ICD-10-CM

## 2022-08-03 PROCEDURE — 97110 THERAPEUTIC EXERCISES: CPT | Performed by: PHYSICAL THERAPIST

## 2022-08-03 PROCEDURE — 97112 NEUROMUSCULAR REEDUCATION: CPT | Performed by: PHYSICAL THERAPIST

## 2022-08-03 NOTE — PROGRESS NOTES
Daily Note     Today's date: 8/3/2022  Patient name: Elie Sifuentes  : 1934  MRN: [id number]  Referring provider: CORY Bowman  Dx:   Encounter Diagnosis     ICD-10-CM    1  Chronic pain of both shoulders  M25 511     G89 29     M25 512                   Subjective:  No new complaints to report  Objective: See treatment diary below      Assessment: no complaints t/o tx session  Plan: Continue per plan of care  Daily Treatment Diary    EPOC: 22  Precautions: HTN- takes meds; Hx of CABG (has nitro); possible cardiomyopathy  Co- Morbidities:   Patient Active Problem List   Diagnosis    2-vessel coronary artery disease    Basal cell carcinoma of scalp    Cardiomyopathy ([facility] [address]    History of heart artery stent    Cholelithiasis    Hyperlipidemia    Hypertension    Lumbar pain    Urinary urgency    Vitamin D deficiency    Primary osteoarthritis of right knee    Primary osteoarthritis of left knee    Presence of stent in coronary artery in patient with coronary artery disease    Epistaxis    Murmur, cardiac    Squamous cell carcinoma in situ (SCCIS) of scalp    Calculus of gallbladder without cholecystitis without obstruction    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    Medicare annual wellness visit, subsequent    Anxiety    Irregular heart beat    Localized edema    Chronic obstructive pulmonary disease, unspecified COPD type (HCC)    Chronic pain of both shoulders         Manual  6/20  7/18  8/1  8/3    GHJ mobs  RB RB                 Total Time   4'  4'  4'     Exercise Diary 7/5 7/7 7/11 7/13 7/18 7/25 7/27 8/1 8/3    THEREX             B/l shoulder PROM     10'   8' 9'    Pt ed             Standing shoulder flexion/scaption       2x10;scaption 6x p!  2x10 flexion 1#       UT stretch  10" 10x  10" 10x          Pulleys flexion 2'/2' 2/2 2/2 2/2 3'/3' 3/3 2/2 3'/3' 3'/3'    Wall washes Flexion/scap 10x2 5" ea side Flexion/scap 10x2 5" ea side Flexion/scap 10x2 5" ea side Flexion/scap 10x2 5" ea side   Flexion/scap 10x2 5" ea side Flex/scap 10x2 5" ea side Flex scap 10x2 5" ea side    Supine flexion AROM    Focus on eccentric 15x R only + SL ER BL 15x NV        Supine AA sh flexion  HHA 10" 10x  HHA 10" 10x   10"x10 HHA        Shoulder isometrics Pink/ytb 10x Pink/ytb 10x 15x ea no band 15x ea no band  20x ytb ext;flex 20x; pink IR/ER 10x YTB flex 10x ea b/l; YTB ext 3x10 ea b/l ; PTB IR/ER 15x ea b/l  Flex PTB L and YTB R 15x; YTB ext 3x10 ea b/l; PTB IR/ER 3x10 ea b/l     Standing cane IR/extension             NEURO RE-ED             Scapular squeeze  10" 10x 10" 10x  HEP        Mod prone rows  15x   NV 2# 2x10 3# 2x10 3# 2x10 3# 3x10     Mod prone ext  15x   NV 2# 2x10 3# 2x10 3# 2x10 3# 3x10     TB resisted UT strengthening      NV         LPD/rows gtb 2x10 ea gtb 3x10 ea  gtb 3x10 ea GTB 3x10/BTB 3x10 OTB 3x10 ea OTB 3x10 ea [place] 8# 3x10/8# 3x10      UBE retro 2/2 2/2 3/3 3/3 3'/3' 3/3 3/3 3'/3' 3'/3'     serratus punch supine 2x10 0# 2x10 0#            reaching at [employer]   various levels 3 rounds each arm   various levels 3 rounds each arm   various levels 5 rounds each arm   1# R hand; 0# L hand          anna walk outs              anna pallof press              ball on wall scap stab        0# cw/ccw 20x ea dir b/l  0# cw/ccw 20x ea dir b/l                                    Modalities  6/20         CP  10'

## 2022-08-08 ENCOUNTER — RA CDI HCC (OUTPATIENT)
Dept: OTHER | Facility: HOSPITAL | Age: 87
End: 2022-08-08

## 2022-08-08 ENCOUNTER — OFFICE VISIT (OUTPATIENT)
Dept: PHYSICAL THERAPY | Facility: CLINIC | Age: 87
End: 2022-08-08
Payer: MEDICARE

## 2022-08-08 DIAGNOSIS — M25.512 CHRONIC PAIN OF BOTH SHOULDERS: Primary | ICD-10-CM

## 2022-08-08 DIAGNOSIS — G89.29 CHRONIC PAIN OF BOTH SHOULDERS: Primary | ICD-10-CM

## 2022-08-08 DIAGNOSIS — M25.511 CHRONIC PAIN OF BOTH SHOULDERS: Primary | ICD-10-CM

## 2022-08-08 PROCEDURE — 97110 THERAPEUTIC EXERCISES: CPT

## 2022-08-08 PROCEDURE — 97112 NEUROMUSCULAR REEDUCATION: CPT

## 2022-08-08 NOTE — PROGRESS NOTES
[facility] [address]  coding opportunities       Chart reviewed, no opportunity found: CHART REVIEWED, NO OPPORTUNITY FOUND        Patients Insurance     Medicare Insurance: Medicare

## 2022-08-08 NOTE — PROGRESS NOTES
Daily Note     Today's date: 2022  Patient name: Joseline Jacobson  : 1934  MRN: [id number]  Referring provider: CORY Watson  Dx:   Encounter Diagnosis     ICD-10-CM    1  Chronic pain of both shoulders  M25 511     G89 29     M25 512        Start Time: 1230          Subjective: pt reports yesterday was a good day pertaining to his shoulders, stated that today is not that great of a day  Objective: See treatment diary below      Assessment: Tolerated treatment well  Patient would benefit from continued PT  Pt needed cueing for proper positioning w/ IR/ER w/ tband  Able to increase resistance w/ mod prone rows w/ good tolerance  Able to reach  w/ 1# easier and minimal difficulty  Plan: Continue per plan of care  Daily Treatment Diary    EPOC: 22  Precautions: HTN- takes meds;  Hx of CABG (has nitro); possible cardiomyopathy  Co- Morbidities:   Patient Active Problem List   Diagnosis    2-vessel coronary artery disease    Basal cell carcinoma of scalp    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    Medicare annual wellness visit, subsequent    Anxiety    Irregular heart beat    Localized edema    Chronic obstructive pulmonary disease, unspecified COPD type (HCC)    Chronic pain of both shoulders         Manual  6/20  7/18  8/1  8/3    GHJ mobs  RB RB                 Total Time   4'  4'  4'     Exercise Diary 7/5 7/7 7/11 7/13 7/18 7/25 7/27 8/1 8/3 8/8   THEREX B/l shoulder PROM     10'   8' 9'    Pt ed             Standing shoulder flexion/scaption       2x10;scaption 6x p! 2x10 flexion 1#       UT stretch  10" 10x  10" 10x          Pulleys flexion 2'/2' 2/2 2/2 2/2 3'/3' 3/3 2/2 3'/3' 3'/3' 3/3   Wall washes Flexion/scap 10x2 5" ea side Flexion/scap 10x2 5" ea side Flexion/scap 10x2 5" ea side Flexion/scap 10x2 5" ea side   Flexion/scap 10x2 5" ea side Flex/scap 10x2 5" ea side Flex scap 10x2 5" ea side Flex scap 10x2 5" ea side   Supine flexion AROM    Focus on eccentric 15x R only + SL ER BL 15x NV        Supine AA sh flexion  HHA 10" 10x  HHA 10" 10x   10"x10 HHA        Shoulder isometrics Pink/ytb 10x Pink/ytb 10x 15x ea no band 15x ea no band  20x ytb ext;flex 20x; pink IR/ER 10x YTB flex 10x ea b/l; YTB ext 3x10 ea b/l ; PTB IR/ER 15x ea b/l  Flex PTB L and YTB R 15x; YTB ext 3x10 ea b/l; PTB IR/ER 3x10 ea b/l  Flex  20x; YTB ext 3x10 ea b/l; PTB IR/ER 3x10 ea b/l    Standing cane IR/extension             NEURO RE-ED             Scapular squeeze  10" 10x 10" 10x  HEP        Mod prone rows  15x   NV 2# 2x10 3# 2x10 3# 2x10 3# 3x10 5# 3x10    Mod prone ext  15x   NV 2# 2x10 3# 2x10 3# 2x10 3# 3x10 5# 3x10    TB resisted UT strengthening      NV         LPD/rows gtb 2x10 ea gtb 3x10 ea  gtb 3x10 ea GTB 3x10/BTB 3x10 OTB 3x10 ea OTB 3x10 ea [place] 8# 3x10/8# 3x10      UBE retro 2/2 2/2 3/3 3/3 3'/3' 3/3 3/3 3'/3' 3'/3' 3/3    serratus punch supine 2x10 0# 2x10 0#            reaching at [employer]   various levels 3 rounds each arm   various levels 3 rounds each arm   various levels 5 rounds each arm   1# R hand; 0# L hand      1# 4 rounds     anna walk outs              anna pallof press              ball on wall scap stab        0# cw/ccw 20x ea dir b/l  0# cw/ccw 20x ea dir b/l  2# cw/cww 20x ea dir b/l                                  Modalities  6/20         CP  10'

## 2022-08-10 ENCOUNTER — APPOINTMENT (OUTPATIENT)
Dept: PHYSICAL THERAPY | Facility: CLINIC | Age: 87
End: 2022-08-10
Payer: MEDICARE

## 2022-08-11 ENCOUNTER — OFFICE VISIT (OUTPATIENT)
Dept: PHYSICAL THERAPY | Facility: CLINIC | Age: 87
End: 2022-08-11
Payer: MEDICARE

## 2022-08-11 DIAGNOSIS — M25.512 CHRONIC PAIN OF BOTH SHOULDERS: Primary | ICD-10-CM

## 2022-08-11 DIAGNOSIS — G89.29 CHRONIC PAIN OF BOTH SHOULDERS: Primary | ICD-10-CM

## 2022-08-11 DIAGNOSIS — M25.511 CHRONIC PAIN OF BOTH SHOULDERS: Primary | ICD-10-CM

## 2022-08-11 PROCEDURE — 97110 THERAPEUTIC EXERCISES: CPT

## 2022-08-11 PROCEDURE — 97112 NEUROMUSCULAR REEDUCATION: CPT

## 2022-08-11 NOTE — PROGRESS NOTES
Daily Note     Today's date: 2022  Patient name: Rafiq Vasquez  : 1934  MRN: [id number]  Referring provider: CORY Euceda  Dx:   Encounter Diagnosis     ICD-10-CM    1  Chronic pain of both shoulders  M25 511     G89 29     M25 512                   Subjective: pt reports      Objective: See treatment diary below      Assessment: Tolerated treatment well  He demonstrates very good effort throughout session, without inc in pain verbalized  He follows all cues given for correct exercise technique and performance  Good recall of current program demonstrated  Patient demonstrated fatigue post treatment, exhibited good technique with therapeutic exercises and would benefit from continued PT  Plan: Continue per plan of care  Daily Treatment Diary    EPOC: 22  Precautions: HTN- takes meds;  Hx of CABG (has nitro); possible cardiomyopathy  Co- Morbidities:   Patient Active Problem List   Diagnosis    2-vessel coronary artery disease    Basal cell carcinoma of scalp    [facility]    History of heart artery stent    Cholelithiasis    Hyperlipidemia    Hypertension    Lumbar pain    Urinary urgency    Vitamin D deficiency    Primary osteoarthritis of right knee    Primary osteoarthritis of left knee    Presence of stent in coronary artery in patient with coronary artery disease    Epistaxis    Murmur, cardiac    Squamous cell carcinoma in situ (SCCIS) of scalp    Calculus of gallbladder without cholecystitis without obstruction    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 [facility]    Medicare annual wellness visit, subsequent    Anxiety    Irregular heart beat    Localized edema    Chronic obstructive pulmonary disease, unspecified COPD type ([facility] )    Chronic pain of both shoulders         Manual  6/20  7/18  8/1  8/3    BRITANY mobs  RB RB                 Total Time   4'  4'  4'     Exercise Diary 8/11      7/27 8/1 8/3 8/8   Fady Gill B/l shoulder PROM        8' 9'    Pt ed             Standing shoulder flexion/scaption        2x10 flexion 1#       UT stretch             Pulleys flexion 3/3      2/2 3'/3' 3'/3' 3/3   Wall washes Flex scap 10x2 5" ea side      Flexion/scap 10x2 5" ea side Flex/scap 10x2 5" ea side Flex scap 10x2 5" ea side Flex scap 10x2 5" ea side   Supine flexion AROM             Supine AA sh flexion             Shoulder isometrics Flex  20x; YTB ext 3x10 ea b/l; YTB IR/ER except for L ER PTB 3x10 ea b/l       ytb ext;flex 20x; pink IR/ER 10x YTB flex 10x ea b/l; YTB ext 3x10 ea b/l ; PTB IR/ER 15x ea b/l  Flex PTB L and YTB R 15x; YTB ext 3x10 ea b/l; PTB IR/ER 3x10 ea b/l  Flex  20x; YTB ext 3x10 ea b/l; PTB IR/ER 3x10 ea b/l    Standing cane IR/extension             NEURO RE-ED             Scapular squeeze             Mod prone rows 5# 3x10      3# 2x10 3# 2x10 3# 3x10 5# 3x10    Mod prone ext 5# 3x10      3# 2x10 3# 2x10 3# 3x10 5# 3x10    TB resisted UT strengthening               LPD/rows [place] 8# 3x10/8# 3x10      OTB 3x10 ea [place] 8# 3x10/8# 3x10      UBE retro 3/3      3/3 3'/3' 3'/3' 3/3    serratus punch supine              reaching at [employer]  1# 4 rounds          1# 4 rounds     anna walk outs              anna pallof press              ball on wall scap stab 2# cw/cww 20x ea dir b/l       0# cw/ccw 20x ea dir b/l  0# cw/ccw 20x ea dir b/l  2# cw/cww 20x ea dir b/l                                  Modalities  6/20         CP  10'

## 2022-08-15 ENCOUNTER — OFFICE VISIT (OUTPATIENT)
Dept: PHYSICAL THERAPY | Facility: CLINIC | Age: 87
End: 2022-08-15
Payer: MEDICARE

## 2022-08-15 DIAGNOSIS — M25.511 CHRONIC PAIN OF BOTH SHOULDERS: Primary | ICD-10-CM

## 2022-08-15 DIAGNOSIS — M25.512 CHRONIC PAIN OF BOTH SHOULDERS: Primary | ICD-10-CM

## 2022-08-15 DIAGNOSIS — G89.29 CHRONIC PAIN OF BOTH SHOULDERS: Primary | ICD-10-CM

## 2022-08-15 PROCEDURE — 97112 NEUROMUSCULAR REEDUCATION: CPT

## 2022-08-15 PROCEDURE — 97110 THERAPEUTIC EXERCISES: CPT

## 2022-08-15 NOTE — PROGRESS NOTES
Daily Note     Today's date: 8/15/2022  Patient name: Joseline Jacobson  : 1934  MRN: [id number]  Referring provider: CORY Watson  Dx:   Encounter Diagnosis     ICD-10-CM    1  Chronic pain of both shoulders  M25 511     G89 29     M25 512        Start Time: 1230          Subjective: pt reports having no new complaints  Objective: See treatment diary below      Assessment: Tolerated treatment well  Patient would benefit from continued PT  Decreased pain in L OH w/ reaching further above head  Pt was challenged w/ OH carry L > R side  Plan: Continue per plan of care  Daily Treatment Diary    EPOC: 22  Precautions: HTN- takes meds;  Hx of CABG (has nitro); possible cardiomyopathy  Co- Morbidities:   Patient Active Problem List   Diagnosis    2-vessel coronary artery disease    Basal cell carcinoma of scalp    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    Medicare annual wellness visit, subsequent    Anxiety    Irregular heart beat    Localized edema    Chronic obstructive pulmonary disease, unspecified COPD type (HCC)    Chronic pain of both shoulders         Manual  6/20  7/18  8/1  8/3    GHJ mobs  RB RB                 Total Time   4'  4'  4'     Exercise Diary 8/11 8/15     7/27 8/1 8/3 8/8   THEREX             B/l shoulder PROM        8' 9'    Pt ed             Shoulder press  2x10           UT stretch             Pulleys flexion 3/3 3/3     2/2 3'/3' 3'/3' 3/3 Wall washes Flex scap 10x2 5" ea side Flex scap 10x2 5" ea side     Flexion/scap 10x2 5" ea side Flex/scap 10x2 5" ea side Flex scap 10x2 5" ea side Flex scap 10x2 5" ea side   Supine flexion AROM             Supine AA sh flexion             Shoulder isometrics Flex  20x; YTB ext 3x10 ea b/l; YTB IR/ER except for L ER PTB 3x10 ea b/l  Flex  20x; YTB ext 3x10 ea b/l; YTB IR/ER except for L ER PTB 3x10 ea b/l      ytb ext;flex 20x; pink IR/ER 10x YTB flex 10x ea b/l; YTB ext 3x10 ea b/l ; PTB IR/ER 15x ea b/l  Flex PTB L and YTB R 15x; YTB ext 3x10 ea b/l; PTB IR/ER 3x10 ea b/l  Flex  20x; YTB ext 3x10 ea b/l; PTB IR/ER 3x10 ea b/l    Standing cane IR/extension             NEURO RE-ED             Scapular squeeze             Mod prone rows 5# 3x10 5# 3x10     3# 2x10 3# 2x10 3# 3x10 5# 3x10    Mod prone ext 5# 3x10 5# 3x10     3# 2x10 3# 2x10 3# 3x10 5# 3x10    TB resisted UT strengthening               LPD/rows [place] 8# 3x10/8# 3x10 [place] 10# 3x10 ea dir     OTB 3x10 ea [place] 8# 3x10/8# 3x10      UBE retro 3/3 3/3     3/3 3'/3' 3'/3' 3/3    serratus punch supine              reaching at [employer]  1# 4 rounds  1# 4 rounds         1# 4 rounds                               ball on wall scap stab 2# cw/cww 20x ea dir b/l       0# cw/ccw 20x ea dir b/l  0# cw/ccw 20x ea dir b/l  2# cw/cww 20x ea dir b/l    OH carry  1# 1 laps L side; 2 laps R side                            Modalities  6/20         CP  10'

## 2022-08-16 ENCOUNTER — APPOINTMENT (OUTPATIENT)
Dept: LAB | Facility: HOSPITAL | Age: 87
End: 2022-08-16
Payer: MEDICARE

## 2022-08-16 DIAGNOSIS — E55.9 VITAMIN D DEFICIENCY: ICD-10-CM

## 2022-08-16 DIAGNOSIS — I10 HYPERTENSION, UNSPECIFIED TYPE: ICD-10-CM

## 2022-08-16 DIAGNOSIS — E78.5 HYPERLIPIDEMIA, UNSPECIFIED HYPERLIPIDEMIA TYPE: ICD-10-CM

## 2022-08-16 LAB
25(OH)D3 SERPL-MCNC: 50.7 NG/ML (ref 30–100)
ALBUMIN SERPL BCP-MCNC: 3.7 G/DL (ref 3.5–5)
ALP SERPL-CCNC: 101 U/L (ref 46–116)
ALT SERPL W P-5'-P-CCNC: 22 U/L (ref 12–78)
ANION GAP SERPL CALCULATED.3IONS-SCNC: 5 MMOL/L (ref 4–13)
AST SERPL W P-5'-P-CCNC: 26 U/L (ref 5–45)
BASOPHILS # BLD AUTO: 0.03 THOUSANDS/ΜL (ref 0–0.1)
BASOPHILS NFR BLD AUTO: 1 % (ref 0–1)
BILIRUB SERPL-MCNC: 1.13 MG/DL (ref 0.2–1)
BUN SERPL-MCNC: 19 MG/DL (ref 5–25)
CALCIUM SERPL-MCNC: 9.2 MG/DL (ref 8.3–10.1)
CHLORIDE SERPL-SCNC: 105 MMOL/L (ref 96–108)
CHOLEST SERPL-MCNC: 112 MG/DL
CO2 SERPL-SCNC: 30 MMOL/L (ref 21–32)
CREAT SERPL-MCNC: 1 MG/DL (ref 0.6–1.3)
EOSINOPHIL # BLD AUTO: 0.11 THOUSAND/ΜL (ref 0–0.61)
EOSINOPHIL NFR BLD AUTO: 2 % (ref 0–6)
ERYTHROCYTE [DISTWIDTH] IN BLOOD BY AUTOMATED COUNT: 12.8 % (ref 11.6–15.1)
GFR SERPL CREATININE-BSD FRML MDRD: 67 ML/MIN/1.73SQ M
GLUCOSE P FAST SERPL-MCNC: 101 MG/DL (ref 65–99)
HCT VFR BLD AUTO: 41.8 % (ref 36.5–49.3)
HDLC SERPL-MCNC: 75 MG/DL
HGB BLD-MCNC: 13.6 G/DL (ref 12–17)
IMM GRANULOCYTES # BLD AUTO: 0.01 THOUSAND/UL (ref 0–0.2)
IMM GRANULOCYTES NFR BLD AUTO: 0 % (ref 0–2)
LDLC SERPL CALC-MCNC: 30 MG/DL (ref 0–100)
LYMPHOCYTES # BLD AUTO: 1.88 THOUSANDS/ΜL (ref 0.6–4.47)
LYMPHOCYTES NFR BLD AUTO: 31 % (ref 14–44)
MCH RBC QN AUTO: 31.5 PG (ref 26.8–34.3)
MCHC RBC AUTO-ENTMCNC: 32.5 G/DL (ref 31.4–37.4)
MCV RBC AUTO: 97 FL (ref 82–98)
MONOCYTES # BLD AUTO: 0.53 THOUSAND/ΜL (ref 0.17–1.22)
MONOCYTES NFR BLD AUTO: 9 % (ref 4–12)
NEUTROPHILS # BLD AUTO: 3.56 THOUSANDS/ΜL (ref 1.85–7.62)
NEUTS SEG NFR BLD AUTO: 57 % (ref 43–75)
NONHDLC SERPL-MCNC: 37 MG/DL
NRBC BLD AUTO-RTO: 0 /100 WBCS
PLATELET # BLD AUTO: 192 THOUSANDS/UL (ref 149–390)
PMV BLD AUTO: 9.7 FL (ref 8.9–12.7)
POTASSIUM SERPL-SCNC: 4.8 MMOL/L (ref 3.5–5.3)
PROT SERPL-MCNC: 7.3 G/DL (ref 6.4–8.4)
RBC # BLD AUTO: 4.32 MILLION/UL (ref 3.88–5.62)
SODIUM SERPL-SCNC: 140 MMOL/L (ref 135–147)
TRIGL SERPL-MCNC: 33 MG/DL
TSH SERPL DL<=0.05 MIU/L-ACNC: 2.12 UIU/ML (ref 0.45–4.5)
WBC # BLD AUTO: 6.12 THOUSAND/UL (ref 4.31–10.16)

## 2022-08-16 PROCEDURE — 80061 LIPID PANEL: CPT

## 2022-08-16 PROCEDURE — 36415 COLL VENOUS BLD VENIPUNCTURE: CPT

## 2022-08-16 PROCEDURE — 80053 COMPREHEN METABOLIC PANEL: CPT

## 2022-08-16 PROCEDURE — 84443 ASSAY THYROID STIM HORMONE: CPT

## 2022-08-16 PROCEDURE — 85025 COMPLETE CBC W/AUTO DIFF WBC: CPT

## 2022-08-16 PROCEDURE — 82306 VITAMIN D 25 HYDROXY: CPT

## 2022-08-17 ENCOUNTER — EVALUATION (OUTPATIENT)
Dept: PHYSICAL THERAPY | Facility: CLINIC | Age: 87
End: 2022-08-17
Payer: MEDICARE

## 2022-08-17 DIAGNOSIS — M25.511 CHRONIC PAIN OF BOTH SHOULDERS: Primary | ICD-10-CM

## 2022-08-17 DIAGNOSIS — M25.512 CHRONIC PAIN OF BOTH SHOULDERS: Primary | ICD-10-CM

## 2022-08-17 DIAGNOSIS — G89.29 CHRONIC PAIN OF BOTH SHOULDERS: Primary | ICD-10-CM

## 2022-08-17 PROCEDURE — 97112 NEUROMUSCULAR REEDUCATION: CPT | Performed by: PHYSICAL THERAPIST

## 2022-08-17 PROCEDURE — 97110 THERAPEUTIC EXERCISES: CPT | Performed by: PHYSICAL THERAPIST

## 2022-08-17 NOTE — PROGRESS NOTES
PT Re-Evaluation     Today's date: 2022  Patient name: Elda Kirk  : 1934  MRN: [id number]  Referring provider: CORY Herrera  Dx:   Encounter Diagnosis     ICD-10-CM    1  Chronic pain of both shoulders  M25 511     G89 29     M25 512                   Assessment  Assessment details: Elda Kirk is an 80 y o  male being treated as an outpatient at [place] [address] PT w/ initial c/o b/l shoulder pain (L worse vs R)  Since previous progress note, pt has made steady gains in pain reduction, ROM, and strength, but continues to remain limited from max function  Pt will continue to benefit from skilled PT services in order to max function to allow pt to achieve goals in PT  Thank you  Impairments: abnormal muscle tone, abnormal or restricted ROM, activity intolerance, impaired physical strength, pain with function and poor posture   Understanding of Dx/Px/POC: good   Prognosis: good    Goals  ST  Pain decreased by 25% in 4-6 weeks  - Met  2  ROM increased by 25% in 4-6 weeks  - PROGRESSING    LT  Decrease pain to 1-2/10 at worst by d/c - PROGRESSING  2  Increase ROM to [facility] for all deficient movements by d/c  - PROGRESSING  3  Strength increased to 5 for all deficient muscle groups by d/c - PROGRESSING  4  IADL performance increased to max function by d/c - PROGRESSING  5  Recreational performance increased to max function by d/c - PROGRESSING      Plan  Planned modality interventions: cryotherapy  Other planned modality interventions: other modalities PRN  Planned therapy interventions: ADL retraining, IADL retraining, flexibility, functional ROM exercises, graded exercise, home exercise program, manual therapy, joint mobilization, neuromuscular re-education, patient education, postural training, strengthening, therapeutic exercise, therapeutic activities and abdominal trunk stabilization  Other planned therapy interventions: other interventions PRN  Frequency: 1-2x/week    Duration in weeks: 4  Plan of Care beginning date: 8/17/2022  Plan of Care expiration date: 9/14/2022  Treatment plan discussed with: patient        Subjective Evaluation    History of Present Illness  Mechanism of injury: CURRENT LEVEL (8/17/22)  Pt reports feeling approx 80-85% recovered at this time  Pt can now drive w/ LUE w/o pain  Pt also w/ less difficulty donning seat belt and reaching across to R shoulder  Although still some mobility issues, pt reports that LUE strength seems to be the most limited at this time  Pt expresses interest that he may want to be evaluated for balance impairment in the near future  PREVIOUS LEVEL (7/20/22)  Pt reports progress since IE- he feels approximately 75-80% recovered  He can now tolerate driving better- states that he can turn steering wheel w/ LUE while looking in the opposite direction when backing up which he was unable to do previously  Although still painful/limited, pt reports significant improvement in activities involving shoulder elevation, performing quick/jerky movements, w/ sleeping, w/ lifting, and w/ functional IR/ER  He no longer has pain sleeping in recliner, but has not yet attempted sleeping in bed  Pt states that he is now able to lift 1/2 gallon of milk into fridge and mow lawn (w/ self propelled ) w/o much difficulty  He continues to have the most pain performing activities which involve reaching into abduction  INITIAL LEVEL (6/20/22)  Pt reports to IE w/ c/o b/l shoulder pain (L worse vs R)  He reports that R shoulder pain began 3-4 months ago and L shoulder pain started 1-1 5 months ago  Pt reports that R shoulder pain started first while carrying trash can up the driveway and he "swung it" causing R shoulder pain  He believes that L shoulder pain is a result of compensation  Pt reports that imaging of R shoulder demonstrated arthritis and L shoulder demonstrated RTC tendonitis        Pt sleeps in a recliner for the past approximate month  Pt denies any associated c/s pain and/or numbness/parasthesias  Pain  Pain scale: R shoulder: 0/10; L shoulder: 0/10  Pain scale at highest: R shoulder: 0/10; L shoulder: 6/10  Location: b/l shoulders  Relieving factors: rest  Exacerbated by: L shoulder: elevating b/l UE, quick, jerky movements, sleeping, lifting  Social Support  Lives with: spouse    Employment status: not working  Hand dominance: right      Diagnostic Tests  Abnormal x-ray: 6/9: R- Mild osteoarthritis of the glenohumeral and acromioclavicular joints ; L - RTC tendonitis    Patient Goals  Patient goals for therapy: decreased pain, independence with ADLs/IADLs, increased motion and increased strength (PROGRESSING)          Objective     Active Range of Motion   Left Shoulder   Flexion: 172 (*minimal pain) degrees   Abduction: 155 degrees with pain  External rotation 45°: 75 degrees   Internal rotation 45°: 72 degrees     Right Shoulder   Flexion: 175 (*min pain) degrees   Abduction: 176 degrees   External rotation 45°: 84 degrees   Internal rotation 45°: 65 degrees     Passive Range of Motion   Left Shoulder   Flexion: 171 (*minimal pain) degrees   Abduction: 155 degrees with pain  External rotation 45°: 75 degrees   Internal rotation 45°: 72 degrees     Strength/Myotome Testing     Left Shoulder     Planes of Motion   Flexion: 4 (*pain)   Abduction: 4- (*pain)   External rotation at 45°: 3 (*pain w/ attempted resistance)   Internal rotation at 45°: 4+     Right Shoulder     Planes of Motion   Flexion: 5   Abduction: 4+   External rotation at 45°: 4+ (*min pain)   Internal rotation at 45°: 5     Additional Strength Details  DNT strength due to time constraints    General Comments:      Shoulder Comments   Observation/Posture:  Pt sits w/ fwd, rounded shoulders w/ fwd head     Palpation: Hypertonicity t/o b/l UT, scap    c/s special tests (assessed at IE):   sharp-ethan: negative  vertebral artery: negative  spurlings:negative    Shoulder Special Tests (L/R- assessed at ):   [place] : positive/positive  Empty Can :positive/positive  Neer Impingement : negative/negative  Speed's Test : unable to actively lift LUE/negative               Daily Treatment Diary    EPOC: 9/14/22  Precautions: HTN- takes meds;  Hx of CABG (has nitro); possible cardiomyopathy  Co- Morbidities:   Patient Active Problem List   Diagnosis    2-vessel coronary artery disease    Basal cell carcinoma of scalp    [facility]    History of heart artery stent    Cholelithiasis    Hyperlipidemia    Hypertension    Lumbar pain    Urinary urgency    Vitamin D deficiency    Primary osteoarthritis of right knee    Primary osteoarthritis of left knee    Presence of stent in coronary artery in patient with coronary artery disease    Epistaxis    Murmur, cardiac    Squamous cell carcinoma in situ (SCCIS) of scalp    Calculus of gallbladder without cholecystitis without obstruction    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    Medicare annual wellness visit, subsequent    Anxiety    Irregular heart beat    Localized edema    Chronic obstructive pulmonary disease, unspecified COPD type (HCC)    Chronic pain of both shoulders         Manual  6/20  7/18  8/1  8/3 8/17      GHJ mobs  RB RB  RB                     Total Time   4'  4'  4' 4'       Exercise Diary 8/11 8/15 8/17    7/27 8/1 8/3 8/8   THEREX             Progress Note   15'          B/l shoulder PROM   10'     8' 9'    Pt ed             Shoulder press  2x10           UT stretch             Pulleys flexion 3/3 3/3 3'/3'    2/2 3'/3' 3'/3' 3/3   Wall washes Flex scap 10x2 5" ea side Flex scap 10x2 5" ea side Flex/scap 10x2 5" ea side    Flexion/scap 10x2 5" ea side Flex/scap 10x2 5" ea side Flex scap 10x2 5" ea side Flex scap 10x2 5" ea side   Shoulder isometrics Flex  20x; YTB ext 3x10 ea b/l; YTB IR/ER except for L ER PTB 3x10 ea b/l  Flex  20x; YTB ext 3x10 ea b/l; YTB IR/ER except for L ER PTB 3x10 ea b/l      ytb ext;flex 20x; pink IR/ER 10x YTB flex 10x ea b/l; YTB ext 3x10 ea b/l ; PTB IR/ER 15x ea b/l  Flex PTB L and YTB R 15x; YTB ext 3x10 ea b/l; PTB IR/ER 3x10 ea b/l  Flex  20x; YTB ext 3x10 ea b/l; PTB IR/ER 3x10 ea b/l    Standing cane IR/extension             NEURO RE-ED             Scapular squeeze             Mod prone rows 5# 3x10 5# 3x10     3# 2x10 3# 2x10 3# 3x10 5# 3x10    Mod prone ext 5# 3x10 5# 3x10     3# 2x10 3# 2x10 3# 3x10 5# 3x10    TB resisted UT strengthening               LPD/rows [place] 8# 3x10/8# 3x10 [place] 10# 3x10 ea dir     OTB 3x10 ea [place] 8# 3x10/8# 3x10      UBE retro 3/3 3/3 3'/3'    3/3 3'/3' 3'/3' 3/3    serratus punch supine              reaching at [employer]  1# 4 rounds  1# 4 rounds         1# 4 rounds    Supine active shoulder flexion             s/l ER             S/l abduction              ball on wall scap stab 2# cw/cww 20x ea dir b/l       0# cw/ccw 20x ea dir b/l  0# cw/ccw 20x ea dir b/l  2# cw/cww 20x ea dir b/l    OH carry  1# 1 laps L side; 2 laps R side                            Modalities  6/20         CP  10'

## 2022-08-22 ENCOUNTER — APPOINTMENT (OUTPATIENT)
Dept: PHYSICAL THERAPY | Facility: CLINIC | Age: 87
End: 2022-08-22
Payer: MEDICARE

## 2022-08-22 NOTE — PROGRESS NOTES
Daily Note     Today's date: 2022  Patient name: Jeet Blood  : 1934  MRN: [id number]  Referring provider: CORY Lauren  Dx: No diagnosis found  Subjective: pt reports      Objective: See treatment diary below      Assessment: Tolerated treatment well  Patient would benefit from continued PT  Plan: Continue per plan of care  Daily Treatment Diary    EPOC: 22  Precautions: HTN- takes meds;  Hx of CABG (has nitro); possible cardiomyopathy  Co- Morbidities:   Patient Active Problem List   Diagnosis    2-vessel coronary artery disease    Basal cell carcinoma of scalp    [facility]    History of heart artery stent    Cholelithiasis    Hyperlipidemia    Hypertension    Lumbar pain    Urinary urgency    Vitamin D deficiency    Primary osteoarthritis of right knee    Primary osteoarthritis of left knee    Presence of stent in coronary artery in patient with coronary artery disease    Epistaxis    Murmur, cardiac    Squamous cell carcinoma in situ (SCCIS) of scalp    Calculus of gallbladder without cholecystitis without obstruction    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    Medicare annual wellness visit, subsequent    Anxiety    Irregular heart beat    Localized edema    Chronic obstructive pulmonary disease, unspecified COPD type (HCC)    Chronic pain of both shoulders         Manual  6/20  7/18  8/1  8/3    GHJ mobs  RB RB                 Total Time   4'  4'  4'     Exercise Diary 8/11 8/15 8/22    7/27 8/1 8/3 8/8   THEREX             B/l shoulder PROM        8' 9'    Pt ed             Shoulder press  2x10           UT stretch             Pulleys flexion 3/3 3/3     2/2 3'/3' 3'/3' 33   Wall washes Flex scap 10x2 5" ea side Flex scap 10x2 5" ea side     Flexion/scap 10x2 5" ea side Flex/scap 10x2 5" ea side Flex scap 10x2 5" ea side Flex scap 10x2 5" ea side   Supine flexion AROM             Supine AA sh flexion             Shoulder isometrics Flex  20x; YTB ext 3x10 ea b/l; YTB IR/ER except for L ER PTB 3x10 ea b/l  Flex  20x; YTB ext 3x10 ea b/l; YTB IR/ER except for L ER PTB 3x10 ea b/l      ytb ext;flex 20x; pink IR/ER 10x YTB flex 10x ea b/l; YTB ext 3x10 ea b/l ; PTB IR/ER 15x ea b/l  Flex PTB L and YTB R 15x; YTB ext 3x10 ea b/l; PTB IR/ER 3x10 ea b/l  Flex  20x; YTB ext 3x10 ea b/l; PTB IR/ER 3x10 ea b/l    Standing cane IR/extension             NEURO RE-ED             Scapular squeeze             Mod prone rows 5# 3x10 5# 3x10     3# 2x10 3# 2x10 3# 3x10 5# 3x10    Mod prone ext 5# 3x10 5# 3x10     3# 2x10 3# 2x10 3# 3x10 5# 3x10    TB resisted UT strengthening               LPD/rows [place] 8# 3x10/8# 3x10 [place] 10# 3x10 ea dir     OTB 3x10 ea [place] 8# 3x10/8# 3x10      UBE retro 3/3 3/3     3/3 3'/3' 3'/3' 3/3    serratus punch supine              reaching at [employer]  1# 4 rounds  1# 4 rounds         1# 4 rounds                               ball on wall scap stab 2# cw/cww 20x ea dir b/l       0# cw/ccw 20x ea dir b/l  0# cw/ccw 20x ea dir b/l  2# cw/cww 20x ea dir b/l    OH carry  1# 1 laps L side; 2 laps R side                            Modalities  6/20         CP  10'

## 2022-08-24 ENCOUNTER — OFFICE VISIT (OUTPATIENT)
Dept: INTERNAL MEDICINE CLINIC | Facility: CLINIC | Age: 87
End: 2022-08-24
Payer: MEDICARE

## 2022-08-24 VITALS
WEIGHT: 146 LBS | OXYGEN SATURATION: 96 % | DIASTOLIC BLOOD PRESSURE: 66 MMHG | SYSTOLIC BLOOD PRESSURE: 110 MMHG | BODY MASS INDEX: 24.32 KG/M2 | HEIGHT: 65 IN | HEART RATE: 88 BPM | RESPIRATION RATE: 12 BRPM

## 2022-08-24 DIAGNOSIS — G89.29 CHRONIC PAIN OF BOTH SHOULDERS: ICD-10-CM

## 2022-08-24 DIAGNOSIS — E78.5 HYPERLIPIDEMIA, UNSPECIFIED HYPERLIPIDEMIA TYPE: ICD-10-CM

## 2022-08-24 DIAGNOSIS — E55.9 VITAMIN D DEFICIENCY: ICD-10-CM

## 2022-08-24 DIAGNOSIS — I25.10 PRESENCE OF STENT IN CORONARY ARTERY IN PATIENT WITH CORONARY ARTERY DISEASE: ICD-10-CM

## 2022-08-24 DIAGNOSIS — I10 HYPERTENSION, UNSPECIFIED TYPE: Primary | ICD-10-CM

## 2022-08-24 DIAGNOSIS — R73.09 ABNORMAL GLUCOSE: ICD-10-CM

## 2022-08-24 DIAGNOSIS — M25.511 CHRONIC PAIN OF BOTH SHOULDERS: ICD-10-CM

## 2022-08-24 DIAGNOSIS — R17 ELEVATED BILIRUBIN: ICD-10-CM

## 2022-08-24 DIAGNOSIS — Z95.5 PRESENCE OF STENT IN CORONARY ARTERY IN PATIENT WITH CORONARY ARTERY DISEASE: ICD-10-CM

## 2022-08-24 DIAGNOSIS — M25.512 CHRONIC PAIN OF BOTH SHOULDERS: ICD-10-CM

## 2022-08-24 PROCEDURE — 99214 OFFICE O/P EST MOD 30 MIN: CPT | Performed by: NURSE PRACTITIONER

## 2022-08-24 NOTE — PROGRESS NOTES
Depression Screening and Follow-up Plan: Patient was screened for depression during today's encounter  They screened negative with a PHQ-2 score of 0  Falls Plan of Care: balance, strength, and gait training instructions were provided  Assessment/Plan:    1  Hypertension- At goal  Continue present medication  2  Hyperlipidemia- At goal  Continue statin  3  Coronary artery disease- Continue beta-blocker, statin, aspirin  Followed by cardiology, Dr Smooth Baca  4  B/L shoulder pain- responding well to physical therapy  5  Fasting blood glucose- A1c added to labs  6  Elevated bilirubin- Repeat hepatic function panel in one month  Follow up in six months, labs prior  Diagnoses and all orders for this visit:    Hypertension, unspecified type  -     CBC and differential; Future  -     Comprehensive metabolic panel; Future  -     TSH, 3rd generation with Free T4 reflex; Future    Presence of stent in coronary artery in patient with coronary artery disease    Vitamin D deficiency    Hyperlipidemia, unspecified hyperlipidemia type  -     Lipid panel; Future    Chronic pain of both shoulders    Abnormal glucose  -     HEMOGLOBIN A1C W/ EAG ESTIMATION; Future    Elevated bilirubin  -     Hepatic function panel; Future        The patient was counseled regarding instructions for management, risk factor reductions, patient and family education,impressions, risks and benefits of treatment options, side effects of medications, importance of compliance with treatment  The treatment plan was reviewed with the patient/guardian and patient/guardian understands and agrees with the treatment plan          Current Outpatient Medications:     amLODIPine (NORVASC) 5 mg tablet, Take 1 tablet (5 mg total) by mouth daily, Disp: 90 tablet, Rfl: 2    aspirin 81 MG tablet, Take 2 tablets by mouth once , Disp: , Rfl:     atorvastatin (LIPITOR) 80 mg tablet, Take 1 tablet (80 mg total) by mouth daily, Disp: 90 tablet, Rfl: 2   B Complex Vitamins (VITAMIN B COMPLEX) TABS, Take 1 tablet by mouth daily, Disp: , Rfl:     Cholecalciferol (VITAMIN D-3 PO), Take 2,000 Units by mouth 3 (three) times a day before meals, Disp: , Rfl:     co-enzyme Q-10 30 MG capsule, Take 30 mg by mouth daily, Disp: , Rfl:     Flax OIL, Take 1 capsule by mouth daily, Disp: 90 mL, Rfl: 3    metoprolol succinate (TOPROL-XL) 25 mg 24 hr tablet, Take 1 tablet (25 mg total) by mouth daily, Disp: 90 tablet, Rfl: 2    nitroglycerin (NITROSTAT) 0 4 mg SL tablet, Place 1 tablet (0 4 mg total) under the tongue every 5 (five) minutes as needed for chest pain, Disp: 25 tablet, Rfl: 4    valsartan (DIOVAN) 80 mg tablet, Take 1 tablet (80 mg total) by mouth daily, Disp: 90 tablet, Rfl: 2    omega-3-acid ethyl esters (LOVAZA) 1 g capsule, Take 2 g by mouth daily (Patient not taking: Reported on 8/24/2022), Disp: , Rfl:     Subjective:      Patient ID: Juan Jose Luu is a 80 y o  male  Here for follow up  No complaints today  The following portions of the patient's history were reviewed and updated as appropriate:   He has a past medical history of Abnormal weight loss, Arthritis, Basal cell carcinoma, Bursitis, Cancer ([facility] [address] ), Cardiomyopathy ([facility] [address] ), Chest discomfort, Chest pain, Coronary artery disease, Ecchymosis, Exertional dyspnea, Hepatic hemangioma, Herniation of lumbar intervertebral disc, Hyperlipidemia, Hypertension, Nonmelanoma skin cancer, Pleural effusion, Pulmonary nodule, Shortness of breath, and Vitamin D deficiency  ,  does not have any pertinent problems on file  ,   has a past surgical history that includes Carpal tunnel release (Bilateral); Coronary artery bypass graft (03/24/2011); Coronary artery bypass graft; and pr repair incisional hernia,reducible (Bilateral, 4/11/2017)  ,  family history includes Heart disease in his mother; Hypertension in his mother  ,   reports that he quit smoking about 42 years ago   He has never used smokeless tobacco  He reports current alcohol use of about 1 0 standard drink of alcohol per week  He reports that he does not use drugs  ,  is allergic to other       Review of Systems   Constitutional: Negative  Respiratory: Negative  Cardiovascular: Negative  Musculoskeletal: Negative  Psychiatric/Behavioral: Negative          Objective:  /66 (BP Location: Left arm, Patient Position: Sitting)   Pulse 88   Resp 12   Ht 5' 5" (1 651 m)   Wt 66 2 kg (146 lb)   SpO2 96%   BMI 24 30 kg/m²     Lab Review  Appointment on 08/16/2022   Component Date Value    WBC 08/16/2022 6 12     RBC 08/16/2022 4 32     Hemoglobin 08/16/2022 13 6     Hematocrit 08/16/2022 41 8     MCV 08/16/2022 97     MCH 08/16/2022 31 5     MCHC 08/16/2022 32 5     RDW 08/16/2022 12 8     MPV 08/16/2022 9 7     Platelets 67/76/8793 192     nRBC 08/16/2022 0     Neutrophils Relative 08/16/2022 57     Immat GRANS % 08/16/2022 0     Lymphocytes Relative 08/16/2022 31     Monocytes Relative 08/16/2022 9     Eosinophils Relative 08/16/2022 2     Basophils Relative 08/16/2022 1     Neutrophils Absolute 08/16/2022 3 56     Immature Grans Absolute 08/16/2022 0 01     Lymphocytes Absolute 08/16/2022 1 88     Monocytes Absolute 08/16/2022 0 53     Eosinophils Absolute 08/16/2022 0 11     Basophils Absolute 08/16/2022 0 03     Sodium 08/16/2022 140     Potassium 08/16/2022 4 8     Chloride 08/16/2022 105     CO2 08/16/2022 30     ANION GAP 08/16/2022 5     BUN 08/16/2022 19     Creatinine 08/16/2022 1 00     Glucose, Fasting 08/16/2022 101 (A)    Calcium 08/16/2022 9 2     AST 08/16/2022 26     ALT 08/16/2022 22     Alkaline Phosphatase 08/16/2022 101     Total Protein 08/16/2022 7 3     Albumin 08/16/2022 3 7     Total Bilirubin 08/16/2022 1 13 (A)    eGFR 08/16/2022 67     Cholesterol 08/16/2022 112     Triglycerides 08/16/2022 33     HDL, Direct 08/16/2022 75     LDL Calculated 08/16/2022 30     Non-HDL-Chol (CHOL-HDL) 08/16/2022 37     Vit D, 25-Hydroxy 08/16/2022 50 7     TSH 3RD GENERATON 08/16/2022 2 115       Imaging: No results found  Physical Exam  Constitutional:       Appearance: He is well-developed  Cardiovascular:      Rate and Rhythm: Normal rate and regular rhythm  Heart sounds: Normal heart sounds  Pulmonary:      Effort: Pulmonary effort is normal       Breath sounds: Normal breath sounds  Musculoskeletal:         General: Normal range of motion  Neurological:      Mental Status: He is alert and oriented to person, place, and time  Deep Tendon Reflexes: Reflexes are normal and symmetric  Psychiatric:         Behavior: Behavior normal          Thought Content:  Thought content normal          Judgment: Judgment normal

## 2022-08-24 NOTE — PATIENT INSTRUCTIONS
Hypertension- At goal  Continue present medication  Hyperlipidemia- At goal  Continue statin  Coronary artery disease- Continue beta-blocker, statin, aspirin  Followed by cardiology, Dr Mcdermott Doctor  B/L shoulder pain- responding well to physical therapy  Fasting blood glucose- A1c added to labs  Elevated bilirubin- Repeat hepatic function panel in one month  Follow up in six months, labs prior

## 2022-08-29 ENCOUNTER — OFFICE VISIT (OUTPATIENT)
Dept: PHYSICAL THERAPY | Facility: CLINIC | Age: 87
End: 2022-08-29
Payer: MEDICARE

## 2022-08-29 DIAGNOSIS — G89.29 CHRONIC PAIN OF BOTH SHOULDERS: Primary | ICD-10-CM

## 2022-08-29 DIAGNOSIS — M25.511 CHRONIC PAIN OF BOTH SHOULDERS: Primary | ICD-10-CM

## 2022-08-29 DIAGNOSIS — M25.512 CHRONIC PAIN OF BOTH SHOULDERS: Primary | ICD-10-CM

## 2022-08-29 PROCEDURE — 97112 NEUROMUSCULAR REEDUCATION: CPT | Performed by: PHYSICAL THERAPIST

## 2022-08-29 PROCEDURE — 97110 THERAPEUTIC EXERCISES: CPT | Performed by: PHYSICAL THERAPIST

## 2022-08-29 NOTE — PROGRESS NOTES
Daily Note     Today's date: 2022  Patient name: Alla Arcos  : 1934  MRN: [id number]  Referring provider: CORY Vaz  Dx:   Encounter Diagnosis     ICD-10-CM    1  Chronic pain of both shoulders  M25 511     G89 29     M25 512        Start Time: 1225          Subjective: pt reports overall being sore after mowing the lawn this weekend  Objective: See treatment diary below      Assessment: Progressed to incorporate more strengthening this visit- pt more challenged w/ LUE vs RUE  Some soreness/pain in L shoulder w/ ex, but pt tolerated fairly well  Plan: Continue per plan of care  Progress strengthening as able NV  Daily Treatment Diary    EPOC: 22  Precautions: HTN- takes meds;  Hx of CABG (has nitro); possible cardiomyopathy  Co- Morbidities:   Patient Active Problem List   Diagnosis    2-vessel coronary artery disease    Basal cell carcinoma of scalp    Cardiomyopathy ([facility] [address] )    History of heart artery stent    Cholelithiasis    Hyperlipidemia    Hypertension    Lumbar pain    Urinary urgency    Vitamin D deficiency    Primary osteoarthritis of right knee    Primary osteoarthritis of left knee    Presence of stent in coronary artery in patient with coronary artery disease    Epistaxis    Murmur, cardiac    Squamous cell carcinoma in situ (SCCIS) of scalp    Calculus of gallbladder without cholecystitis without obstruction    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 ([facility] [address] )    Medicare annual wellness visit, subsequent    Anxiety    Irregular heart beat    Localized edema    Chronic obstructive pulmonary disease, unspecified COPD type (HCC)    Chronic pain of both shoulders         Manual  6/20  7/18  8/1  8/3 8/17 8/29     GHJ mobs  RB RB  RB RB- L only                    Total Time   4'  4'  4' 4' 2'      Exercise Diary 8/11 8/15 8/17 8/29   7/27 8/1 8/3 8/8   THEREX             Progress Note   15'          B/l shoulder PROM 10' 6'    8' 9'    Pt ed             Shoulder press  2x10           UT stretch             Pulleys flexion 3/3 3/3 3'/3' 3'/3'   2/2 3'/3' 3'/3' 3/3   Wall washes Flex scap 10x2 5" ea side Flex scap 10x2 5" ea side Flex/scap 10x2 5" ea side Flex/scap 10x2 5" ea side   Flexion/scap 10x2 5" ea side Flex/scap 10x2 5" ea side Flex scap 10x2 5" ea side Flex scap 10x2 5" ea side   Shoulder isometrics Flex  20x; YTB ext 3x10 ea b/l; YTB IR/ER except for L ER PTB 3x10 ea b/l  Flex  20x; YTB ext 3x10 ea b/l; YTB IR/ER except for L ER PTB 3x10 ea b/l   YTB flex 20; YTB IR 20x ea;    ytb ext;flex 20x; pink IR/ER 10x YTB flex 10x ea b/l; YTB ext 3x10 ea b/l ; PTB IR/ER 15x ea b/l  Flex PTB L and YTB R 15x; YTB ext 3x10 ea b/l; PTB IR/ER 3x10 ea b/l  Flex  20x; YTB ext 3x10 ea b/l; PTB IR/ER 3x10 ea b/l    Standing cane IR/extension             NEURO RE-ED             Scapular squeeze             Mod prone rows 5# 3x10 5# 3x10     3# 2x10 3# 2x10 3# 3x10 5# 3x10    Mod prone ext 5# 3x10 5# 3x10     3# 2x10 3# 2x10 3# 3x10 5# 3x10    TB resisted UT strengthening               LPD/rows [place] 8# 3x10/8# 3x10 Anna 10# 3x10 ea dir  GTB rows: 20x; LPD: OTB 20x   OTB 3x10 ea [place] 8# 3x10/8# 3x10      UBE retro 3/3 3/3 3'/3' 3'/3'   3/3 3'/3' 3'/3' 3/3    serratus punch supine              reaching at [employer]  1# 4 rounds  1# 4 rounds         1# 4 rounds    Supine active shoulder flexion    R 1# 15x/L 0# 15x         s/l ER    1# 15x ea b/l          S/l abduction    R 1# 15x/L 0# 15          ball on wall scap stab 2# cw/cww 20x ea dir b/l   2# CW/CCW    0# cw/ccw 20x ea dir b/l  0# cw/ccw 20x ea dir b/l  2# cw/cww 20x ea dir b/l    OH carry  1# 1 laps L side; 2 laps R side            TB resisted     RTB 15x            Modalities  6/20         CP  10'

## 2022-08-31 ENCOUNTER — OFFICE VISIT (OUTPATIENT)
Dept: PHYSICAL THERAPY | Facility: CLINIC | Age: 87
End: 2022-08-31
Payer: MEDICARE

## 2022-08-31 DIAGNOSIS — M25.512 CHRONIC PAIN OF BOTH SHOULDERS: Primary | ICD-10-CM

## 2022-08-31 DIAGNOSIS — G89.29 CHRONIC PAIN OF BOTH SHOULDERS: Primary | ICD-10-CM

## 2022-08-31 DIAGNOSIS — M25.511 CHRONIC PAIN OF BOTH SHOULDERS: Primary | ICD-10-CM

## 2022-08-31 PROCEDURE — 97112 NEUROMUSCULAR REEDUCATION: CPT | Performed by: PHYSICAL THERAPIST

## 2022-08-31 PROCEDURE — 97110 THERAPEUTIC EXERCISES: CPT | Performed by: PHYSICAL THERAPIST

## 2022-08-31 NOTE — PROGRESS NOTES
Daily Note     Today's date: 2022  Patient name: Calos Bueno  : 1934  MRN: [id number]  Referring provider: CORY Velez  Dx:   Encounter Diagnosis     ICD-10-CM    1  Chronic pain of both shoulders  M25 511     G89 29     M25 512                   Subjective: pt reports feeling pretty good yesterday following increased focused on strengthening Monday  Objective: See treatment diary below      Assessment: Pt tolerated tx session well  L continues to be more challenged w/ strengthening  Intermittent cueing for technique usually to maintain neutral scap vs protracted  Plan: Continue per plan of care  Progress strengthening as able NV  Daily Treatment Diary    EPOC: 22  Precautions: HTN- takes meds;  Hx of CABG (has nitro); possible cardiomyopathy  Co- Morbidities:   Patient Active Problem List   Diagnosis    2-vessel coronary artery disease    Basal cell carcinoma of scalp    Cardiomyopathy [facility] [address] )    History of heart artery stent    Cholelithiasis    Hyperlipidemia    Hypertension    Lumbar pain    Urinary urgency    Vitamin D deficiency    Primary osteoarthritis of right knee    Primary osteoarthritis of left knee    Presence of stent in coronary artery in patient with coronary artery disease    Epistaxis    Murmur, cardiac    Squamous cell carcinoma in situ (SCCIS) of scalp    Calculus of gallbladder without cholecystitis without obstruction    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    Medicare annual wellness visit, subsequent    Anxiety    Irregular heart beat    Localized edema    Chronic obstructive pulmonary disease, unspecified COPD type (HCC)    Chronic pain of both shoulders         Manual         GHJ mobs RB RB- L only RB- L only                   Total Time 4' 2' 2'         Exercise Diary 8/11 8/15 8/17 8/29 8/31        THEREX             Progress Note   15'          B/l shoulder PROM   10' 6' Pt ed             Shoulder press  2x10           UT stretch             Pulleys flexion 3/3 3/3 3'/3' 3'/3' 3'/3'        Wall washes Flex scap 10x2 5" ea side Flex scap 10x2 5" ea side Flex/scap 10x2 5" ea side Flex/scap 10x2 5" ea side D/c HEP        Shoulder isometrics Flex  20x; YTB ext 3x10 ea b/l; YTB IR/ER except for L ER PTB 3x10 ea b/l  Flex  20x; YTB ext 3x10 ea b/l; YTB IR/ER except for L ER PTB 3x10 ea b/l   YTB flex 20; YTB IR 20x ea;  2# flex 20x R/0 5# flex 20x L; 4#; IR R 5# 20x/L 2 5# 20x        Standing cane IR/extension             NEURO RE-ED             Scapular squeeze             Mod prone rows 5# 3x10 5# 3x10            Mod prone ext 5# 3x10 5# 3x10            TB resisted UT strengthening               LPD/rows [place] 8# 3x10/8# 3x10 Anna 10# 3x10 ea dir  GTB rows: 20x; LPD: OTB 20x 10# rows 20x; 10# 20x         UBE retro 3/3 3/3 3'/3' 3'/3' 3'/3'         serratus punch supine              reaching at [employer]  1# 4 rounds  1# 4 rounds            Supine active shoulder flexion    R 1# 15x/L 0# 15x R 1# 15x/L 0# 15x        s/l ER    1# 15x ea b/l  1# 15x ea b/l         S/l abduction    R 1# 15x/L 0# 15 R 1# 15x/L 0# 15x         ball on wall scap stab 2# cw/cww 20x ea dir b/l   2# CW/CCW 2# CW/CCW 20x ea dir b/l          OH carry  1# 1 laps L side; 2 laps R side            TB resisted     RTB 15x            Modalities  6/20         CP  10'

## 2022-09-07 ENCOUNTER — OFFICE VISIT (OUTPATIENT)
Dept: PHYSICAL THERAPY | Facility: CLINIC | Age: 87
End: 2022-09-07
Payer: MEDICARE

## 2022-09-07 DIAGNOSIS — G89.29 CHRONIC PAIN OF BOTH SHOULDERS: Primary | ICD-10-CM

## 2022-09-07 DIAGNOSIS — M25.511 CHRONIC PAIN OF BOTH SHOULDERS: Primary | ICD-10-CM

## 2022-09-07 DIAGNOSIS — M25.512 CHRONIC PAIN OF BOTH SHOULDERS: Primary | ICD-10-CM

## 2022-09-07 PROCEDURE — 97110 THERAPEUTIC EXERCISES: CPT

## 2022-09-07 PROCEDURE — 97112 NEUROMUSCULAR REEDUCATION: CPT

## 2022-09-07 NOTE — PROGRESS NOTES
Daily Note     Today's date: 2022  Patient name: Roberto Kinsey  : 1934  MRN: [id number]  Referring provider: Claudetta Field, CRNP  Dx:   Encounter Diagnosis     ICD-10-CM    1  Chronic pain of both shoulders  M25 511     G89 29     M25 512                   Subjective: Pt states he did a lot of digging over the weekend and his shoulders feel good  Objective: See treatment diary below      Assessment: Tolerated treatment well  Patient demonstrated fatigue post treatment, exhibited good technique with therapeutic exercises and would benefit from continued PT  Cueing required throughout for proper form, pacing, and posturing  Decreased ROM and B postural endurance remains limited, but pt bale to complete program successfully  Appears to be appropriately challenged by current exercises  Plan: Continue per plan of care  Progress treatment as tolerated  Daily Treatment Diary    EPOC: 22  Precautions: HTN- takes meds;  Hx of CABG (has nitro); possible cardiomyopathy  Co- Morbidities:   Patient Active Problem List   Diagnosis    2-vessel coronary artery disease    Basal cell carcinoma of scalp    Cardiomyopathy ([facility] )    History of heart artery stent    Cholelithiasis    Hyperlipidemia    Hypertension    Lumbar pain    Urinary urgency    Vitamin D deficiency    Primary osteoarthritis of right knee    Primary osteoarthritis of left knee    Presence of stent in coronary artery in patient with coronary artery disease    Epistaxis    Murmur, cardiac    Squamous cell carcinoma in situ (SCCIS) of scalp    Calculus of gallbladder without cholecystitis without obstruction    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 ([facility] )    Medicare annual wellness visit, subsequent    Anxiety    Irregular heart beat    Localized edema    Chronic obstructive pulmonary disease, unspecified COPD type ([facility] )    Chronic pain of both shoulders         Manual  GHJ mobs RB RB- L only RB- L only MS-L only                  Total Time 4' 2' 2' 2'        Exercise Diary 8/11 8/15 8/17 8/29 8/31 9/7       THEREX             Progress Note   15'          B/l shoulder PROM   10' 6'         Pt ed             Shoulder press  2x10           UT stretch             Pulleys flexion 3/3 3/3 3'/3' 3'/3' 3'/3' 3'/3'       Wall washes Flex scap 10x2 5" ea side Flex scap 10x2 5" ea side Flex/scap 10x2 5" ea side Flex/scap 10x2 5" ea side D/c HEP        Shoulder isometrics Flex  20x; YTB ext 3x10 ea b/l; YTB IR/ER except for L ER PTB 3x10 ea b/l  Flex  20x; YTB ext 3x10 ea b/l; YTB IR/ER except for L ER PTB 3x10 ea b/l   YTB flex 20; YTB IR 20x ea;  2# flex 20x R/0 5# flex 20x L; 4#; IR R 5# 20x/L 2 5# 20x 2# flex on R x20 1 0# flex L x20 /  4# BL ext x20 / IR R 5# x20, 3 2# x20 L       Standing cane IR/extension             NEURO RE-ED             Scapular squeeze             Mod prone rows 5# 3x10 5# 3x10            Mod prone ext 5# 3x10 5# 3x10            TB resisted UT strengthening               LPD/rows [place] 8# 3x10/8# 3x10 Anna 10# 3x10 ea dir  GTB rows: 20x; LPD: OTB 20x 10# rows 20x; 10# 20x 10# x20 rows / 10# x20 LPD        UBE retro 3/3 3/3 3'/3' 3'/3' 3'/3' 3'/3'        serratus punch supine              reaching at [employer]  1# 4 rounds  1# 4 rounds            Supine active shoulder flexion    R 1# 15x/L 0# 15x R 1# 15x/L 0# 15x R 1# x15/L 0# x15       s/l ER    1# 15x ea b/l  1# 15x ea b/l  1# x15 BL       S/l abduction    R 1# 15x/L 0# 15 R 1# 15x/L 0# 15x R 1#/L 0# x15 ea        ball on wall scap stab 2# cw/cww 20x ea dir b/l   2# CW/CCW 2# CW/CCW 20x ea dir b/l  2# CW/CCW x20 ea dir BL        OH carry  1# 1 laps L side; 2 laps R side            TB resisted     RTB 15x            Modalities  6/20         CP  10'

## 2022-09-12 ENCOUNTER — APPOINTMENT (OUTPATIENT)
Dept: PHYSICAL THERAPY | Facility: CLINIC | Age: 87
End: 2022-09-12
Payer: MEDICARE

## 2022-09-14 ENCOUNTER — APPOINTMENT (OUTPATIENT)
Dept: PHYSICAL THERAPY | Facility: CLINIC | Age: 87
End: 2022-09-14
Payer: MEDICARE

## 2022-10-17 ENCOUNTER — OFFICE VISIT (OUTPATIENT)
Dept: DERMATOLOGY | Facility: CLINIC | Age: 87
End: 2022-10-17

## 2022-10-17 VITALS — WEIGHT: 146 LBS | BODY MASS INDEX: 24.32 KG/M2 | HEIGHT: 65 IN

## 2022-10-17 DIAGNOSIS — L82.1 SEBORRHEIC KERATOSIS: ICD-10-CM

## 2022-10-17 DIAGNOSIS — Z85.828 HISTORY OF SKIN CANCER: ICD-10-CM

## 2022-10-17 DIAGNOSIS — Z13.89 SCREENING FOR SKIN CONDITION: ICD-10-CM

## 2022-10-17 DIAGNOSIS — L81.9 CHANGING PIGMENTED SKIN LESION: Primary | ICD-10-CM

## 2022-10-17 DIAGNOSIS — L57.0 ACTINIC KERATOSIS: ICD-10-CM

## 2022-10-17 NOTE — PROGRESS NOTES
[facility] DERMATOLOGY  [address] [place] [id number]  [phone]  [phone]     MRN: [id number] : 1934  Encounter: [id number]  Patient Information: Arti Norton  Chief complaint:  Lesion on scalp and overall checkup    History of present illness:  19-year-old male presents for overall skin check patient has not been seen for over 3 years because of COVID concerned regarding a pigmented lesion he noted on his posterior scalp  Patient had multiple skin cancers previously  Past Medical History:   Diagnosis Date   • Abnormal weight loss     Last Assessed: 2014   • Arthritis    • Basal cell carcinoma     Last Assessed: 2016   • Bursitis     left hip pain   • Cancer (HCC)     BCA- back, left forearm, skin   • Cardiomyopathy ([address] )    • Chest discomfort     Last Assessed: 2016   • Chest pain     Last Assessed: 2013   • Coronary artery disease    • Ecchymosis     Last Assessed: 2016   • Exertional dyspnea     Last Assessed: 2016   • Hepatic hemangioma    • Herniation of lumbar intervertebral disc    • Hyperlipidemia    • Hypertension    • Nonmelanoma skin cancer     Last Assessed: 12/15/2017   • Pleural effusion     Bilateral; Last Assessed: 2016   • Pulmonary nodule     multiple nodes;  Last Assessesd: 2016   • Shortness of breath    • Vitamin D deficiency      Past Surgical History:   Procedure Laterality Date   • CARPAL TUNNEL RELEASE Bilateral    • CORONARY ARTERY BYPASS GRAFT  2011   • CORONARY ARTERY BYPASS GRAFT     • KS REPAIR INCISIONAL HERNIA,REDUCIBLE Bilateral 2017    Procedure: LAPAROSCOPIC INGUINAL HERNIA REPAIR WITH MESH ;  Surgeon: Mac Varghese MD;  Location: [facility];  Service: General     Social History   Social History     Substance and Sexual Activity   Alcohol Use Yes   • Alcohol/week: 1 0 standard drink   • Types: 1 Glasses of wine per week    Comment: daily     Social History Substance and Sexual Activity   Drug Use No     Social History     Tobacco Use   Smoking Status Former Smoker   • Quit date: 1980   • Years since quittin 5   Smokeless Tobacco Never Used   Tobacco Comment    quit date  per allscripts     Family History   Problem Relation Age of Onset   • Heart disease Mother    • Hypertension Mother      Meds/Allergies   Allergies   Allergen Reactions   • Other Allergic Rhinitis     Environmental         Meds:  Prior to Admission medications    Medication Sig Start Date End Date Taking?  Authorizing Provider   amLODIPine (NORVASC) 5 mg tablet Take 1 tablet (5 mg total) by mouth daily 22  Yes CORY No   aspirin 81 MG tablet Take 2 tablets by mouth once    Yes Historical Provider, MD   atorvastatin (LIPITOR) 80 mg tablet Take 1 tablet (80 mg total) by mouth daily 22  Yes CORY Teague   B Complex Vitamins (VITAMIN B COMPLEX) TABS Take 1 tablet by mouth daily   Yes Historical Provider, MD   Cholecalciferol (VITAMIN D-3 PO) Take 2,000 Units by mouth 3 (three) times a day before meals   Yes Historical Provider, MD   co-enzyme Q-10 30 MG capsule Take 30 mg by mouth daily   Yes Historical Provider, MD   Flax OIL Take 1 capsule by mouth daily 21  Yes CORY No   metoprolol succinate (TOPROL-XL) 25 mg 24 hr tablet Take 1 tablet (25 mg total) by mouth daily 22  Yes CORY No   nitroglycerin (NITROSTAT) 0 4 mg SL tablet Place 1 tablet (0 4 mg total) under the tongue every 5 (five) minutes as needed for chest pain 3/7/22  Yes CORY Teague   valsartan (DIOVAN) 80 mg tablet Take 1 tablet (80 mg total) by mouth daily 22  Yes CORY No   omega-3-acid ethyl esters (LOVAZA) 1 g capsule Take 2 g by mouth daily  Patient not taking: No sig reported    Historical Provider, MD       Subjective:     Review of Systems:    General: negative for - chills, fatigue, fever,  weight gain or weight loss  Psychological: negative for - anxiety, behavioral disorder, concentration difficulties, decreased libido, depression, irritability, memory difficulties, mood swings, sleep disturbances or suicidal ideation  ENT: negative for - hearing difficulties , nasal congestion, nasal discharge, oral lesions, sinus pain, sneezing, sore throat  Allergy and Immunology: negative for - hives, insect bite sensitivity,  Hematological and Lymphatic: negative for - bleeding problems, blood clots,bruising, swollen lymph nodes  Endocrine: negative for - hair pattern changes, hot flashes, malaise/lethargy, mood swings, palpitations, polydipsia/polyuria, skin changes, temperature intolerance or unexpected weight change  Respiratory: negative for - cough, hemoptysis, orthopnea, shortness of breath, or wheezing  Cardiovascular: negative for - chest pain, dyspnea on exertion, edema,  Gastrointestinal: negative for - abdominal pain, nausea/vomiting  Genito-Urinary: negative for - dysuria, incontinence, irregular/heavy menses or urinary frequency/urgency  Musculoskeletal: negative for - gait disturbance, joint pain, joint stiffness, joint swelling, muscle pain, muscular weakness  Dermatological:  As in HPI  Neurological: negative for confusion, dizziness, headaches, impaired coordination/balance, memory loss, numbness/tingling, seizures, speech problems, tremors or weakness       Objective:   Ht 5' 5" (1 651 m)   Wt 66 2 kg (146 lb)   BMI 24 30 kg/m²     Physical Exam:    General Appearance:    Alert, cooperative, no distress   Head:    Normocephalic, without obvious abnormality, atraumatic           Skin:   A full skin exam was performed including scalp, head scalp, eyes, ears, nose, lips, neck, chest, axilla, abdomen, back, buttocks, bilateral upper extremities, bilateral lower extremities, hands, feet, fingers, toes, fingernails, and toenails hyperpigmented with erythema it 3 cm area noted on right posterior scalp previous sites of skin cancer well healed without recurrence scaling erythematous areas noted below nothing else remarkable noted on complete exam normal keratotic papules greasy stuck on appearance      Physical Exam  HENT:      Head:          Shave Biopsy Procedure Note    Pre-operative Diagnosis:  Irritated keratosis rule out atypia    Plan:  1  Instructed to keep the wound dry and covered for 24 and clean thereafter  2  Warning signs of infection were reviewed  3  Recommended that the patient use OTC acetaminophen as needed for pain  4  Return  Pending results of biopsy(ies)    Locations:  Right posterior scalp    Indications:  Atypical appearance    Anesthesia: Lidocaine 1% with epinephrine without added sodium bicarbonate    Procedure Details     Patient informed of the risks (including bleeding and infection) and benefits of the   procedure and Verbal informed consent obtained  The lesion and surrounding area were given a sterile prep using alcohol and draped in the usual sterile fashion  A Blue blade razor was used to obtain a specimen  Hemostasis achieved with aluminum chloride  Petrolatum and a sterile dressing applied  The specimen was sent for pathologic examination  The patient tolerated the procedure(s) well  Complications:  None  Cryotherapy Procedure Note    Pre-operative Diagnosis: actinic keratosis    Plan:  1  Instructed to keep the area dry and clean thereafter  Apply petrolatum if area gets crusty  2  Recommended that the patient use acetaminophen  as needed for pain  Locations:  Scalp    Indications: Destruction of actinic keratoses x6    Patient informed of risks (permanent scarring, infection, light or dark discoloration, bleeding, infection, weakness, numbness and recurrence of the lesion) and benefits of the procedure and verbal informed consent obtained  The areas are treated with liquid nitrogen therapy, frozen until ice ball extended 2 mm beyond lesion, allowed to thaw, and treated again   The patient tolerated procedure well  The patient was instructed on post-op care, warned that there may be blister formation, redness and pain  Recommend OTC analgesia as needed for pain  Condition:  Stable    Complications:  none  Assessment:     1  Changing pigmented skin lesion     2  Actinic keratosis     3  Seborrheic keratosis     4  History of skin cancer     5  Screening for skin condition           Plan:   Changing pigmented lesion probably a seborrheic keratosis await results of biopsy if anything atypical noted  Actinic Keratosis:  Patient advised lesions are precancers  These should resolve with cryosurgery if not to let us know sun block recommended  Seborrheic keratosis patient reassured these are normal growths we acquire with age no treatment needed  History of skin cancer in no recurrence nothing else atypical sunblock recommended follow-up in 1 year  Screening for dermatologic disorders nothing else of concern noted on complete exam follow-up in 1 year      Ronaldo Vazquez  10/17/2022,4:08 PM    Portions of the record may have been created with voice recognition software   Occasional wrong word or "sound a like" substitutions may have occurred due to the inherent limitations of voice recognition software   Read the chart carefully and recognize, using context, where substitutions have occurred

## 2022-10-17 NOTE — PATIENT INSTRUCTIONS
Changing pigmented lesion probably a seborrheic keratosis await results of biopsy if anything atypical noted  Actinic Keratosis:  Patient advised lesions are precancers  These should resolve with cryosurgery if not to let us know sun block recommended  Seborrheic keratosis patient reassured these are normal growths we acquire with age no treatment needed  History of skin cancer in no recurrence nothing else atypical sunblock recommended follow-up in 1 year  Screening for dermatologic disorders nothing else of concern noted on complete exam follow-up in 1 year  Dr Bautista Mention Shave/Punch Biopsy After Care Instructions      Remove bandage the next day  Keep bandage dry  Shower or Bathe as usual the next day  Cleanse the area twice daily with saline or hydrogen peroxide  Apply Vaseline  WE ADVISE YOU NOT TO USE NEOSPORIN OR ANY TOPICAL ANTIBIOTIC UNLESS INSTRUCTED BY THE DOCTOR  Cover area with a dressing or Band-Aid if possible  Continue treatment until completely healed  (Skin appears in pink)  Try to avoid scab formation  Slight bleeding may occur after the Band-Aid/Dressing is removed or the first few days after the procedure was done  Don't panic!! Apply continuous, direct pressure on the dressing over the wound for 15-20 minutes  DO NOT remove dressing  HINT:  Set a timer for 15-20 minutes to make sure you press on the wound long enough  SOAKING: the dressing before you remove it should decrease chances of bleeding  If the wound is on the legs or arms, swelling may occur  Elevating the arm or leg above the level of the heart as much as possible will also decrease swelling, promote healing and decrease chances of bleeding  ANY QUESTION PLEASE CALL OUR OFFICE  [phone]  IF AFTER HOURS, THE ANSWERING SERVICE WILL GET A HOLD OF THE DOCTOR  PLEASE BE ADVISED THAT BIOPSY RESULTS CAN TAKE UP TO 1 TO 2 WEEKS   YOU WILL RECEIVE THE RESULTS IN [facility] [place] [facility] FIRST, BUT PLEASE WAIT FOR THE DOCTOR OR STAFF TO NOTIFY YOU  Huyen Long!! Treatment with Cryotherapy    The doctor has treated your skin with nitrogen, which is 320 degrees Fahrenheit below zero  He has given the treated area "frostbite "    Stinging should subside within a few hours  You can take Tylenol for pain, if needed  Over the next few days, it is normal if the area becomes reddened, a blood blister, or swollen with fluid  If the lesion treated was near the eye - you could get a swollen eye over the next few days  Do not panic! This is all temporary, and will resolve with time  There is no special treatment - just keep the area clean  Makeup and BandAids can be used, if preferred  When the area starts to dry up and peel off, using Vaseline can help healing  It usually takes up to a month for it to heal   Some lesions are recurrent and may require repeat treatments  If a lesion has not healed in one month, please don't hesitate to contact us  If you have any further questions that are not answered here, please call us  [phone]    Thank you for allowing us to care for you

## 2022-11-02 ENCOUNTER — TELEPHONE (OUTPATIENT)
Dept: INTERNAL MEDICINE CLINIC | Facility: CLINIC | Age: 87
End: 2022-11-02

## 2022-11-02 ENCOUNTER — CLINICAL SUPPORT (OUTPATIENT)
Dept: INTERNAL MEDICINE CLINIC | Facility: CLINIC | Age: 87
End: 2022-11-02

## 2022-11-02 DIAGNOSIS — R60.0 LOCALIZED EDEMA: ICD-10-CM

## 2022-11-02 DIAGNOSIS — I25.10 2-VESSEL CORONARY ARTERY DISEASE: ICD-10-CM

## 2022-11-02 DIAGNOSIS — E78.5 HYPERLIPIDEMIA, UNSPECIFIED HYPERLIPIDEMIA TYPE: ICD-10-CM

## 2022-11-02 DIAGNOSIS — Z23 NEED FOR INFLUENZA VACCINATION: Primary | ICD-10-CM

## 2022-11-02 DIAGNOSIS — I10 HYPERTENSION, UNSPECIFIED TYPE: ICD-10-CM

## 2022-11-02 RX ORDER — ATORVASTATIN CALCIUM 80 MG/1
80 TABLET, FILM COATED ORAL DAILY
Qty: 90 TABLET | Refills: 1 | Status: SHIPPED | OUTPATIENT
Start: 2022-11-02

## 2022-11-02 RX ORDER — METOPROLOL SUCCINATE 25 MG/1
25 TABLET, EXTENDED RELEASE ORAL DAILY
Qty: 90 TABLET | Refills: 1 | Status: SHIPPED | OUTPATIENT
Start: 2022-11-02

## 2022-11-02 RX ORDER — VALSARTAN 80 MG/1
80 TABLET ORAL DAILY
Qty: 90 TABLET | Refills: 1 | Status: SHIPPED | OUTPATIENT
Start: 2022-11-02

## 2022-11-02 RX ORDER — AMLODIPINE BESYLATE 5 MG/1
5 TABLET ORAL DAILY
Qty: 90 TABLET | Refills: 1 | Status: SHIPPED | OUTPATIENT
Start: 2022-11-02

## 2022-11-02 NOTE — TELEPHONE ENCOUNTER
Patient is requesting refills on amlodipine 5 mg tablet, take 1 tablet (5 mg total) by mouth daily; atorvastatin 80 mg tablet, take 1 tablet (80 mg total) by mouth daily; metoprolol succinate 25 mg 24 hr tablet, take 1 tablet (25 mg total) by mouth daily; valsartan 80 mg tablet, take 1 tablet (80 mg total) by mouth daily   90 day supply    [facility]/[place]    Call Back #[phone]

## 2022-11-03 ENCOUNTER — TELEPHONE (OUTPATIENT)
Dept: DERMATOLOGY | Facility: CLINIC | Age: 87
End: 2022-11-03

## 2022-11-03 NOTE — TELEPHONE ENCOUNTER
----- Message from Jesse Cordova MD sent at 11/2/2022  4:51 PM EDT -----  Please call him of normal pathology

## 2022-11-23 ENCOUNTER — OFFICE VISIT (OUTPATIENT)
Dept: INTERNAL MEDICINE CLINIC | Facility: CLINIC | Age: 87
End: 2022-11-23

## 2022-11-23 VITALS
WEIGHT: 148 LBS | BODY MASS INDEX: 24.66 KG/M2 | OXYGEN SATURATION: 100 % | SYSTOLIC BLOOD PRESSURE: 116 MMHG | HEIGHT: 65 IN | HEART RATE: 78 BPM | RESPIRATION RATE: 12 BRPM | DIASTOLIC BLOOD PRESSURE: 58 MMHG

## 2022-11-23 DIAGNOSIS — L50.9 HIVES: Primary | ICD-10-CM

## 2022-11-23 DIAGNOSIS — M17.11 PRIMARY OSTEOARTHRITIS OF RIGHT KNEE: ICD-10-CM

## 2022-11-23 DIAGNOSIS — J31.0 CHRONIC RHINITIS: ICD-10-CM

## 2022-11-23 PROBLEM — R73.09 ABNORMAL GLUCOSE: Status: RESOLVED | Noted: 2022-08-24 | Resolved: 2022-11-23

## 2022-11-23 NOTE — PROGRESS NOTES
Assessment/Plan:      Hives are resolving; unclear etiology  taking prednisone after seeing an urgent care yesterday; call with any s/s worsening  Continue OTC TX for chronic rhinitis  Right knee OA; ? Xray may be needed  Tylenol prn  Quality Measures:       Depression Screening and Follow-up Plan: Clincally patient does not have depression  No treatment is required  Falls Plan of Care: balance, strength, and gait training instructions were provided  Return in about 3 months (around 2/23/2023) for Next scheduled follow up, regular and medicare  No problem-specific Assessment & Plan notes found for this encounter  Diagnoses and all orders for this visit:    Hives    Primary osteoarthritis of right knee    Chronic rhinitis          Subjective:      Patient ID: Naomi Arce is a 80 y o  male  Here with rash and post nasal drip        ALLERGIES:  Allergies   Allergen Reactions   • Other Allergic Rhinitis     Environmental         CURRENT MEDICATIONS:    Current Outpatient Medications:   •  amLODIPine (NORVASC) 5 mg tablet, Take 1 tablet (5 mg total) by mouth daily, Disp: 90 tablet, Rfl: 1  •  aspirin 81 MG tablet, Take 2 tablets by mouth once , Disp: , Rfl:   •  atorvastatin (LIPITOR) 80 mg tablet, Take 1 tablet (80 mg total) by mouth daily, Disp: 90 tablet, Rfl: 1  •  B Complex Vitamins (VITAMIN B COMPLEX) TABS, Take 1 tablet by mouth daily, Disp: , Rfl:   •  Cholecalciferol (VITAMIN D-3 PO), Take 2,000 Units by mouth 3 (three) times a day before meals, Disp: , Rfl:   •  co-enzyme Q-10 30 MG capsule, Take 30 mg by mouth daily, Disp: , Rfl:   •  Flax OIL, Take 1 capsule by mouth daily, Disp: 90 mL, Rfl: 3  •  metoprolol succinate (TOPROL-XL) 25 mg 24 hr tablet, Take 1 tablet (25 mg total) by mouth daily, Disp: 90 tablet, Rfl: 1  •  nitroglycerin (NITROSTAT) 0 4 mg SL tablet, Place 1 tablet (0 4 mg total) under the tongue every 5 (five) minutes as needed for chest pain, Disp: 25 tablet, Rfl: 4  •  valsartan (DIOVAN) 80 mg tablet, Take 1 tablet (80 mg total) by mouth daily, Disp: 90 tablet, Rfl: 1    ACTIVE PROBLEM LIST:  Patient Active Problem List   Diagnosis   • 2-vessel coronary artery disease   • Basal cell carcinoma of scalp   • Cardiomyopathy (HCC)   • History of heart artery stent   • Cholelithiasis   • Hyperlipidemia   • Hypertension   • Lumbar pain   • Urinary urgency   • Vitamin D deficiency   • Primary osteoarthritis of right knee   • Primary osteoarthritis of left knee   • Presence of stent in coronary artery in patient with coronary artery disease   • Epistaxis   • Murmur, cardiac   • Squamous cell carcinoma in situ (SCCIS) of scalp   • Calculus of gallbladder without cholecystitis without obstruction   • 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 ([facility] )   • Medicare annual wellness visit, subsequent   • Anxiety   • Irregular heart beat   • Localized edema   • Chronic obstructive pulmonary disease, unspecified COPD type (HCC)   • Chronic pain of both shoulders   • Skin lesion   • Abnormal glucose       PAST MEDICAL HISTORY:  Past Medical History:   Diagnosis Date   • Abnormal weight loss     Last Assessed: 2/24/2014   • Arthritis    • Basal cell carcinoma     Last Assessed: 8/16/2016   • Bursitis     left hip pain   • Cancer (HCC)     BCA- back, left forearm, skin   • Cardiomyopathy ([facility] )    • Chest discomfort     Last Assessed: 2/23/2016   • Chest pain     Last Assessed: 2/13/2013   • Coronary artery disease    • Ecchymosis     Last Assessed: 7/12/2016   • Exertional dyspnea     Last Assessed: 2/23/2016   • Hepatic hemangioma    • Herniation of lumbar intervertebral disc    • Hyperlipidemia    • Hypertension    • Nonmelanoma skin cancer     Last Assessed: 12/15/2017   • Pleural effusion     Bilateral; Last Assessed: 4/26/2016   • Pulmonary nodule     multiple nodes;  Last Assessesd: 4/26/2016   • Shortness of breath    • Vitamin D deficiency        PAST SURGICAL HISTORY:  Past Surgical History:   Procedure Laterality Date   • CARPAL TUNNEL RELEASE Bilateral    • CORONARY ARTERY BYPASS GRAFT  2011   • CORONARY ARTERY BYPASS GRAFT     • AL REPAIR INCISIONAL HERNIA,REDUCIBLE Bilateral 2017    Procedure: LAPAROSCOPIC INGUINAL HERNIA REPAIR WITH MESH ;  Surgeon: Ema Cheema MD;  Location: [facility];  Service: General       FAMILY HISTORY:  Family History   Problem Relation Age of Onset   • Heart disease Mother    • Hypertension Mother        SOCIAL HISTORY:  Social History     Socioeconomic History   • Marital status: /Civil Union     Spouse name: Not on file   • Number of children: Not on file   • Years of education: Not on file   • Highest education level: Not on file   Occupational History   • Not on file   Tobacco Use   • Smoking status: Former     Types: Cigarettes     Quit date: 1980     Years since quittin 6   • Smokeless tobacco: Never   • Tobacco comments:     quit date  per allscripts   Vaping Use   • Vaping Use: Never used   Substance and Sexual Activity   • Alcohol use: Yes     Alcohol/week: 1 0 standard drink     Types: 1 Glasses of wine per week     Comment: daily   • Drug use: No   • Sexual activity: Not Currently   Other Topics Concern   • Not on file   Social History Narrative    Caffeine use     Social Determinants of Health     Financial Resource Strain: Not on file   Food Insecurity: Not on file   Transportation Needs: Not on file   Physical Activity: Not on file   Stress: Not on file   Social Connections: Not on file   Intimate Partner Violence: Not on file   Housing Stability: Not on file       Review of Systems   HENT: Positive for postnasal drip and rhinorrhea  Respiratory: Positive for cough  Cardiovascular: Positive for leg swelling  Musculoskeletal: Positive for arthralgias and gait problem  Skin: Positive for color change and rash  All other systems reviewed and are negative  Objective:  Vitals:    11/23/22 1346   BP: 116/58   BP Location: Left arm   Patient Position: Sitting   Pulse: 78   Resp: 12   SpO2: 100%   Weight: 67 1 kg (148 lb)   Height: 5' 5" (1 651 m)     Body mass index is 24 63 kg/m²  Physical Exam  Vitals and nursing note reviewed  Constitutional:       Appearance: Normal appearance  HENT:      Head: Normocephalic and atraumatic  Cardiovascular:      Rate and Rhythm: Normal rate  Pulmonary:      Effort: Pulmonary effort is normal    Musculoskeletal:         General: Normal range of motion  Cervical back: Normal range of motion  Neurological:      General: No focal deficit present  Mental Status: He is alert and oriented to person, place, and time  Gait: Gait abnormal    Psychiatric:         Mood and Affect: Mood normal            RESULTS:    Recent Results (from the past 1008 hour(s))   Tissue Exam    Collection Time: 10/17/22  4:17 PM   Result Value Ref Range    Case Report       Surgical Pathology Report                         Case: [id number]                                   Authorizing Provider:  Prince Herrera MD          Collected:           10/17/2022 161              Ordering Location:     [facility]           Received:            10/17/2022 [address] [place] [address] Dermatology                                                           Pathologist:           Gary Lindquist MD                                                           Specimen:    Skin, Other, A) Right Posterior Scalp                                                      Final Diagnosis       A  Skin, right posterior scalp, shave biopsy:    Pigmented SEBORRHEIC KERATOSIS, IRRITATED AND INFLAMED (see note)  Note: SOX10 and MART-1 immunostains were performed and reviewed          Additional Information       All reported additional testing was performed with appropriately reactive controls  These tests were developed and their performance characteristics determined by [facility] or appropriate performing facility, though some tests may be performed on tissues which have not been validated for performance characteristics (such as staining performed on alcohol exposed cell blocks and decalcified tissues)  Results should be interpreted with caution and in the context of the patients’ clinical condition  These tests may not be cleared or approved by the [place]  Food and Drug Administration, though the FDA has determined that such clearance or approval is not necessary  These tests are used for clinical purposes and they should not be regarded as investigational or for research  This laboratory has been approved by [place] [id number], designated as a high-complexity laboratory and is qualified to perform these tests  Aashish Contreras Description          A  The specimen is received in formalin, labeled with the patient's name and hospital number, and is designated "right posterior scalp"  The specimen consists of a 1 5 x 1 2 x 0 1 cm shave biopsy of tan-brown skin  The epithelial surface appears keratotic and is inked red and the margin of resection is inked green  The specimen is serially sectioned revealing grossly unremarkable cut surfaces  The specimen is entirely submitted between sponges, 1 cassette  Note: The estimated total formalin fixation time based upon information provided by the submitting clinician and the standard processing schedule is under 72 hours  Madalyn Torre          Clinical Information       Specimen A; Right Posterior Scalp; Skin; Shave Biopsy; 80Year Old Male with Atypical Appearance Lesion; Diff  Dx:  Irritated Keratosis; Rule Out Atypia    Attn: Derm Path       This note was created with voice recognition software  Phonic, grammatical and spelling errors may be present within the note as a result

## 2023-01-13 ENCOUNTER — OFFICE VISIT (OUTPATIENT)
Dept: INTERNAL MEDICINE CLINIC | Facility: CLINIC | Age: 88
End: 2023-01-13

## 2023-01-13 VITALS
HEIGHT: 65 IN | OXYGEN SATURATION: 98 % | DIASTOLIC BLOOD PRESSURE: 58 MMHG | BODY MASS INDEX: 24.29 KG/M2 | WEIGHT: 145.8 LBS | HEART RATE: 85 BPM | SYSTOLIC BLOOD PRESSURE: 112 MMHG | TEMPERATURE: 97.6 F

## 2023-01-13 DIAGNOSIS — K59.1 FUNCTIONAL DIARRHEA: Primary | ICD-10-CM

## 2023-01-13 DIAGNOSIS — I42.9 CARDIOMYOPATHY, UNSPECIFIED TYPE (HCC): ICD-10-CM

## 2023-01-13 DIAGNOSIS — I25.119 ATHEROSCLEROSIS OF NATIVE CORONARY ARTERY WITH ANGINA PECTORIS, UNSPECIFIED WHETHER NATIVE OR TRANSPLANTED HEART (HCC): ICD-10-CM

## 2023-01-13 DIAGNOSIS — J44.9 CHRONIC OBSTRUCTIVE PULMONARY DISEASE, UNSPECIFIED COPD TYPE (HCC): ICD-10-CM

## 2023-01-13 NOTE — PROGRESS NOTES
Assessment/Plan:     Andrey Perez is here with c/o loose BM for the past few days; reports he ate a lot of cabbage and noodles this past week; feels bloated and used some Pepto; denies fever or chills; abdomen is non tender; discussed eating bland diet and if s/s do not resolve or worsen let me know; stay hydrated  Quality Measures:     Depression Screening and Follow-up Plan: Clincally patient does not have depression  No treatment is required  Falls Plan of Care: balance, strength, and gait training instructions were provided  Return for Next scheduled follow up, regular and medicare  No problem-specific Assessment & Plan notes found for this encounter  Diagnoses and all orders for this visit:    Functional diarrhea    Chronic obstructive pulmonary disease, unspecified COPD type ([facility] )    Cardiomyopathy, unspecified type ([place] [address] )    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, unspecified whether native or transplanted heart ([place] [address] )          Subjective:      Patient ID: Brock Friedman is a 80 y o  male  Here with stomach issues        ALLERGIES:  Allergies   Allergen Reactions   • Other Allergic Rhinitis     Environmental         CURRENT MEDICATIONS:    Current Outpatient Medications:   •  amLODIPine (NORVASC) 5 mg tablet, Take 1 tablet (5 mg total) by mouth daily, Disp: 90 tablet, Rfl: 1  •  aspirin 81 MG tablet, Take 2 tablets by mouth once , Disp: , Rfl:   •  atorvastatin (LIPITOR) 80 mg tablet, Take 1 tablet (80 mg total) by mouth daily, Disp: 90 tablet, Rfl: 1  •  B Complex Vitamins (VITAMIN B COMPLEX) TABS, Take 1 tablet by mouth daily, Disp: , Rfl:   •  Cholecalciferol (VITAMIN D-3 PO), Take 2,000 Units by mouth 3 (three) times a day before meals, Disp: , Rfl:   •  co-enzyme Q-10 30 MG capsule, Take 30 mg by mouth daily, Disp: , Rfl:   •  Flax OIL, Take 1 capsule by mouth daily, Disp: 90 mL, Rfl: 3  •  metoprolol succinate (TOPROL-XL) 25 mg 24 hr tablet, Take 1 tablet (25 mg total) by mouth daily, Disp: 90 tablet, Rfl: 1  •  nitroglycerin (NITROSTAT) 0 4 mg SL tablet, Place 1 tablet (0 4 mg total) under the tongue every 5 (five) minutes as needed for chest pain, Disp: 25 tablet, Rfl: 4  •  valsartan (DIOVAN) 80 mg tablet, Take 1 tablet (80 mg total) by mouth daily, Disp: 90 tablet, Rfl: 1    ACTIVE PROBLEM LIST:  Patient Active Problem List   Diagnosis   • 2-vessel coronary artery disease   • Basal cell carcinoma of scalp   • Cardiomyopathy ([facility] )   • History of heart artery stent   • Cholelithiasis   • Hyperlipidemia   • Hypertension   • Lumbar pain   • Vitamin D deficiency   • Primary osteoarthritis of right knee   • Primary osteoarthritis of left knee   • Presence of stent in coronary artery in patient with coronary artery disease   • Epistaxis   • Murmur, cardiac   • Squamous cell carcinoma in situ (SCCIS) of scalp   • Calculus of gallbladder without cholecystitis without obstruction   • 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 ([facility] )   • Medicare annual wellness visit, subsequent   • Anxiety   • Irregular heart beat   • Localized edema   • Chronic obstructive pulmonary disease, unspecified COPD type (HCC)   • Chronic pain of both shoulders   • Skin lesion       PAST MEDICAL HISTORY:  Past Medical History:   Diagnosis Date   • Abnormal weight loss     Last Assessed: 2/24/2014   • Arthritis    • Basal cell carcinoma     Last Assessed: 8/16/2016   • Bursitis     left hip pain   • Cancer (HCC)     BCA- back, left forearm, skin   • Cardiomyopathy ([facility] )    • Chest discomfort     Last Assessed: 2/23/2016   • Chest pain     Last Assessed: 2/13/2013   • Coronary artery disease    • Ecchymosis     Last Assessed: 7/12/2016   • Exertional dyspnea     Last Assessed: 2/23/2016   • Hepatic hemangioma    • Herniation of lumbar intervertebral disc    • Hyperlipidemia    • Hypertension    • Nonmelanoma skin cancer     Last Assessed: 12/15/2017   • Pleural effusion Bilateral; Last Assessed: 2016   • Pulmonary nodule     multiple nodes; Last Assessesd: 2016   • Shortness of breath    • Vitamin D deficiency        PAST SURGICAL HISTORY:  Past Surgical History:   Procedure Laterality Date   • CARPAL TUNNEL RELEASE Bilateral    • CORONARY ARTERY BYPASS GRAFT  2011   • CORONARY ARTERY BYPASS GRAFT     • [place] REPAIR FIRST ABDOMINAL WALL HERNIA Bilateral 2017    Procedure: LAPAROSCOPIC INGUINAL HERNIA REPAIR WITH MESH ;  Surgeon: Laura Ferrer MD;  Location: [facility];  Service: General       FAMILY HISTORY:  Family History   Problem Relation Age of Onset   • Heart disease Mother    • Hypertension Mother        SOCIAL HISTORY:  Social History     Socioeconomic History   • Marital status: /Civil Union     Spouse name: Not on file   • Number of children: Not on file   • Years of education: Not on file   • Highest education level: Not on file   Occupational History   • Not on file   Tobacco Use   • Smoking status: Former     Types: Cigarettes     Quit date: 1980     Years since quittin 7   • Smokeless tobacco: Never   • Tobacco comments:     quit date  per allscripts   Vaping Use   • Vaping Use: Never used   Substance and Sexual Activity   • Alcohol use: Yes     Alcohol/week: 1 0 standard drink     Types: 1 Glasses of wine per week     Comment: daily   • Drug use: No   • Sexual activity: Not Currently   Other Topics Concern   • Not on file   Social History Narrative    Caffeine use     Social Determinants of Health     Financial Resource Strain: Not on file   Food Insecurity: Not on file   Transportation Needs: Not on file   Physical Activity: Not on file   Stress: Not on file   Social Connections: Not on file   Intimate Partner Violence: Not on file   Housing Stability: Not on file       Review of Systems   Gastrointestinal: Positive for abdominal distention and diarrhea  Gas   All other systems reviewed and are negative  Objective:  Vitals:    01/13/23 0918   BP: 112/58   BP Location: Left arm   Patient Position: Sitting   Cuff Size: Adult   Pulse: 85   Temp: 97 6 °F (36 4 °C)   TempSrc: Tympanic   SpO2: 98%   Weight: 66 1 kg (145 lb 12 8 oz)   Height: 5' 5" (1 651 m)     Body mass index is 24 26 kg/m²  Physical Exam  Vitals and nursing note reviewed  Constitutional:       Appearance: Normal appearance  HENT:      Head: Normocephalic and atraumatic  Cardiovascular:      Rate and Rhythm: Normal rate  Pulmonary:      Effort: Pulmonary effort is normal    Abdominal:      General: Abdomen is flat  Bowel sounds are normal  There is no distension  Palpations: Abdomen is soft  There is no mass  Tenderness: There is no abdominal tenderness  There is no right CVA tenderness, left CVA tenderness, guarding or rebound  Hernia: No hernia is present  Musculoskeletal:         General: Normal range of motion  Cervical back: Normal range of motion  Skin:     General: Skin is warm  Neurological:      General: No focal deficit present  Mental Status: He is alert and oriented to person, place, and time  Psychiatric:         Mood and Affect: Mood normal            RESULTS:    No results found for this or any previous visit (from the past 1008 hour(s))  This note was created with voice recognition software  Phonic, grammatical and spelling errors may be present within the note as a result

## 2023-02-24 ENCOUNTER — APPOINTMENT (OUTPATIENT)
Dept: LAB | Facility: HOSPITAL | Age: 88
End: 2023-02-24

## 2023-02-24 DIAGNOSIS — R73.09 ABNORMAL GLUCOSE: ICD-10-CM

## 2023-02-24 DIAGNOSIS — I10 HYPERTENSION, UNSPECIFIED TYPE: ICD-10-CM

## 2023-02-24 DIAGNOSIS — E78.5 HYPERLIPIDEMIA, UNSPECIFIED HYPERLIPIDEMIA TYPE: ICD-10-CM

## 2023-02-24 DIAGNOSIS — R17 ELEVATED BILIRUBIN: ICD-10-CM

## 2023-02-24 LAB
ALBUMIN SERPL BCP-MCNC: 3.3 G/DL (ref 3.5–5)
ALBUMIN SERPL BCP-MCNC: 3.3 G/DL (ref 3.5–5)
ALP SERPL-CCNC: 101 U/L (ref 46–116)
ALP SERPL-CCNC: 103 U/L (ref 46–116)
ALT SERPL W P-5'-P-CCNC: 21 U/L (ref 12–78)
ALT SERPL W P-5'-P-CCNC: 25 U/L (ref 12–78)
ANION GAP SERPL CALCULATED.3IONS-SCNC: 6 MMOL/L (ref 4–13)
AST SERPL W P-5'-P-CCNC: 20 U/L (ref 5–45)
AST SERPL W P-5'-P-CCNC: 20 U/L (ref 5–45)
BASOPHILS # BLD AUTO: 0.02 THOUSANDS/ÂΜL (ref 0–0.1)
BASOPHILS NFR BLD AUTO: 0 % (ref 0–1)
BILIRUB DIRECT SERPL-MCNC: 0.23 MG/DL (ref 0–0.2)
BILIRUB SERPL-MCNC: 0.82 MG/DL (ref 0.2–1)
BILIRUB SERPL-MCNC: 0.83 MG/DL (ref 0.2–1)
BUN SERPL-MCNC: 19 MG/DL (ref 5–25)
CALCIUM ALBUM COR SERPL-MCNC: 9.1 MG/DL (ref 8.3–10.1)
CALCIUM SERPL-MCNC: 8.5 MG/DL (ref 8.3–10.1)
CHLORIDE SERPL-SCNC: 106 MMOL/L (ref 96–108)
CHOLEST SERPL-MCNC: 103 MG/DL
CO2 SERPL-SCNC: 29 MMOL/L (ref 21–32)
CREAT SERPL-MCNC: 0.91 MG/DL (ref 0.6–1.3)
EOSINOPHIL # BLD AUTO: 0.11 THOUSAND/ÂΜL (ref 0–0.61)
EOSINOPHIL NFR BLD AUTO: 2 % (ref 0–6)
ERYTHROCYTE [DISTWIDTH] IN BLOOD BY AUTOMATED COUNT: 13.2 % (ref 11.6–15.1)
EST. AVERAGE GLUCOSE BLD GHB EST-MCNC: 108 MG/DL
GFR SERPL CREATININE-BSD FRML MDRD: 74 ML/MIN/1.73SQ M
GLUCOSE P FAST SERPL-MCNC: 96 MG/DL (ref 65–99)
HBA1C MFR BLD: 5.4 %
HCT VFR BLD AUTO: 39.1 % (ref 36.5–49.3)
HDLC SERPL-MCNC: 70 MG/DL
HGB BLD-MCNC: 12.5 G/DL (ref 12–17)
IMM GRANULOCYTES # BLD AUTO: 0.01 THOUSAND/UL (ref 0–0.2)
IMM GRANULOCYTES NFR BLD AUTO: 0 % (ref 0–2)
LDLC SERPL CALC-MCNC: 28 MG/DL (ref 0–100)
LYMPHOCYTES # BLD AUTO: 1.52 THOUSANDS/ÂΜL (ref 0.6–4.47)
LYMPHOCYTES NFR BLD AUTO: 26 % (ref 14–44)
MCH RBC QN AUTO: 30.3 PG (ref 26.8–34.3)
MCHC RBC AUTO-ENTMCNC: 32 G/DL (ref 31.4–37.4)
MCV RBC AUTO: 95 FL (ref 82–98)
MONOCYTES # BLD AUTO: 0.55 THOUSAND/ÂΜL (ref 0.17–1.22)
MONOCYTES NFR BLD AUTO: 9 % (ref 4–12)
NEUTROPHILS # BLD AUTO: 3.64 THOUSANDS/ÂΜL (ref 1.85–7.62)
NEUTS SEG NFR BLD AUTO: 63 % (ref 43–75)
NONHDLC SERPL-MCNC: 33 MG/DL
NRBC BLD AUTO-RTO: 0 /100 WBCS
PLATELET # BLD AUTO: 194 THOUSANDS/UL (ref 149–390)
PMV BLD AUTO: 10 FL (ref 8.9–12.7)
POTASSIUM SERPL-SCNC: 4.5 MMOL/L (ref 3.5–5.3)
PROT SERPL-MCNC: 6.8 G/DL (ref 6.4–8.4)
PROT SERPL-MCNC: 6.8 G/DL (ref 6.4–8.4)
RBC # BLD AUTO: 4.13 MILLION/UL (ref 3.88–5.62)
SODIUM SERPL-SCNC: 141 MMOL/L (ref 135–147)
TRIGL SERPL-MCNC: 27 MG/DL
TSH SERPL DL<=0.05 MIU/L-ACNC: 2.3 UIU/ML (ref 0.45–4.5)
WBC # BLD AUTO: 5.85 THOUSAND/UL (ref 4.31–10.16)

## 2023-03-16 ENCOUNTER — RA CDI HCC (OUTPATIENT)
Dept: OTHER | Facility: HOSPITAL | Age: 88
End: 2023-03-16

## 2023-03-23 ENCOUNTER — OFFICE VISIT (OUTPATIENT)
Dept: INTERNAL MEDICINE CLINIC | Facility: CLINIC | Age: 88
End: 2023-03-23

## 2023-03-23 VITALS
DIASTOLIC BLOOD PRESSURE: 64 MMHG | HEART RATE: 61 BPM | OXYGEN SATURATION: 98 % | SYSTOLIC BLOOD PRESSURE: 124 MMHG | TEMPERATURE: 97.8 F | WEIGHT: 147 LBS | BODY MASS INDEX: 24.49 KG/M2 | HEIGHT: 65 IN

## 2023-03-23 DIAGNOSIS — I10 HYPERTENSION, UNSPECIFIED TYPE: ICD-10-CM

## 2023-03-23 DIAGNOSIS — R26.89 BALANCE PROBLEMS: ICD-10-CM

## 2023-03-23 DIAGNOSIS — E78.5 HYPERLIPIDEMIA, UNSPECIFIED HYPERLIPIDEMIA TYPE: ICD-10-CM

## 2023-03-23 DIAGNOSIS — I25.10 2-VESSEL CORONARY ARTERY DISEASE: ICD-10-CM

## 2023-03-23 DIAGNOSIS — Z00.00 MEDICARE ANNUAL WELLNESS VISIT, SUBSEQUENT: Primary | ICD-10-CM

## 2023-03-23 DIAGNOSIS — R60.0 LOCALIZED EDEMA: ICD-10-CM

## 2023-03-23 DIAGNOSIS — R79.9 ABNORMAL FINDING OF BLOOD CHEMISTRY, UNSPECIFIED: ICD-10-CM

## 2023-03-23 DIAGNOSIS — F40.298 FEAR OF FALLING: ICD-10-CM

## 2023-03-23 PROBLEM — R04.0 EPISTAXIS: Status: RESOLVED | Noted: 2018-02-20 | Resolved: 2023-03-23

## 2023-03-23 PROBLEM — K80.20 CHOLELITHIASIS: Status: RESOLVED | Noted: 2018-01-02 | Resolved: 2023-03-23

## 2023-03-23 PROBLEM — L98.9 SKIN LESION: Status: RESOLVED | Noted: 2022-07-20 | Resolved: 2023-03-23

## 2023-03-23 PROBLEM — I49.9 IRREGULAR HEART BEAT: Status: RESOLVED | Noted: 2020-09-08 | Resolved: 2023-03-23

## 2023-03-23 RX ORDER — ATORVASTATIN CALCIUM 80 MG/1
80 TABLET, FILM COATED ORAL DAILY
Qty: 90 TABLET | Refills: 1 | Status: SHIPPED | OUTPATIENT
Start: 2023-03-23

## 2023-03-23 RX ORDER — VALSARTAN 80 MG/1
80 TABLET ORAL DAILY
Qty: 90 TABLET | Refills: 1 | Status: SHIPPED | OUTPATIENT
Start: 2023-03-23

## 2023-03-23 RX ORDER — AMLODIPINE BESYLATE 5 MG/1
5 TABLET ORAL DAILY
Qty: 90 TABLET | Refills: 1 | Status: SHIPPED | OUTPATIENT
Start: 2023-03-23

## 2023-03-23 RX ORDER — METOPROLOL SUCCINATE 25 MG/1
25 TABLET, EXTENDED RELEASE ORAL DAILY
Qty: 90 TABLET | Refills: 1 | Status: SHIPPED | OUTPATIENT
Start: 2023-03-23

## 2023-03-23 NOTE — PROGRESS NOTES
Assessment and Plan:     Problem List Items Addressed This Visit        Cardiovascular and Mediastinum    2-vessel coronary artery disease    Relevant Medications    amLODIPine (NORVASC) 5 mg tablet    metoprolol succinate (TOPROL-XL) 25 mg 24 hr tablet    Hypertension    Relevant Medications    amLODIPine (NORVASC) 5 mg tablet    metoprolol succinate (TOPROL-XL) 25 mg 24 hr tablet    valsartan (DIOVAN) 80 mg tablet    Other Relevant Orders    CBC and differential    TSH, 3rd generation with Free T4 reflex    Comprehensive metabolic panel       Other    Hyperlipidemia    Relevant Medications    atorvastatin (LIPITOR) 80 mg tablet    Other Relevant Orders    Lipid Panel with Direct LDL reflex    Hemoglobin A1C    Medicare annual wellness visit, subsequent - Primary    RESOLVED: Localized edema    Relevant Medications    valsartan (DIOVAN) 80 mg tablet   Other Visit Diagnoses     Balance problems        Fear of falling        Abnormal finding of blood chemistry, unspecified        Relevant Orders    Hemoglobin A1C          Depression Screening and Follow-up Plan: Patient was screened for depression during today's encounter  They screened negative with a PHQ-2 score of 0  Falls Plan of Care: balance, strength, and gait training instructions were provided and referral to physical therapy  Recommended assistive device to help with gait and balance  Medications that increase falls were reviewed  Assessed feet and footwear  Vitamin D supplementation was recommended  Cognitive screening performed  Preventive health issues were discussed with patient, and age appropriate screening tests were ordered as noted in patient's After Visit Summary  Personalized health advice and appropriate referrals for health education or preventive services given if needed, as noted in patient's After Visit Summary       History of Present Illness:     Patient presents for a Medicare Wellness Visit    Franny Amaya is here for AWV; very pleasant; he has five children, grandchildren, great grandchildren, and even great great grandchildren! Denies complaints; is interested in PT in the future for his loss of balance; does not want it ordered today; no falls; uses a cane for ambulation  Refills sent; labs reviewed; Patient Care Team:  Elle Colunga MD as PCP - General (Internal Medicine)  Nakia Leon MD     Review of Systems:     Review of Systems   Musculoskeletal: Positive for arthralgias and gait problem  Neurological:        Balance problems   All other systems reviewed and are negative         Problem List:     Patient Active Problem List   Diagnosis   • 2-vessel coronary artery disease   • Basal cell carcinoma of scalp   • Cardiomyopathy ([facility] )   • History of heart artery stent   • Hyperlipidemia   • Hypertension   • Lumbar pain   • Vitamin D deficiency   • Primary osteoarthritis of right knee   • Primary osteoarthritis of left knee   • Presence of stent in coronary artery in patient with coronary artery disease   • Murmur, cardiac   • Squamous cell carcinoma in situ (SCCIS) of scalp   • Calculus of gallbladder without cholecystitis without obstruction   • 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 ([facility] )   • Medicare annual wellness visit, subsequent   • Anxiety   • Chronic obstructive pulmonary disease, unspecified COPD type ([facility] )   • Chronic pain of both shoulders      Past Medical and Surgical History:     Past Medical History:   Diagnosis Date   • Abnormal weight loss     Last Assessed: 2/24/2014   • Arthritis    • Basal cell carcinoma     Last Assessed: 8/16/2016   • Bursitis     left hip pain   • Cancer (HCC)     BCA- back, left forearm, skin   • Cardiomyopathy ([facility] )    • Chest discomfort     Last Assessed: 2/23/2016   • Chest pain     Last Assessed: 2/13/2013   • Coronary artery disease    • Ecchymosis     Last Assessed: 7/12/2016   • Exertional dyspnea     Last Assessed: 2/23/2016   • Hepatic hemangioma    • Herniation of lumbar intervertebral disc    • Hyperlipidemia    • Hypertension    • Nonmelanoma skin cancer     Last Assessed: 12/15/2017   • Pleural effusion     Bilateral; Last Assessed: 2016   • Pulmonary nodule     multiple nodes; Last Assessesd: 2016   • Shortness of breath    • Vitamin D deficiency      Past Surgical History:   Procedure Laterality Date   • CARPAL TUNNEL RELEASE Bilateral    • CORONARY ARTERY BYPASS GRAFT  2011   • CORONARY ARTERY BYPASS GRAFT     • [place] REPAIR FIRST ABDOMINAL WALL HERNIA Bilateral 2017    Procedure: LAPAROSCOPIC INGUINAL HERNIA REPAIR WITH MESH ;  Surgeon: Emperatriz Rios MD;  Location: [facility];  Service: General      Family History:     Family History   Problem Relation Age of Onset   • Heart disease Mother    • Hypertension Mother       Social History:     Social History     Socioeconomic History   • Marital status: /Civil Union     Spouse name: None   • Number of children: None   • Years of education: None   • Highest education level: None   Occupational History   • None   Tobacco Use   • Smoking status: Former     Types: Cigarettes     Quit date: 1980     Years since quittin 9   • Smokeless tobacco: Never   • Tobacco comments:     quit date  per allscripts   Vaping Use   • Vaping Use: Never used   Substance and Sexual Activity   • Alcohol use: Yes     Alcohol/week: 1 0 standard drink     Types: 1 Glasses of wine per week     Comment: daily   • Drug use: No   • Sexual activity: Not Currently   Other Topics Concern   • None   Social History Narrative    Caffeine use     Social Determinants of Health     Financial Resource Strain: Low Risk    • Difficulty of Paying Living Expenses: Not hard at all   Food Insecurity: Not on file   Transportation Needs: No Transportation Needs   • Lack of Transportation (Medical): No   • Lack of Transportation (Non-Medical):  No   Physical Activity: Not on file   Stress: Not on file Social Connections: Not on file   Intimate Partner Violence: Not on file   Housing Stability: Not on file      Medications and Allergies:     Current Outpatient Medications   Medication Sig Dispense Refill   • amLODIPine (NORVASC) 5 mg tablet Take 1 tablet (5 mg total) by mouth daily 90 tablet 1   • aspirin 81 MG tablet Take 2 tablets by mouth once      • atorvastatin (LIPITOR) 80 mg tablet Take 1 tablet (80 mg total) by mouth daily 90 tablet 1   • B Complex Vitamins (VITAMIN B COMPLEX) TABS Take 1 tablet by mouth daily     • Cholecalciferol (VITAMIN D-3 PO) Take 2,000 Units by mouth 3 (three) times a day before meals     • co-enzyme Q-10 30 MG capsule Take 30 mg by mouth daily     • Flax OIL Take 1 capsule by mouth daily 90 mL 3   • metoprolol succinate (TOPROL-XL) 25 mg 24 hr tablet Take 1 tablet (25 mg total) by mouth daily 90 tablet 1   • valsartan (DIOVAN) 80 mg tablet Take 1 tablet (80 mg total) by mouth daily 90 tablet 1   • nitroglycerin (NITROSTAT) 0 4 mg SL tablet Place 1 tablet (0 4 mg total) under the tongue every 5 (five) minutes as needed for chest pain 25 tablet 4     No current facility-administered medications for this visit       Allergies   Allergen Reactions   • Other Allergic Rhinitis     Environmental        Immunizations:     Immunization History   Administered Date(s) Administered   • COVID-19 PFIZER VACCINE 0 3 ML IM 04/03/2021, 04/24/2021, 12/04/2021   • DT (pediatric) 03/08/2005   • INFLUENZA 09/12/2018, 11/02/2022   • Influenza Split High Dose Preservative Free IM 09/17/2014, 09/30/2015, 10/06/2016, 10/02/2017   • Influenza, high dose seasonal 0 7 mL 09/12/2018, 10/02/2019, 10/13/2020, 11/15/2021, 11/02/2022   • Influenza, seasonal, injectable 10/01/2012   • Pneumococcal Conjugate 13-Valent 06/26/2019   • Pneumococcal Polysaccharide PPV23 12/10/2004, 09/10/2010   • TD (adult) Preservative Free 10/07/2015      Health Maintenance:         Topic Date Due   • Hepatitis C Screening  Completed Topic Date Due   • COVID-19 Vaccine (4 - Booster for Pfizer series) 01/29/2022      Medicare Screening Tests and Risk Assessments:     Bryce Torres is here for his Subsequent Wellness visit  Last Medicare Wellness visit information reviewed, patient interviewed and updates made to the record today  Health Risk Assessment:   Patient rates overall health as good  Patient feels that their physical health rating is same  Patient is satisfied with their life  Eyesight was rated as same  Hearing was rated as same  Patient feels that their emotional and mental health rating is same  Patients states they are never, rarely angry  Patient states they are never, rarely unusually tired/fatigued  Pain experienced in the last 7 days has been none  Patient states that he has experienced no weight loss or gain in last 6 months  Depression Screening:   PHQ-2 Score: 0      Home Safety:  Patient does not have trouble with stairs inside or outside of their home  Patient has working smoke alarms and has working carbon monoxide detector  Home safety hazards include: none  Nutrition:   Current diet is Regular  Medications:   Patient is not currently taking any over-the-counter supplements  Patient is able to manage medications  Activities of Daily Living (ADLs)/Instrumental Activities of Daily Living (IADLs):   Walk and transfer into and out of bed and chair?: Yes  Dress and groom yourself?: Yes    Bathe or shower yourself?: Yes    Feed yourself?  Yes  Do your laundry/housekeeping?: Yes  Manage your money, pay your bills and track your expenses?: Yes  Make your own meals?: Yes    Do your own shopping?: Yes    Previous Hospitalizations:   Any hospitalizations or ED visits within the last 12 months?: No      Advance Care Planning:   Living will: Yes    Durable POA for healthcare: No    Advanced directive: Yes      Cognitive Screening:   Provider or family/friend/caregiver concerned regarding cognition?: No    PREVENTIVE SCREENINGS      Cardiovascular Screening:    General: History Lipid Disorder and Screening Current      Diabetes Screening:     General: Screening Current      Colorectal Cancer Screening:     General: Screening Not Indicated      Prostate Cancer Screening:    General: Screening Not Indicated      Osteoporosis Screening:    General: Screening Not Indicated      Abdominal Aortic Aneurysm (AAA) Screening:    Risk factors include: tobacco use        General: Screening Not Indicated      Lung Cancer Screening:     General: Screening Not Indicated      Hepatitis C Screening:    General: Screening Current    Screening, Brief Intervention, and Referral to Treatment (SBIRT)    Screening  Typical number of drinks in a day: 1  Typical number of drinks in a week: 7  Interpretation: Low risk drinking behavior  Single Item Drug Screening:  How often have you used an illegal drug (including marijuana) or a prescription medication for non-medical reasons in the past year? never    Single Item Drug Screen Score: 0  Interpretation: Negative screen for possible drug use disorder    Brief Intervention  Alcohol & drug use screenings were reviewed  No concerns regarding substance use disorder identified  Other Counseling Topics:   Car/seat belt/driving safety, skin self-exam, sunscreen and regular weightbearing exercise and calcium and vitamin D intake  No results found  Physical Exam:     /64   Pulse 61   Temp 97 8 °F (36 6 °C)   Ht 5' 5" (1 651 m)   Wt 66 7 kg (147 lb)   SpO2 98%   BMI 24 46 kg/m²     Physical Exam  Vitals and nursing note reviewed  Constitutional:       Appearance: Normal appearance  He is normal weight  HENT:      Head: Normocephalic and atraumatic  Cardiovascular:      Rate and Rhythm: Normal rate and regular rhythm  Heart sounds: Normal heart sounds  Pulmonary:      Effort: Pulmonary effort is normal       Breath sounds: Normal breath sounds     Musculoskeletal: General: Normal range of motion  Cervical back: Normal range of motion  Right lower leg: No edema  Left lower leg: No edema  Lymphadenopathy:      Cervical: No cervical adenopathy  Skin:     General: Skin is warm and dry  Neurological:      General: No focal deficit present  Mental Status: He is alert and oriented to person, place, and time  Mental status is at baseline        Gait: Gait abnormal    Psychiatric:         Mood and Affect: Mood normal          Behavior: Behavior normal           Mateo Stubbs MD

## 2023-05-30 ENCOUNTER — HOSPITAL ENCOUNTER (OUTPATIENT)
Dept: RADIOLOGY | Facility: HOSPITAL | Age: 88
Discharge: HOME | End: 2023-05-30

## 2023-05-30 ENCOUNTER — OFFICE VISIT (OUTPATIENT)
Dept: INTERNAL MEDICINE CLINIC | Facility: CLINIC | Age: 88
End: 2023-05-30

## 2023-05-30 VITALS
HEART RATE: 92 BPM | SYSTOLIC BLOOD PRESSURE: 116 MMHG | HEIGHT: 65 IN | DIASTOLIC BLOOD PRESSURE: 76 MMHG | OXYGEN SATURATION: 98 % | BODY MASS INDEX: 24.46 KG/M2 | RESPIRATION RATE: 14 BRPM

## 2023-05-30 DIAGNOSIS — M25.561 CHRONIC PAIN OF BOTH KNEES: ICD-10-CM

## 2023-05-30 DIAGNOSIS — G89.29 CHRONIC PAIN OF BOTH KNEES: Primary | ICD-10-CM

## 2023-05-30 DIAGNOSIS — M25.562 CHRONIC PAIN OF BOTH KNEES: ICD-10-CM

## 2023-05-30 DIAGNOSIS — M25.562 CHRONIC PAIN OF BOTH KNEES: Primary | ICD-10-CM

## 2023-05-30 DIAGNOSIS — G89.29 CHRONIC PAIN OF BOTH KNEES: ICD-10-CM

## 2023-05-30 DIAGNOSIS — I10 HYPERTENSION, UNSPECIFIED TYPE: ICD-10-CM

## 2023-05-30 DIAGNOSIS — R60.0 LOCALIZED EDEMA: ICD-10-CM

## 2023-05-30 DIAGNOSIS — R26.89 BALANCE PROBLEMS: ICD-10-CM

## 2023-05-30 DIAGNOSIS — M25.561 CHRONIC PAIN OF BOTH KNEES: Primary | ICD-10-CM

## 2023-05-30 RX ORDER — VALSARTAN 80 MG/1
80 TABLET ORAL DAILY
Qty: 90 TABLET | Refills: 1 | Status: SHIPPED | OUTPATIENT
Start: 2023-05-30

## 2023-05-30 NOTE — PROGRESS NOTES
Assessment/Plan:     Janee Grover is here with c/o chronic b/l knee pain x years but has been worsening recently and he has not been able to preform his everyday activities like cutting the grass; has not taken anything for the pain, sleeps sitting upright because he has trouble turning in bed; will obtain xray's and send to ortho  Can take tylenol and motrin (sparingly) for pain  Quality Measures:       Depression Screening and Follow-up Plan: Patient was screened for depression during today's encounter  They screened negative with a PHQ-2 score of 0  Falls Plan of Care: balance, strength, and gait training instructions were provided  Recommended assistive device to help with gait and balance  Medications that increase falls were reviewed  Return for Next scheduled follow up  No problem-specific Assessment & Plan notes found for this encounter  Diagnoses and all orders for this visit:    Chronic pain of both knees  -     XR knee 3 vw right non injury; Future  -     XR knee 3 vw left non injury; Future    Hypertension, unspecified type  -     valsartan (DIOVAN) 80 mg tablet; Take 1 tablet (80 mg total) by mouth daily    Localized edema  -     valsartan (DIOVAN) 80 mg tablet; Take 1 tablet (80 mg total) by mouth daily    Balance problems          Subjective:      Patient ID: Myriam Pereira is a 80 y o  male  Here with b/l knee pain        ALLERGIES:  Allergies   Allergen Reactions   • Other Allergic Rhinitis     Environmental         CURRENT MEDICATIONS:    Current Outpatient Medications:   •  amLODIPine (NORVASC) 5 mg tablet, Take 1 tablet (5 mg total) by mouth daily, Disp: 90 tablet, Rfl: 1  •  aspirin 81 MG tablet, Take 2 tablets by mouth once , Disp: , Rfl:   •  atorvastatin (LIPITOR) 80 mg tablet, Take 1 tablet (80 mg total) by mouth daily, Disp: 90 tablet, Rfl: 1  •  B Complex Vitamins (VITAMIN B COMPLEX) TABS, Take 1 tablet by mouth daily, Disp: , Rfl:   •  Cholecalciferol (VITAMIN D-3 PO), Take 2,000 Units by mouth 3 (three) times a day before meals, Disp: , Rfl:   •  co-enzyme Q-10 30 MG capsule, Take 30 mg by mouth daily, Disp: , Rfl:   •  Flax OIL, Take 1 capsule by mouth daily, Disp: 90 mL, Rfl: 3  •  metoprolol succinate (TOPROL-XL) 25 mg 24 hr tablet, Take 1 tablet (25 mg total) by mouth daily, Disp: 90 tablet, Rfl: 1  •  nitroglycerin (NITROSTAT) 0 4 mg SL tablet, Place 1 tablet (0 4 mg total) under the tongue every 5 (five) minutes as needed for chest pain, Disp: 25 tablet, Rfl: 4  •  valsartan (DIOVAN) 80 mg tablet, Take 1 tablet (80 mg total) by mouth daily, Disp: 90 tablet, Rfl: 1    ACTIVE PROBLEM LIST:  Patient Active Problem List   Diagnosis   • 2-vessel coronary artery disease   • Basal cell carcinoma of scalp   • Cardiomyopathy ([facility] [address] )   • History of heart artery stent   • Hyperlipidemia   • Hypertension   • Lumbar pain   • Vitamin D deficiency   • Primary osteoarthritis of right knee   • Primary osteoarthritis of left knee   • Presence of stent in coronary artery in patient with coronary artery disease   • Murmur, cardiac   • Squamous cell carcinoma in situ (SCCIS) of scalp   • Calculus of gallbladder without cholecystitis without obstruction   • 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)   • Medicare annual wellness visit, subsequent   • Anxiety   • Chronic obstructive pulmonary disease, unspecified COPD type (HCC)   • Chronic pain of both shoulders       PAST MEDICAL HISTORY:  Past Medical History:   Diagnosis Date   • Abnormal weight loss     Last Assessed: 2/24/2014   • Arthritis    • Basal cell carcinoma     Last Assessed: 8/16/2016   • Bursitis     left hip pain   • Cancer (HCC)     BCA- back, left forearm, skin   • Cardiomyopathy ([facility] [address] )    • Chest discomfort     Last Assessed: 2/23/2016   • Chest pain     Last Assessed: 2/13/2013   • Coronary artery disease    • Ecchymosis     Last Assessed: 7/12/2016   • Exertional dyspnea     Last Assessed: 2/23/2016 • Hepatic hemangioma    • Herniation of lumbar intervertebral disc    • Hyperlipidemia    • Hypertension    • Nonmelanoma skin cancer     Last Assessed: 12/15/2017   • Pleural effusion     Bilateral; Last Assessed: 2016   • Pulmonary nodule     multiple nodes; Last Assessesd: 2016   • Shortness of breath    • Vitamin D deficiency        PAST SURGICAL HISTORY:  Past Surgical History:   Procedure Laterality Date   • CARPAL TUNNEL RELEASE Bilateral    • CORONARY ARTERY BYPASS GRAFT  2011   • CORONARY ARTERY BYPASS GRAFT     • IN REPAIR FIRST ABDOMINAL WALL HERNIA Bilateral 2017    Procedure: LAPAROSCOPIC INGUINAL HERNIA REPAIR WITH MESH ;  Surgeon: Jimy Rincon MD;  Location: [facility] OR;  Service: General       FAMILY HISTORY:  Family History   Problem Relation Age of Onset   • Heart disease Mother    • Hypertension Mother        SOCIAL HISTORY:  Social History     Socioeconomic History   • Marital status: /Civil Union     Spouse name: Not on file   • Number of children: Not on file   • Years of education: Not on file   • Highest education level: Not on file   Occupational History   • Not on file   Tobacco Use   • Smoking status: Former     Types: Cigarettes     Quit date: 1980     Years since quittin 1   • Smokeless tobacco: Never   • Tobacco comments:     quit date  per allscripts   Vaping Use   • Vaping Use: Never used   Substance and Sexual Activity   • Alcohol use:  Yes     Alcohol/week: 1 0 standard drink of alcohol     Types: 1 Glasses of wine per week     Comment: daily   • Drug use: No   • Sexual activity: Not Currently   Other Topics Concern   • Not on file   Social History Narrative    Caffeine use     Social Determinants of Health     Financial Resource Strain: Low Risk  (3/23/2023)    Overall Financial Resource Strain (CARDIA)    • Difficulty of Paying Living Expenses: Not hard at all   Food Insecurity: Not on file   Transportation Needs: No Transportation "Needs (3/23/2023)    PRAPARE - Transportation    • Lack of Transportation (Medical): No    • Lack of Transportation (Non-Medical): No   Physical Activity: Not on file   Stress: Not on file   Social Connections: Not on file   Intimate Partner Violence: Not on file   Housing Stability: Not on file       Review of Systems   Constitutional: Negative for chills and fever  HENT: Negative for ear pain and sore throat  Eyes: Negative for pain and visual disturbance  Respiratory: Negative for cough and shortness of breath  Cardiovascular: Negative for chest pain and palpitations  Gastrointestinal: Negative for abdominal pain and vomiting  Genitourinary: Negative for dysuria and hematuria  Musculoskeletal: Positive for arthralgias and gait problem  Negative for back pain  B/l knee pain   Skin: Negative for color change and rash  Neurological: Negative for seizures and syncope  All other systems reviewed and are negative  Objective:  Vitals:    05/30/23 1304   BP: 116/76   BP Location: Left arm   Patient Position: Sitting   Cuff Size: Adult   Pulse: 92   Resp: 14   SpO2: 98%   Height: 5' 5\" (1 651 m)     Body mass index is 24 46 kg/m²  Physical Exam  Vitals and nursing note reviewed  Constitutional:       Appearance: Normal appearance  He is normal weight  HENT:      Head: Normocephalic and atraumatic  Cardiovascular:      Rate and Rhythm: Normal rate  Pulmonary:      Effort: Pulmonary effort is normal    Musculoskeletal:         General: Normal range of motion  Cervical back: Normal range of motion  Right lower leg: No edema  Left lower leg: No edema  Skin:     General: Skin is warm and dry  Neurological:      General: No focal deficit present  Mental Status: He is alert and oriented to person, place, and time  Mental status is at baseline  Gait: Gait abnormal (uses a cane)     Psychiatric:         Mood and Affect: Mood normal            RESULTS:    In " chart    This note was created with voice recognition software  Phonic, grammatical and spelling errors may be present within the note as a result

## 2023-09-12 ENCOUNTER — APPOINTMENT (OUTPATIENT)
Dept: LAB | Facility: HOSPITAL | Age: 88
End: 2023-09-12
Payer: MEDICARE

## 2023-09-12 DIAGNOSIS — E78.5 HYPERLIPIDEMIA, UNSPECIFIED HYPERLIPIDEMIA TYPE: ICD-10-CM

## 2023-09-12 DIAGNOSIS — R79.9 ABNORMAL FINDING OF BLOOD CHEMISTRY, UNSPECIFIED: ICD-10-CM

## 2023-09-12 DIAGNOSIS — I10 HYPERTENSION, UNSPECIFIED TYPE: ICD-10-CM

## 2023-09-12 LAB
ALBUMIN SERPL BCP-MCNC: 3.9 G/DL (ref 3.5–5)
ALP SERPL-CCNC: 87 U/L (ref 34–104)
ALT SERPL W P-5'-P-CCNC: 14 U/L (ref 7–52)
ANION GAP SERPL CALCULATED.3IONS-SCNC: 3 MMOL/L
AST SERPL W P-5'-P-CCNC: 22 U/L (ref 13–39)
BASOPHILS # BLD AUTO: 0.03 THOUSANDS/ÂΜL (ref 0–0.1)
BASOPHILS NFR BLD AUTO: 1 % (ref 0–1)
BILIRUB SERPL-MCNC: 1.01 MG/DL (ref 0.2–1)
BUN SERPL-MCNC: 22 MG/DL (ref 5–25)
CALCIUM SERPL-MCNC: 9 MG/DL (ref 8.4–10.2)
CHLORIDE SERPL-SCNC: 106 MMOL/L (ref 96–108)
CHOLEST SERPL-MCNC: 105 MG/DL
CO2 SERPL-SCNC: 30 MMOL/L (ref 21–32)
CREAT SERPL-MCNC: 0.84 MG/DL (ref 0.6–1.3)
EOSINOPHIL # BLD AUTO: 0.1 THOUSAND/ÂΜL (ref 0–0.61)
EOSINOPHIL NFR BLD AUTO: 2 % (ref 0–6)
ERYTHROCYTE [DISTWIDTH] IN BLOOD BY AUTOMATED COUNT: 12.9 % (ref 11.6–15.1)
EST. AVERAGE GLUCOSE BLD GHB EST-MCNC: 120 MG/DL
GFR SERPL CREATININE-BSD FRML MDRD: 78 ML/MIN/1.73SQ M
GLUCOSE P FAST SERPL-MCNC: 92 MG/DL (ref 65–99)
HBA1C MFR BLD: 5.8 %
HCT VFR BLD AUTO: 41.8 % (ref 36.5–49.3)
HDLC SERPL-MCNC: 58 MG/DL
HGB BLD-MCNC: 14.9 G/DL (ref 12–17)
IMM GRANULOCYTES # BLD AUTO: 0.01 THOUSAND/UL (ref 0–0.2)
IMM GRANULOCYTES NFR BLD AUTO: 0 % (ref 0–2)
LDLC SERPL CALC-MCNC: 38 MG/DL (ref 0–100)
LYMPHOCYTES # BLD AUTO: 1.7 THOUSANDS/ÂΜL (ref 0.6–4.47)
LYMPHOCYTES NFR BLD AUTO: 28 % (ref 14–44)
MCH RBC QN AUTO: 33.6 PG (ref 26.8–34.3)
MCHC RBC AUTO-ENTMCNC: 35.6 G/DL (ref 31.4–37.4)
MCV RBC AUTO: 94 FL (ref 82–98)
MONOCYTES # BLD AUTO: 0.59 THOUSAND/ÂΜL (ref 0.17–1.22)
MONOCYTES NFR BLD AUTO: 10 % (ref 4–12)
NEUTROPHILS # BLD AUTO: 3.58 THOUSANDS/ÂΜL (ref 1.85–7.62)
NEUTS SEG NFR BLD AUTO: 59 % (ref 43–75)
NRBC BLD AUTO-RTO: 0 /100 WBCS
PLATELET # BLD AUTO: 222 THOUSANDS/UL (ref 149–390)
PMV BLD AUTO: 10.5 FL (ref 8.9–12.7)
POTASSIUM SERPL-SCNC: 4.5 MMOL/L (ref 3.5–5.3)
PROT SERPL-MCNC: 6.9 G/DL (ref 6.4–8.4)
RBC # BLD AUTO: 4.43 MILLION/UL (ref 3.88–5.62)
SODIUM SERPL-SCNC: 139 MMOL/L (ref 135–147)
TRIGL SERPL-MCNC: 45 MG/DL
TSH SERPL DL<=0.05 MIU/L-ACNC: 2.61 UIU/ML (ref 0.45–4.5)
WBC # BLD AUTO: 6.01 THOUSAND/UL (ref 4.31–10.16)

## 2023-09-12 PROCEDURE — 80053 COMPREHEN METABOLIC PANEL: CPT

## 2023-09-12 PROCEDURE — 85025 COMPLETE CBC W/AUTO DIFF WBC: CPT

## 2023-09-12 PROCEDURE — 83036 HEMOGLOBIN GLYCOSYLATED A1C: CPT

## 2023-09-12 PROCEDURE — 84443 ASSAY THYROID STIM HORMONE: CPT

## 2023-09-12 PROCEDURE — 80061 LIPID PANEL: CPT

## 2023-09-12 PROCEDURE — 36415 COLL VENOUS BLD VENIPUNCTURE: CPT

## 2023-09-28 ENCOUNTER — OFFICE VISIT (OUTPATIENT)
Dept: INTERNAL MEDICINE CLINIC | Facility: CLINIC | Age: 88
End: 2023-09-28
Payer: MEDICARE

## 2023-09-28 VITALS
SYSTOLIC BLOOD PRESSURE: 118 MMHG | OXYGEN SATURATION: 99 % | BODY MASS INDEX: 24.46 KG/M2 | HEIGHT: 65 IN | DIASTOLIC BLOOD PRESSURE: 66 MMHG | TEMPERATURE: 98.4 F | HEART RATE: 86 BPM | RESPIRATION RATE: 14 BRPM

## 2023-09-28 DIAGNOSIS — I25.10 2-VESSEL CORONARY ARTERY DISEASE: ICD-10-CM

## 2023-09-28 DIAGNOSIS — I10 HYPERTENSION, UNSPECIFIED TYPE: ICD-10-CM

## 2023-09-28 DIAGNOSIS — E78.5 HYPERLIPIDEMIA, UNSPECIFIED HYPERLIPIDEMIA TYPE: ICD-10-CM

## 2023-09-28 DIAGNOSIS — J44.9 CHRONIC OBSTRUCTIVE PULMONARY DISEASE, UNSPECIFIED COPD TYPE (HCC): Primary | ICD-10-CM

## 2023-09-28 DIAGNOSIS — Z23 FLU VACCINE NEED: ICD-10-CM

## 2023-09-28 DIAGNOSIS — I25.5 ISCHEMIC CARDIOMYOPATHY: ICD-10-CM

## 2023-09-28 DIAGNOSIS — R60.0 LOCALIZED EDEMA: ICD-10-CM

## 2023-09-28 DIAGNOSIS — R79.9 ABNORMAL FINDING OF BLOOD CHEMISTRY, UNSPECIFIED: ICD-10-CM

## 2023-09-28 DIAGNOSIS — R21 RASH: ICD-10-CM

## 2023-09-28 PROBLEM — K80.20 CALCULUS OF GALLBLADDER WITHOUT CHOLECYSTITIS WITHOUT OBSTRUCTION: Status: RESOLVED | Noted: 2019-11-27 | Resolved: 2023-09-28

## 2023-09-28 PROCEDURE — 90662 IIV NO PRSV INCREASED AG IM: CPT | Performed by: INTERNAL MEDICINE

## 2023-09-28 PROCEDURE — 99214 OFFICE O/P EST MOD 30 MIN: CPT | Performed by: INTERNAL MEDICINE

## 2023-09-28 PROCEDURE — G0008 ADMIN INFLUENZA VIRUS VAC: HCPCS | Performed by: INTERNAL MEDICINE

## 2023-09-28 RX ORDER — NITROGLYCERIN 0.4 MG/1
0.4 TABLET SUBLINGUAL
Qty: 25 TABLET | Refills: 3 | Status: SHIPPED | OUTPATIENT
Start: 2023-09-28

## 2023-09-28 RX ORDER — ATORVASTATIN CALCIUM 80 MG/1
80 TABLET, FILM COATED ORAL DAILY
Qty: 90 TABLET | Refills: 3 | Status: SHIPPED | OUTPATIENT
Start: 2023-09-28

## 2023-09-28 RX ORDER — AMLODIPINE BESYLATE 5 MG/1
5 TABLET ORAL DAILY
Qty: 90 TABLET | Refills: 3 | Status: SHIPPED | OUTPATIENT
Start: 2023-09-28

## 2023-09-28 RX ORDER — METOPROLOL SUCCINATE 25 MG/1
25 TABLET, EXTENDED RELEASE ORAL DAILY
Qty: 90 TABLET | Refills: 3 | Status: SHIPPED | OUTPATIENT
Start: 2023-09-28

## 2023-09-28 RX ORDER — VALSARTAN 80 MG/1
80 TABLET ORAL DAILY
Qty: 90 TABLET | Refills: 3 | Status: SHIPPED | OUTPATIENT
Start: 2023-09-28

## 2023-09-28 NOTE — PROGRESS NOTES
Assessment/Plan:     Patient is here for routine follow up, reviewed chronic medical problems, labs ordered for next visit including, cbc, CMP, TSH, a1c, lipid; recent labs reviewed; For HTN, continue ccb and arb; for h/o CAD and murmur, continue f/u with cardiology; has nitro but has not used it recently. Continue aspirin and BB; Quality Measures:       Depression Screening and Follow-up Plan: Patient was screened for depression during today's encounter. They screened negative with a PHQ-2 score of 0. Return in about 6 months (around 3/28/2024) for regular and medicare. No problem-specific Assessment & Plan notes found for this encounter. Diagnoses and all orders for this visit:    Chronic obstructive pulmonary disease, unspecified COPD type ([address])    Hypertension, unspecified type  -     amLODIPine (NORVASC) 5 mg tablet; Take 1 tablet (5 mg total) by mouth daily  -     valsartan (DIOVAN) 80 mg tablet; Take 1 tablet (80 mg total) by mouth daily  -     metoprolol succinate (TOPROL-XL) 25 mg 24 hr tablet; Take 1 tablet (25 mg total) by mouth daily  -     CBC and differential; Future  -     Comprehensive metabolic panel; Future  -     TSH, 3rd generation with Free T4 reflex; Future    Localized edema  -     valsartan (DIOVAN) 80 mg tablet; Take 1 tablet (80 mg total) by mouth daily    2-vessel coronary artery disease  -     metoprolol succinate (TOPROL-XL) 25 mg 24 hr tablet; Take 1 tablet (25 mg total) by mouth daily    Hyperlipidemia, unspecified hyperlipidemia type  -     atorvastatin (LIPITOR) 80 mg tablet; Take 1 tablet (80 mg total) by mouth daily  -     CBC and differential; Future  -     Comprehensive metabolic panel; Future  -     TSH, 3rd generation with Free T4 reflex; Future  -     Hemoglobin A1C; Future  -     Lipid Panel with Direct LDL reflex;  Future    Ischemic cardiomyopathy  -     nitroglycerin (NITROSTAT) 0.4 mg SL tablet; Place 1 tablet (0.4 mg total) under the tongue every 5 (five) minutes as needed for chest pain    Abnormal finding of blood chemistry, unspecified  -     Hemoglobin A1C; Future    Rash  -     hydrocortisone 2.5 % cream; Apply topically 4 (four) times a day as needed for irritation or rash    Flu vaccine need  -     influenza vaccine, high-dose, PF 0.7 mL (FLUZONE HIGH-DOSE)          Subjective:      Patient ID: Veronica Ruano is a 80 y.o. male. Here for routine f/u.       ALLERGIES:  Allergies   Allergen Reactions   • Other Allergic Rhinitis     Environmental         CURRENT MEDICATIONS:    Current Outpatient Medications:   •  amLODIPine (NORVASC) 5 mg tablet, Take 1 tablet (5 mg total) by mouth daily, Disp: 90 tablet, Rfl: 3  •  aspirin 81 MG tablet, Take 2 tablets by mouth once , Disp: , Rfl:   •  atorvastatin (LIPITOR) 80 mg tablet, Take 1 tablet (80 mg total) by mouth daily, Disp: 90 tablet, Rfl: 3  •  B Complex Vitamins (VITAMIN B COMPLEX) TABS, Take 1 tablet by mouth daily, Disp: , Rfl:   •  Cholecalciferol (VITAMIN D-3 PO), Take 2,000 Units by mouth 3 (three) times a day before meals, Disp: , Rfl:   •  co-enzyme Q-10 30 MG capsule, Take 30 mg by mouth daily, Disp: , Rfl:   •  Flax OIL, Take 1 capsule by mouth daily, Disp: 90 mL, Rfl: 3  •  hydrocortisone 2.5 % cream, Apply topically 4 (four) times a day as needed for irritation or rash, Disp: 20 g, Rfl: 3  •  metoprolol succinate (TOPROL-XL) 25 mg 24 hr tablet, Take 1 tablet (25 mg total) by mouth daily, Disp: 90 tablet, Rfl: 3  •  nitroglycerin (NITROSTAT) 0.4 mg SL tablet, Place 1 tablet (0.4 mg total) under the tongue every 5 (five) minutes as needed for chest pain, Disp: 25 tablet, Rfl: 3  •  valsartan (DIOVAN) 80 mg tablet, Take 1 tablet (80 mg total) by mouth daily, Disp: 90 tablet, Rfl: 3    ACTIVE PROBLEM LIST:  Patient Active Problem List   Diagnosis   • 2-vessel coronary artery disease   • Basal cell carcinoma of scalp   • Cardiomyopathy [facility])   • History of heart artery stent   • Hyperlipidemia   • Hypertension   • Lumbar pain   • Vitamin D deficiency   • Primary osteoarthritis of right knee   • Primary osteoarthritis of left knee   • Presence of stent in coronary artery in patient with coronary artery disease   • Murmur, cardiac   • Squamous cell carcinoma in situ (SCCIS) of scalp   • 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 ([address])   • Medicare annual wellness visit, subsequent   • Anxiety   • Chronic obstructive pulmonary disease, unspecified COPD type ([address])   • Chronic pain of both shoulders       PAST MEDICAL HISTORY:  Past Medical History:   Diagnosis Date   • Abnormal weight loss     Last Assessed: 2/24/2014   • Arthritis    • Basal cell carcinoma     Last Assessed: 8/16/2016   • Bursitis     left hip pain   • Cancer (HCC)     BCA- back, left forearm, skin   • Cardiomyopathy ([address])    • Chest discomfort     Last Assessed: 2/23/2016   • Chest pain     Last Assessed: 2/13/2013   • Coronary artery disease    • Ecchymosis     Last Assessed: 7/12/2016   • Exertional dyspnea     Last Assessed: 2/23/2016   • Hepatic hemangioma    • Herniation of lumbar intervertebral disc    • Hyperlipidemia    • Hypertension    • Nonmelanoma skin cancer     Last Assessed: 12/15/2017   • Pleural effusion     Bilateral; Last Assessed: 4/26/2016   • Pulmonary nodule     multiple nodes;  Last Assessesd: 4/26/2016   • Shortness of breath    • Vitamin D deficiency        PAST SURGICAL HISTORY:  Past Surgical History:   Procedure Laterality Date   • CARPAL TUNNEL RELEASE Bilateral    • CORONARY ARTERY BYPASS GRAFT  03/24/2011   • CORONARY ARTERY BYPASS GRAFT     • AL REPAIR FIRST ABDOMINAL WALL HERNIA Bilateral 4/11/2017    Procedure: LAPAROSCOPIC INGUINAL HERNIA REPAIR WITH MESH ;  Surgeon: Alina Oliva MD;  Location: [facility];  Service: General       FAMILY HISTORY:  Family History   Problem Relation Age of Onset   • Heart disease Mother    • Hypertension Mother        SOCIAL HISTORY:  Social History Socioeconomic History   • Marital status: /Civil Union     Spouse name: Not on file   • Number of children: Not on file   • Years of education: Not on file   • Highest education level: Not on file   Occupational History   • Not on file   Tobacco Use   • Smoking status: Former     Types: Cigarettes     Quit date: 1980     Years since quittin.4   • Smokeless tobacco: Never   • Tobacco comments:     quit date 1970 per allscripts   Vaping Use   • Vaping Use: Never used   Substance and Sexual Activity   • Alcohol use: Yes     Alcohol/week: 1.0 standard drink of alcohol     Types: 1 Glasses of wine per week     Comment: daily   • Drug use: No   • Sexual activity: Not Currently   Other Topics Concern   • Not on file   Social History Narrative    Caffeine use     Social Determinants of Health     Financial Resource Strain: Low Risk  (3/23/2023)    Overall Financial Resource Strain (CARDIA)    • Difficulty of Paying Living Expenses: Not hard at all   Food Insecurity: Not on file   Transportation Needs: No Transportation Needs (3/23/2023)    PRAPARE - Transportation    • Lack of Transportation (Medical): No    • Lack of Transportation (Non-Medical): No   Physical Activity: Not on file   Stress: Not on file   Social Connections: Not on file   Intimate Partner Violence: Not on file   Housing Stability: Not on file       Review of Systems   Constitutional: Negative for chills and fever. HENT: Negative for ear pain and sore throat. Eyes: Negative for pain and visual disturbance. Respiratory: Negative for cough and shortness of breath. Cardiovascular: Negative for chest pain and palpitations. Gastrointestinal: Negative for abdominal pain and vomiting. Genitourinary: Negative for dysuria and hematuria. Musculoskeletal: Positive for arthralgias, back pain and gait problem (using cane). Skin: Positive for color change and rash. Neurological: Negative for seizures and syncope.    All other systems reviewed and are negative. Objective:  Vitals:    09/28/23 1120   BP: 118/66   BP Location: Left arm   Patient Position: Sitting   Cuff Size: Adult   Pulse: 86   Resp: 14   Temp: 98.4 °F (36.9 °C)   TempSrc: Tympanic   SpO2: 99%   Height: 5' 5" (1.651 m)     Body mass index is 24.46 kg/m². Physical Exam  Vitals and nursing note reviewed. Constitutional:       Appearance: Normal appearance. He is normal weight. HENT:      Head: Normocephalic and atraumatic. Cardiovascular:      Rate and Rhythm: Normal rate and regular rhythm. Heart sounds: Murmur heard. Pulmonary:      Effort: Pulmonary effort is normal.      Breath sounds: Normal breath sounds. Musculoskeletal:         General: Normal range of motion. Cervical back: Normal range of motion. Skin:     General: Skin is warm and dry. Findings: Rash (posterior neck) present. Neurological:      General: No focal deficit present. Mental Status: He is alert and oriented to person, place, and time. Mental status is at baseline. Gait: Gait abnormal (using cane). Psychiatric:         Mood and Affect: Mood normal.           RESULTS:  Hemoglobin A1C   Date/Time Value Ref Range Status   09/12/2023 09:19 AM 5.8 (H) Normal 4.0-5.6%; PreDiabetic 5.7-6.4%;  Diabetic >=6.5%; Glycemic control for adults with diabetes <7.0% % Final     Cholesterol   Date/Time Value Ref Range Status   09/12/2023 09:19  See Comment mg/dL Final     Comment:     Cholesterol:         Pediatric <18 Years        Desirable          <170 mg/dL      Borderline High    170-199 mg/dL      High               >=200 mg/dL        Adult >=18 Years            Desirable        <200 mg/dL      Borderline High  200-239 mg/dL      High             >239 mg/dL       Triglycerides   Date/Time Value Ref Range Status   09/12/2023 09:19 AM 45 See Comment mg/dL Final     Comment:     Triglyceride:     0-9Y            <75mg/dL     10Y-17Y         <90 mg/dL >=18Y     Normal          <150 mg/dL     Borderline High 150-199 mg/dL     High            200-499 mg/dL        Very High       >499 mg/dL    Specimen collection should occur prior to Metamizole administration due to the potential for falsely depressed results. 11/01/2016 09:05 AM 51 0 - 149 mg/dL Final     Comment:     Performed at: [address], [id number], [id number]  MD:  [address] [place] [phone]       HDL   Date/Time Value Ref Range Status   09/18/2015 08:56 AM 64 >39 mg/dL Final     Comment:     Result Comment: According to ATP-III Guidelines, HDL-C >59 mg/dL is considered a  negative risk factor for CHD. HDL, Direct   Date/Time Value Ref Range Status   09/12/2023 09:19 AM 58 >=40 mg/dL Final     LDL Calculated   Date/Time Value Ref Range Status   09/12/2023 09:19 AM 38 0 - 100 mg/dL Final     Comment:     LDL Cholesterol:     Optimal           <100 mg/dl     Near Optimal      100-129 mg/dl     Above Optimal       Borderline High 130-159 mg/dl       High            160-189 mg/dl       Very High       >189 mg/dl         This screening LDL is a calculated result. It does not have the accuracy of the Direct Measured LDL in the monitoring of patients with hyperlipidemia and/or statin therapy. Direct Measure LDL (YLS750) must be ordered separately in these patients.    09/18/2015 08:56 AM 58 0 - 99 mg/dL Final     Comment:       Performed at: [address], [place], [place], [id number], [id number]  MD:  [address] [place] [phone]       Hemoglobin   Date/Time Value Ref Range Status   09/12/2023 09:19 AM 14.9 12.0 - 17.0 g/dL Final   11/01/2016 09:05 AM 14.0 12.6 - 17.7 g/dL Final     Hematocrit   Date/Time Value Ref Range Status   09/12/2023 09:19 AM 41.8 36.5 - 49.3 % Final   11/01/2016 09:05 AM 41.4 37.5 - 51.0 % Final     Platelets   Date/Time Value Ref Range Status   09/12/2023 09:19  149 - 390 Thousands/uL Final 11/01/2016 09:05  150 - 379 x10E3/uL Final     PROSTATE SPECIFIC ANTIGEN   Date/Time Value Ref Range Status   10/08/2015 10:03 AM 1.3 0.0 - 4.0 ng/mL Final     Comment:     Result Comment: Roche ECLIA methodology. According to the American Urological Association, Serum PSA should  decrease and remain at undetectable levels after radical  prostatectomy. The AUA defines biochemical recurrence as an initial  PSA value 0.2 ng/mL or greater followed by a subsequent confirmatory  PSA value 0.2 ng/mL or greater. Values obtained with different assay methods or kits cannot be used  interchangeably. Results cannot be interpreted as absolute evidence  of the presence or absence of malignant disease. TSH 3RD GENERATON   Date/Time Value Ref Range Status   09/12/2023 09:19 AM 2.610 0.450 - 4.500 uIU/mL Final     Comment:     Adult TSH (3rd generation) reference range follows the recommended guidelines of the American Thyroid Association, January, 2020.   11/01/2016 09:05 AM 2.280 0.450 - 4.500 uIU/mL Final     Comment:     Performed at: [facility], [address] [place] [address], [id number], [id number]  MD:  [address]  [phone]       Free T4   Date/Time Value Ref Range Status   05/24/2017 09:24 AM 0.80 0.76 - 1.46 ng/dL Final     Sodium   Date/Time Value Ref Range Status   09/12/2023 09:19  135 - 147 mmol/L Final     BUN   Date/Time Value Ref Range Status   09/12/2023 09:19 AM 22 5 - 25 mg/dL Final   11/01/2016 09:05 AM 20 8 - 27 mg/dL Final     Creatinine   Date/Time Value Ref Range Status   09/12/2023 09:19 AM 0.84 0.60 - 1.30 mg/dL Final     Comment:     Standardized to IDMS reference method   11/01/2016 09:05 AM 0.93 0.76 - 1.27 mg/dL Final      In chart    This note was created with voice recognition software. Phonic, grammatical and spelling errors may be present within the note as a result.

## 2023-09-29 ENCOUNTER — TELEPHONE (OUTPATIENT)
Dept: INTERNAL MEDICINE CLINIC | Facility: CLINIC | Age: 88
End: 2023-09-29

## 2023-09-29 DIAGNOSIS — M25.562 BILATERAL CHRONIC KNEE PAIN: Primary | ICD-10-CM

## 2023-09-29 DIAGNOSIS — M25.561 BILATERAL CHRONIC KNEE PAIN: Primary | ICD-10-CM

## 2023-09-29 DIAGNOSIS — G89.29 BILATERAL CHRONIC KNEE PAIN: Primary | ICD-10-CM

## 2023-09-29 NOTE — TELEPHONE ENCOUNTER
Patient said you discussed with him about seeing an orthopedic for his knees. Patient said he thought about it and thought it would be a good idea. So if you could please put in the order for Ortho.     Thank you,

## 2023-10-12 ENCOUNTER — OFFICE VISIT (OUTPATIENT)
Dept: OBGYN CLINIC | Facility: CLINIC | Age: 88
End: 2023-10-12
Payer: MEDICARE

## 2023-10-12 VITALS
BODY MASS INDEX: 24.16 KG/M2 | DIASTOLIC BLOOD PRESSURE: 73 MMHG | HEART RATE: 71 BPM | SYSTOLIC BLOOD PRESSURE: 118 MMHG | WEIGHT: 145 LBS | HEIGHT: 65 IN

## 2023-10-12 DIAGNOSIS — R29.898 WEAKNESS OF BOTH HIPS: ICD-10-CM

## 2023-10-12 DIAGNOSIS — M22.2X2 PATELLOFEMORAL SYNDROME OF BOTH KNEES: ICD-10-CM

## 2023-10-12 DIAGNOSIS — M17.0 PRIMARY OSTEOARTHRITIS OF BOTH KNEES: Primary | ICD-10-CM

## 2023-10-12 DIAGNOSIS — M22.2X1 PATELLOFEMORAL SYNDROME OF BOTH KNEES: ICD-10-CM

## 2023-10-12 DIAGNOSIS — M62.9 HAMSTRING TIGHTNESS OF BOTH LOWER EXTREMITIES: ICD-10-CM

## 2023-10-12 PROCEDURE — 20610 DRAIN/INJ JOINT/BURSA W/O US: CPT | Performed by: FAMILY MEDICINE

## 2023-10-12 PROCEDURE — 99204 OFFICE O/P NEW MOD 45 MIN: CPT | Performed by: FAMILY MEDICINE

## 2023-10-12 RX ORDER — BUPIVACAINE HYDROCHLORIDE 2.5 MG/ML
1 INJECTION, SOLUTION INFILTRATION; PERINEURAL
Status: COMPLETED | OUTPATIENT
Start: 2023-10-12 | End: 2023-10-12

## 2023-10-12 RX ORDER — TRIAMCINOLONE ACETONIDE 40 MG/ML
80 INJECTION, SUSPENSION INTRA-ARTICULAR; INTRAMUSCULAR
Status: COMPLETED | OUTPATIENT
Start: 2023-10-12 | End: 2023-10-12

## 2023-10-12 RX ORDER — BUPIVACAINE HYDROCHLORIDE 2.5 MG/ML
4 INJECTION, SOLUTION INFILTRATION; PERINEURAL
Status: COMPLETED | OUTPATIENT
Start: 2023-10-12 | End: 2023-10-12

## 2023-10-12 RX ADMIN — TRIAMCINOLONE ACETONIDE 80 MG: 40 INJECTION, SUSPENSION INTRA-ARTICULAR; INTRAMUSCULAR at 11:00

## 2023-10-12 RX ADMIN — BUPIVACAINE HYDROCHLORIDE 1 ML: 2.5 INJECTION, SOLUTION INFILTRATION; PERINEURAL at 11:00

## 2023-10-12 RX ADMIN — BUPIVACAINE HYDROCHLORIDE 4 ML: 2.5 INJECTION, SOLUTION INFILTRATION; PERINEURAL at 11:00

## 2023-10-12 NOTE — PROGRESS NOTES
Assessment/Plan:  Assessment/Plan   Diagnoses and all orders for this visit:    Primary osteoarthritis of both knees  -     Ambulatory Referral to Orthopedic Surgery  -     Diclofenac Sodium (VOLTAREN) 1 %; Apply 2 g topically 3 (three) times a day  -     Ambulatory Referral to Physical Therapy; Future  -     Large joint arthrocentesis: bilateral knee    Patellofemoral syndrome of both knees  -     Ambulatory Referral to Physical Therapy; Future    Weakness of both hips  -     Ambulatory Referral to Physical Therapy; Future    Hamstring tightness of both lower extremities  -     Ambulatory Referral to Physical Therapy; Future      80-year-old male with pain both knees many years duration. Discussed with patient physical exam, radiographs, impression, and plan. X-rays both knees noted for severe medial joint space narrowing with bone-on-bone and genu varum. Physical exam both knees noted for generalized swelling and genu varum. He has mild tenderness medial joint line both knees. He has full extension and flexion to 100 degrees both knees. There is mild laxity valgus stress both knees. There is no groin pain with CARITO and FADDIR of the hips. He has hamstring tightness both lower extremities and weakness both hips with abduction. Clinical impression is that he has symptoms from degenerative changes. I discussed treatment regimen of steroid injection, anti-inflammatory, and formal therapy. Surgery not warranted at this time. I administered mixtures of 3 cc 0.25% bupivacaine and 2 cc Kenalog to each knee without complication. He is to apply topical diclofenac gel 3 times a day for the next 10 days. He is to start physical therapy as soon as possible and do home exercises as directed. He was advised if no improvement in 3 to 4 weeks following steroid injection he may contact the office and we will request visco injection. Subjective:   Patient ID: Julia Lawrence is a 80 y.o. male.   Chief Complaint   Patient presents with    Right Knee - Pain    Left Knee - Pain        44-year-old male presents for evaluation pain both knees many years duration. He denies particular trauma or inciting event. He reports have been diagnosed with arthritis and many years ago underwent 5 series visco injection which he states did not help. He has been experiencing pain described as generalized to the knees, nonradiating, worse with standing and ambulating, most bothersome with stairs, associated with swelling, and improved with resting. He ambulates with a cane and in the stores uses a shopping cart to help with stability and gait. He was seen by primary care physician, referred for x-rays which were noted for degenerative changes and he was referred to orthopedic care. Knee Pain  This is a chronic problem. The current episode started more than 1 year ago. The problem occurs daily. The problem has been gradually worsening. Associated symptoms include arthralgias and joint swelling. Pertinent negatives include no abdominal pain, chest pain, chills, fever, numbness, rash, sore throat or weakness. The symptoms are aggravated by standing and walking. He has tried rest and position changes for the symptoms. The treatment provided mild relief. The following portions of the patient's history were reviewed and updated as appropriate: He  has a past medical history of Abnormal weight loss, Arthritis, Basal cell carcinoma, Bursitis, Cancer ([address]), Cardiomyopathy ([address]), Chest discomfort, Chest pain, Coronary artery disease, Ecchymosis, Exertional dyspnea, Hepatic hemangioma, Herniation of lumbar intervertebral disc, Hyperlipidemia, Hypertension, Nonmelanoma skin cancer, Pleural effusion, Pulmonary nodule, Shortness of breath, and Vitamin D deficiency. He is allergic to other. .    Review of Systems   Constitutional:  Negative for chills and fever. HENT:  Negative for sore throat.     Eyes:  Negative for visual disturbance. Respiratory:  Negative for shortness of breath. Cardiovascular:  Negative for chest pain. Gastrointestinal:  Negative for abdominal pain. Genitourinary:  Negative for flank pain. Musculoskeletal:  Positive for arthralgias and joint swelling. Skin:  Negative for rash and wound. Neurological:  Negative for weakness and numbness. Hematological:  Does not bruise/bleed easily. Psychiatric/Behavioral:  Negative for self-injury. Objective:  Vitals:    10/12/23 1103   BP: 118/73   Pulse: 71   Weight: 65.8 kg (145 lb)   Height: 5' 5" (1.651 m)      Right Knee Exam     Muscle Strength   The patient has normal right knee strength. Tenderness   The patient is experiencing tenderness in the medial joint line. Range of Motion   Extension:  normal   Flexion:  100     Tests    Valgus: positive    Other   Swelling: mild      Left Knee Exam     Muscle Strength   The patient has normal left knee strength. Tenderness   The patient is experiencing tenderness in the medial joint line. Range of Motion   Extension:  normal   Flexion:  100     Tests    Valgus: positive    Other   Swelling: mild      Right Hip Exam     Muscle Strength   Abduction: 4/5   Flexion: 5/5     Tests   CARITO: negative    Comments:  Negative FADDIR  Hamstring tightness      Left Hip Exam     Muscle Strength   Abduction: 4/5   Flexion: 5/5     Tests   CARITO: negative    Comments:  Negative FADDIR  Hamstring tightness            Physical Exam  Vitals and nursing note reviewed. Constitutional:       Appearance: Normal appearance. He is well-developed. He is not ill-appearing or diaphoretic. HENT:      Head: Normocephalic and atraumatic. Right Ear: External ear normal.      Left Ear: External ear normal.   Eyes:      Conjunctiva/sclera: Conjunctivae normal.   Neck:      Trachea: No tracheal deviation. Cardiovascular:      Rate and Rhythm: Normal rate.    Pulmonary:      Effort: Pulmonary effort is normal. No respiratory distress. Abdominal:      General: There is no distension. Musculoskeletal:         General: Swelling and tenderness present. No deformity or signs of injury. Skin:     General: Skin is warm and dry. Coloration: Skin is not jaundiced or pale. Neurological:      Mental Status: He is alert and oriented to person, place, and time. Psychiatric:         Mood and Affect: Mood normal.         Behavior: Behavior normal.         Thought Content: Thought content normal.         Judgment: Judgment normal.         I have personally reviewed pertinent films in PACS and my interpretation is severe medial joint space loss both knees with bone-on-bone and genu varum. Large joint arthrocentesis: bilateral knee  Universal Protocol:  Consent: Verbal consent obtained. Risks and benefits: risks, benefits and alternatives were discussed  Consent given by: patient  Time out: Immediately prior to procedure a "time out" was called to verify the correct patient, procedure, equipment, support staff and site/side marked as required. Patient understanding: patient states understanding of the procedure being performed  Patient consent: the patient's understanding of the procedure matches consent given  Procedure consent: procedure consent matches procedure scheduled  Relevant documents: relevant documents present and verified  Test results: test results available and properly labeled  Site marked: the operative site was marked  Radiology Images displayed and confirmed.  If images not available, report reviewed: imaging studies available  Required items: required blood products, implants, devices, and special equipment available  Patient identity confirmed: verbally with patient  Supporting Documentation  Indications: pain   Procedure Details  Location: knee - bilateral knee  Preparation: Patient was prepped and draped in the usual sterile fashion  Needle gauge: 21G 2"  Ultrasound guidance: no  Approach: anteromedial    Medications (Right): 4 mL bupivacaine 0.25 %; 1 mL bupivacaine 0.25 %; 80 mg triamcinolone acetonide 40 mg/mLMedications (Left): 1 mL bupivacaine 0.25 %; 4 mL bupivacaine 0.25 %; 80 mg triamcinolone acetonide 40 mg/mL   Patient tolerance: patient tolerated the procedure well with no immediate complications  Dressing:  Sterile dressing applied

## 2023-10-12 NOTE — LETTER
October 12, 2023     Maru Meyer, [address]  [facility]    Patient: Christie Santana   YOB: 1934   Date of Visit: 10/12/2023       Dear Dr. Ashtyn Mendoza: Thank you for referring Christie Santana to me for evaluation. Below are my notes for this consultation. If you have questions, please do not hesitate to call me. I look forward to following your patient along with you. Sincerely,        DARRYN Pereira, DO        CC: No Recipients    DARRYN Pereira, DO  10/12/2023 12:13 PM  Sign when Signing Visit  Assessment/Plan:  Assessment/Plan  Diagnoses and all orders for this visit:    Primary osteoarthritis of both knees  -     Ambulatory Referral to Orthopedic Surgery  -     Diclofenac Sodium (VOLTAREN) 1 %; Apply 2 g topically 3 (three) times a day  -     Ambulatory Referral to Physical Therapy; Future  -     Large joint arthrocentesis: bilateral knee    Patellofemoral syndrome of both knees  -     Ambulatory Referral to Physical Therapy; Future    Weakness of both hips  -     Ambulatory Referral to Physical Therapy; Future    Hamstring tightness of both lower extremities  -     Ambulatory Referral to Physical Therapy; Future      68-year-old male with pain both knees many years duration. Discussed with patient physical exam, radiographs, impression, and plan. X-rays both knees noted for severe medial joint space narrowing with bone-on-bone and genu varum. Physical exam both knees noted for generalized swelling and genu varum. He has mild tenderness medial joint line both knees. He has full extension and flexion to 100 degrees both knees. There is mild laxity valgus stress both knees. There is no groin pain with CARITO and FADDIR of the hips. He has hamstring tightness both lower extremities and weakness both hips with abduction. Clinical impression is that he has symptoms from degenerative changes.   I discussed treatment regimen of steroid injection, anti-inflammatory, and formal therapy. Surgery not warranted at this time. I administered mixtures of 3 cc 0.25% bupivacaine and 2 cc Kenalog to each knee without complication. He is to apply topical diclofenac gel 3 times a day for the next 10 days. He is to start physical therapy as soon as possible and do home exercises as directed. He was advised if no improvement in 3 to 4 weeks following steroid injection he may contact the office and we will request visco injection. Subjective:   Patient ID: Haseeb Jessica is a 80 y.o. male. Chief Complaint   Patient presents with   • Right Knee - Pain   • Left Knee - Pain        75-year-old male presents for evaluation pain both knees many years duration. He denies particular trauma or inciting event. He reports have been diagnosed with arthritis and many years ago underwent 5 series visco injection which he states did not help. He has been experiencing pain described as generalized to the knees, nonradiating, worse with standing and ambulating, most bothersome with stairs, associated with swelling, and improved with resting. He ambulates with a cane and in the stores uses a shopping cart to help with stability and gait. He was seen by primary care physician, referred for x-rays which were noted for degenerative changes and he was referred to orthopedic care. Knee Pain  This is a chronic problem. The current episode started more than 1 year ago. The problem occurs daily. The problem has been gradually worsening. Associated symptoms include arthralgias and joint swelling. Pertinent negatives include no abdominal pain, chest pain, chills, fever, numbness, rash, sore throat or weakness. The symptoms are aggravated by standing and walking. He has tried rest and position changes for the symptoms. The treatment provided mild relief.            The following portions of the patient's history were reviewed and updated as appropriate: He has a past medical history of Abnormal weight loss, Arthritis, Basal cell carcinoma, Bursitis, Cancer ([facility]), Cardiomyopathy ([address]), Chest discomfort, Chest pain, Coronary artery disease, Ecchymosis, Exertional dyspnea, Hepatic hemangioma, Herniation of lumbar intervertebral disc, Hyperlipidemia, Hypertension, Nonmelanoma skin cancer, Pleural effusion, Pulmonary nodule, Shortness of breath, and Vitamin D deficiency. He is allergic to other. .    Review of Systems   Constitutional:  Negative for chills and fever. HENT:  Negative for sore throat. Eyes:  Negative for visual disturbance. Respiratory:  Negative for shortness of breath. Cardiovascular:  Negative for chest pain. Gastrointestinal:  Negative for abdominal pain. Genitourinary:  Negative for flank pain. Musculoskeletal:  Positive for arthralgias and joint swelling. Skin:  Negative for rash and wound. Neurological:  Negative for weakness and numbness. Hematological:  Does not bruise/bleed easily. Psychiatric/Behavioral:  Negative for self-injury. Objective:  Vitals:    10/12/23 1103   BP: 118/73   Pulse: 71   Weight: 65.8 kg (145 lb)   Height: 5' 5" (1.651 m)      Right Knee Exam     Muscle Strength   The patient has normal right knee strength. Tenderness   The patient is experiencing tenderness in the medial joint line. Range of Motion   Extension:  normal   Flexion:  100     Tests    Valgus: positive    Other   Swelling: mild      Left Knee Exam     Muscle Strength   The patient has normal left knee strength. Tenderness   The patient is experiencing tenderness in the medial joint line.     Range of Motion   Extension:  normal   Flexion:  100     Tests    Valgus: positive    Other   Swelling: mild      Right Hip Exam     Muscle Strength   Abduction: 4/5   Flexion: 5/5     Tests   CARITO: negative    Comments:  Negative FADDIR  Hamstring tightness      Left Hip Exam     Muscle Strength   Abduction: 4/5   Flexion: 5/5 Tests   CARITO: negative    Comments:  Negative FADDIR  Hamstring tightness            Physical Exam  Vitals and nursing note reviewed. Constitutional:       Appearance: Normal appearance. He is well-developed. He is not ill-appearing or diaphoretic. HENT:      Head: Normocephalic and atraumatic. Right Ear: External ear normal.      Left Ear: External ear normal.   Eyes:      Conjunctiva/sclera: Conjunctivae normal.   Neck:      Trachea: No tracheal deviation. Cardiovascular:      Rate and Rhythm: Normal rate. Pulmonary:      Effort: Pulmonary effort is normal. No respiratory distress. Abdominal:      General: There is no distension. Musculoskeletal:         General: Swelling and tenderness present. No deformity or signs of injury. Skin:     General: Skin is warm and dry. Coloration: Skin is not jaundiced or pale. Neurological:      Mental Status: He is alert and oriented to person, place, and time. Psychiatric:         Mood and Affect: Mood normal.         Behavior: Behavior normal.         Thought Content: Thought content normal.         Judgment: Judgment normal.         I have personally reviewed pertinent films in PACS and my interpretation is severe medial joint space loss both knees with bone-on-bone and genu varum. Large joint arthrocentesis: bilateral knee  Universal Protocol:  Consent: Verbal consent obtained. Risks and benefits: risks, benefits and alternatives were discussed  Consent given by: patient  Time out: Immediately prior to procedure a "time out" was called to verify the correct patient, procedure, equipment, support staff and site/side marked as required.   Patient understanding: patient states understanding of the procedure being performed  Patient consent: the patient's understanding of the procedure matches consent given  Procedure consent: procedure consent matches procedure scheduled  Relevant documents: relevant documents present and verified  Test results: test results available and properly labeled  Site marked: the operative site was marked  Radiology Images displayed and confirmed.  If images not available, report reviewed: imaging studies available  Required items: required blood products, implants, devices, and special equipment available  Patient identity confirmed: verbally with patient  Supporting Documentation  Indications: pain   Procedure Details  Location: knee - bilateral knee  Preparation: Patient was prepped and draped in the usual sterile fashion  Needle gauge: 21G 2"  Ultrasound guidance: no  Approach: anteromedial    Medications (Right): 4 mL bupivacaine 0.25 %; 1 mL bupivacaine 0.25 %; 80 mg triamcinolone acetonide 40 mg/mLMedications (Left): 1 mL bupivacaine 0.25 %; 4 mL bupivacaine 0.25 %; 80 mg triamcinolone acetonide 40 mg/mL   Patient tolerance: patient tolerated the procedure well with no immediate complications  Dressing:  Sterile dressing applied

## 2023-10-12 NOTE — PATIENT INSTRUCTIONS
Prescription Diclofenac gel (voltaren)  - 3 times a day for 10 days    Call in 3 -4 weeks if not feeling better after the cortisone injections

## 2023-10-19 ENCOUNTER — OFFICE VISIT (OUTPATIENT)
Age: 88
End: 2023-10-19

## 2023-10-19 VITALS — WEIGHT: 145 LBS | BODY MASS INDEX: 24.16 KG/M2 | HEIGHT: 65 IN

## 2023-10-19 DIAGNOSIS — D18.01 CHERRY ANGIOMA: ICD-10-CM

## 2023-10-19 DIAGNOSIS — L82.1 SEBORRHEIC KERATOSIS: ICD-10-CM

## 2023-10-19 DIAGNOSIS — Z85.828 HISTORY OF SKIN CANCER: ICD-10-CM

## 2023-10-19 DIAGNOSIS — Z13.89 SCREENING FOR SKIN CONDITION: Primary | ICD-10-CM

## 2023-10-19 DIAGNOSIS — D22.9 MULTIPLE NEVI: ICD-10-CM

## 2023-10-19 NOTE — PATIENT INSTRUCTIONS
ACTINIC KERATOSIS    Actinic keratoses are very common on sites repeatedly exposed to the sun, especially the backs of the hands and the face, most often affecting the ears, nose, cheeks, upper lip, vermilion of the lower lip, temples, forehead and balding scalp. In severely chronically sun-damaged individuals, they may also be found on the upper trunk, upper and lower limbs, and dorsum of feet. We discussed the theoretical premalignant (“pre-cancerous”) nature and etiology of these growths. We discussed the prevailing notion that actinic keratoses are a reflection of abnormal skin cell development due to DNA damage by short wavelength UVB. They are more likely to appear if the immune function is poor, due to aging, recent sun exposure, predisposing disease or certain drugs. We discussed that the main concern is that actinic keratoses may predispose to squamous cell carcinoma. It is rare for a solitary actinic keratosis to evolve to squamous cell carcinoma (SCC), but the risk of SCC occurring at some stage in a patient with more than 10 actinic keratoses is thought to be about 10 to 15%. A tender, thickened, ulcerated or enlarging actinic keratosis is suspicious of SCC. Actinic keratoses may be prevented by strict sun protection. If already present, keratoses may improve with a very high sun protection factor (50+) broad-spectrum sunscreen applied at least daily to affected areas, year-round. We recommend that UPF-rated clothing and hats and sunglasses be worn whenever possible and that a sunscreen-moisturizer combination product such as Neutrogena Daily Defense be applied at least three times a day.     We performed a thorough discussion of treatment options and specific risk/benefits/alternatives including but not limited to medical “field” treatment with medications such as the following:    Topical “field area” medications such as 5-fluorouracil or Aldara (specifically, the trouble with long-term compliance, blistering and local skin reaction versus the convenience of at-home therapy and that field therapy “gets what is not yet seen”). Cryotherapy (specifically, local pain, scarring, dyspigmentation, blistering, possible superinfection, and treats “only what we see” versus directed treatment today). Photodynamic therapy (specifically, local pain, scarring, dyspigmentation, blistering, possible superinfection, need to schedule for a later date, and time spent in the office versus field therapy that “gets what is not yet seen”). BASAL CELL CARCINOMA    What is basal cell carcinoma? Basal cell carcinoma (BCC) is a common, locally invasive, keratinocytic, or non-melanoma, skin cancer. It is also known as rodent ulcer and basalioma. Patients with BCC often develop multiple primary tumours over time. Who gets basal cell carcinoma? Risk factors for BCC include:  Age and sex: BCCs are particularly prevalent in elderly males. However, they also affect females and younger adults   Previous BCC or other form of skin cancer (squamous cell carcinoma, melanoma)   Sun damage (photoaging, actinic keratoses)   Repeated prior episodes of sunburn   Fair skin, blue eyes and blond or red hair--note; BCC can also affect darker skin types   Previous cutaneous injury, thermal burn, disease (eg cutaneous lupus, sebaceous naevus)   Inherited syndromes: BCC is a particular problem for families with basal cell naevus syndrome (Gorlin syndrome), Nerhc-Vnaué-Tnfryyue syndrome, Rombo syndrome, Oley syndrome and xeroderma pigmentosum   Other risk factors include ionising radiation, exposure to arsenic, and immune suppression due to disease or medicines    What causes basal cell carcinoma?   The cause of BCC is multifactorial.  Most often, there are DNA mutations in the patched [facility]) tumour suppressor gene, part of hedgehog signaling pathway   These may be triggered by exposure to ultraviolet radiation   Various spontaneous and inherited gene defects predispose to [facility]    What are the clinical features of basal cell carcinoma? BCC is a locally invasive skin tumour. The main characteristics are:  Slowly growing plaque or nodule   Skin coloured, pink or pigmented   Varies in size from a few millimetres to several centimetres in diameter   Spontaneous bleeding or ulceration  BCC is very rarely a threat to life. A tiny proportion of BCCs grow rapidly, invade deeply, and/or metastasise to local lymph nodes. Types of basal cell carcinoma  There are several distinct clinical types of BCC, and over 20 histological growth patterns of BCC. Nodular BCC  Most common type of facial BCC   Shiny or pearly nodule with a smooth surface   May have central depression or ulceration, so its edges appear rolled   Blood vessels cross its surface   Cystic variant is soft, with jelly-like contents   Micronodular, microcystic and infiltrative types are potentially aggressive subtypes   Also known as nodulocystic carcinoma  Superficial BCC  Most common type in younger adults   Most common type on upper trunk and shoulders   Slightly scaly, irregular plaque   Thin, translucent rolled border   Multiple microerosions  Morphoeaform BCC  Usually found in mid-facial sites   Waxy, scar-like plaque with indistinct borders   Wide and deep subclinical extension   May infiltrate cutaneous nerves (perineural spread)   Also known as morpheic, morphoeiform or sclerosing BCC  Basosquamous carcinoma  Mixed basal cell carcinoma (BCC) and squamous cell carcinoma (SCC)   Infiltrative growth pattern   Potentially more aggressive than other forms of BCC   Also known as basisquamous carcinoma and mixed basal-squamous cell carcinoma       Complications of basal cell carcinoma    Recurrent BCC  Recurrence of BCC after initial treatment is not uncommon. Characteristics of recurrent BCC often include:   Incomplete excision or narrow margins at primary excision   Morphoeic, micronodular, and infiltrative subtypes   Location on head and neck    Advanced BCC  Advanced BCCs are large, often neglected tumours. They may be several centimetres in diameter   They may be deeply infiltrating into tissues below the skin   They are difficult or impossible to treat surgically    Metastatic BCC  Very rare   Primary tumour is often large, neglected or recurrent, located on head and neck, with aggressive subtype   May have had multiple prior treatments   May arise in site exposed to ionising radiation   Can be fatal    How is basal cell carcinoma diagnosed? BCC is diagnosed clinically by the presence of a slowly enlarging skin lesion with typical appearance. The diagnosis and  by a diagnostic biopsy or following excision. Some typical superficial BCCs on trunk and limbs are clinically diagnosed and have non-surgical treatment without histology. What is the treatment for primary basal cell carcinoma? The treatment for a [address] [place] [address] depends on its type, size and location, the number to be treated, patient factors, and the preference or expertise of the doctor. Most BCCs are treated surgically. Long-term follow-up is recommended to check for new lesions and recurrence; the latter may be unnecessary if histology has reported wide clear margins. Excision biopsy  Excision means the lesion is cut out and the skin stitched up. Most appropriate treatment for nodular, infiltrative and morphoeic BCCs   Should include 3 to 5 mm margin of normal skin around the tumour   Very large lesions may require flap or skin graft to repair the defect   Pathologist will report deep and lateral margins   Further surgery is recommended for lesions that are incompletely excised    Mohs micrographically controlled excision  Mohs micrographically controlled surgery involves examining carefully marked excised tissue under the microscope, layer by layer, to ensure complete excision.   Very high cure rates achieved by trained Mohs surgeons   Used in high-risk areas of the face around eyes, lips and nose   Suitable for ill-defined, morphoeic, infiltrative and recurrent subtypes   Large defects are repaired by flap or skin graft    Superficial skin surgery  Superficial skin surgery comprises shave, curettage, and electrocautery. It is a rapid technique using local anaesthesia and does not require sutures. Suitable for small, well-defined nodular or superficial BCCs   Lesions are usually located on trunk or limbs   Wound is left open to heal by secondary intention   Moist wound dressings lead to healing within a few weeks   Eventual scar quality variable    Cryotherapy  Cryotherapy is the treatment of a superficial skin lesion by freezing it, usually with liquid nitrogen. Suitable for small superficial BCCs on covered areas of trunk and limbs   Best avoided for BCCs on head and neck, and distal to knees   Double freeze-thaw technique   Results in a blister that crusts over and heals within several weeks. Leaves permanent white pop    Photodynamic therapy  Photodynamic therapy (PDT) refers to a technique in which [facility] is treated with a photosensitising chemical, and exposed to light several hours later. Topical photosensitisers include aminolevulinic acid lotion and methyl aminolevulinate cream   Suitable for low-risk small, superficial BCCs   Best avoided if tumour in site at high risk of recurrence   Results in inflammatory reaction, maximal 3-4 days after procedure   Treatment repeated 7 days after initial treatment   Excellent cosmetic results    Imiquimod cream  Imiquimod is an immune response modifier. Best used for superficial BCCs less than 2 cm diameter   Applied three to five times each week, for 6-16 weeks   Results in a variable inflammatory reaction, maximal at three weeks   Minimal scarring is usual    Fluorouracil cream  5-Fluorouracil cream is a topical cytotoxic agent.   Used to treat small superficial basal cell carcinomas Requires prolonged course, eg twice daily for 6-12 weeks   Causes inflammatory reaction   Has high recurrence rates    Radiotherapy  Radiotherapy or X-ray treatment can be used to treat primary BCCs or as adjunctive treatment if margins are incomplete. Mainly used if surgery is not suitable   Best avoided in young patients and in genetic conditions predisposing to skin cancer   Best cosmetic results achieved using multiple fractions   Typically, patient attends once-weekly for several weeks   Causes inflammatory reaction followed by scar   Risk of radiodermatitis, late recurrence, and new tumours    What is the treatment for advanced or metastatic basal cell carcinoma? Locally advanced primary, recurrent or metastatic BCC requires multidisciplinary consultation. Often a combination of treatments is used. Surgery   Radiotherapy   Targeted therapy  Targeted therapy refers to the hedgehog signalling pathway inhibitors, vismodegib and sonidegib. These drugs have some important risks and side effects. How can basal cell carcinoma be prevented? The most important way to prevent BCC is to avoid sunburn. This is especially important in childhood and early life. Fair skinned individuals and those with a personal or family history of BCC should protect their skin from sun exposure daily, year-round and lifelong. Stay indoors or under the shade in the middle of the day   Wear covering clothing   Apply high protection factor SPF50+ broad-spectrum sunscreens generously to exposed skin if outdoors   Avoid indoor tanning (sun beds, solaria)  Oral nicotinamide (vitamin B3) in a dose of 500 mg twice daily may reduce the number and severity of BCCs. What is the outlook for basal cell carcinoma? Most BCCs are cured by treatment. Cure is most likely if treatment is undertaken when the lesion is small.   About 50% of people with BCC develop a second one within 3 years of the first. They are also at increased risk of other skin cancers, especially melanoma. Regular self-skin examinations and long-term annual skin checks by an experienced health professional are recommended. SQUAMOUS CELL CARCINOMA    What is cutaneous squamous cell carcinoma? Cutaneous squamous cell carcinoma (SCC) is a common type of keratinocyte or non-melanoma skin cancer. It is derived from cells within the epidermis that make keratin -- the horny protein that makes up skin, hair and nails. Cutaneous SCC is an invasive disease, referring to cancer cells that have grown beyond the epidermis. SCC can sometimes metastasise and may prove fatal.  Intraepidermal carcinoma (cutaneous SCC in situ) and mucosal SCC are considered elsewhere. Who gets cutaneous squamous cell carcinoma? Risk factors for cutaneous SCC include:  Age and sex: SCCs are particularly prevalent in elderly males. However, they also affect females and younger adults. Previous SCC or another form of skin cancer (basal cell carcinoma, melanoma) are a strong predictor for further skin cancers. Actinic keratoses   Outdoor occupation or recreation   Smoking   Fair skin, blue eyes and blond or red hair   Previous cutaneous injury, thermal burn, disease (eg cutaneous lupus, epidermolysis bullosa, leg ulcer)   Inherited syndromes: SCC is a particular problem for families with xeroderma pigmentosum and albinism   Other risk factors include ionising radiation, exposure to arsenic, and immune suppression due to disease (eg chronic lymphocytic leukaemia) or medicines. Organ transplant recipients have a massively increased risk of developing SCC. What causes cutaneous squamous cell carcinoma? More than 90% of cases of SCC are associated with numerous DNA mutations in multiple somatic genes. Mutations in the p53 tumour suppressor gene are caused by exposure to ultraviolet radiation (UV), especially UVB (known as signature 7).  Other signature mutations relate to cigarette smoking, ageing and immune suppression (eg, to drugs such as azathioprine). Mutations in signalling pathways affect the epidermal growth factor receptor, MARYAN, Fyn, and [id number] signalling. Beta-genus human papillomaviruses (wart virus) are thought to play a role in SCC arising in immune-suppressed populations. ?-HPV and HPV subtypes 5, 8, 17, 20, 24, and 38 have also been associated with an increased risk of cutaneous SCC in immunocompetent individuals. What are the clinical features of cutaneous squamous cell carcinoma? Cutaneous SCCs present as enlarging scaly or crusted lumps. They usually arise within pre-existing actinic keratosis or intraepidermal carcinoma. They grow over weeks to months   They may ulcerate   They are often tender or painful   Located on sun-exposed sites, particularly the face, lips, ears, hands, forearms and lower legs   Size varies from a few millimetres to several centimetres in diameter. Types of cutaneous squamous cell carcinoma  Distinct clinical types of invasive cutaneous SCC include:  Cutaneous horn -- the horn is due to excessive production of keratin   Keratoacanthoma (KA) -- a rapidly growing keratinising nodule that may resolve without treatment   Carcinoma cuniculatum (‘verrucous carcinoma’), a slow-growing, warty tumour on the sole of the foot. Multiple eruptive SCC/KA-like lesions arising in syndromes, such as multiple self-healing squamous epitheliomas of Carpenter-Smith and Grzybowski syndrome  The pathologist may classify a tumour as well differentiated, moderately well differentiated, poorly differentiated or anaplastic cutaneous SCC. There are other variants. Classification of squamous cell carcinoma by risk  Cutaneous SCC is classified as low-risk or high-risk, depending on the chance of tumour recurrence and metastasis.  Characteristics of high-risk SCC include:  High-risk cutaneous squamous cell carcinoma has the following characteristics:  Diameter greater than or equal to 2 cm   Location on the ear, vermilion of the lip, central face, hands, feet, genitalia   Arising in elderly or immune suppressed patient   Histological thickness greater than 2 mm, poorly differentiated histology, or with the invasion of the subcutaneous tissue, nerves and blood vessels  Metastatic SCC is found in regional lymph nodes (80%), lungs, liver, brain, bones and skin. Staging cutaneous squamous cell carcinoma  In 2011, the [address] on Cancer (AJCC) published a new staging systemic for cutaneous SCC for the 7th Edition of the AJCC manual. This evaluates the dimensions of the original primary tumour (T) and its metastases to lymph nodes (N). Tumour staging for cutaneous SCC  TX: Th Primary tumour cannot be assessed  T0: No evidence of a primary tumour  Tis: Carcinoma in situ  T1: Tumour ? 2cm without high-risk features  T2: Tumour ? 2cm; or; Tumour ? 2 cm with high-risk features  T3: Tumour with the invasion of maxilla, mandible, orbit or temporal bone  T4: Tumour with the invasion of axial or appendicular skeleton or perineural invasion of skull base    Eb staging for cutaneous SCC  NX: Regional lymph nodes cannot be assessed  N0: No regional lymph node metastasis  N1: Metastasis in one local lymph node ? 3cm  N2: Metastasis in one local lymph node ? 3cm; or; Metastasis in >1 local lymph node ? 6cm  N3: Metastasis in lymph node ? 6cm    How is squamous cell carcinoma diagnosed? Diagnosis of cutaneous SCC is based on clinical features. The diagnosis and histological subtype are confirmed pathologically by diagnostic biopsy or following excision. See squamous cell carcinoma - pathology. Patients with high-risk SCC may also undergo staging investigations to determine whether it has spread to lymph nodes or elsewhere.  These may include:  Imaging using ultrasound scan, X-rays, CT scans, MRI scans   Lymph node or other tissue biopsies    What is the treatment for cutaneous squamous cell carcinoma? Cutaneous SCC is nearly always treated surgically. Most cases are excised with a 3-10 mm margin of normal tissue around a visible tumour. A flap or skin graft may be needed to repair the defect. Other methods of removal include:  Shave, curettage, and electrocautery for low-risk tumours on trunk and limbs   Aggressive cryotherapy for very small, thin, low-risk tumours   Mohs micrographic surgery for large facial lesions with indistinct margins or recurrent tumours   Radiotherapy for an inoperable tumour, patients unsuitable for surgery, or as adjuvant    What is the treatment for advanced or metastatic squamous cell carcinoma? Locally advanced primary, recurrent or metastatic SCC requires multidisciplinary consultation. Often a combination of treatments is used. Surgery   Radiotherapy   Cemiplimab   Experimental targeted therapy using epidermal growth factor receptor inhibitors    How can cutaneous squamous cell carcinoma be prevented? There is a great deal of evidence to show that very careful sun protection at any time of life reduces the number of SCCs. This is particularly important in ageing, sun-damaged, fair skin; in patients that are immune suppressed; and in those who already have actinic keratoses or previous SCC. Stay indoors or under the shade in the middle of the day   Wear covering clothing   Apply high protection factor SPF50+ broad-spectrum sunscreens generously to exposed skin if outdoors   Avoid indoor tanning (sun beds, solaria)    Oral nicotinamide (vitamin B3) in a dose of 500 mg twice daily may reduce the number and severity of SCCs in people at high risk. Patients with multiple squamous cell carcinomas may be prescribed an oral retinoid (acitretin or isotretinoin). These reduce the number of tumours but have some nuisance side effects. What is the outlook for cutaneous squamous cell carcinoma? Most SCCs are cured by treatment.  A cure is most likely if treatment is undertaken when the lesion is small. The risk of recurrence or disease-associated death is greater for tumours that are > 20 mm in diameter and/or > 2 mm in thickness at the time of surgical excision. About 50% of people at high risk of SCC develop a second one within 5 years of the first. They are also at increased risk of other skin cancers, especially melanoma. Regular self-skin examinations and long-term annual skin checks by an experienced health professional are recommended.

## 2023-10-19 NOTE — PROGRESS NOTES
[facility] Note     Patient Name: Jeff Norman  Encounter Date: October 19, 2023     Have you been cared for by a [facility] D[facility]atologist in the last 3 years and, if so, which description applies to you? Yes. I have been here within the last 3 years, and my medical history has NOT changed since that time. I am MALE/not capable of bearing children. REVIEW OF SYSTEMS:  Have you recently had or currently have any of the following? No changes in my recent health. PAST MEDICAL HISTORY:  Have you personally ever had or currently have any of the following? If "YES," then please provide more detail. No changes in my medical history. HISTORY OF IMMUNOSUPPRESSION: Do you have a history of any of the following:  Systemic Immunosuppression such as Diabetes, Biologic or Immunotherapy, Chemotherapy, Organ Transplantation, Bone Marrow Transplantation? No     Answering "YES" requires the addition of the dotphrase "IMMUNOSUPPRESSED" as the first diagnosis of the patient's visit. FAMILY HISTORY:  Any "first degree relatives" (parent, brother, sister, or child) with the following? No changes in my family's known health. PATIENT EXPERIENCE:    Do you want the Dermatologist to perform a COMPLETE skin exam today including a clinical examination under the "bra and underwear" areas? Yes  If necessary, do we have your permission to call and leave a detailed message on your Preferred Phone number that includes your specific medical information?   Yes      Allergies   Allergen Reactions    Other Allergic Rhinitis     Environmental        Current Outpatient Medications:     amLODIPine (NORVASC) 5 mg tablet, Take 1 tablet (5 mg total) by mouth daily, Disp: 90 tablet, Rfl: 3    aspirin 81 MG tablet, Take 2 tablets by mouth once , Disp: , Rfl:     atorvastatin (LIPITOR) 80 mg tablet, Take 1 tablet (80 mg total) by mouth daily, Disp: 90 tablet, Rfl: 3    B Complex Vitamins (VITAMIN B COMPLEX) TABS, Take 1 tablet by mouth daily, Disp: , Rfl:     Cholecalciferol (VITAMIN D-3 PO), Take 2,000 Units by mouth 3 (three) times a day before meals, Disp: , Rfl:     co-enzyme Q-10 30 MG capsule, Take 30 mg by mouth daily, Disp: , Rfl:     Flax OIL, Take 1 capsule by mouth daily, Disp: 90 mL, Rfl: 3    hydrocortisone 2.5 % cream, Apply topically 4 (four) times a day as needed for irritation or rash, Disp: 20 g, Rfl: 3    metoprolol succinate (TOPROL-XL) 25 mg 24 hr tablet, Take 1 tablet (25 mg total) by mouth daily, Disp: 90 tablet, Rfl: 3    valsartan (DIOVAN) 80 mg tablet, Take 1 tablet (80 mg total) by mouth daily, Disp: 90 tablet, Rfl: 3    Diclofenac Sodium (VOLTAREN) 1 %, Apply 2 g topically 3 (three) times a day (Patient not taking: Reported on 10/19/2023), Disp: 350 g, Rfl: 1    nitroglycerin (NITROSTAT) 0.4 mg SL tablet, Place 1 tablet (0.4 mg total) under the tongue every 5 (five) minutes as needed for chest pain (Patient not taking: Reported on 10/19/2023), Disp: 25 tablet, Rfl: 3          Whom besides the patient is providing clinical information about today's encounter? NO ADDITIONAL HISTORIAN (patient alone provided history)    Physical Exam and Assessment/Plan by Diagnosis:    Chief complaint: Patient is a 79 y/o male present for a routine skin exam with history of non-melanoma skin cancer, today he has a spot of concern on the left temple/sideburn region. HISTORY OF BASAL CELL CARCINOMA    Physical Exam:  Anatomic Location Affected:  Mid Scalp - 2019  Right Scalp - 2017  Left Forearm and Back - 2016  Morphological Description of scar:  well healed  Suspected Recurrence: No  Pertinent Positives:  Pertinent Negatives:       Additional History of Present Condition:  History of basal cell carcinoma with no sign of recurrence    Assessment and Plan:  Based on a thorough discussion of this condition and the management approach to it (including a comprehensive discussion of the known risks, side effects and potential benefits of treatment), the patient (family) agrees to implement the following specific plan:  Monitor for change    HISTORY OF SQUAMOUS CELL CARCINOMA     Physical Exam:  Anatomic Location Affected:  Left Posterior Scalp - 2019  Morphological Description of Scar:  well healed  Suspected Recurrence: no  Regional adenopathy: no    Additional History of Present Condition:  History of squamous cell carcinoma without reoccurrence    Assessment and Plan:  Based on a thorough discussion of this condition and the management approach to it (including a comprehensive discussion of the known risks, side effects and potential benefits of treatment), the patient (family) agrees to implement the following specific plan:  Monitor for change    MELANOCYTIC NEVI ("Moles")    Physical Exam:  Anatomic Location Affected: Mostly on sun-exposed areas of the trunk and extremities  Morphological Description:  Scattered, 1-4mm round to ovoid, symmetrical-appearing, even bordered, skin colored to dark brown macules/papules, mostly in sun-exposed areas  Pertinent Positives:  Pertinent Negatives: Additional History of Present Condition:  present on exam    Assessment and Plan:  Based on a thorough discussion of this condition and the management approach to it (including a comprehensive discussion of the known risks, side effects and potential benefits of treatment), the patient (family) agrees to implement the following specific plan:  Provided handout with information regarding the ABCDE's of moles   Recommend routine skin exams every year   Sun avoidance, protective clothing (known as UPF clothing), and the use of at least SPF 30 sunscreens is advised. Sunscreen should be reapplied every two hours when outside.      SEBORRHEIC KERATOSIS; NON-INFLAMED    Physical Exam:  Anatomic Location Affected:  scattered across trunk, extremities, face  Morphological Description:  Flat and raised, waxy, smooth to warty textured, yellow to brownish-grey to dark brown to blackish, discrete, "stuck-on" appearing papules. Pertinent Positives:  Pertinent Negatives: Additional History of Present Condition:  Patient reports new bumps on the skin. Denies itch, burn, pain, bleeding or ulceration. Present constantly; nothing seems to make it worse or better. No prior treatment. Assessment and Plan:  Based on a thorough discussion of this condition and the management approach to it (including a comprehensive discussion of the known risks, side effects and potential benefits of treatment), the patient (family) agrees to implement the following specific plan:  Reassured benign    ANGIOMA ("CHERRY ANGIOMA")    Physical Exam:  Anatomic Location: scattered across sun exposed areas of the trunk and extremities   Morphologic Description: Firm red to reddish-blue discrete papules  Pertinent Positives:  Pertinent Negatives:     Additional History of Present Condition:  Present on exam.     Assessment and Plan:  Reassured benign    Scribe Attestation      I,:   am acting as a scribe while in the presence of the attending physician.:       I,:   personally performed the services described in this documentation    as scribed in my presence.:

## 2023-11-02 DIAGNOSIS — I10 HYPERTENSION, UNSPECIFIED TYPE: ICD-10-CM

## 2023-11-02 RX ORDER — AMLODIPINE BESYLATE 5 MG/1
5 TABLET ORAL DAILY
Qty: 90 TABLET | Refills: 3 | Status: SHIPPED | OUTPATIENT
Start: 2023-11-02

## 2023-11-02 NOTE — TELEPHONE ENCOUNTER
Can we resend this refill in,  90 day supply with refills please.   he says the [facility] says they never received our script

## 2024-01-31 ENCOUNTER — OFFICE VISIT (OUTPATIENT)
Dept: INTERNAL MEDICINE CLINIC | Facility: CLINIC | Age: 89
End: 2024-01-31
Payer: MEDICARE

## 2024-01-31 VITALS
RESPIRATION RATE: 16 BRPM | BODY MASS INDEX: 24.16 KG/M2 | WEIGHT: 145 LBS | HEIGHT: 65 IN | DIASTOLIC BLOOD PRESSURE: 62 MMHG | SYSTOLIC BLOOD PRESSURE: 110 MMHG | OXYGEN SATURATION: 98 % | TEMPERATURE: 98.7 F | HEART RATE: 76 BPM

## 2024-01-31 DIAGNOSIS — M25.562 CHRONIC PAIN OF BOTH KNEES: ICD-10-CM

## 2024-01-31 DIAGNOSIS — I25.119 ATHEROSCLEROSIS OF NATIVE CORONARY ARTERY WITH ANGINA PECTORIS, UNSPECIFIED WHETHER NATIVE OR TRANSPLANTED HEART (HCC): ICD-10-CM

## 2024-01-31 DIAGNOSIS — R09.82 POST-NASAL DRIP: ICD-10-CM

## 2024-01-31 DIAGNOSIS — R05.1 ACUTE COUGH: Primary | ICD-10-CM

## 2024-01-31 DIAGNOSIS — G89.29 CHRONIC PAIN OF BOTH KNEES: ICD-10-CM

## 2024-01-31 DIAGNOSIS — M25.561 CHRONIC PAIN OF BOTH KNEES: ICD-10-CM

## 2024-01-31 DIAGNOSIS — I42.9 CARDIOMYOPATHY, UNSPECIFIED TYPE (HCC): ICD-10-CM

## 2024-01-31 PROCEDURE — 99213 OFFICE O/P EST LOW 20 MIN: CPT | Performed by: INTERNAL MEDICINE

## 2024-01-31 RX ORDER — FLUTICASONE PROPIONATE 50 MCG
1 SPRAY, SUSPENSION (ML) NASAL DAILY
Qty: 9.9 ML | Refills: 0 | Status: SHIPPED | OUTPATIENT
Start: 2024-01-31

## 2024-01-31 RX ORDER — BENZONATATE 200 MG/1
200 CAPSULE ORAL 3 TIMES DAILY PRN
Qty: 20 CAPSULE | Refills: 0 | Status: SHIPPED | OUTPATIENT
Start: 2024-01-31

## 2024-01-31 NOTE — PROGRESS NOTES
Assessment/Plan:     Here with cough, nasal drainage yellowish mucus, does not appear to be bacterial in nature, likely viral; treat symptomatically.    Continue follow up with cardiology, h/o stable CM, continue aspirin, not using nitro; on BB.       Quality Measures:       Depression Screening and Follow-up Plan: Patient was screened for depression during today's encounter. They screened negative with a PHQ-2 score of 0.Clincally patient does not have depression. No treatment is required.        Return for Next scheduled follow up.    No problem-specific Assessment & Plan notes found for this encounter.       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 male.    Here with sick complaints.      ALLERGIES:  Allergies   Allergen Reactions   • Other Allergic Rhinitis     Environmental         CURRENT MEDICATIONS:    Current Outpatient Medications:   •  amLODIPine (NORVASC) 5 mg tablet, Take 1 tablet (5 mg total) by mouth daily, Disp: 90 tablet, Rfl: 3  •  aspirin 81 MG tablet, Take 2 tablets by mouth once , Disp: , Rfl:   •  atorvastatin (LIPITOR) 80 mg tablet, Take 1 tablet (80 mg total) by mouth daily, Disp: 90 tablet, Rfl: 3  •  B Complex Vitamins (VITAMIN B COMPLEX) TABS, Take 1 tablet by mouth daily, Disp: , Rfl:   •  benzonatate (TESSALON) 200 MG capsule, Take 1 capsule (200 mg  total) by mouth 3 (three) times a day as needed for cough, Disp: 20 capsule, Rfl: 0  •  Cholecalciferol (VITAMIN D-3 PO), Take 2,000 Units by mouth 3 (three) times a day before meals, Disp: , Rfl:   •  co-enzyme Q-10 30 MG capsule, Take 30 mg by mouth daily, Disp: , Rfl:   •  Flax OIL, Take 1 capsule by mouth daily, Disp: 90 mL, Rfl: 3  •  fluticasone (FLONASE) 50 mcg/act nasal spray, 1 spray into each nostril daily, Disp: 9.9 mL, Rfl: 0  •  hydrocortisone 2.5 % cream, Apply topically 4 (four) times a day as needed for irritation or rash, Disp: 20 g, Rfl: 3  •  metoprolol succinate (TOPROL-XL) 25 mg 24 hr tablet, Take 1 tablet (25 mg total) by mouth daily, Disp: 90 tablet, Rfl: 3  •  valsartan (DIOVAN) 80 mg tablet, Take 1 tablet (80 mg total) by mouth daily, Disp: 90 tablet, Rfl: 3  •  nitroglycerin (NITROSTAT) 0.4 mg SL tablet, Place 1 tablet (0.4 mg total) under the tongue every 5 (five) minutes as needed for chest pain (Patient not taking: Reported on 10/19/2023), Disp: 25 tablet, Rfl: 3    ACTIVE PROBLEM LIST:  Patient Active Problem List   Diagnosis   • 2-vessel coronary artery disease   • Basal cell carcinoma of scalp   • Cardiomyopathy (HCC)   • History of heart artery stent   • Hyperlipidemia   • Hypertension   • Lumbar pain   • Vitamin D deficiency   • Primary osteoarthritis of right knee   • Primary osteoarthritis of left knee   • Presence of stent in coronary artery in patient with coronary artery disease   • Murmur, cardiac   • Squamous cell carcinoma in situ (SCCIS) of scalp   • 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)   • Medicare annual wellness visit, subsequent   • Anxiety   • Chronic pain of both shoulders       PAST MEDICAL HISTORY:  Past Medical History:   Diagnosis Date   • Abnormal weight loss     Last Assessed: 2/24/2014   • Arthritis    • Basal cell carcinoma     Last Assessed: 8/16/2016   • Bursitis     left hip pain   • Cancer (HCC)     BCA- back, left forearm,  skin   • Cardiomyopathy (HCC)    • Chest discomfort     Last Assessed: 2016   • Chest pain     Last Assessed: 2013   • Coronary artery disease    • Ecchymosis     Last Assessed: 2016   • Exertional dyspnea     Last Assessed: 2016   • Hepatic hemangioma    • Herniation of lumbar intervertebral disc    • Hyperlipidemia    • Hypertension    • Nonmelanoma skin cancer     Last Assessed: 12/15/2017   • Pleural effusion     Bilateral; Last Assessed: 2016   • Pulmonary nodule     multiple nodes; Last Assessesd: 2016   • Shortness of breath    • Vitamin D deficiency        PAST SURGICAL HISTORY:  Past Surgical History:   Procedure Laterality Date   • CARPAL TUNNEL RELEASE Bilateral    • CORONARY ARTERY BYPASS GRAFT  2011   • CORONARY ARTERY BYPASS GRAFT     • CT REPAIR FIRST ABDOMINAL WALL HERNIA Bilateral 2017    Procedure: LAPAROSCOPIC INGUINAL HERNIA REPAIR WITH MESH ;  Surgeon: Con Flores MD;  Location: [facility];  Service: General       FAMILY HISTORY:  Family History   Problem Relation Age of Onset   • Heart disease Mother    • Hypertension Mother        SOCIAL HISTORY:  Social History     Socioeconomic History   • Marital status: /Civil Union     Spouse name: Not on file   • Number of children: Not on file   • Years of education: Not on file   • Highest education level: Not on file   Occupational History   • Not on file   Tobacco Use   • Smoking status: Former     Current packs/day: 0.00     Types: Cigarettes     Quit date: 1980     Years since quittin.8   • Smokeless tobacco: Never   • Tobacco comments:     quit date  per allscripts   Vaping Use   • Vaping status: Never Used   Substance and Sexual Activity   • Alcohol use: Yes     Alcohol/week: 1.0 standard drink of alcohol     Types: 1 Glasses of wine per week     Comment: daily   • Drug use: No   • Sexual activity: Not Currently   Other Topics Concern   • Not on file   Social History Narrative     "Caffeine use     Social Determinants of Health     Financial Resource Strain: Low Risk  (3/23/2023)    Overall Financial Resource Strain (CARDIA)    • Difficulty of Paying Living Expenses: Not hard at all   Food Insecurity: Not on file   Transportation Needs: No Transportation Needs (3/23/2023)    PRAPARE - Transportation    • Lack of Transportation (Medical): No    • Lack of Transportation (Non-Medical): No   Physical Activity: Not on file   Stress: Not on file   Social Connections: Not on file   Intimate Partner Violence: Not on file   Housing Stability: Not on file       Review of Systems   Constitutional:  Positive for chills. Negative for fever.   HENT:  Positive for congestion, postnasal drip and rhinorrhea. Negative for ear pain and sore throat.    Eyes:  Negative for pain and visual disturbance.   Respiratory:  Positive for cough. Negative for shortness of breath.    Cardiovascular:  Negative for chest pain and palpitations.   Gastrointestinal:  Negative for abdominal pain and vomiting.   Genitourinary:  Negative for dysuria and hematuria.   Musculoskeletal:  Positive for arthralgias and gait problem (uses cane). Negative for back pain.   Skin:  Negative for color change and rash.   Allergic/Immunologic: Positive for environmental allergies.   Neurological:  Negative for seizures and syncope.   All other systems reviewed and are negative.        Objective:  Vitals:    01/31/24 1332   BP: 110/62   BP Location: Left arm   Patient Position: Sitting   Cuff Size: Adult   Pulse: 76   Resp: 16   Temp: 98.7 °F (37.1 °C)   TempSrc: Tympanic   SpO2: 98%   Weight: 65.8 kg (145 lb)   Height: 5' 5\" (1.651 m)     Body mass index is 24.13 kg/m².     Physical Exam  Vitals and nursing note reviewed.   Constitutional:       Appearance: Normal appearance. He is normal weight.   HENT:      Head: Normocephalic and atraumatic.      Nose: Nose normal.      Mouth/Throat:      Mouth: Mucous membranes are moist.      Pharynx: " Posterior oropharyngeal erythema present.   Cardiovascular:      Rate and Rhythm: Normal rate.   Pulmonary:      Effort: Pulmonary effort is normal.      Breath sounds: Normal breath sounds.   Musculoskeletal:         General: Normal range of motion.      Cervical back: Normal range of motion.   Skin:     General: Skin is warm and dry.   Neurological:      General: No focal deficit present.      Mental Status: He is alert and oriented to person, place, and time. Mental status is at baseline.      Gait: Gait abnormal.   Psychiatric:         Mood and Affect: Mood normal.         RESULTS:  Hemoglobin A1C   Date/Time Value Ref Range Status   09/12/2023 09:19 AM 5.8 (H) Normal 4.0-5.6%; PreDiabetic 5.7-6.4%; Diabetic >=6.5%; Glycemic control for adults with diabetes <7.0% % Final     Cholesterol   Date/Time Value Ref Range Status   09/12/2023 09:19  See Comment mg/dL Final     Comment:     Cholesterol:         Pediatric <18 Years        Desirable          <170 mg/dL      Borderline High    170-199 mg/dL      High               >=200 mg/dL        Adult >=18 Years            Desirable        <200 mg/dL      Borderline High  200-239 mg/dL      High             >239 mg/dL       Triglycerides   Date/Time Value Ref Range Status   09/12/2023 09:19 AM 45 See Comment mg/dL Final     Comment:     Triglyceride:     0-9Y            <75mg/dL     10Y-17Y         <90 mg/dL       >=18Y     Normal          <150 mg/dL     Borderline High 150-199 mg/dL     High            200-499 mg/dL        Very High       >499 mg/dL    Specimen collection should occur prior to Metamizole administration due to the potential for falsely depressed results.   11/01/2016 09:05 AM 51 0 - 149 mg/dL Final     Comment:     Performed at: [facility], [address] [place] [address], [place], [place], [id number], [id number]  MD:  [address] [place] [phone]       HDL   Date/Time Value Ref Range Status   09/18/2015 08:56 AM 64 >39 mg/dL Final     Comment:      Result Comment: According to ATP-III Guidelines, HDL-C >59 mg/dL is considered a  negative risk factor for CHD.       HDL, Direct   Date/Time Value Ref Range Status   09/12/2023 09:19 AM 58 >=40 mg/dL Final     LDL Calculated   Date/Time Value Ref Range Status   09/12/2023 09:19 AM 38 0 - 100 mg/dL Final     Comment:     LDL Cholesterol:     Optimal           <100 mg/dl     Near Optimal      100-129 mg/dl     Above Optimal       Borderline High 130-159 mg/dl       High            160-189 mg/dl       Very High       >189 mg/dl         This screening LDL is a calculated result.   It does not have the accuracy of the Direct Measured LDL in the monitoring of patients with hyperlipidemia and/or statin therapy.   Direct Measure LDL (FNJ433) must be ordered separately in these patients.   09/18/2015 08:56 AM 58 0 - 99 mg/dL Final     Comment:       Performed at: [facility], [address] [place] [address], , [place], [place], [id number], [id number]  MD:  [address] [place] [phone]       Hemoglobin   Date/Time Value Ref Range Status   09/12/2023 09:19 AM 14.9 12.0 - 17.0 g/dL Final   11/01/2016 09:05 AM 14.0 12.6 - 17.7 g/dL Final     Hematocrit   Date/Time Value Ref Range Status   09/12/2023 09:19 AM 41.8 36.5 - 49.3 % Final   11/01/2016 09:05 AM 41.4 37.5 - 51.0 % Final     Platelets   Date/Time Value Ref Range Status   09/12/2023 09:19  149 - 390 Thousands/uL Final   11/01/2016 09:05  150 - 379 x10E3/uL Final     PROSTATE SPECIFIC ANTIGEN   Date/Time Value Ref Range Status   10/08/2015 10:03 AM 1.3 0.0 - 4.0 ng/mL Final     Comment:     Result Comment: Roche ECLIA methodology.  According to the American Urological Association, Serum PSA should  decrease and remain at undetectable levels after radical  prostatectomy. The AUA defines biochemical recurrence as an initial  PSA value 0.2 ng/mL or greater followed by a subsequent confirmatory  PSA value 0.2 ng/mL or greater.  Values obtained with different assay methods  or kits cannot be used  interchangeably. Results cannot be interpreted as absolute evidence  of the presence or absence of malignant disease.       TSH 3RD GENERATON   Date/Time Value Ref Range Status   09/12/2023 09:19 AM 2.610 0.450 - 4.500 uIU/mL Final     Comment:     Adult TSH (3rd generation) reference range follows the recommended guidelines of the American Thyroid Association, January, 2020.   11/01/2016 09:05 AM 2.280 0.450 - 4.500 uIU/mL Final     Comment:     Performed at: [facility], [address] [place] [address], , [place], [place], [id number], [id number]  MD:  [address] [place] [phone]       Free T4   Date/Time Value Ref Range Status   05/24/2017 09:24 AM 0.80 0.76 - 1.46 ng/dL Final     Sodium   Date/Time Value Ref Range Status   09/12/2023 09:19  135 - 147 mmol/L Final     BUN   Date/Time Value Ref Range Status   09/12/2023 09:19 AM 22 5 - 25 mg/dL Final   11/01/2016 09:05 AM 20 8 - 27 mg/dL Final     Creatinine   Date/Time Value Ref Range Status   09/12/2023 09:19 AM 0.84 0.60 - 1.30 mg/dL Final     Comment:     Standardized to IDMS reference method   11/01/2016 09:05 AM 0.93 0.76 - 1.27 mg/dL Final      In chart    This note was created with voice recognition software.  Phonic, grammatical and spelling errors may be present within the note as a result.

## 2024-02-02 ENCOUNTER — TELEPHONE (OUTPATIENT)
Dept: INTERNAL MEDICINE CLINIC | Facility: CLINIC | Age: 89
End: 2024-02-02

## 2024-02-02 NOTE — TELEPHONE ENCOUNTER
Doing the 2 meds we gave     Cough is a bit better,    he feels like itll still linger but its manageable now     Tino

## 2024-02-21 PROBLEM — Z00.00 MEDICARE ANNUAL WELLNESS VISIT, SUBSEQUENT: Status: RESOLVED | Noted: 2020-01-16 | Resolved: 2024-02-21

## 2024-03-12 ENCOUNTER — APPOINTMENT (OUTPATIENT)
Dept: LAB | Facility: HOSPITAL | Age: 89
End: 2024-03-12
Attending: INTERNAL MEDICINE
Payer: MEDICARE

## 2024-03-12 DIAGNOSIS — I10 HYPERTENSION, UNSPECIFIED TYPE: ICD-10-CM

## 2024-03-12 DIAGNOSIS — R79.9 ABNORMAL FINDING OF BLOOD CHEMISTRY, UNSPECIFIED: ICD-10-CM

## 2024-03-12 DIAGNOSIS — E78.5 HYPERLIPIDEMIA, UNSPECIFIED HYPERLIPIDEMIA TYPE: ICD-10-CM

## 2024-03-12 LAB
ALBUMIN SERPL BCP-MCNC: 3.8 G/DL (ref 3.5–5)
ALP SERPL-CCNC: 88 U/L (ref 34–104)
ALT SERPL W P-5'-P-CCNC: 15 U/L (ref 7–52)
ANION GAP SERPL CALCULATED.3IONS-SCNC: 3 MMOL/L (ref 4–13)
AST SERPL W P-5'-P-CCNC: 24 U/L (ref 13–39)
BASOPHILS # BLD AUTO: 0.03 THOUSANDS/ÂΜL (ref 0–0.1)
BASOPHILS NFR BLD AUTO: 0 % (ref 0–1)
BILIRUB SERPL-MCNC: 0.89 MG/DL (ref 0.2–1)
BUN SERPL-MCNC: 14 MG/DL (ref 5–25)
CALCIUM SERPL-MCNC: 8.9 MG/DL (ref 8.4–10.2)
CHLORIDE SERPL-SCNC: 105 MMOL/L (ref 96–108)
CHOLEST SERPL-MCNC: 106 MG/DL
CO2 SERPL-SCNC: 30 MMOL/L (ref 21–32)
CREAT SERPL-MCNC: 0.86 MG/DL (ref 0.6–1.3)
EOSINOPHIL # BLD AUTO: 0.15 THOUSAND/ÂΜL (ref 0–0.61)
EOSINOPHIL NFR BLD AUTO: 2 % (ref 0–6)
ERYTHROCYTE [DISTWIDTH] IN BLOOD BY AUTOMATED COUNT: 12.9 % (ref 11.6–15.1)
EST. AVERAGE GLUCOSE BLD GHB EST-MCNC: 123 MG/DL
GFR SERPL CREATININE-BSD FRML MDRD: 76 ML/MIN/1.73SQ M
GLUCOSE P FAST SERPL-MCNC: 96 MG/DL (ref 65–99)
HBA1C MFR BLD: 5.9 %
HCT VFR BLD AUTO: 40.9 % (ref 36.5–49.3)
HDLC SERPL-MCNC: 58 MG/DL
HGB BLD-MCNC: 13.5 G/DL (ref 12–17)
IMM GRANULOCYTES # BLD AUTO: 0.01 THOUSAND/UL (ref 0–0.2)
IMM GRANULOCYTES NFR BLD AUTO: 0 % (ref 0–2)
LDLC SERPL CALC-MCNC: 38 MG/DL (ref 0–100)
LYMPHOCYTES # BLD AUTO: 1.94 THOUSANDS/ÂΜL (ref 0.6–4.47)
LYMPHOCYTES NFR BLD AUTO: 29 % (ref 14–44)
MCH RBC QN AUTO: 30.9 PG (ref 26.8–34.3)
MCHC RBC AUTO-ENTMCNC: 33 G/DL (ref 31.4–37.4)
MCV RBC AUTO: 94 FL (ref 82–98)
MONOCYTES # BLD AUTO: 0.61 THOUSAND/ÂΜL (ref 0.17–1.22)
MONOCYTES NFR BLD AUTO: 9 % (ref 4–12)
NEUTROPHILS # BLD AUTO: 4.06 THOUSANDS/ÂΜL (ref 1.85–7.62)
NEUTS SEG NFR BLD AUTO: 60 % (ref 43–75)
NRBC BLD AUTO-RTO: 0 /100 WBCS
PLATELET # BLD AUTO: 172 THOUSANDS/UL (ref 149–390)
PMV BLD AUTO: 9.7 FL (ref 8.9–12.7)
POTASSIUM SERPL-SCNC: 4.2 MMOL/L (ref 3.5–5.3)
PROT SERPL-MCNC: 6.6 G/DL (ref 6.4–8.4)
RBC # BLD AUTO: 4.37 MILLION/UL (ref 3.88–5.62)
SODIUM SERPL-SCNC: 138 MMOL/L (ref 135–147)
TRIGL SERPL-MCNC: 51 MG/DL
TSH SERPL DL<=0.05 MIU/L-ACNC: 2.57 UIU/ML (ref 0.45–4.5)
WBC # BLD AUTO: 6.8 THOUSAND/UL (ref 4.31–10.16)

## 2024-03-12 PROCEDURE — 84443 ASSAY THYROID STIM HORMONE: CPT

## 2024-03-12 PROCEDURE — 36415 COLL VENOUS BLD VENIPUNCTURE: CPT

## 2024-03-12 PROCEDURE — 80061 LIPID PANEL: CPT

## 2024-03-12 PROCEDURE — 80053 COMPREHEN METABOLIC PANEL: CPT

## 2024-03-12 PROCEDURE — 83036 HEMOGLOBIN GLYCOSYLATED A1C: CPT

## 2024-03-12 PROCEDURE — 85025 COMPLETE CBC W/AUTO DIFF WBC: CPT

## 2024-03-23 DIAGNOSIS — R09.82 POST-NASAL DRIP: ICD-10-CM

## 2024-03-23 DIAGNOSIS — R05.1 ACUTE COUGH: ICD-10-CM

## 2024-03-25 RX ORDER — FLUTICASONE PROPIONATE 50 MCG
SPRAY, SUSPENSION (ML) NASAL
Qty: 24 ML | Refills: 1 | Status: SHIPPED | OUTPATIENT
Start: 2024-03-25

## 2024-03-28 ENCOUNTER — OFFICE VISIT (OUTPATIENT)
Dept: INTERNAL MEDICINE CLINIC | Facility: CLINIC | Age: 89
End: 2024-03-28
Payer: MEDICARE

## 2024-03-28 VITALS
RESPIRATION RATE: 18 BRPM | TEMPERATURE: 98.8 F | HEIGHT: 65 IN | OXYGEN SATURATION: 98 % | BODY MASS INDEX: 23.26 KG/M2 | DIASTOLIC BLOOD PRESSURE: 66 MMHG | HEART RATE: 79 BPM | SYSTOLIC BLOOD PRESSURE: 106 MMHG | WEIGHT: 139.6 LBS

## 2024-03-28 DIAGNOSIS — E78.5 HYPERLIPIDEMIA, UNSPECIFIED HYPERLIPIDEMIA TYPE: ICD-10-CM

## 2024-03-28 DIAGNOSIS — Z00.00 MEDICARE ANNUAL WELLNESS VISIT, SUBSEQUENT: Primary | ICD-10-CM

## 2024-03-28 DIAGNOSIS — I25.10 2-VESSEL CORONARY ARTERY DISEASE: ICD-10-CM

## 2024-03-28 DIAGNOSIS — I10 HYPERTENSION, UNSPECIFIED TYPE: ICD-10-CM

## 2024-03-28 DIAGNOSIS — R60.0 LOCALIZED EDEMA: ICD-10-CM

## 2024-03-28 PROCEDURE — G0439 PPPS, SUBSEQ VISIT: HCPCS | Performed by: INTERNAL MEDICINE

## 2024-03-28 PROCEDURE — 99214 OFFICE O/P EST MOD 30 MIN: CPT | Performed by: INTERNAL MEDICINE

## 2024-03-28 RX ORDER — VALSARTAN 40 MG/1
40 TABLET ORAL DAILY
Status: SHIPPED
Start: 2024-03-28

## 2024-03-28 RX ORDER — METOPROLOL SUCCINATE 25 MG/1
12.5 TABLET, EXTENDED RELEASE ORAL DAILY
Status: SHIPPED
Start: 2024-03-28

## 2024-03-28 NOTE — PROGRESS NOTES
Assessment and Plan:     Problem List Items Addressed This Visit       2-vessel coronary artery disease    Relevant Medications    metoprolol succinate (TOPROL-XL) 25 mg 24 hr tablet    Hyperlipidemia    Relevant Orders    CBC and differential    Comprehensive metabolic panel    Hypertension    Relevant Medications    valsartan (DIOVAN) 40 mg tablet    metoprolol succinate (TOPROL-XL) 25 mg 24 hr tablet    Other Relevant Orders    CBC and differential    Comprehensive metabolic panel     Other Visit Diagnoses       Medicare annual wellness visit, subsequent    -  Primary    Localized edema        Relevant Medications    valsartan (DIOVAN) 40 mg tablet            Depression Screening and Follow-up Plan: Patient was screened for depression during today's encounter. They screened negative with a PHQ-2 score of 0.  Preventive health issues were discussed with patient, and age appropriate screening tests were ordered as noted in patient's After Visit Summary.  Personalized health advice and appropriate referrals for health education or preventive services given if needed, as noted in patient's After Visit Summary.     History of Present Illness:     Patient presents for a Medicare Wellness Visit    Patient is here for routine follow up, reviewed chronic medical problems, ordered labs for next visit including CBC CMP TSH A1C LIPID. Reviewed labs for this visit.    AWV completed.    Episodes of symptomatic hypotension, cut down valsartan, BB; continue amlodipine; call me with update.    Continue follow up with cardiology.  Continue statin for HLD.     Patient Care Team:  Pj Noland MD as PCP - General (Internal Medicine)  Denilson Martin MD     Review of Systems:     Review of Systems   Constitutional:  Negative for chills and fever.   HENT:  Negative for ear pain and sore throat.    Eyes:  Negative for pain and visual disturbance.   Respiratory:  Negative for cough and shortness of breath.    Cardiovascular:   Negative for chest pain and palpitations.   Gastrointestinal:  Negative for abdominal pain and vomiting.   Genitourinary:  Negative for dysuria and hematuria.   Musculoskeletal:  Positive for arthralgias and gait problem. Negative for back pain.   Skin:  Negative for color change and rash.   Neurological:  Negative for seizures and syncope.   All other systems reviewed and are negative.     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)   • Anxiety   • Chronic pain of both shoulders      Past Medical and Surgical History:     Past Medical History:   Diagnosis Date   • Abnormal weight loss     Last Assessed: 2/24/2014   • Arthritis    • Basal cell carcinoma     Last Assessed: 8/16/2016   • Bursitis     left hip pain   • Cancer (HCC)     BCA- back, left forearm, skin   • Cardiomyopathy (HCC)    • Chest discomfort     Last Assessed: 2/23/2016   • Chest pain     Last Assessed: 2/13/2013   • Coronary artery disease    • Ecchymosis     Last Assessed: 7/12/2016   • Exertional dyspnea     Last Assessed: 2/23/2016   • Hepatic hemangioma    • Herniation of lumbar intervertebral disc    • Hyperlipidemia    • Hypertension    • Nonmelanoma skin cancer     Last Assessed: 12/15/2017   • Pleural effusion     Bilateral; Last Assessed: 4/26/2016   • Pulmonary nodule     multiple nodes; Last Assessesd: 4/26/2016   • Shortness of breath    • Vitamin D deficiency      Past Surgical History:   Procedure Laterality Date    • CARPAL TUNNEL RELEASE Bilateral    • CORONARY ARTERY BYPASS GRAFT  2011   • CORONARY ARTERY BYPASS GRAFT     • WA REPAIR FIRST ABDOMINAL WALL HERNIA Bilateral 2017    Procedure: LAPAROSCOPIC INGUINAL HERNIA REPAIR WITH MESH ;  Surgeon: Con Flores MD;  Location: [facility];  Service: General      Family History:     Family History   Problem Relation Age of Onset   • Heart disease Mother    • Hypertension Mother       Social History:     Social History     Socioeconomic History   • Marital status: /Civil Union     Spouse name: None   • Number of children: None   • Years of education: None   • Highest education level: None   Occupational History   • None   Tobacco Use   • Smoking status: Former     Current packs/day: 0.00     Types: Cigarettes     Quit date: 1980     Years since quittin.9   • Smokeless tobacco: Never   • Tobacco comments:     quit date  per allscripts   Vaping Use   • Vaping status: Never Used   Substance and Sexual Activity   • Alcohol use: Yes     Alcohol/week: 1.0 standard drink of alcohol     Types: 1 Glasses of wine per week     Comment: daily   • Drug use: No   • Sexual activity: Not Currently   Other Topics Concern   • None   Social History Narrative    Caffeine use     Social Determinants of Health     Financial Resource Strain: Low Risk  (3/23/2023)    Overall Financial Resource Strain (CARDIA)    • Difficulty of Paying Living Expenses: Not hard at all   Food Insecurity: No Food Insecurity (3/28/2024)    Hunger Vital Sign    • Worried About Running Out of Food in the Last Year: Never true    • Ran Out of Food in the Last Year: Never true   Transportation Needs: No Transportation Needs (3/23/2023)    PRAPARE - Transportation    • Lack of Transportation (Medical): No    • Lack of Transportation (Non-Medical): No   Physical Activity: Not on file   Stress: Not on file   Social Connections: Not on file   Intimate Partner Violence: Not on file   Housing  Stability: Unknown (3/28/2024)    Housing Stability Vital Sign    • Unable to Pay for Housing in the Last Year: No    • Number of Places Lived in the Last Year: Not on file    • Unstable Housing in the Last Year: No      Medications and Allergies:     Current Outpatient Medications   Medication Sig Dispense Refill   • amLODIPine (NORVASC) 5 mg tablet Take 1 tablet (5 mg total) by mouth daily 90 tablet 3   • aspirin 81 MG tablet Take 2 tablets by mouth once      • atorvastatin (LIPITOR) 80 mg tablet Take 1 tablet (80 mg total) by mouth daily 90 tablet 3   • B Complex Vitamins (VITAMIN B COMPLEX) TABS Take 1 tablet by mouth daily     • Cholecalciferol (VITAMIN D-3 PO) Take 2,000 Units by mouth 3 (three) times a day before meals     • co-enzyme Q-10 30 MG capsule Take 30 mg by mouth daily     • Flax OIL Take 1 capsule by mouth daily 90 mL 3   • hydrocortisone 2.5 % cream Apply topically 4 (four) times a day as needed for irritation or rash 20 g 3   • metoprolol succinate (TOPROL-XL) 25 mg 24 hr tablet Take 0.5 tablets (12.5 mg total) by mouth daily     • nitroglycerin (NITROSTAT) 0.4 mg SL tablet Place 1 tablet (0.4 mg total) under the tongue every 5 (five) minutes as needed for chest pain 25 tablet 3   • valsartan (DIOVAN) 40 mg tablet Take 1 tablet (40 mg total) by mouth daily     • fluticasone (FLONASE) 50 mcg/act nasal spray SPRAY 1 SPRAY INTO EACH NOSTRIL EVERY DAY (Patient not taking: Reported on 3/28/2024) 24 mL 1     No current facility-administered medications for this visit.     Allergies   Allergen Reactions   • Other Allergic Rhinitis     Environmental        Immunizations:     Immunization History   Administered Date(s) Administered   • COVID-19 PFIZER VACCINE 0.3 ML IM 04/03/2021, 04/24/2021, 12/04/2021   • DT (pediatric) 03/08/2005   • INFLUENZA 09/12/2018, 10/02/2019, 10/13/2020, 11/15/2021, 11/02/2022, 09/28/2023   • Influenza Split High Dose Preservative Free IM 09/17/2014, 09/30/2015, 10/06/2016,  10/02/2017   • Influenza, high dose seasonal 0.7 mL 09/12/2018, 10/02/2019, 10/13/2020, 11/15/2021, 11/02/2022, 09/28/2023   • Influenza, seasonal, injectable 10/01/2012   • Pneumococcal Conjugate 13-Valent 06/26/2019   • Pneumococcal Polysaccharide PPV23 12/10/2004, 09/10/2010   • TD (adult) Preservative Free 10/07/2015      Health Maintenance:         Topic Date Due   • Hepatitis C Screening  Completed         Topic Date Due   • COVID-19 Vaccine (4 - 2023-24 season) 09/01/2023      Medicare Screening Tests and Risk Assessments:     Omega is here for his Subsequent Wellness visit. Last Medicare Wellness visit information reviewed, patient interviewed, no change since last AWV.     Health Risk Assessment:   Patient rates overall health as good. Patient feels that their physical health rating is same. Patient is very satisfied with their life. Eyesight was rated as same. Hearing was rated as same. Patient feels that their emotional and mental health rating is same. Patients states they are never, rarely angry. Patient states they are sometimes unusually tired/fatigued. Pain experienced in the last 7 days has been some. Patient's pain rating has been 5/10. Patient states that he has experienced no weight loss or gain in last 6 months.     Depression Screening:   PHQ-2 Score: 0      Fall Risk Screening:   In the past year, patient has experienced: no history of falling in past year      Home Safety:  Patient has trouble with stairs inside or outside of their home. Patient has working smoke alarms and has working carbon monoxide detector. Home safety hazards include: none.     Nutrition:   Current diet is Regular.     Medications:   Patient is currently taking over-the-counter supplements. OTC medications include: see medication list. Patient is able to manage medications.     Activities of Daily Living (ADLs)/Instrumental Activities of Daily Living (IADLs):   Walk and transfer into and out of bed and chair?: Yes  Dress  and groom yourself?: Yes    Bathe or shower yourself?: Yes    Feed yourself? Yes  Do your laundry/housekeeping?: Yes  Manage your money, pay your bills and track your expenses?: Yes  Make your own meals?: No    Do your own shopping?: Yes    Previous Hospitalizations:   Any hospitalizations or ED visits within the last 12 months?: No      Advance Care Planning:   Living will: No    Advanced directive: No      Cognitive Screening:   Provider or family/friend/caregiver concerned regarding cognition?: No    PREVENTIVE SCREENINGS      Cardiovascular Screening:    General: History Lipid Disorder and Screening Current      Diabetes Screening:     General: Screening Current      Colorectal Cancer Screening:     General: Screening Not Indicated      Prostate Cancer Screening:    General: Screening Not Indicated      Osteoporosis Screening:    General: Screening Not Indicated      Abdominal Aortic Aneurysm (AAA) Screening:    Risk factors include: tobacco use        General: Screening Not Indicated      Lung Cancer Screening:     General: Screening Not Indicated      Hepatitis C Screening:    General: Screening Current    Screening, Brief Intervention, and Referral to Treatment (SBIRT)    Screening  Typical number of drinks in a day: 1  Typical number of drinks in a week: 7  Interpretation: Low risk drinking behavior.    Single Item Drug Screening:  How often have you used an illegal drug (including marijuana) or a prescription medication for non-medical reasons in the past year? never    Single Item Drug Screen Score: 0  Interpretation: Negative screen for possible drug use disorder    Brief Intervention  Alcohol & drug use screenings were reviewed. No concerns regarding substance use disorder identified.     Other Counseling Topics:   Car/seat belt/driving safety, skin self-exam, sunscreen and calcium and vitamin D intake and regular weightbearing exercise.     No results found.     Physical Exam:     /66 (BP  "Location: Left arm, Patient Position: Sitting, Cuff Size: Standard)   Pulse 79   Temp 98.8 °F (37.1 °C) (Tympanic)   Resp 18   Ht 5' 5\" (1.651 m)   Wt 63.3 kg (139 lb 9.6 oz)   SpO2 98%   BMI 23.23 kg/m²     Physical Exam  Vitals and nursing note reviewed.   Constitutional:       General: He is not in acute distress.     Appearance: Normal appearance. He is well-developed and normal weight.   HENT:      Head: Normocephalic and atraumatic.   Eyes:      Conjunctiva/sclera: Conjunctivae normal.   Cardiovascular:      Rate and Rhythm: Normal rate and regular rhythm.      Heart sounds: Normal heart sounds. No murmur heard.  Pulmonary:      Effort: Pulmonary effort is normal. No respiratory distress.      Breath sounds: Normal breath sounds.   Abdominal:      Tenderness: There is no abdominal tenderness.   Musculoskeletal:         General: No swelling.      Cervical back: Neck supple.   Skin:     General: Skin is warm and dry.   Neurological:      Mental Status: He is alert.      Gait: Gait abnormal.   Psychiatric:         Mood and Affect: Mood normal.        Pj Noland MD  "

## 2024-04-04 DIAGNOSIS — R09.82 POST-NASAL DRIP: ICD-10-CM

## 2024-04-04 DIAGNOSIS — R05.1 ACUTE COUGH: ICD-10-CM

## 2024-04-04 RX ORDER — FLUTICASONE PROPIONATE 50 MCG
SPRAY, SUSPENSION (ML) NASAL
Qty: 24 ML | Refills: 2 | Status: SHIPPED | OUTPATIENT
Start: 2024-04-04

## 2024-06-04 ENCOUNTER — TELEPHONE (OUTPATIENT)
Age: 89
End: 2024-06-04

## 2024-06-04 NOTE — TELEPHONE ENCOUNTER
Patient stated that he is returning a call. No encounters on his chart or a change in his schedule.

## 2024-06-27 ENCOUNTER — OFFICE VISIT (OUTPATIENT)
Dept: INTERNAL MEDICINE CLINIC | Facility: CLINIC | Age: 89
End: 2024-06-27
Payer: MEDICARE

## 2024-06-27 VITALS
DIASTOLIC BLOOD PRESSURE: 54 MMHG | OXYGEN SATURATION: 97 % | HEIGHT: 65 IN | SYSTOLIC BLOOD PRESSURE: 90 MMHG | HEART RATE: 62 BPM | BODY MASS INDEX: 22.66 KG/M2 | WEIGHT: 136 LBS

## 2024-06-27 DIAGNOSIS — I95.2 HYPOTENSION DUE TO DRUGS: Primary | ICD-10-CM

## 2024-06-27 PROCEDURE — G2211 COMPLEX E/M VISIT ADD ON: HCPCS | Performed by: PHYSICIAN ASSISTANT

## 2024-06-27 PROCEDURE — 99213 OFFICE O/P EST LOW 20 MIN: CPT | Performed by: PHYSICIAN ASSISTANT

## 2024-06-27 NOTE — ASSESSMENT & PLAN NOTE
Patient reports BP readings at home have been low with systolic in the 100s.   He has a history of hypertension, however has lost 25-30 pounds since he was initiated on antihypertension regimen, valsartan has recently been decreased from 80mg to 40mg and metoprolol from 25mg bid to 12.5mg bid  Current regimen includes:  Amlodipine 5mg daily  Valsartan 40mg daily  Metoprolol  12.5mg bid  Will hold valsartan and Amlodipine and continue with metoprolol f12.5mg bid for CAD  Patient encouraged to increase PO hydration as he reports that he only drinks 3-4 cups of fluid daily - 1 cup of coffee, 1 glass of orange juice and 1 glass of red wine  Patient will return to the office on Monday for BP check, He will bring his home machine with him to assess for accuracy.

## 2024-06-27 NOTE — PROGRESS NOTES
"Ambulatory Visit  Name: Omega Vega      : 1934      MRN: [id number]  Encounter Provider: Sherry Bowen PA-C  Encounter Date: 2024   Encounter department: [facility] INTERNAL MEDICINE [place]    Assessment & Plan   1. Hypotension due to drugs  Assessment & Plan:  Patient reports BP readings at home have been low with systolic in the 100s.   He has a history of hypertension, however has lost 25-30 pounds since he was initiated on antihypertension regimen, valsartan has recently been decreased from 80mg to 40mg and metoprolol from 25mg bid to 12.5mg bid  Current regimen includes:  Amlodipine 5mg daily  Valsartan 40mg daily  Metoprolol  12.5mg bid  Will hold valsartan and Amlodipine and continue with metoprolol f12.5mg bid for CAD  Patient encouraged to increase PO hydration as he reports that he only drinks 3-4 cups of fluid daily - 1 cup of coffee, 1 glass of orange juice and 1 glass of red wine  Patient will return to the office on Monday for BP check, He will bring his home machine with him to assess for accuracy.       History of Present Illness     Patient is a pleasant 89 year old male that presents to the office today for evaluation of hypotension. He reports BP has been low at home with systolics in the 100's. He also reports elevated HR in the 120's today. His blood pressure has been running low for several months. His valsartan was decreased from 80-40mg and his metoprolol from 25-12.5mg. He was evaluated by his cardiologist Dr Martínez last week and ECHO was performed. He state he received a call last night stating the his \"valve is leaking a little more\" but nothing needs to be done at this time. (I am unable to verify this as Cardiologist is private practice and not in epic). Today he complains of fatigue but also reports that he is not sleeping well. He denies any dizziness or lightheadedness.         Review of Systems   Constitutional:  Positive for fatigue.   Respiratory:  " "Negative for shortness of breath.    Cardiovascular:  Negative for chest pain and palpitations.   Neurological:  Negative for dizziness, light-headedness and headaches.       Objective     BP 90/54 (BP Location: Left arm, Patient Position: Sitting, Cuff Size: Standard)   Pulse 62   Ht 5' 5\" (1.651 m)   Wt 61.7 kg (136 lb)   SpO2 97%   BMI 22.63 kg/m²     Physical Exam  Constitutional:       Appearance: Normal appearance.   HENT:      Nose: Nose normal.      Mouth/Throat:      Mouth: Mucous membranes are moist.   Cardiovascular:      Rate and Rhythm: Normal rate and regular rhythm.      Heart sounds: Murmur heard.   Pulmonary:      Effort: Pulmonary effort is normal. No respiratory distress.   Skin:     General: Skin is warm.   Neurological:      General: No focal deficit present.      Mental Status: He is alert and oriented to person, place, and time.   Psychiatric:         Mood and Affect: Mood normal.       Administrative Statements           "

## 2024-07-01 ENCOUNTER — OFFICE VISIT (OUTPATIENT)
Dept: INTERNAL MEDICINE CLINIC | Facility: CLINIC | Age: 89
End: 2024-07-01
Payer: MEDICARE

## 2024-07-01 VITALS
OXYGEN SATURATION: 98 % | TEMPERATURE: 98.8 F | SYSTOLIC BLOOD PRESSURE: 114 MMHG | BODY MASS INDEX: 22.99 KG/M2 | HEART RATE: 74 BPM | WEIGHT: 138 LBS | RESPIRATION RATE: 17 BRPM | DIASTOLIC BLOOD PRESSURE: 62 MMHG | HEIGHT: 65 IN

## 2024-07-01 DIAGNOSIS — I10 PRIMARY HYPERTENSION: Primary | ICD-10-CM

## 2024-07-01 PROCEDURE — G2211 COMPLEX E/M VISIT ADD ON: HCPCS | Performed by: PHYSICIAN ASSISTANT

## 2024-07-01 PROCEDURE — 99213 OFFICE O/P EST LOW 20 MIN: CPT | Performed by: PHYSICIAN ASSISTANT

## 2024-07-01 NOTE — PROGRESS NOTES
"Ambulatory Visit  Name: Omega Vega      : 1934      MRN: [id number]  Encounter Provider: Sherry Bowen PA-C  Encounter Date: 2024   Encounter department: [facility] INTERNAL MEDICINE [place]    Assessment & Plan   1. Primary hypertension  Assessment & Plan:  BP improved significantly after discontinuing Valsartan and amlodipine.  Continue metoprolol 12.5 mg twice daily  Patient brought home BP monitor.  BP in the office 114/62, BP on patient's machine 125/62  He will continue to monitor his blood pressure at home and call the office should blood pressure elevate       History of Present Illness     Patient is a pleasant 89-year-old male who presents in the office today for follow-up on hypotension.  He has a past medical history significant for hypertension, and was previously on 3 antihypertensive medications.  We discontinued his valsartan and amlodipine at last visit.  He has been continuing with metoprolol 12.5 mg twice daily.  He has been taking his blood pressure at home and blood pressures have improved significantly.  He brought his home blood pressure machine to this visit to assess for accuracy.        Review of Systems   Constitutional:  Positive for fatigue.   Respiratory:  Negative for shortness of breath and wheezing.    Cardiovascular:  Negative for chest pain and palpitations.       Objective     /62 (BP Location: Left arm, Patient Position: Sitting, Cuff Size: Adult)   Pulse 74   Temp 98.8 °F (37.1 °C) (Tympanic)   Resp 17   Ht 5' 5\" (1.651 m)   Wt 62.6 kg (138 lb)   SpO2 98%   BMI 22.96 kg/m²     Physical Exam  Constitutional:       Appearance: Normal appearance.   HENT:      Nose: Nose normal.      Mouth/Throat:      Mouth: Mucous membranes are moist.   Cardiovascular:      Rate and Rhythm: Normal rate and regular rhythm.      Heart sounds: Murmur heard.   Pulmonary:      Effort: Pulmonary effort is normal.      Breath sounds: Normal breath sounds. "   Musculoskeletal:         General: Normal range of motion.   Skin:     General: Skin is warm and dry.   Neurological:      General: No focal deficit present.      Mental Status: He is alert and oriented to person, place, and time.   Psychiatric:         Mood and Affect: Mood normal.         Behavior: Behavior normal.         Thought Content: Thought content normal.         Judgment: Judgment normal.       Administrative Statements   I have spent a total time of 20 minutes in caring for this patient on the day of the visit/encounter including Risks and benefits of tx options, Patient and family education, Importance of tx compliance, and Documenting in the medical record.

## 2024-07-01 NOTE — ASSESSMENT & PLAN NOTE
BP improved significantly after discontinuing Valsartan and amlodipine.  Continue metoprolol 12.5 mg twice daily  Patient brought home BP monitor.  BP in the office 114/62, BP on patient's machine 125/62  He will continue to monitor his blood pressure at home and call the office should blood pressure elevate

## 2024-07-05 ENCOUNTER — OFFICE VISIT (OUTPATIENT)
Dept: INTERNAL MEDICINE CLINIC | Facility: CLINIC | Age: 89
End: 2024-07-05
Payer: MEDICARE

## 2024-07-05 VITALS — SYSTOLIC BLOOD PRESSURE: 124 MMHG | HEART RATE: 83 BPM | DIASTOLIC BLOOD PRESSURE: 80 MMHG

## 2024-07-05 DIAGNOSIS — I48.92 ATRIAL FLUTTER BY ELECTROCARDIOGRAM (HCC): ICD-10-CM

## 2024-07-05 DIAGNOSIS — R00.2 PALPITATIONS: Primary | ICD-10-CM

## 2024-07-05 DIAGNOSIS — I10 HYPERTENSION, UNSPECIFIED TYPE: ICD-10-CM

## 2024-07-05 PROCEDURE — 99213 OFFICE O/P EST LOW 20 MIN: CPT | Performed by: INTERNAL MEDICINE

## 2024-07-05 PROCEDURE — 93000 ELECTROCARDIOGRAM COMPLETE: CPT | Performed by: INTERNAL MEDICINE

## 2024-07-05 RX ORDER — HYDROXYZINE HYDROCHLORIDE 25 MG/1
25 TABLET, FILM COATED ORAL EVERY 6 HOURS PRN
Qty: 90 TABLET | Refills: 0 | Status: SHIPPED | OUTPATIENT
Start: 2024-07-05 | End: 2024-08-04

## 2024-07-05 NOTE — PROGRESS NOTES
Ambulatory Visit  Name: Omega Vega      : 1934      MRN: [id number]  Encounter Provider: Pj Noland MD  Encounter Date: 2024   Encounter department: [facility] INTERNAL MEDICINE [place]    Assessment & Plan     Here with palpitations that come and go. H/o CAD. Recently stopped two of his medications which were causing hypotension. EKG revealing atrial flutter. Prefers to talk to cardiology before starting ac. He has a high CHADsVASC score.    1. Palpitations  -     hydrOXYzine HCL (ATARAX) 25 mg tablet; Take 1 tablet (25 mg total) by mouth every 6 (six) hours as needed for itching  2. Hypertension, unspecified type  3. Atrial flutter by electrocardiogram ([facility])      Depression Screening and Follow-up Plan: Clincally patient does not have depression. No treatment is required.       History of Present Illness     Here with anxiety and palpitations.      Review of Systems   Constitutional:  Negative for chills and fever.   HENT:  Negative for ear pain and sore throat.    Eyes:  Negative for pain and visual disturbance.   Respiratory:  Negative for cough and shortness of breath.    Cardiovascular:  Positive for palpitations. Negative for chest pain.   Gastrointestinal:  Negative for abdominal pain and vomiting.   Genitourinary:  Negative for dysuria and hematuria.   Musculoskeletal:  Negative for arthralgias and back pain.   Skin:  Negative for color change and rash.   Neurological:  Negative for seizures and syncope.   Psychiatric/Behavioral:  The patient is nervous/anxious.    All other systems reviewed and are negative.    Past Medical History   Past Medical History:   Diagnosis Date    Abnormal weight loss     Last Assessed: 2014    Arthritis     Basal cell carcinoma     Last Assessed: 2016    Bursitis     left hip pain    Cancer (HCC)     BCA- back, left forearm, skin    Cardiomyopathy (HCC)     Chest discomfort     Last Assessed: 2016    Chest pain     Last Assessed:  2/13/2013    Coronary artery disease     Ecchymosis     Last Assessed: 7/12/2016    Exertional dyspnea     Last Assessed: 2/23/2016    Hepatic hemangioma     Herniation of lumbar intervertebral disc     Hyperlipidemia     Hypertension     Nonmelanoma skin cancer     Last Assessed: 12/15/2017    Pleural effusion     Bilateral; Last Assessed: 4/26/2016    Pulmonary nodule     multiple nodes; Last Assessesd: 4/26/2016    Shortness of breath     Vitamin D deficiency      Past Surgical History:   Procedure Laterality Date    CARPAL TUNNEL RELEASE Bilateral     CORONARY ARTERY BYPASS GRAFT  03/24/2011    CORONARY ARTERY BYPASS GRAFT      UT REPAIR FIRST ABDOMINAL WALL HERNIA Bilateral 4/11/2017    Procedure: LAPAROSCOPIC INGUINAL HERNIA REPAIR WITH MESH ;  Surgeon: Con Flores MD;  Location: [facility];  Service: General     Family History   Problem Relation Age of Onset    Heart disease Mother     Hypertension Mother      Current Outpatient Medications on File Prior to Visit   Medication Sig Dispense Refill    aspirin 81 MG tablet Take 2 tablets by mouth once       atorvastatin (LIPITOR) 80 mg tablet Take 1 tablet (80 mg total) by mouth daily 90 tablet 3    B Complex Vitamins (VITAMIN B COMPLEX) TABS Take 1 tablet by mouth daily      Cholecalciferol (VITAMIN D-3 PO) Take 2,000 Units by mouth 3 (three) times a day before meals      co-enzyme Q-10 30 MG capsule Take 30 mg by mouth daily      Flax OIL Take 1 capsule by mouth daily 90 mL 3    fluticasone (FLONASE) 50 mcg/act nasal spray SPRAY 1 SPRAY INTO EACH NOSTRIL EVERY DAY 24 mL 2    hydrocortisone 2.5 % cream Apply topically 4 (four) times a day as needed for irritation or rash 20 g 3    metoprolol succinate (TOPROL-XL) 25 mg 24 hr tablet Take 0.5 tablets (12.5 mg total) by mouth daily      nitroglycerin (NITROSTAT) 0.4 mg SL tablet Place 1 tablet (0.4 mg total) under the tongue every 5 (five) minutes as needed for chest pain 25 tablet 3     No current  facility-administered medications on file prior to visit.     Allergies   Allergen Reactions    Other Allergic Rhinitis     Environmental        Objective     /80   Pulse 83     Physical Exam  Vitals and nursing note reviewed.   Constitutional:       General: He is not in acute distress.     Appearance: He is well-developed.   HENT:      Head: Normocephalic and atraumatic.   Cardiovascular:      Rate and Rhythm: Normal rate. Rhythm irregular.      Heart sounds: No murmur heard.  Pulmonary:      Effort: Pulmonary effort is normal. No respiratory distress.   Abdominal:      Tenderness: There is no abdominal tenderness.   Musculoskeletal:         General: No swelling.      Cervical back: Neck supple.   Skin:     General: Skin is warm and dry.      Capillary Refill: Capillary refill takes less than 2 seconds.   Neurological:      Mental Status: He is alert and oriented to person, place, and time.      Gait: Gait abnormal.       Administrative Statements   I have spent a total time of 15 minutes in caring for this patient on the day of the visit/encounter including Instructions for management, Importance of tx compliance, Risk factor reductions, Counseling / Coordination of care, Documenting in the medical record, and Obtaining or reviewing history  .

## 2024-07-16 ENCOUNTER — OFFICE VISIT (OUTPATIENT)
Dept: INTERNAL MEDICINE CLINIC | Facility: CLINIC | Age: 89
End: 2024-07-16
Payer: MEDICARE

## 2024-07-16 ENCOUNTER — TELEPHONE (OUTPATIENT)
Age: 89
End: 2024-07-16

## 2024-07-16 VITALS
WEIGHT: 139 LBS | HEIGHT: 65 IN | RESPIRATION RATE: 18 BRPM | BODY MASS INDEX: 23.16 KG/M2 | TEMPERATURE: 98.5 F | DIASTOLIC BLOOD PRESSURE: 68 MMHG | HEART RATE: 65 BPM | SYSTOLIC BLOOD PRESSURE: 122 MMHG | OXYGEN SATURATION: 96 %

## 2024-07-16 DIAGNOSIS — I10 PRIMARY HYPERTENSION: ICD-10-CM

## 2024-07-16 DIAGNOSIS — I48.92 ATRIAL FLUTTER BY ELECTROCARDIOGRAM (HCC): Primary | ICD-10-CM

## 2024-07-16 DIAGNOSIS — R00.2 PALPITATIONS: ICD-10-CM

## 2024-07-16 DIAGNOSIS — F41.9 ANXIETY: ICD-10-CM

## 2024-07-16 PROBLEM — I95.2 HYPOTENSION DUE TO DRUGS: Status: RESOLVED | Noted: 2024-06-27 | Resolved: 2024-07-16

## 2024-07-16 PROCEDURE — G2211 COMPLEX E/M VISIT ADD ON: HCPCS | Performed by: INTERNAL MEDICINE

## 2024-07-16 PROCEDURE — 99214 OFFICE O/P EST MOD 30 MIN: CPT | Performed by: INTERNAL MEDICINE

## 2024-07-16 RX ORDER — VALSARTAN 80 MG/1
20 TABLET ORAL DAILY
COMMUNITY
Start: 2024-07-11 | End: 2024-07-16

## 2024-07-16 RX ORDER — VALSARTAN 40 MG/1
20 TABLET ORAL DAILY
Qty: 90 TABLET | Refills: 0 | Status: SHIPPED | OUTPATIENT
Start: 2024-07-16

## 2024-07-16 RX ORDER — AMLODIPINE BESYLATE 2.5 MG/1
2.5 TABLET ORAL DAILY
COMMUNITY

## 2024-07-16 RX ORDER — HYDROXYZINE HYDROCHLORIDE 10 MG/1
10 TABLET, FILM COATED ORAL EVERY 6 HOURS PRN
Status: SHIPPED
Start: 2024-07-16 | End: 2024-08-15

## 2024-07-16 NOTE — ASSESSMENT & PLAN NOTE
Noted on last EKG. Appears regular today. Refusing any AC. Has cardiology in 2 weeks. High CHADsVASC.

## 2024-07-16 NOTE — PROGRESS NOTES
Ambulatory Visit  Name: Omega Vega      : 1934      MRN: [id number]  Encounter Provider: Pj Noland MD  Encounter Date: 2024   Encounter department: [facility] INTERNAL MEDICINE [place]    Assessment & Plan     1. Atrial flutter by electrocardiogram (HCC)  Assessment & Plan:  Noted on last EKG. Appears regular today. Refusing any AC. Has cardiology in 2 weeks. High CHADsVASC.  2. Anxiety  Assessment & Plan:  Better controlled with 10 mg of atarax.  3. Primary hypertension  Assessment & Plan:  Patient restarted amlodipine 2.5, valsartan 20 mg, metoprolol 12.5 mg. BP looks good.  4. Palpitations  -     hydrOXYzine HCL (ATARAX) 10 mg tablet; Take 1 tablet (10 mg total) by mouth every 6 (six) hours as needed for itching      Depression Screening and Follow-up Plan: Patient was screened for depression during today's encounter. They screened negative with a PHQ-2 score of 0.Clincally patient does not have depression. No treatment is required.         History of Present Illness     Here for 1 week f/u.      Review of Systems   Constitutional:  Negative for chills and fever.   HENT:  Negative for ear pain and sore throat.    Eyes:  Negative for pain and visual disturbance.   Respiratory:  Negative for cough and shortness of breath.    Cardiovascular:  Negative for chest pain and palpitations.   Gastrointestinal:  Negative for abdominal pain and vomiting.   Genitourinary:  Negative for dysuria and hematuria.   Musculoskeletal:  Negative for arthralgias and back pain.   Skin:  Negative for color change and rash.   Neurological:  Negative for seizures and syncope.   All other systems reviewed and are negative.    Past Medical History:   Diagnosis Date    Abnormal weight loss     Last Assessed: 2014    Arthritis     Basal cell carcinoma     Last Assessed: 2016    Bursitis     left hip pain    Cancer (HCC)     BCA- back, left forearm, skin    Cardiomyopathy (HCC)     Chest discomfort      Last Assessed: 2016    Chest pain     Last Assessed: 2013    Coronary artery disease     Ecchymosis     Last Assessed: 2016    Exertional dyspnea     Last Assessed: 2016    Hepatic hemangioma     Herniation of lumbar intervertebral disc     Hyperlipidemia     Hypertension     Nonmelanoma skin cancer     Last Assessed: 12/15/2017    Pleural effusion     Bilateral; Last Assessed: 2016    Pulmonary nodule     multiple nodes; Last Assessesd: 2016    Shortness of breath     Vitamin D deficiency      Past Surgical History:   Procedure Laterality Date    CARPAL TUNNEL RELEASE Bilateral     CORONARY ARTERY BYPASS GRAFT  2011    CORONARY ARTERY BYPASS GRAFT      GA REPAIR FIRST ABDOMINAL WALL HERNIA Bilateral 2017    Procedure: LAPAROSCOPIC INGUINAL HERNIA REPAIR WITH MESH ;  Surgeon: Con Flores MD;  Location: [facility];  Service: General     Family History   Problem Relation Age of Onset    Heart disease Mother     Hypertension Mother      Social History     Tobacco Use    Smoking status: Former     Current packs/day: 0.00     Types: Cigarettes     Quit date: 1980     Years since quittin.2    Smokeless tobacco: Never    Tobacco comments:     quit date  per allscripts   Vaping Use    Vaping status: Never Used   Substance and Sexual Activity    Alcohol use: Yes     Alcohol/week: 1.0 standard drink of alcohol     Types: 1 Glasses of wine per week     Comment: daily    Drug use: No    Sexual activity: Not Currently     Current Outpatient Medications on File Prior to Visit   Medication Sig    amLODIPine (NORVASC) 2.5 mg tablet Take 2.5 mg by mouth daily    aspirin 81 MG tablet Take 2 tablets by mouth once     atorvastatin (LIPITOR) 80 mg tablet Take 1 tablet (80 mg total) by mouth daily    B Complex Vitamins (VITAMIN B COMPLEX) TABS Take 1 tablet by mouth daily    Cholecalciferol (VITAMIN D-3 PO) Take 2,000 Units by mouth 3 (three) times a day before meals    co-enzyme  "Q-10 30 MG capsule Take 30 mg by mouth daily    Flax OIL Take 1 capsule by mouth daily    fluticasone (FLONASE) 50 mcg/act nasal spray SPRAY 1 SPRAY INTO EACH NOSTRIL EVERY DAY    hydrocortisone 2.5 % cream Apply topically 4 (four) times a day as needed for irritation or rash    metoprolol succinate (TOPROL-XL) 25 mg 24 hr tablet Take 0.5 tablets (12.5 mg total) by mouth daily    nitroglycerin (NITROSTAT) 0.4 mg SL tablet Place 1 tablet (0.4 mg total) under the tongue every 5 (five) minutes as needed for chest pain (Patient taking differently: Place 0.4 mg under the tongue every 5 (five) minutes as needed for chest pain PRN)    [DISCONTINUED] hydrOXYzine HCL (ATARAX) 25 mg tablet Take 1 tablet (25 mg total) by mouth every 6 (six) hours as needed for itching    [DISCONTINUED] valsartan (DIOVAN) 80 mg tablet Take 20 mg by mouth daily     Allergies   Allergen Reactions    Other Allergic Rhinitis     Environmental       Immunization History   Administered Date(s) Administered    COVID-19 PFIZER VACCINE 0.3 ML IM 04/03/2021, 04/24/2021, 12/04/2021    DT (pediatric) 03/08/2005    INFLUENZA 09/12/2018, 10/02/2019, 10/13/2020, 11/15/2021, 11/02/2022, 09/28/2023    Influenza Split High Dose Preservative Free IM 09/17/2014, 09/30/2015, 10/06/2016, 10/02/2017    Influenza, high dose seasonal 0.7 mL 09/12/2018, 10/02/2019, 10/13/2020, 11/15/2021, 11/02/2022, 09/28/2023    Influenza, seasonal, injectable 10/01/2012    Pneumococcal Conjugate 13-Valent 06/26/2019    Pneumococcal Polysaccharide PPV23 12/10/2004, 09/10/2010    TD (adult) Preservative Free 10/07/2015     Objective     /68 (BP Location: Left arm, Patient Position: Sitting, Cuff Size: Standard)   Pulse 65   Temp 98.5 °F (36.9 °C) (Tympanic)   Resp 18   Ht 5' 5\" (1.651 m)   Wt 63 kg (139 lb)   SpO2 96%   BMI 23.13 kg/m²     Physical Exam  Vitals and nursing note reviewed.   Constitutional:       General: He is not in acute distress.     Appearance: He is " well-developed.   HENT:      Head: Normocephalic and atraumatic.   Eyes:      Conjunctiva/sclera: Conjunctivae normal.   Cardiovascular:      Rate and Rhythm: Normal rate and regular rhythm.      Heart sounds: No murmur heard.  Pulmonary:      Effort: Pulmonary effort is normal. No respiratory distress.      Breath sounds: Normal breath sounds.   Abdominal:      Palpations: Abdomen is soft.      Tenderness: There is no abdominal tenderness.   Musculoskeletal:         General: No swelling.      Cervical back: Neck supple.   Skin:     General: Skin is warm and dry.      Capillary Refill: Capillary refill takes less than 2 seconds.   Neurological:      Mental Status: He is alert.   Psychiatric:         Mood and Affect: Mood normal.       Administrative Statements   I have spent a total time of 15 minutes in caring for this patient on the day of the visit/encounter including Patient and family education, Risk factor reductions, Counseling / Coordination of care, and Obtaining or reviewing history  .

## 2024-07-19 ENCOUNTER — TELEPHONE (OUTPATIENT)
Age: 89
End: 2024-07-19

## 2024-07-19 NOTE — TELEPHONE ENCOUNTER
PT called in to inform Dr. Noland, that since lowering his BP medication, for the past 2 days, he has been having extremely high numbers for his BP.    PT reported one BP reading at: 154/102 and that the other was higher than that reading.     PT also wanted to talk to  about starting up on a blood thinner, that they discussed at one point.     Please advise & call pt with update at earliest convenience. Thank you!    Omega Vega   [phone]

## 2024-07-22 NOTE — TELEPHONE ENCOUNTER
Spoke w/patient he is scheduled to see his cardiologist on 8/14/24, patient understood the regimen per provider:    Amlodipine 5mg daily  · Valsartan 40mg daily  · Metoprolol  12.5mg bid     He states he is feeling better and is scheduled for apt on 8/7/24.  He will call us if his BP starts to elevate.

## 2024-07-24 ENCOUNTER — TELEPHONE (OUTPATIENT)
Age: 89
End: 2024-07-24

## 2024-07-24 DIAGNOSIS — I48.92 ATRIAL FLUTTER BY ELECTROCARDIOGRAM (HCC): Primary | ICD-10-CM

## 2024-07-24 NOTE — TELEPHONE ENCOUNTER
Patient called asking if Dr. Noland can send over the script for Eliquis as discussed.  [facility]  Please notify patient once script is sent over

## 2024-07-24 NOTE — TELEPHONE ENCOUNTER
Pt was called, states he was advised by cardiology to stop aspirin, has stopped x1 wk today   Advised eliquis has been sent in pt will  at pharmacy

## 2024-07-24 NOTE — TELEPHONE ENCOUNTER
Patient called to say that he cannot afford to take his blood thinner as it will cost him $600.  He is going to the pharmacy to see how much the Eliquis is (this was just sent in today) and if it too is costly, he will cancel the prescription.  I advised him to contact his insurance company to see what would be the most cost effective for him and let us know.  But he just wants to talk to the doctor about this.  He has been without for a while now. Also, he mentioned that he has not been taking the 80mg aspirin as he was told to stop it if he would be taking a blood thinner.

## 2024-08-01 ENCOUNTER — APPOINTMENT (OUTPATIENT)
Dept: LAB | Facility: HOSPITAL | Age: 89
End: 2024-08-01
Payer: MEDICARE

## 2024-08-01 DIAGNOSIS — I10 HYPERTENSION, UNSPECIFIED TYPE: ICD-10-CM

## 2024-08-01 DIAGNOSIS — E78.5 HYPERLIPIDEMIA, UNSPECIFIED HYPERLIPIDEMIA TYPE: ICD-10-CM

## 2024-08-01 LAB
ALBUMIN SERPL BCG-MCNC: 3.9 G/DL (ref 3.5–5)
ALP SERPL-CCNC: 91 U/L (ref 34–104)
ALT SERPL W P-5'-P-CCNC: 17 U/L (ref 7–52)
ANION GAP SERPL CALCULATED.3IONS-SCNC: 5 MMOL/L (ref 4–13)
AST SERPL W P-5'-P-CCNC: 22 U/L (ref 13–39)
BASOPHILS # BLD AUTO: 0.03 THOUSANDS/ÂΜL (ref 0–0.1)
BASOPHILS NFR BLD AUTO: 1 % (ref 0–1)
BILIRUB SERPL-MCNC: 1.13 MG/DL (ref 0.2–1)
BUN SERPL-MCNC: 16 MG/DL (ref 5–25)
CALCIUM SERPL-MCNC: 9 MG/DL (ref 8.4–10.2)
CHLORIDE SERPL-SCNC: 104 MMOL/L (ref 96–108)
CO2 SERPL-SCNC: 29 MMOL/L (ref 21–32)
CREAT SERPL-MCNC: 0.87 MG/DL (ref 0.6–1.3)
EOSINOPHIL # BLD AUTO: 0.13 THOUSAND/ÂΜL (ref 0–0.61)
EOSINOPHIL NFR BLD AUTO: 2 % (ref 0–6)
ERYTHROCYTE [DISTWIDTH] IN BLOOD BY AUTOMATED COUNT: 13.1 % (ref 11.6–15.1)
GFR SERPL CREATININE-BSD FRML MDRD: 76 ML/MIN/1.73SQ M
GLUCOSE P FAST SERPL-MCNC: 96 MG/DL (ref 65–99)
HCT VFR BLD AUTO: 40.3 % (ref 36.5–49.3)
HGB BLD-MCNC: 13 G/DL (ref 12–17)
IMM GRANULOCYTES # BLD AUTO: 0.02 THOUSAND/UL (ref 0–0.2)
IMM GRANULOCYTES NFR BLD AUTO: 0 % (ref 0–2)
LYMPHOCYTES # BLD AUTO: 1.54 THOUSANDS/ÂΜL (ref 0.6–4.47)
LYMPHOCYTES NFR BLD AUTO: 27 % (ref 14–44)
MCH RBC QN AUTO: 31.1 PG (ref 26.8–34.3)
MCHC RBC AUTO-ENTMCNC: 32.3 G/DL (ref 31.4–37.4)
MCV RBC AUTO: 96 FL (ref 82–98)
MONOCYTES # BLD AUTO: 0.56 THOUSAND/ÂΜL (ref 0.17–1.22)
MONOCYTES NFR BLD AUTO: 10 % (ref 4–12)
NEUTROPHILS # BLD AUTO: 3.36 THOUSANDS/ÂΜL (ref 1.85–7.62)
NEUTS SEG NFR BLD AUTO: 60 % (ref 43–75)
NRBC BLD AUTO-RTO: 0 /100 WBCS
PLATELET # BLD AUTO: 182 THOUSANDS/UL (ref 149–390)
PMV BLD AUTO: 10.1 FL (ref 8.9–12.7)
POTASSIUM SERPL-SCNC: 4.4 MMOL/L (ref 3.5–5.3)
PROT SERPL-MCNC: 6.7 G/DL (ref 6.4–8.4)
RBC # BLD AUTO: 4.18 MILLION/UL (ref 3.88–5.62)
SODIUM SERPL-SCNC: 138 MMOL/L (ref 135–147)
WBC # BLD AUTO: 5.64 THOUSAND/UL (ref 4.31–10.16)

## 2024-08-01 PROCEDURE — 85025 COMPLETE CBC W/AUTO DIFF WBC: CPT

## 2024-08-01 PROCEDURE — 36415 COLL VENOUS BLD VENIPUNCTURE: CPT

## 2024-08-01 PROCEDURE — 80053 COMPREHEN METABOLIC PANEL: CPT

## 2024-08-05 DIAGNOSIS — I25.10 2-VESSEL CORONARY ARTERY DISEASE: ICD-10-CM

## 2024-08-05 DIAGNOSIS — I10 HYPERTENSION, UNSPECIFIED TYPE: ICD-10-CM

## 2024-08-05 RX ORDER — METOPROLOL SUCCINATE 25 MG/1
12.5 TABLET, EXTENDED RELEASE ORAL DAILY
Qty: 90 TABLET | Refills: 0 | Status: SHIPPED | OUTPATIENT
Start: 2024-08-05

## 2024-08-05 RX ORDER — AMLODIPINE BESYLATE 2.5 MG/1
2.5 TABLET ORAL DAILY
Qty: 90 TABLET | Refills: 0 | Status: SHIPPED | OUTPATIENT
Start: 2024-08-05

## 2024-08-05 NOTE — TELEPHONE ENCOUNTER
Patient is requesting refills on the amlodipine, 5 mg tablet, take 1 daily (this dosage is on the bottle) 2.5 mg tablet, take 2.5 mg by mouth daily; metoprolol succinate 25 mg 24 hr tablet, take 0.5 tablets (12.5 mg total) by mouth daily. 90 day supply    [facility]    Call back #[phone]

## 2024-08-22 ENCOUNTER — APPOINTMENT (OUTPATIENT)
Dept: LAB | Facility: HOSPITAL | Age: 89
End: 2024-08-22
Payer: MEDICARE

## 2024-08-22 DIAGNOSIS — I48.91 ATRIAL FIBRILLATION, UNSPECIFIED TYPE (HCC): ICD-10-CM

## 2024-08-22 LAB
INR PPP: 1.95 (ref 0.85–1.19)
PROTHROMBIN TIME: 22.9 SECONDS (ref 12.3–15)

## 2024-08-22 PROCEDURE — 36415 COLL VENOUS BLD VENIPUNCTURE: CPT

## 2024-08-22 PROCEDURE — 85610 PROTHROMBIN TIME: CPT

## 2024-08-28 ENCOUNTER — APPOINTMENT (OUTPATIENT)
Dept: LAB | Facility: HOSPITAL | Age: 89
End: 2024-08-28
Payer: MEDICARE

## 2024-08-28 DIAGNOSIS — I48.91 ATRIAL FIBRILLATION, UNSPECIFIED TYPE (HCC): ICD-10-CM

## 2024-08-28 LAB
INR PPP: 4.07 (ref 0.85–1.19)
PROTHROMBIN TIME: 39.9 SECONDS (ref 12.3–15)

## 2024-08-28 PROCEDURE — 36415 COLL VENOUS BLD VENIPUNCTURE: CPT

## 2024-08-28 PROCEDURE — 85610 PROTHROMBIN TIME: CPT

## 2024-09-04 ENCOUNTER — OFFICE VISIT (OUTPATIENT)
Dept: INTERNAL MEDICINE CLINIC | Facility: CLINIC | Age: 89
End: 2024-09-04
Payer: MEDICARE

## 2024-09-04 VITALS
WEIGHT: 136 LBS | DIASTOLIC BLOOD PRESSURE: 74 MMHG | RESPIRATION RATE: 18 BRPM | OXYGEN SATURATION: 94 % | SYSTOLIC BLOOD PRESSURE: 126 MMHG | HEIGHT: 65 IN | HEART RATE: 60 BPM | BODY MASS INDEX: 22.66 KG/M2

## 2024-09-04 DIAGNOSIS — R73.03 PREDIABETES: ICD-10-CM

## 2024-09-04 DIAGNOSIS — I25.10 2-VESSEL CORONARY ARTERY DISEASE: ICD-10-CM

## 2024-09-04 DIAGNOSIS — F41.9 ANXIETY: ICD-10-CM

## 2024-09-04 DIAGNOSIS — I48.92 ATRIAL FLUTTER BY ELECTROCARDIOGRAM (HCC): Primary | ICD-10-CM

## 2024-09-04 DIAGNOSIS — I10 PRIMARY HYPERTENSION: ICD-10-CM

## 2024-09-04 PROCEDURE — G2211 COMPLEX E/M VISIT ADD ON: HCPCS | Performed by: INTERNAL MEDICINE

## 2024-09-04 PROCEDURE — 99214 OFFICE O/P EST MOD 30 MIN: CPT | Performed by: INTERNAL MEDICINE

## 2024-09-04 RX ORDER — WARFARIN SODIUM 1 MG/1
1 TABLET ORAL DAILY
COMMUNITY
Start: 2024-08-28

## 2024-09-04 NOTE — PROGRESS NOTES
Ambulatory Visit  Name: Omega Vega      : 1934      MRN: [id number]  Encounter Provider: Pj Noland MD  Encounter Date: 2024   Encounter department: [facility] INTERNAL MEDICINE [place]    Assessment & Plan     1. Atrial flutter by electrocardiogram (HCC)  Assessment & Plan:  Noted on last EKG. Appears regular today. Refusing any AC. Now on coumadin. INR managed by cardiology- Dr. Blanco. High CHADsVASC.  2. Primary hypertension  Assessment & Plan:  Continue amlodipine 2.5, valsartan 20 mg, metoprolol 12.5 mg. BP looks good.  Orders:  -     CBC and differential; Future  -     Basic metabolic panel; Future  -     TSH, 3rd generation with Free T4 reflex; Future  3. Anxiety  Assessment & Plan:  Better controlled with 10 mg of atarax.  Orders:  -     Basic metabolic panel; Future  -     TSH, 3rd generation with Free T4 reflex; Future  4. 2-vessel coronary artery disease  Assessment & Plan:  X 2 stents over 10 years ago. Was on aspirin, stopped, now on coumadin, continue statin.  5. Prediabetes  -     Hemoglobin A1C; Future      Depression Screening and Follow-up Plan: Patient was screened for depression during today's encounter. They screened negative with a PHQ-2 score of 0.      History of Present Illness   Patient is here for routine follow up, reviewed chronic medical problems, ordered labs for next visit including CBC CMP TSH A1C LIPID. Reviewed labs for this visit.        Review of Systems   Constitutional:  Negative for chills and fever.   HENT:  Negative for ear pain and sore throat.    Eyes:  Negative for pain and visual disturbance.   Respiratory:  Negative for cough and shortness of breath.    Cardiovascular:  Negative for chest pain and palpitations.   Gastrointestinal:  Negative for abdominal pain and vomiting.   Genitourinary:  Negative for dysuria and hematuria.   Musculoskeletal:  Positive for arthralgias and gait problem. Negative for back pain.   Skin:  Negative for color  change and rash.   Neurological:  Negative for seizures and syncope.   All other systems reviewed and are negative.    Past Medical History   Past Medical History:   Diagnosis Date    Abnormal weight loss     Last Assessed: 2/24/2014    Arthritis     Basal cell carcinoma     Last Assessed: 8/16/2016    Bursitis     left hip pain    Cancer (HCC)     BCA- back, left forearm, skin    Cardiomyopathy (HCC)     Chest discomfort     Last Assessed: 2/23/2016    Chest pain     Last Assessed: 2/13/2013    Coronary artery disease     Ecchymosis     Last Assessed: 7/12/2016    Exertional dyspnea     Last Assessed: 2/23/2016    Hepatic hemangioma     Herniation of lumbar intervertebral disc     Hyperlipidemia     Hypertension     Nonmelanoma skin cancer     Last Assessed: 12/15/2017    Pleural effusion     Bilateral; Last Assessed: 4/26/2016    Pulmonary nodule     multiple nodes; Last Assessesd: 4/26/2016    Shortness of breath     Vitamin D deficiency      Past Surgical History:   Procedure Laterality Date    CARPAL TUNNEL RELEASE Bilateral     CORONARY ARTERY BYPASS GRAFT  03/24/2011    CORONARY ARTERY BYPASS GRAFT      CT REPAIR FIRST ABDOMINAL WALL HERNIA Bilateral 4/11/2017    Procedure: LAPAROSCOPIC INGUINAL HERNIA REPAIR WITH MESH ;  Surgeon: Con Flores MD;  Location: [facility];  Service: General     Family History   Problem Relation Age of Onset    Heart disease Mother     Hypertension Mother      Current Outpatient Medications on File Prior to Visit   Medication Sig Dispense Refill    amLODIPine (NORVASC) 2.5 mg tablet Take 1 tablet (2.5 mg total) by mouth daily 90 tablet 0    atorvastatin (LIPITOR) 80 mg tablet Take 1 tablet (80 mg total) by mouth daily 90 tablet 3    B Complex Vitamins (VITAMIN B COMPLEX) TABS Take 1 tablet by mouth daily      Cholecalciferol (VITAMIN D-3 PO) Take 2,000 Units by mouth 3 (three) times a day before meals      co-enzyme Q-10 30 MG capsule Take 30 mg by mouth daily       "hydrocortisone 2.5 % cream Apply topically 4 (four) times a day as needed for irritation or rash 20 g 3    hydrOXYzine HCL (ATARAX) 10 mg tablet Take 1 tablet (10 mg total) by mouth every 6 (six) hours as needed for itching      metoprolol succinate (TOPROL-XL) 25 mg 24 hr tablet Take 0.5 tablets (12.5 mg total) by mouth daily 90 tablet 0    nitroglycerin (NITROSTAT) 0.4 mg SL tablet Place 1 tablet (0.4 mg total) under the tongue every 5 (five) minutes as needed for chest pain (Patient taking differently: Place 0.4 mg under the tongue every 5 (five) minutes as needed for chest pain PRN) 25 tablet 3    valsartan (DIOVAN) 40 mg tablet Take 0.5 tablets (20 mg total) by mouth daily 90 tablet 0    warfarin (COUMADIN) 1 mg tablet Take 1 mg by mouth daily      [DISCONTINUED] apixaban (ELIQUIS) 5 mg Take 1 tablet (5 mg total) by mouth 2 (two) times a day (Patient not taking: Reported on 9/4/2024) 180 tablet 3    [DISCONTINUED] aspirin 81 MG tablet Take 2 tablets by mouth once  (Patient not taking: Reported on 9/4/2024)      [DISCONTINUED] Flax OIL Take 1 capsule by mouth daily (Patient not taking: Reported on 9/4/2024) 90 mL 3    [DISCONTINUED] fluticasone (FLONASE) 50 mcg/act nasal spray SPRAY 1 SPRAY INTO EACH NOSTRIL EVERY DAY (Patient not taking: Reported on 9/4/2024) 24 mL 2     No current facility-administered medications on file prior to visit.     Allergies   Allergen Reactions    Other Allergic Rhinitis     Environmental        Objective   /74 (BP Location: Left arm, Patient Position: Sitting, Cuff Size: Adult)   Pulse 60   Resp 18   Ht 5' 5\" (1.651 m)   Wt 61.7 kg (136 lb)   SpO2 94%   BMI 22.63 kg/m²     Physical Exam  Vitals and nursing note reviewed.   Constitutional:       General: He is not in acute distress.     Appearance: He is well-developed.   HENT:      Head: Normocephalic and atraumatic.   Cardiovascular:      Rate and Rhythm: Normal rate. Rhythm irregular.      Heart sounds: No murmur " heard.  Pulmonary:      Effort: Pulmonary effort is normal. No respiratory distress.      Breath sounds: Normal breath sounds.   Abdominal:      Tenderness: There is no abdominal tenderness.   Musculoskeletal:         General: No swelling.      Cervical back: Neck supple.   Skin:     General: Skin is warm and dry.      Capillary Refill: Capillary refill takes less than 2 seconds.   Neurological:      Mental Status: He is alert and oriented to person, place, and time.      Gait: Gait abnormal.   Psychiatric:         Mood and Affect: Mood normal.       I have spent a total time of 15 minutes in caring for this patient on the day of the visit/encounter including Instructions for management, Patient and family education, Impressions, Documenting in the medical record, and Reviewing / ordering tests, medicine, procedures  .         I personally evaluated the patient. I reviewed the Resident’s or Physician Assistant’s note (as assigned above), and agree with the findings and plan except as documented in my note.18-year-old here for evaluation of ear backing into a stuck in your left and on allergies never admitted was unable to self remove recent PE within acceptable limits will give lido and remove

## 2024-09-04 NOTE — ASSESSMENT & PLAN NOTE
Noted on last EKG. Appears regular today. Refusing any AC. Now on coumadin. INR managed by cardiology- Dr. Blanco. High CHADsVASC.

## 2024-09-05 ENCOUNTER — APPOINTMENT (OUTPATIENT)
Dept: LAB | Facility: HOSPITAL | Age: 89
End: 2024-09-05
Payer: MEDICARE

## 2024-09-05 DIAGNOSIS — F41.9 ANXIETY: ICD-10-CM

## 2024-09-05 DIAGNOSIS — I10 PRIMARY HYPERTENSION: ICD-10-CM

## 2024-09-05 DIAGNOSIS — I48.91 ATRIAL FIBRILLATION, UNSPECIFIED TYPE (HCC): ICD-10-CM

## 2024-09-05 DIAGNOSIS — R73.03 PREDIABETES: ICD-10-CM

## 2024-09-05 LAB
INR PPP: 1.7 (ref 0.85–1.19)
PROTHROMBIN TIME: 20.7 SECONDS (ref 12.3–15)

## 2024-09-05 PROCEDURE — 36415 COLL VENOUS BLD VENIPUNCTURE: CPT

## 2024-09-05 PROCEDURE — 85610 PROTHROMBIN TIME: CPT

## 2024-09-11 ENCOUNTER — APPOINTMENT (OUTPATIENT)
Dept: LAB | Facility: HOSPITAL | Age: 89
End: 2024-09-11
Payer: MEDICARE

## 2024-09-11 DIAGNOSIS — I48.91 ATRIAL FIBRILLATION, UNSPECIFIED TYPE (HCC): ICD-10-CM

## 2024-09-11 LAB
ANION GAP SERPL CALCULATED.3IONS-SCNC: 5 MMOL/L (ref 4–13)
BASOPHILS # BLD AUTO: 0.03 THOUSANDS/ÂΜL (ref 0–0.1)
BASOPHILS NFR BLD AUTO: 1 % (ref 0–1)
BUN SERPL-MCNC: 16 MG/DL (ref 5–25)
CALCIUM SERPL-MCNC: 8.9 MG/DL (ref 8.4–10.2)
CHLORIDE SERPL-SCNC: 104 MMOL/L (ref 96–108)
CO2 SERPL-SCNC: 28 MMOL/L (ref 21–32)
CREAT SERPL-MCNC: 0.71 MG/DL (ref 0.6–1.3)
EOSINOPHIL # BLD AUTO: 0.13 THOUSAND/ÂΜL (ref 0–0.61)
EOSINOPHIL NFR BLD AUTO: 2 % (ref 0–6)
ERYTHROCYTE [DISTWIDTH] IN BLOOD BY AUTOMATED COUNT: 12.9 % (ref 11.6–15.1)
GFR SERPL CREATININE-BSD FRML MDRD: 83 ML/MIN/1.73SQ M
GLUCOSE P FAST SERPL-MCNC: 97 MG/DL (ref 65–99)
HCT VFR BLD AUTO: 40.1 % (ref 36.5–49.3)
HGB BLD-MCNC: 13.1 G/DL (ref 12–17)
IMM GRANULOCYTES # BLD AUTO: 0.02 THOUSAND/UL (ref 0–0.2)
IMM GRANULOCYTES NFR BLD AUTO: 0 % (ref 0–2)
INR PPP: 1.59 (ref 0.85–1.19)
LYMPHOCYTES # BLD AUTO: 1.59 THOUSANDS/ÂΜL (ref 0.6–4.47)
LYMPHOCYTES NFR BLD AUTO: 25 % (ref 14–44)
MCH RBC QN AUTO: 31 PG (ref 26.8–34.3)
MCHC RBC AUTO-ENTMCNC: 32.7 G/DL (ref 31.4–37.4)
MCV RBC AUTO: 95 FL (ref 82–98)
MONOCYTES # BLD AUTO: 0.7 THOUSAND/ÂΜL (ref 0.17–1.22)
MONOCYTES NFR BLD AUTO: 11 % (ref 4–12)
NEUTROPHILS # BLD AUTO: 3.8 THOUSANDS/ÂΜL (ref 1.85–7.62)
NEUTS SEG NFR BLD AUTO: 61 % (ref 43–75)
NRBC BLD AUTO-RTO: 0 /100 WBCS
PLATELET # BLD AUTO: 180 THOUSANDS/UL (ref 149–390)
PMV BLD AUTO: 10.2 FL (ref 8.9–12.7)
POTASSIUM SERPL-SCNC: 4.3 MMOL/L (ref 3.5–5.3)
PROTHROMBIN TIME: 19.7 SECONDS (ref 12.3–15)
RBC # BLD AUTO: 4.23 MILLION/UL (ref 3.88–5.62)
SODIUM SERPL-SCNC: 137 MMOL/L (ref 135–147)
TSH SERPL DL<=0.05 MIU/L-ACNC: 2.44 UIU/ML (ref 0.45–4.5)
WBC # BLD AUTO: 6.27 THOUSAND/UL (ref 4.31–10.16)

## 2024-09-11 PROCEDURE — 80048 BASIC METABOLIC PNL TOTAL CA: CPT

## 2024-09-11 PROCEDURE — 85025 COMPLETE CBC W/AUTO DIFF WBC: CPT

## 2024-09-11 PROCEDURE — 83036 HEMOGLOBIN GLYCOSYLATED A1C: CPT

## 2024-09-11 PROCEDURE — 36415 COLL VENOUS BLD VENIPUNCTURE: CPT

## 2024-09-11 PROCEDURE — 84443 ASSAY THYROID STIM HORMONE: CPT

## 2024-09-11 PROCEDURE — 85610 PROTHROMBIN TIME: CPT

## 2024-09-12 LAB
EST. AVERAGE GLUCOSE BLD GHB EST-MCNC: 123 MG/DL
HBA1C MFR BLD: 5.9 %

## 2024-09-18 ENCOUNTER — APPOINTMENT (OUTPATIENT)
Dept: LAB | Facility: HOSPITAL | Age: 89
End: 2024-09-18
Payer: MEDICARE

## 2024-09-18 DIAGNOSIS — I48.91 ATRIAL FIBRILLATION, UNSPECIFIED TYPE (HCC): ICD-10-CM

## 2024-09-18 LAB
INR PPP: 1.52 (ref 0.85–1.19)
PROTHROMBIN TIME: 19.1 SECONDS (ref 12.3–15)

## 2024-09-18 PROCEDURE — 85610 PROTHROMBIN TIME: CPT

## 2024-09-18 PROCEDURE — 36415 COLL VENOUS BLD VENIPUNCTURE: CPT

## 2024-09-25 ENCOUNTER — APPOINTMENT (OUTPATIENT)
Dept: LAB | Facility: HOSPITAL | Age: 89
End: 2024-09-25
Payer: MEDICARE

## 2024-09-25 DIAGNOSIS — I48.91 ATRIAL FIBRILLATION, UNSPECIFIED TYPE (HCC): ICD-10-CM

## 2024-09-25 LAB
INR PPP: 1.78 (ref 0.85–1.19)
PROTHROMBIN TIME: 21.5 SECONDS (ref 12.3–15)

## 2024-09-25 PROCEDURE — 36415 COLL VENOUS BLD VENIPUNCTURE: CPT

## 2024-09-25 PROCEDURE — 85610 PROTHROMBIN TIME: CPT

## 2024-10-02 ENCOUNTER — APPOINTMENT (OUTPATIENT)
Dept: LAB | Facility: HOSPITAL | Age: 89
End: 2024-10-02
Payer: MEDICARE

## 2024-10-02 DIAGNOSIS — I48.91 ATRIAL FIBRILLATION, UNSPECIFIED TYPE (HCC): ICD-10-CM

## 2024-10-02 LAB
INR PPP: 2.48 (ref 0.85–1.19)
PROTHROMBIN TIME: 27.5 SECONDS (ref 12.3–15)

## 2024-10-02 PROCEDURE — 36415 COLL VENOUS BLD VENIPUNCTURE: CPT

## 2024-10-02 PROCEDURE — 85610 PROTHROMBIN TIME: CPT

## 2024-10-18 ENCOUNTER — APPOINTMENT (OUTPATIENT)
Dept: LAB | Facility: HOSPITAL | Age: 89
End: 2024-10-18
Payer: MEDICARE

## 2024-10-18 DIAGNOSIS — I48.91 ATRIAL FIBRILLATION, UNSPECIFIED TYPE (HCC): ICD-10-CM

## 2024-10-18 LAB
INR PPP: 2.16 (ref 0.85–1.19)
PROTHROMBIN TIME: 24.8 SECONDS (ref 12.3–15)

## 2024-10-18 PROCEDURE — 36415 COLL VENOUS BLD VENIPUNCTURE: CPT

## 2024-10-18 PROCEDURE — 85610 PROTHROMBIN TIME: CPT

## 2024-10-24 ENCOUNTER — IMMUNIZATIONS (OUTPATIENT)
Dept: INTERNAL MEDICINE CLINIC | Facility: CLINIC | Age: 89
End: 2024-10-24
Payer: MEDICARE

## 2024-10-24 DIAGNOSIS — Z23 NEED FOR INFLUENZA VACCINATION: Primary | ICD-10-CM

## 2024-10-24 DIAGNOSIS — I25.5 ISCHEMIC CARDIOMYOPATHY: ICD-10-CM

## 2024-10-24 PROCEDURE — G0008 ADMIN INFLUENZA VIRUS VAC: HCPCS

## 2024-10-24 PROCEDURE — 90662 IIV NO PRSV INCREASED AG IM: CPT

## 2024-10-24 RX ORDER — NITROGLYCERIN 0.4 MG/1
0.4 TABLET SUBLINGUAL
Qty: 25 TABLET | Refills: 3 | Status: SHIPPED | OUTPATIENT
Start: 2024-10-24

## 2024-11-01 DIAGNOSIS — I10 PRIMARY HYPERTENSION: Primary | ICD-10-CM

## 2024-11-01 RX ORDER — VALSARTAN 40 MG/1
20 TABLET ORAL DAILY
Qty: 90 TABLET | Refills: 0 | Status: SHIPPED | OUTPATIENT
Start: 2024-11-01

## 2024-11-04 DIAGNOSIS — I10 HYPERTENSION, UNSPECIFIED TYPE: ICD-10-CM

## 2024-11-04 RX ORDER — AMLODIPINE BESYLATE 2.5 MG/1
2.5 TABLET ORAL DAILY
Qty: 90 TABLET | Refills: 0 | Status: SHIPPED | OUTPATIENT
Start: 2024-11-04 | End: 2024-11-05 | Stop reason: SDUPTHER

## 2024-11-05 ENCOUNTER — TELEPHONE (OUTPATIENT)
Dept: INTERNAL MEDICINE CLINIC | Facility: CLINIC | Age: 89
End: 2024-11-05

## 2024-11-05 ENCOUNTER — TELEPHONE (OUTPATIENT)
Age: 89
End: 2024-11-05

## 2024-11-05 DIAGNOSIS — I10 PRIMARY HYPERTENSION: ICD-10-CM

## 2024-11-05 DIAGNOSIS — I10 HYPERTENSION, UNSPECIFIED TYPE: ICD-10-CM

## 2024-11-05 RX ORDER — AMLODIPINE BESYLATE 2.5 MG/1
2.5 TABLET ORAL DAILY
Qty: 90 TABLET | Refills: 0 | Status: SHIPPED | OUTPATIENT
Start: 2024-11-05

## 2024-11-05 NOTE — TELEPHONE ENCOUNTER
Pt went to  his prescriptions today and the pharmacist told him Dr. Noland had discontinued the amlodipine.  Omega doesn't recall this being discontinue and would like to know what to do.  Also, he is instructed to take the valsartan 20 mg but pharmacist told  him the doctor doesn't usually go that low of a dose.  He is asking what he should take, the 40mg or the 20mg.  He would like a call back with some clarification on both of these meds.

## 2024-11-05 NOTE — TELEPHONE ENCOUNTER
Amlodipine Besylate 2.5 mg tablet-take 1 tablet by mouth daily    90 day supply    [facility] [place]

## 2024-11-07 ENCOUNTER — APPOINTMENT (OUTPATIENT)
Dept: LAB | Facility: HOSPITAL | Age: 89
End: 2024-11-07
Payer: MEDICARE

## 2024-11-07 DIAGNOSIS — I48.91 ATRIAL FIBRILLATION, UNSPECIFIED TYPE (HCC): ICD-10-CM

## 2024-11-07 LAB
INR PPP: 1.78 (ref 0.85–1.19)
PROTHROMBIN TIME: 21.5 SECONDS (ref 12.3–15)

## 2024-11-07 PROCEDURE — 85610 PROTHROMBIN TIME: CPT

## 2024-11-07 PROCEDURE — 36415 COLL VENOUS BLD VENIPUNCTURE: CPT

## 2024-11-18 DIAGNOSIS — E78.5 HYPERLIPIDEMIA, UNSPECIFIED HYPERLIPIDEMIA TYPE: ICD-10-CM

## 2024-11-18 RX ORDER — ATORVASTATIN CALCIUM 80 MG/1
80 TABLET, FILM COATED ORAL DAILY
Qty: 90 TABLET | Refills: 3 | Status: SHIPPED | OUTPATIENT
Start: 2024-11-18

## 2024-11-18 NOTE — TELEPHONE ENCOUNTER
Requested Prescriptions     Pending Prescriptions Disp Refills    atorvastatin (LIPITOR) 80 mg tablet 90 tablet 3     Sig: Take 1 tablet (80 mg total) by mouth daily       LAST SEEN: 9/4/24  NEXT APPT: 12/12/24  LAST FILLED: 9/28/23

## 2024-11-19 ENCOUNTER — APPOINTMENT (OUTPATIENT)
Dept: LAB | Facility: HOSPITAL | Age: 89
End: 2024-11-19
Payer: MEDICARE

## 2024-11-19 DIAGNOSIS — I48.91 ATRIAL FIBRILLATION, UNSPECIFIED TYPE (HCC): ICD-10-CM

## 2024-11-19 LAB
INR PPP: 1.89 (ref 0.85–1.19)
PROTHROMBIN TIME: 22.5 SECONDS (ref 12.3–15)

## 2024-11-19 PROCEDURE — 36415 COLL VENOUS BLD VENIPUNCTURE: CPT

## 2024-11-19 PROCEDURE — 85610 PROTHROMBIN TIME: CPT

## 2024-12-03 ENCOUNTER — APPOINTMENT (OUTPATIENT)
Dept: LAB | Facility: HOSPITAL | Age: 89
End: 2024-12-03
Payer: MEDICARE

## 2024-12-03 DIAGNOSIS — I48.91 ATRIAL FIBRILLATION, UNSPECIFIED TYPE (HCC): ICD-10-CM

## 2024-12-03 LAB
INR PPP: 2.18 (ref 0.85–1.19)
PROTHROMBIN TIME: 25 SECONDS (ref 12.3–15)

## 2024-12-03 PROCEDURE — 85610 PROTHROMBIN TIME: CPT

## 2024-12-03 PROCEDURE — 36415 COLL VENOUS BLD VENIPUNCTURE: CPT

## 2024-12-18 ENCOUNTER — APPOINTMENT (OUTPATIENT)
Dept: LAB | Facility: HOSPITAL | Age: 89
End: 2024-12-18
Attending: INTERNAL MEDICINE
Payer: MEDICARE

## 2024-12-18 DIAGNOSIS — I48.91 ATRIAL FIBRILLATION, UNSPECIFIED TYPE (HCC): ICD-10-CM

## 2024-12-18 LAB
INR PPP: 2.25 (ref 0.85–1.19)
PROTHROMBIN TIME: 25.6 SECONDS (ref 12.3–15)

## 2024-12-18 PROCEDURE — 85610 PROTHROMBIN TIME: CPT

## 2024-12-18 PROCEDURE — 36415 COLL VENOUS BLD VENIPUNCTURE: CPT

## 2024-12-19 ENCOUNTER — RA CDI HCC (OUTPATIENT)
Dept: OTHER | Facility: HOSPITAL | Age: 89
End: 2024-12-19

## 2024-12-27 ENCOUNTER — OFFICE VISIT (OUTPATIENT)
Dept: INTERNAL MEDICINE CLINIC | Facility: CLINIC | Age: 89
End: 2024-12-27
Payer: MEDICARE

## 2024-12-27 VITALS
HEIGHT: 65 IN | HEART RATE: 62 BPM | RESPIRATION RATE: 18 BRPM | OXYGEN SATURATION: 98 % | BODY MASS INDEX: 23.06 KG/M2 | WEIGHT: 138.4 LBS | DIASTOLIC BLOOD PRESSURE: 64 MMHG | SYSTOLIC BLOOD PRESSURE: 120 MMHG

## 2024-12-27 DIAGNOSIS — I48.92 ATRIAL FLUTTER BY ELECTROCARDIOGRAM (HCC): ICD-10-CM

## 2024-12-27 DIAGNOSIS — I10 PRIMARY HYPERTENSION: ICD-10-CM

## 2024-12-27 DIAGNOSIS — I25.10 2-VESSEL CORONARY ARTERY DISEASE: Primary | ICD-10-CM

## 2024-12-27 PROCEDURE — G2211 COMPLEX E/M VISIT ADD ON: HCPCS | Performed by: INTERNAL MEDICINE

## 2024-12-27 PROCEDURE — 99214 OFFICE O/P EST MOD 30 MIN: CPT | Performed by: INTERNAL MEDICINE

## 2024-12-27 RX ORDER — WARFARIN SODIUM 2 MG/1
1 TABLET ORAL DAILY
COMMUNITY
Start: 2024-12-13

## 2024-12-27 NOTE — ASSESSMENT & PLAN NOTE
Continue amlodipine 2.5, valsartan 20 mg, metoprolol 12.5 mg. BP looks good.    Orders:    CBC and differential; Future    Basic metabolic panel; Future    TSH, 3rd generation with Free T4 reflex; Future

## 2024-12-27 NOTE — ASSESSMENT & PLAN NOTE
Noted on last EKG. Appears regular today. Refusing any AC. Now on coumadin. INR managed by cardiology- Dr. Blanco. High CHADsVASC.    Orders:    CBC and differential; Future    Basic metabolic panel; Future    TSH, 3rd generation with Free T4 reflex; Future

## 2024-12-27 NOTE — ASSESSMENT & PLAN NOTE
X 2 stents over 10 years ago. Was on aspirin, stopped, now on coumadin, continue statin.    Orders:    CBC and differential; Future    Basic metabolic panel; Future    TSH, 3rd generation with Free T4 reflex; Future

## 2024-12-27 NOTE — PROGRESS NOTES
Name: Omega Vega      : 1934      MRN: [id number]  Encounter Provider: Pj Noland MD  Encounter Date: 2024   Encounter department: [facility] INTERNAL MEDICINE [place]  :  Assessment & Plan  2-vessel coronary artery disease  X 2 stents over 10 years ago. Was on aspirin, stopped, now on coumadin, continue statin.    Orders:    CBC and differential; Future    Basic metabolic panel; Future    TSH, 3rd generation with Free T4 reflex; Future    Primary hypertension  Continue amlodipine 2.5, valsartan 20 mg, metoprolol 12.5 mg. BP looks good.    Orders:    CBC and differential; Future    Basic metabolic panel; Future    TSH, 3rd generation with Free T4 reflex; Future    Atrial flutter by electrocardiogram (HCC)  Noted on last EKG. Appears regular today. Refusing any AC. Now on coumadin. INR managed by cardiology- Dr. Blanco. High CHADsVASC.    Orders:    CBC and differential; Future    Basic metabolic panel; Future    TSH, 3rd generation with Free T4 reflex; Future          Depression Screening and Follow-up Plan: Patient was screened for depression during today's encounter. They screened negative with a PHQ-2 score of 0.      History of Present Illness   Patient is here for routine follow up, reviewed chronic medical problems, ordered labs for next visit including CBC CMP TSH A1C LIPID. Reviewed labs for this visit.      Review of Systems   Constitutional:  Negative for chills and fever.   HENT:  Negative for ear pain and sore throat.    Eyes:  Negative for pain and visual disturbance.   Respiratory:  Negative for cough and shortness of breath.    Cardiovascular:  Negative for chest pain and palpitations.   Gastrointestinal:  Negative for abdominal pain and vomiting.   Genitourinary:  Negative for dysuria and hematuria.   Musculoskeletal:  Positive for arthralgias and gait problem. Negative for back pain.   Skin:  Negative for color change and rash.   Neurological:  Negative for seizures  "and syncope.   All other systems reviewed and are negative.      Objective   /64 (BP Location: Left arm, Patient Position: Sitting, Cuff Size: Adult)   Pulse 62   Resp 18   Ht 5' 5\" (1.651 m)   Wt 62.8 kg (138 lb 6.4 oz)   SpO2 98%   BMI 23.03 kg/m²      Physical Exam  Vitals and nursing note reviewed.   Constitutional:       General: He is not in acute distress.     Appearance: He is well-developed.   HENT:      Head: Normocephalic and atraumatic.   Eyes:      Conjunctiva/sclera: Conjunctivae normal.   Cardiovascular:      Rate and Rhythm: Normal rate and regular rhythm.      Heart sounds: No murmur heard.  Pulmonary:      Effort: Pulmonary effort is normal. No respiratory distress.      Breath sounds: Normal breath sounds.   Abdominal:      Tenderness: There is no abdominal tenderness.   Musculoskeletal:         General: No swelling.      Cervical back: Neck supple.   Skin:     General: Skin is warm and dry.      Capillary Refill: Capillary refill takes less than 2 seconds.   Neurological:      Mental Status: He is alert.      Gait: Gait abnormal.   Psychiatric:         Mood and Affect: Mood normal.         "

## 2025-01-02 ENCOUNTER — APPOINTMENT (OUTPATIENT)
Dept: LAB | Facility: HOSPITAL | Age: OVER 89
End: 2025-01-02
Attending: INTERNAL MEDICINE
Payer: MEDICARE

## 2025-01-02 DIAGNOSIS — I48.91 ATRIAL FIBRILLATION, UNSPECIFIED TYPE (HCC): ICD-10-CM

## 2025-01-02 LAB
INR PPP: 2.08 (ref 0.85–1.19)
PROTHROMBIN TIME: 24.1 SECONDS (ref 12.3–15)

## 2025-01-02 PROCEDURE — 36415 COLL VENOUS BLD VENIPUNCTURE: CPT

## 2025-01-02 PROCEDURE — 85610 PROTHROMBIN TIME: CPT

## 2025-01-14 ENCOUNTER — APPOINTMENT (OUTPATIENT)
Dept: LAB | Facility: HOSPITAL | Age: OVER 89
End: 2025-01-14
Attending: INTERNAL MEDICINE
Payer: MEDICARE

## 2025-01-14 DIAGNOSIS — I48.91 ATRIAL FIBRILLATION, UNSPECIFIED TYPE (HCC): ICD-10-CM

## 2025-01-14 LAB
INR PPP: 1.97 (ref 0.85–1.19)
PROTHROMBIN TIME: 23.2 SECONDS (ref 12.3–15)

## 2025-01-14 PROCEDURE — 85610 PROTHROMBIN TIME: CPT

## 2025-01-14 PROCEDURE — 36415 COLL VENOUS BLD VENIPUNCTURE: CPT

## 2025-01-28 ENCOUNTER — APPOINTMENT (OUTPATIENT)
Dept: LAB | Facility: HOSPITAL | Age: OVER 89
End: 2025-01-28
Attending: INTERNAL MEDICINE
Payer: MEDICARE

## 2025-01-28 DIAGNOSIS — I48.91 ATRIAL FIBRILLATION, UNSPECIFIED TYPE (HCC): ICD-10-CM

## 2025-01-28 LAB
INR PPP: 2.42 (ref 0.85–1.19)
PROTHROMBIN TIME: 27 SECONDS (ref 12.3–15)

## 2025-01-28 PROCEDURE — 36415 COLL VENOUS BLD VENIPUNCTURE: CPT

## 2025-01-28 PROCEDURE — 85610 PROTHROMBIN TIME: CPT

## 2025-02-04 ENCOUNTER — OFFICE VISIT (OUTPATIENT)
Dept: INTERNAL MEDICINE CLINIC | Facility: CLINIC | Age: OVER 89
End: 2025-02-04
Payer: MEDICARE

## 2025-02-04 VITALS
OXYGEN SATURATION: 99 % | HEART RATE: 70 BPM | DIASTOLIC BLOOD PRESSURE: 69 MMHG | WEIGHT: 137 LBS | HEIGHT: 65 IN | RESPIRATION RATE: 17 BRPM | SYSTOLIC BLOOD PRESSURE: 128 MMHG | BODY MASS INDEX: 22.82 KG/M2

## 2025-02-04 DIAGNOSIS — I10 PRIMARY HYPERTENSION: Primary | ICD-10-CM

## 2025-02-04 DIAGNOSIS — I10 HYPERTENSION, UNSPECIFIED TYPE: ICD-10-CM

## 2025-02-04 DIAGNOSIS — I25.119 ATHEROSCLEROSIS OF NATIVE CORONARY ARTERY WITH ANGINA PECTORIS, UNSPECIFIED WHETHER NATIVE OR TRANSPLANTED HEART (HCC): ICD-10-CM

## 2025-02-04 DIAGNOSIS — I42.9 CARDIOMYOPATHY, UNSPECIFIED TYPE (HCC): ICD-10-CM

## 2025-02-04 DIAGNOSIS — I48.92 ATRIAL FLUTTER BY ELECTROCARDIOGRAM (HCC): ICD-10-CM

## 2025-02-04 DIAGNOSIS — I25.10 2-VESSEL CORONARY ARTERY DISEASE: ICD-10-CM

## 2025-02-04 PROCEDURE — G2211 COMPLEX E/M VISIT ADD ON: HCPCS | Performed by: INTERNAL MEDICINE

## 2025-02-04 PROCEDURE — 99214 OFFICE O/P EST MOD 30 MIN: CPT | Performed by: INTERNAL MEDICINE

## 2025-02-04 RX ORDER — METOPROLOL SUCCINATE 25 MG/1
12.5 TABLET, EXTENDED RELEASE ORAL DAILY
Qty: 90 TABLET | Refills: 0 | Status: SHIPPED | OUTPATIENT
Start: 2025-02-04

## 2025-02-04 RX ORDER — VALSARTAN 40 MG/1
20 TABLET ORAL DAILY
Status: SHIPPED
Start: 2025-02-04

## 2025-02-04 RX ORDER — VALSARTAN 40 MG/1
40 TABLET ORAL DAILY
Qty: 90 TABLET | Refills: 0 | Status: SHIPPED | OUTPATIENT
Start: 2025-02-04 | End: 2025-02-04

## 2025-02-04 NOTE — PROGRESS NOTES
Name: Omega Vega      : 1934      MRN: [id number]  Encounter Provider: Pj Noland MD  Encounter Date: 2025   Encounter department: [facility] INTERNAL MEDICINE [place]  :  Assessment & Plan  Primary hypertension  Continue amlodipine 2.5, valsartan 20 mg, metoprolol 12.5 mg.  Here with uncontrolled values, see plan below:    Not controlled. Increase valsartan to 40 mg, let me know values, come back in 3 weeks.    Orders:  •  valsartan (DIOVAN) 40 mg tablet; Take 0.5 tablets (20 mg total) by mouth daily      Atrial flutter by electrocardiogram (HCC)  Noted on last EKG. Appears regular today. Refusing any AC. Now on coumadin. INR managed by cardiology- Dr. Blanco. High CHADsVASC.           Cardiomyopathy, unspecified type (HCC)  X 2 stents over 10 years ago. Was on aspirin, stopped, now on coumadin, continue statin.        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 Was on aspirin, stopped, now on coumadin, continue statin.              Depression Screening and Follow-up Plan:   Patient with underlying depression and was advised to continue current medications as prescribed.     History of Present Illness   HPI  Review of Systems   Constitutional:  Negative for chills and fever.   HENT:  Negative for ear pain and sore throat.    Eyes:  Negative for pain and visual disturbance.   Respiratory:  Negative for cough and shortness of breath.    Cardiovascular:  Negative for chest pain and palpitations.   Gastrointestinal:  Negative for abdominal pain and vomiting.   Genitourinary:  Negative for dysuria and hematuria.   Musculoskeletal:  Negative for arthralgias and back pain.   Skin:  Negative for color change and rash.   Neurological:  Negative for seizures and syncope.   All other systems reviewed and are negative.      Objective   /69 (BP Location: Right arm, Patient Position:  "Sitting, Cuff Size: Adult) Comment: Auto cuff from pts home  Pulse 70   Resp 17   Ht 5' 5\" (1.651 m)   Wt 62.1 kg (137 lb)   SpO2 99%   BMI 22.80 kg/m²      Physical Exam  Vitals and nursing note reviewed.   Constitutional:       General: He is not in acute distress.     Appearance: He is well-developed.   HENT:      Head: Normocephalic and atraumatic.   Eyes:      Conjunctiva/sclera: Conjunctivae normal.   Cardiovascular:      Rate and Rhythm: Normal rate and regular rhythm.      Heart sounds: No murmur heard.  Pulmonary:      Effort: Pulmonary effort is normal. No respiratory distress.      Breath sounds: Normal breath sounds.   Abdominal:      Tenderness: There is no abdominal tenderness.   Musculoskeletal:         General: No swelling.      Cervical back: Neck supple.   Skin:     General: Skin is warm and dry.      Capillary Refill: Capillary refill takes less than 2 seconds.   Neurological:      Mental Status: He is alert.      Gait: Gait abnormal.   Psychiatric:         Mood and Affect: Mood normal.       "

## 2025-02-04 NOTE — ASSESSMENT & PLAN NOTE
Continue amlodipine 2.5, valsartan 20 mg, metoprolol 12.5 mg.  Here with uncontrolled values, see plan below:    Not controlled. Increase valsartan to 40 mg, let me know values, come back in 3 weeks.    Orders:    valsartan (DIOVAN) 40 mg tablet; Take 0.5 tablets (20 mg total) by mouth daily

## 2025-02-05 DIAGNOSIS — I10 HYPERTENSION, UNSPECIFIED TYPE: ICD-10-CM

## 2025-02-05 RX ORDER — AMLODIPINE BESYLATE 2.5 MG/1
2.5 TABLET ORAL DAILY
Qty: 90 TABLET | Refills: 0 | Status: SHIPPED | OUTPATIENT
Start: 2025-02-05

## 2025-02-05 NOTE — TELEPHONE ENCOUNTER
Patient needs a refill of the following medication:    amLODIPine (NORVASC) 2.5 mg tablet   Take 1 tablet (2.5 mg total) by mouth daily     Medication can be sent to [facility]/pharmacy #[id number]      Please advise, thank you.

## 2025-02-11 ENCOUNTER — APPOINTMENT (OUTPATIENT)
Dept: LAB | Facility: HOSPITAL | Age: OVER 89
End: 2025-02-11
Attending: INTERNAL MEDICINE
Payer: MEDICARE

## 2025-02-11 DIAGNOSIS — I48.91 ATRIAL FIBRILLATION, UNSPECIFIED TYPE (HCC): ICD-10-CM

## 2025-02-11 LAB
INR PPP: 2.3 (ref 0.85–1.19)
PROTHROMBIN TIME: 26 SECONDS (ref 12.3–15)

## 2025-02-11 PROCEDURE — 85610 PROTHROMBIN TIME: CPT

## 2025-02-11 PROCEDURE — 36415 COLL VENOUS BLD VENIPUNCTURE: CPT

## 2025-02-25 DIAGNOSIS — I48.92 ATRIAL FLUTTER BY ELECTROCARDIOGRAM (HCC): Primary | ICD-10-CM

## 2025-02-27 ENCOUNTER — OFFICE VISIT (OUTPATIENT)
Dept: INTERNAL MEDICINE CLINIC | Facility: CLINIC | Age: OVER 89
End: 2025-02-27
Payer: MEDICARE

## 2025-02-27 VITALS
HEART RATE: 84 BPM | OXYGEN SATURATION: 95 % | SYSTOLIC BLOOD PRESSURE: 126 MMHG | BODY MASS INDEX: 22.82 KG/M2 | RESPIRATION RATE: 16 BRPM | DIASTOLIC BLOOD PRESSURE: 68 MMHG | WEIGHT: 137 LBS | HEIGHT: 65 IN

## 2025-02-27 DIAGNOSIS — I10 PRIMARY HYPERTENSION: Primary | ICD-10-CM

## 2025-02-27 DIAGNOSIS — I25.10 2-VESSEL CORONARY ARTERY DISEASE: ICD-10-CM

## 2025-02-27 DIAGNOSIS — E78.5 HYPERLIPIDEMIA, UNSPECIFIED HYPERLIPIDEMIA TYPE: ICD-10-CM

## 2025-02-27 PROCEDURE — G2211 COMPLEX E/M VISIT ADD ON: HCPCS | Performed by: INTERNAL MEDICINE

## 2025-02-27 PROCEDURE — 99214 OFFICE O/P EST MOD 30 MIN: CPT | Performed by: INTERNAL MEDICINE

## 2025-02-27 RX ORDER — VALSARTAN 40 MG/1
20 TABLET ORAL DAILY
Status: SHIPPED
Start: 2025-02-27 | End: 2025-02-27 | Stop reason: SDUPTHER

## 2025-02-27 RX ORDER — VALSARTAN 40 MG/1
40 TABLET ORAL DAILY
Qty: 90 TABLET | Refills: 0 | Status: SHIPPED | OUTPATIENT
Start: 2025-02-27 | End: 2025-02-27

## 2025-02-27 RX ORDER — AMLODIPINE BESYLATE 2.5 MG/1
2.5 TABLET ORAL DAILY
Qty: 90 TABLET | Refills: 3 | Status: SHIPPED | OUTPATIENT
Start: 2025-02-27

## 2025-02-27 RX ORDER — ATORVASTATIN CALCIUM 80 MG/1
80 TABLET, FILM COATED ORAL DAILY
Qty: 90 TABLET | Refills: 3 | Status: SHIPPED | OUTPATIENT
Start: 2025-02-27

## 2025-02-27 RX ORDER — VALSARTAN 40 MG/1
20 TABLET ORAL DAILY
Qty: 90 TABLET | Refills: 3 | Status: SHIPPED | OUTPATIENT
Start: 2025-02-27

## 2025-02-27 RX ORDER — METOPROLOL SUCCINATE 25 MG/1
12.5 TABLET, EXTENDED RELEASE ORAL DAILY
Qty: 90 TABLET | Refills: 3 | Status: SHIPPED | OUTPATIENT
Start: 2025-02-27

## 2025-02-27 NOTE — ASSESSMENT & PLAN NOTE
Continue amlodipine 2.5,  metoprolol 12.5 mg. He never increased the valsartan but BP looks great today. Continue current regimen.    Orders:    amLODIPine (NORVASC) 2.5 mg tablet; Take 1 tablet (2.5 mg total) by mouth daily    valsartan (DIOVAN) 40 mg tablet; Take 0.5 tablets (20 mg total) by mouth daily

## 2025-02-27 NOTE — ASSESSMENT & PLAN NOTE
X 2 stents over 10 years ago. Was on aspirin, stopped, now on coumadin, continue statin.    Orders:    metoprolol succinate (TOPROL-XL) 25 mg 24 hr tablet; Take 0.5 tablets (12.5 mg total) by mouth daily

## 2025-02-27 NOTE — PROGRESS NOTES
"Name: Omega Vega      : 1934      MRN: [id number]  Encounter Provider: Pj Noland MD  Encounter Date: 2025   Encounter department: [facility] INTERNAL MEDICINE [place]  :  Assessment & Plan  Primary hypertension  Continue amlodipine 2.5,  metoprolol 12.5 mg. He never increased the valsartan but BP looks great today. Continue current regimen.    Orders:    amLODIPine (NORVASC) 2.5 mg tablet; Take 1 tablet (2.5 mg total) by mouth daily    valsartan (DIOVAN) 40 mg tablet; Take 0.5 tablets (20 mg total) by mouth daily        2-vessel coronary artery disease  X 2 stents over 10 years ago. Was on aspirin, stopped, now on coumadin, continue statin.    Orders:    metoprolol succinate (TOPROL-XL) 25 mg 24 hr tablet; Take 0.5 tablets (12.5 mg total) by mouth daily      Hyperlipidemia, unspecified hyperlipidemia type  Continue statin.    Orders:    atorvastatin (LIPITOR) 80 mg tablet; Take 1 tablet (80 mg total) by mouth daily          Depression Screening and Follow-up Plan: Patient was screened for depression during today's encounter. They screened negative with a PHQ-2 score of 0.        History of Present Illness   HPI  Review of Systems   Constitutional:  Negative for chills and fever.   HENT:  Negative for ear pain and sore throat.    Eyes:  Negative for pain and visual disturbance.   Respiratory:  Negative for cough and shortness of breath.    Cardiovascular:  Negative for chest pain and palpitations.   Gastrointestinal:  Negative for abdominal pain and vomiting.   Genitourinary:  Negative for dysuria and hematuria.   Musculoskeletal:  Negative for arthralgias and back pain.   Skin:  Negative for color change and rash.   Neurological:  Negative for seizures and syncope.   All other systems reviewed and are negative.      Objective   /68 (BP Location: Left arm, Patient Position: Sitting, Cuff Size: Adult)   Pulse 84   Resp 16   Ht 5' 5\" (1.651 m)   Wt 62.1 kg (137 lb)   SpO2 95%  "  BMI 22.80 kg/m²      Physical Exam  Vitals and nursing note reviewed.   Constitutional:       General: He is not in acute distress.     Appearance: He is well-developed.   HENT:      Head: Normocephalic and atraumatic.   Cardiovascular:      Rate and Rhythm: Normal rate.      Heart sounds: No murmur heard.  Pulmonary:      Effort: Pulmonary effort is normal. No respiratory distress.   Abdominal:      Tenderness: There is no abdominal tenderness.   Musculoskeletal:         General: No swelling.   Skin:     General: Skin is warm and dry.      Capillary Refill: Capillary refill takes less than 2 seconds.   Neurological:      Mental Status: He is alert.   Psychiatric:         Mood and Affect: Mood normal.

## 2025-02-27 NOTE — ASSESSMENT & PLAN NOTE
Continue statin.    Orders:    atorvastatin (LIPITOR) 80 mg tablet; Take 1 tablet (80 mg total) by mouth daily

## 2025-03-11 ENCOUNTER — APPOINTMENT (OUTPATIENT)
Dept: LAB | Facility: HOSPITAL | Age: OVER 89
End: 2025-03-11
Payer: MEDICARE

## 2025-03-11 DIAGNOSIS — I48.19 PERSISTENT ATRIAL FIBRILLATION (HCC): ICD-10-CM

## 2025-03-11 LAB
INR PPP: 2.41 (ref 0.85–1.19)
PROTHROMBIN TIME: 27 SECONDS (ref 12.3–15)

## 2025-03-11 PROCEDURE — 36415 COLL VENOUS BLD VENIPUNCTURE: CPT

## 2025-03-11 PROCEDURE — 85610 PROTHROMBIN TIME: CPT

## 2025-03-25 ENCOUNTER — APPOINTMENT (OUTPATIENT)
Dept: LAB | Facility: HOSPITAL | Age: OVER 89
End: 2025-03-25
Attending: INTERNAL MEDICINE
Payer: MEDICARE

## 2025-03-25 DIAGNOSIS — I48.92 ATRIAL FLUTTER BY ELECTROCARDIOGRAM (HCC): ICD-10-CM

## 2025-03-25 LAB
INR PPP: 2.07 (ref 0.85–1.19)
PROTHROMBIN TIME: 24 SECONDS (ref 12.3–15)

## 2025-03-25 PROCEDURE — 36415 COLL VENOUS BLD VENIPUNCTURE: CPT

## 2025-03-25 PROCEDURE — 85610 PROTHROMBIN TIME: CPT

## 2025-04-08 ENCOUNTER — APPOINTMENT (OUTPATIENT)
Dept: LAB | Facility: HOSPITAL | Age: OVER 89
End: 2025-04-08
Payer: MEDICARE

## 2025-04-08 DIAGNOSIS — I10 PRIMARY HYPERTENSION: ICD-10-CM

## 2025-04-08 DIAGNOSIS — I25.10 2-VESSEL CORONARY ARTERY DISEASE: ICD-10-CM

## 2025-04-08 DIAGNOSIS — I48.92 ATRIAL FLUTTER BY ELECTROCARDIOGRAM (HCC): ICD-10-CM

## 2025-04-08 LAB
INR PPP: 1.98 (ref 0.85–1.19)
PROTHROMBIN TIME: 23.2 SECONDS (ref 12.3–15)

## 2025-04-08 PROCEDURE — 85610 PROTHROMBIN TIME: CPT

## 2025-04-08 PROCEDURE — 36415 COLL VENOUS BLD VENIPUNCTURE: CPT

## 2025-04-22 ENCOUNTER — APPOINTMENT (OUTPATIENT)
Dept: LAB | Facility: HOSPITAL | Age: OVER 89
End: 2025-04-22
Payer: MEDICARE

## 2025-04-22 LAB
ANION GAP SERPL CALCULATED.3IONS-SCNC: 5 MMOL/L (ref 4–13)
BASOPHILS # BLD AUTO: 0.04 THOUSANDS/ÂΜL (ref 0–0.1)
BASOPHILS NFR BLD AUTO: 1 % (ref 0–1)
BUN SERPL-MCNC: 19 MG/DL (ref 5–25)
CALCIUM SERPL-MCNC: 8.8 MG/DL (ref 8.4–10.2)
CHLORIDE SERPL-SCNC: 106 MMOL/L (ref 96–108)
CO2 SERPL-SCNC: 27 MMOL/L (ref 21–32)
CREAT SERPL-MCNC: 0.83 MG/DL (ref 0.6–1.3)
EOSINOPHIL # BLD AUTO: 0.3 THOUSAND/ÂΜL (ref 0–0.61)
EOSINOPHIL NFR BLD AUTO: 5 % (ref 0–6)
ERYTHROCYTE [DISTWIDTH] IN BLOOD BY AUTOMATED COUNT: 13.4 % (ref 11.6–15.1)
GFR SERPL CREATININE-BSD FRML MDRD: 77 ML/MIN/1.73SQ M
GLUCOSE P FAST SERPL-MCNC: 99 MG/DL (ref 65–99)
HCT VFR BLD AUTO: 39.9 % (ref 36.5–49.3)
HGB BLD-MCNC: 12.7 G/DL (ref 12–17)
IMM GRANULOCYTES # BLD AUTO: 0.02 THOUSAND/UL (ref 0–0.2)
IMM GRANULOCYTES NFR BLD AUTO: 0 % (ref 0–2)
LYMPHOCYTES # BLD AUTO: 1.58 THOUSANDS/ÂΜL (ref 0.6–4.47)
LYMPHOCYTES NFR BLD AUTO: 24 % (ref 14–44)
MCH RBC QN AUTO: 30.2 PG (ref 26.8–34.3)
MCHC RBC AUTO-ENTMCNC: 31.8 G/DL (ref 31.4–37.4)
MCV RBC AUTO: 95 FL (ref 82–98)
MONOCYTES # BLD AUTO: 0.64 THOUSAND/ÂΜL (ref 0.17–1.22)
MONOCYTES NFR BLD AUTO: 10 % (ref 4–12)
NEUTROPHILS # BLD AUTO: 3.95 THOUSANDS/ÂΜL (ref 1.85–7.62)
NEUTS SEG NFR BLD AUTO: 60 % (ref 43–75)
NRBC BLD AUTO-RTO: 0 /100 WBCS
PLATELET # BLD AUTO: 200 THOUSANDS/UL (ref 149–390)
PMV BLD AUTO: 10.1 FL (ref 8.9–12.7)
POTASSIUM SERPL-SCNC: 4.7 MMOL/L (ref 3.5–5.3)
RBC # BLD AUTO: 4.2 MILLION/UL (ref 3.88–5.62)
SODIUM SERPL-SCNC: 138 MMOL/L (ref 135–147)
TSH SERPL DL<=0.05 MIU/L-ACNC: 3.1 UIU/ML (ref 0.45–4.5)
WBC # BLD AUTO: 6.53 THOUSAND/UL (ref 4.31–10.16)

## 2025-04-22 PROCEDURE — 85025 COMPLETE CBC W/AUTO DIFF WBC: CPT

## 2025-04-22 PROCEDURE — 84443 ASSAY THYROID STIM HORMONE: CPT

## 2025-04-22 PROCEDURE — 80048 BASIC METABOLIC PNL TOTAL CA: CPT

## 2025-04-28 ENCOUNTER — OFFICE VISIT (OUTPATIENT)
Dept: INTERNAL MEDICINE CLINIC | Facility: CLINIC | Age: OVER 89
End: 2025-04-28
Payer: MEDICARE

## 2025-04-28 VITALS
OXYGEN SATURATION: 97 % | SYSTOLIC BLOOD PRESSURE: 144 MMHG | WEIGHT: 135 LBS | HEIGHT: 65 IN | HEART RATE: 61 BPM | BODY MASS INDEX: 22.49 KG/M2 | DIASTOLIC BLOOD PRESSURE: 90 MMHG

## 2025-04-28 DIAGNOSIS — F41.9 ANXIETY: ICD-10-CM

## 2025-04-28 DIAGNOSIS — I10 PRIMARY HYPERTENSION: ICD-10-CM

## 2025-04-28 DIAGNOSIS — R79.9 ABNORMAL FINDING OF BLOOD CHEMISTRY, UNSPECIFIED: ICD-10-CM

## 2025-04-28 DIAGNOSIS — I25.119 ATHEROSCLEROSIS OF NATIVE CORONARY ARTERY WITH ANGINA PECTORIS, UNSPECIFIED WHETHER NATIVE OR TRANSPLANTED HEART (HCC): ICD-10-CM

## 2025-04-28 DIAGNOSIS — I48.92 ATRIAL FLUTTER BY ELECTROCARDIOGRAM (HCC): ICD-10-CM

## 2025-04-28 DIAGNOSIS — I25.5 ISCHEMIC CARDIOMYOPATHY: ICD-10-CM

## 2025-04-28 DIAGNOSIS — Z00.00 MEDICARE ANNUAL WELLNESS VISIT, SUBSEQUENT: Primary | ICD-10-CM

## 2025-04-28 DIAGNOSIS — I25.10 2-VESSEL CORONARY ARTERY DISEASE: ICD-10-CM

## 2025-04-28 PROCEDURE — G2211 COMPLEX E/M VISIT ADD ON: HCPCS | Performed by: INTERNAL MEDICINE

## 2025-04-28 PROCEDURE — G0439 PPPS, SUBSEQ VISIT: HCPCS | Performed by: INTERNAL MEDICINE

## 2025-04-28 PROCEDURE — 99214 OFFICE O/P EST MOD 30 MIN: CPT | Performed by: INTERNAL MEDICINE

## 2025-04-28 RX ORDER — VALSARTAN 80 MG/1
80 TABLET ORAL DAILY
Start: 2025-04-28

## 2025-04-28 NOTE — PATIENT INSTRUCTIONS
Medicare Preventive Visit Patient Instructions  Thank you for completing your Welcome to Medicare Visit or Medicare Annual Wellness Visit today. Your next wellness visit will be due in one year (4/29/2026).  The screening/preventive services that you may require over the next 5-10 years are detailed below. Some tests may not apply to you based off risk factors and/or age. Screening tests ordered at today's visit but not completed yet may show as past due. Also, please note that scanned in results may not display below.  Preventive Screenings:  Service Recommendations Previous Testing/Comments   Colorectal Cancer Screening  Colonoscopy    Fecal Occult Blood Test (FOBT)/Fecal Immunochemical Test (FIT)  Fecal DNA/Cologuard Test  Flexible Sigmoidoscopy Age: 45-75 years old   Colonoscopy: every 10 years (May be performed more frequently if at higher risk)  OR  FOBT/FIT: every 1 year  OR  Cologuard: every 3 years  OR  Sigmoidoscopy: every 5 years  Screening may be recommended earlier than age 45 if at higher risk for colorectal cancer. Also, an individualized decision between you and your healthcare provider will decide whether screening between the ages of 76-85 would be appropriate. Colonoscopy: Not on file  FOBT/FIT: Not on file  Cologuard: Not on file  Sigmoidoscopy: Not on file    Screening Not Indicated     Prostate Cancer Screening Individualized decision between patient and health care provider in men between ages of 55-69   Medicare will cover every 12 months beginning on the day after your 50th birthday PSA: No results in last 5 years     Screening Not Indicated     Hepatitis C Screening Once for adults born between 1945 and 1965  More frequently in patients at high risk for Hepatitis C Hep C Antibody: 12/28/2017    Screening Current   Diabetes Screening 1-2 times per year if you're at risk for diabetes or have pre-diabetes Fasting glucose: 99 mg/dL (4/22/2025)  A1C: 5.9 % (9/11/2024)  Screening Current    Cholesterol Screening Once every 5 years if you don't have a lipid disorder. May order more often based on risk factors. Lipid panel: 03/12/2024  Screening Not Indicated  History Lipid Disorder      Other Preventive Screenings Covered by Medicare:  Abdominal Aortic Aneurysm (AAA) Screening: covered once if your at risk. You're considered to be at risk if you have a family history of AAA or a male between the age of 65-75 who smoking at least 100 cigarettes in your lifetime.  Lung Cancer Screening: covers low dose CT scan once per year if you meet all of the following conditions: (1) Age 55-77; (2) No signs or symptoms of lung cancer; (3) Current smoker or have quit smoking within the last 15 years; (4) You have a tobacco smoking history of at least 20 pack years (packs per day x number of years you smoked); (5) You get a written order from a healthcare provider.  Glaucoma Screening: covered annually if you're considered high risk: (1) You have diabetes OR (2) Family history of glaucoma OR (3)  aged 50 and older OR (4)  American aged 65 and older  Osteoporosis Screening: covered every 2 years if you meet one of the following conditions: (1) Have a vertebral abnormality; (2) On glucocorticoid therapy for more than 3 months; (3) Have primary hyperparathyroidism; (4) On osteoporosis medications and need to assess response to drug therapy.  HIV Screening: covered annually if you're between the age of 15-65. Also covered annually if you are younger than 15 and older than 65 with risk factors for HIV infection. For pregnant patients, it is covered up to 3 times per pregnancy.    Immunizations:  Immunization Recommendations   Influenza Vaccine Annual influenza vaccination during flu season is recommended for all persons aged >= 6 months who do not have contraindications   Pneumococcal Vaccine   * Pneumococcal conjugate vaccine = PCV13 (Prevnar 13), PCV15 (Vaxneuvance), PCV20 (Prevnar 20)  *  Pneumococcal polysaccharide vaccine = PPSV23 (Pneumovax) Adults 19-65 yo with certain risk factors or if 65+ yo  If never received any pneumonia vaccine: recommend Prevnar 20 (PCV20)  Give PCV20 if previously received 1 dose of PCV13 or PPSV23   Hepatitis B Vaccine 3 dose series if at intermediate or high risk (ex: diabetes, end stage renal disease, liver disease)   Respiratory syncytial virus (RSV) Vaccine - COVERED BY MEDICARE PART D  * RSVPreF3 (Arexvy) CDC recommends that adults 60 years of age and older may receive a single dose of RSV vaccine using shared clinical decision-making (SCDM)   Tetanus (Td) Vaccine - COST NOT COVERED BY MEDICARE PART B Following completion of primary series, a booster dose should be given every 10 years to maintain immunity against tetanus. Td may also be given as tetanus wound prophylaxis.   Tdap Vaccine - COST NOT COVERED BY MEDICARE PART B Recommended at least once for all adults. For pregnant patients, recommended with each pregnancy.   Shingles Vaccine (Shingrix) - COST NOT COVERED BY MEDICARE PART B  2 shot series recommended in those 19 years and older who have or will have weakened immune systems or those 50 years and older     Health Maintenance Due:      Topic Date Due   • Hepatitis C Screening  Completed     Immunizations Due:      Topic Date Due   • COVID-19 Vaccine (4 - 2024-25 season) 09/01/2024     Advance Directives   What are advance directives?  Advance directives are legal documents that state your wishes and plans for medical care. These plans are made ahead of time in case you lose your ability to make decisions for yourself. Advance directives can apply to any medical decision, such as the treatments you want, and if you want to donate organs.   What are the types of advance directives?  There are many types of advance directives, and each state has rules about how to use them. You may choose a combination of any of the following:  Living will:  This is a  written record of the treatment you want. You can also choose which treatments you do not want, which to limit, and which to stop at a certain time. This includes surgery, medicine, IV fluid, and tube feedings.   Durable power of  for healthcare (DPAHC):  This is a written record that states who you want to make healthcare choices for you when you are unable to make them for yourself. This person, called a proxy, is usually a family member or a friend. You may choose more than 1 proxy.  Do not resuscitate (DNR) order:  A DNR order is used in case your heart stops beating or you stop breathing. It is a request not to have certain forms of treatment, such as CPR. A DNR order may be included in other types of advance directives.  Medical directive:  This covers the care that you want if you are in a coma, near death, or unable to make decisions for yourself. You can list the treatments you want for each condition. Treatment may include pain medicine, surgery, blood transfusions, dialysis, IV or tube feedings, and a ventilator (breathing machine).  Values history:  This document has questions about your views, beliefs, and how you feel and think about life. This information can help others choose the care that you would choose.  Why are advance directives important?  An advance directive helps you control your care. Although spoken wishes may be used, it is better to have your wishes written down. Spoken wishes can be misunderstood, or not followed. Treatments may be given even if you do not want them. An advance directive may make it easier for your family to make difficult choices about your care.       © Copyright [employer] 2018 Information is for End User's use only and may not be sold, redistributed or otherwise used for commercial purposes. All illustrations and images included in CareNotes® are the copyrighted property of [employer] or Purchasing Platform

## 2025-04-28 NOTE — ASSESSMENT & PLAN NOTE
He stopped amlodipine 2/2 LE swelling, continue metoprolol 12.5 mg and valsartan was increased to 80 mg.    Orders:    valsartan (DIOVAN) 80 mg tablet; Take 1 tablet (80 mg total) by mouth daily    CBC and differential; Future    Basic metabolic panel; Future    Hemoglobin A1C; Future    TSH, 3rd generation with Free T4 reflex; Future

## 2025-04-28 NOTE — ASSESSMENT & PLAN NOTE
X 2 stents over 10 years ago. Was on aspirin, stopped, now on coumadin, continue statin.    Orders:    Hemoglobin A1C; Future

## 2025-04-28 NOTE — PROGRESS NOTES
Name: Omega Vega      : 1934      MRN: [id number]  Encounter Provider: Pj Noland MD  Encounter Date: 2025   Encounter department: [facility] INTERNAL MEDICINE [place]  :  Assessment & Plan  Medicare annual wellness visit, subsequent  completed       Primary hypertension  He stopped amlodipine 2/2 LE swelling, continue metoprolol 12.5 mg and valsartan was increased to 80 mg.    Orders:    valsartan (DIOVAN) 80 mg tablet; Take 1 tablet (80 mg total) by mouth daily    CBC and differential; Future    Basic metabolic panel; Future    Hemoglobin A1C; Future    TSH, 3rd generation with Free T4 reflex; Future      Ischemic cardiomyopathy  X 2 stents over 10 years ago. Was on aspirin, stopped, now on coumadin, continue statin.   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 Was on aspirin, stopped, now on coumadin, continue statin.   Orders:    CBC and differential; Future    Basic metabolic panel; Future    Lipid Panel with Direct LDL reflex; Future    Hemoglobin A1C; Future      2-vessel coronary artery disease  X 2 stents over 10 years ago. Was on aspirin, stopped, now on coumadin, continue statin.    Orders:    Hemoglobin A1C; Future        Anxiety  Better controlled with 10 mg of atarax.         Atrial flutter by electrocardiogram ([facility])  Continue cards f/u.   Orders:    Hemoglobin A1C; Future    Abnormal finding of blood chemistry, unspecified    Orders:    Hemoglobin A1C; Future      Depression Screening and Follow-up Plan: Patient was screened for depression during today's encounter. They screened negative with a PHQ-2 score of 0.        Preventive health issues were discussed with patient, and age appropriate screening tests were ordered as noted in patient's After Visit Summary. Personalized  health advice and appropriate referrals for health education or preventive services given if needed, as noted in patient's After Visit Summary.    History of Present Illness     Patient is here for routine follow up, reviewed chronic medical problems, ordered labs for next visit including CBC CMP TSH A1C LIPID. Reviewed labs for this visit.       Patient Care Team:  Pj Noland MD as PCP - General (Internal Medicine)  Denilson Martin MD    Review of Systems   Constitutional:  Negative for chills and fever.   HENT:  Negative for ear pain and sore throat.    Eyes:  Negative for pain and visual disturbance.   Respiratory:  Negative for cough and shortness of breath.    Cardiovascular:  Negative for chest pain and palpitations.   Gastrointestinal:  Negative for abdominal pain and vomiting.   Genitourinary:  Negative for dysuria and hematuria.   Musculoskeletal:  Negative for arthralgias and back pain.   Skin:  Negative for color change and rash.   Neurological:  Negative for seizures and syncope.   All other systems reviewed and are negative.    Medical History Reviewed by provider this encounter:  Tobacco  Allergies  Meds  Problems  Med Hx  Surg Hx  Fam Hx       Annual Wellness Visit Questionnaire   Omega is here for his Subsequent Wellness visit. Last Medicare Wellness visit information reviewed, patient interviewed and updates made to the record today.      Health Risk Assessment:   Patient rates overall health as good. Patient feels that their physical health rating is same. Patient is satisfied with their life. Eyesight was rated as same. Hearing was rated as same. Patient feels that their emotional and mental health rating is same. Patients states they are never, rarely angry. Patient states they are never, rarely unusually tired/fatigued. Pain experienced in the last 7 days has been none. Patient states that he has experienced no weight loss or gain in last 6 months.     Depression Screening:   PHQ-2  Score: 0      Fall Risk Screening:   In the past year, patient has experienced: no history of falling in past year      Home Safety:  Patient has trouble with stairs inside or outside of their home. Patient has working smoke alarms and has working carbon monoxide detector. Home safety hazards include: none.     Nutrition:   Current diet is Regular.     Medications:   Patient is currently taking over-the-counter supplements. OTC medications include: see medication list. Patient is able to manage medications.     Activities of Daily Living (ADLs)/Instrumental Activities of Daily Living (IADLs):   Walk and transfer into and out of bed and chair?: Yes  Dress and groom yourself?: Yes    Bathe or shower yourself?: Yes    Feed yourself? Yes  Do your laundry/housekeeping?: Yes  Manage your money, pay your bills and track your expenses?: Yes  Make your own meals?: Yes    Do your own shopping?: Yes    Previous Hospitalizations:   Any hospitalizations or ED visits within the last 12 months?: No      Advance Care Planning:   Living will: No    Durable POA for healthcare: No    Advanced directive: No      Cognitive Screening:   Provider or family/friend/caregiver concerned regarding cognition?: No    Preventive Screenings      Cardiovascular Screening:    General: History Lipid Disorder and Risks and Benefits Discussed    Due for: Lipid Panel      Diabetes Screening:     General: Risks and Benefits Discussed    Due for: Blood Glucose      Colorectal Cancer Screening:     General: Screening Not Indicated      Prostate Cancer Screening:    General: Screening Not Indicated      Osteoporosis Screening:    General: Screening Not Indicated      Abdominal Aortic Aneurysm (AAA) Screening:    Risk factors include: tobacco use        General: Screening Not Indicated      Lung Cancer Screening:     General: Screening Not Indicated      Hepatitis C Screening:    General: Screening Current    Immunizations:  - Immunizations due: Zoster  "(Shingrix)    Screening, Brief Intervention, and Referral to Treatment (SBIRT)     Screening  Typical number of drinks in a day: 0  Typical number of drinks in a week: 7  Interpretation: Low risk drinking behavior.    Single Item Drug Screening:  How often have you used an illegal drug (including marijuana) or a prescription medication for non-medical reasons in the past year? never    Single Item Drug Screen Score: 0  Interpretation: Negative screen for possible drug use disorder    Other Counseling Topics:   Car/seat belt/driving safety.     Social Drivers of Health     Financial Resource Strain: Low Risk  (3/23/2023)    Overall Financial Resource Strain (CARDIA)     Difficulty of Paying Living Expenses: Not hard at all   Food Insecurity: No Food Insecurity (4/28/2025)    Hunger Vital Sign     Worried About Running Out of Food in the Last Year: Never true     Ran Out of Food in the Last Year: Never true   Transportation Needs: No Transportation Needs (4/28/2025)    PRAPARE - Transportation     Lack of Transportation (Medical): No     Lack of Transportation (Non-Medical): No   Housing Stability: Low Risk  (4/28/2025)    Housing Stability Vital Sign     Unable to Pay for Housing in the Last Year: No     Number of Times Moved in the Last Year: 0     Homeless in the Last Year: No   Utilities: Not At Risk (4/28/2025)    [facility] Utilities     Threatened with loss of utilities: No     No results found.    Objective   /90 (BP Location: Left arm, Patient Position: Sitting, Cuff Size: Standard)   Pulse 61   Ht 5' 5\" (1.651 m)   Wt 61.2 kg (135 lb)   SpO2 97%   BMI 22.47 kg/m²     Physical Exam  Vitals and nursing note reviewed.   Constitutional:       General: He is not in acute distress.     Appearance: He is well-developed.   HENT:      Head: Normocephalic and atraumatic.   Eyes:      Conjunctiva/sclera: Conjunctivae normal.   Cardiovascular:      Rate and Rhythm: Normal rate and regular rhythm.      Heart sounds: " No murmur heard.  Pulmonary:      Effort: Pulmonary effort is normal. No respiratory distress.      Breath sounds: Normal breath sounds.   Abdominal:      Tenderness: There is no abdominal tenderness.   Musculoskeletal:         General: No swelling.   Skin:     General: Skin is warm and dry.      Capillary Refill: Capillary refill takes less than 2 seconds.   Neurological:      Mental Status: He is alert.   Psychiatric:         Mood and Affect: Mood normal.

## 2025-04-28 NOTE — ASSESSMENT & PLAN NOTE
X 2 stents over 10 years ago. Was on aspirin, stopped, now on coumadin, continue statin.   Orders:    Hemoglobin A1C; Future    TSH, 3rd generation with Free T4 reflex; Future

## 2025-05-06 ENCOUNTER — OFFICE VISIT (OUTPATIENT)
Dept: INTERNAL MEDICINE CLINIC | Facility: CLINIC | Age: OVER 89
End: 2025-05-06
Payer: MEDICARE

## 2025-05-06 VITALS
RESPIRATION RATE: 16 BRPM | SYSTOLIC BLOOD PRESSURE: 150 MMHG | HEIGHT: 65 IN | DIASTOLIC BLOOD PRESSURE: 78 MMHG | BODY MASS INDEX: 22.47 KG/M2 | OXYGEN SATURATION: 96 % | HEART RATE: 81 BPM

## 2025-05-06 DIAGNOSIS — I10 PRIMARY HYPERTENSION: Primary | ICD-10-CM

## 2025-05-06 DIAGNOSIS — R09.81 NASAL CONGESTION: ICD-10-CM

## 2025-05-06 DIAGNOSIS — R21 RASH: ICD-10-CM

## 2025-05-06 PROCEDURE — G2211 COMPLEX E/M VISIT ADD ON: HCPCS | Performed by: INTERNAL MEDICINE

## 2025-05-06 PROCEDURE — 99214 OFFICE O/P EST MOD 30 MIN: CPT | Performed by: INTERNAL MEDICINE

## 2025-05-06 RX ORDER — FLUTICASONE PROPIONATE 50 MCG
2 SPRAY, SUSPENSION (ML) NASAL DAILY
Qty: 16 G | Refills: 5 | Status: SHIPPED | OUTPATIENT
Start: 2025-05-06

## 2025-05-06 NOTE — PROGRESS NOTES
"Name: Omega Vega      : 1934      MRN: [id number]  Encounter Provider: Pj Noland MD  Encounter Date: 2025   Encounter department: [facility] INTERNAL MEDICINE [place]  :  Assessment & Plan  Primary hypertension  He stopped amlodipine 2/2 LE swelling, continue metoprolol 12.5 mg and valsartan was increased to 80 mg.    Continue current regimen, recheck on .         Nasal congestion  Reports when he goes down to check his mail box, he will walk back up and get sob, it is at a incline, reports his right nasal nostril will get blocked, this could be causing his SOB, since this started, he has been using saline spray, will send flonase, consider Claritin and/or allegra.    Lungs clear, heart RRR.  Orders:    fluticasone (FLONASE) 50 mcg/act nasal spray; 2 sprays into each nostril daily    Rash  To back of neck, says it is a birthmark.             Depression Screening and Follow-up Plan: Patient was screened for depression during today's encounter. They screened negative with a PHQ-2 score of 0.        History of Present Illness   Here for BP check.    Review of Systems   Constitutional:  Negative for chills and fever.   HENT:  Positive for congestion. Negative for ear pain and sore throat.    Eyes:  Negative for pain and visual disturbance.   Respiratory:  Negative for cough and shortness of breath.    Cardiovascular:  Negative for chest pain and palpitations.   Gastrointestinal:  Negative for abdominal pain and vomiting.   Genitourinary:  Negative for dysuria and hematuria.   Musculoskeletal:  Negative for arthralgias and back pain.   Skin:  Negative for color change and rash.   Neurological:  Negative for seizures and syncope.   All other systems reviewed and are negative.      Objective   /78 (BP Location: Left arm, Patient Position: Sitting, Cuff Size: Adult)   Pulse 81   Resp 16   Ht 5' 5\" (1.651 m)   SpO2 96%   BMI 22.47 kg/m²      Physical Exam  Vitals and nursing note " reviewed.   Constitutional:       General: He is not in acute distress.     Appearance: He is well-developed.   HENT:      Head: Normocephalic and atraumatic.   Cardiovascular:      Rate and Rhythm: Normal rate and regular rhythm.      Heart sounds: No murmur heard.  Pulmonary:      Effort: Pulmonary effort is normal. No respiratory distress.      Breath sounds: Normal breath sounds.   Abdominal:      Tenderness: There is no abdominal tenderness.   Musculoskeletal:         General: No swelling.   Skin:     General: Skin is warm and dry.      Capillary Refill: Capillary refill takes less than 2 seconds.   Neurological:      Mental Status: He is alert.   Psychiatric:         Mood and Affect: Mood normal.

## 2025-05-06 NOTE — ASSESSMENT & PLAN NOTE
He stopped amlodipine 2/2 LE swelling, continue metoprolol 12.5 mg and valsartan was increased to 80 mg.    Continue current regimen, recheck on 5/19.

## 2025-05-07 ENCOUNTER — APPOINTMENT (OUTPATIENT)
Dept: LAB | Facility: HOSPITAL | Age: OVER 89
End: 2025-05-07
Attending: INTERNAL MEDICINE
Payer: MEDICARE

## 2025-05-07 DIAGNOSIS — I48.92 ATRIAL FLUTTER BY ELECTROCARDIOGRAM (HCC): ICD-10-CM

## 2025-05-07 LAB
INR PPP: 2.36 (ref 0.85–1.19)
PROTHROMBIN TIME: 26.5 SECONDS (ref 12.3–15)

## 2025-05-07 PROCEDURE — 85610 PROTHROMBIN TIME: CPT

## 2025-05-07 PROCEDURE — 36415 COLL VENOUS BLD VENIPUNCTURE: CPT

## 2025-05-19 ENCOUNTER — OFFICE VISIT (OUTPATIENT)
Dept: INTERNAL MEDICINE CLINIC | Facility: CLINIC | Age: OVER 89
End: 2025-05-19
Payer: MEDICARE

## 2025-05-19 VITALS
HEART RATE: 51 BPM | SYSTOLIC BLOOD PRESSURE: 140 MMHG | TEMPERATURE: 97.3 F | WEIGHT: 140 LBS | OXYGEN SATURATION: 99 % | HEIGHT: 65 IN | BODY MASS INDEX: 23.32 KG/M2 | DIASTOLIC BLOOD PRESSURE: 78 MMHG

## 2025-05-19 DIAGNOSIS — E78.5 HYPERLIPIDEMIA, UNSPECIFIED HYPERLIPIDEMIA TYPE: ICD-10-CM

## 2025-05-19 DIAGNOSIS — I10 PRIMARY HYPERTENSION: Primary | ICD-10-CM

## 2025-05-19 DIAGNOSIS — F41.9 ANXIETY: ICD-10-CM

## 2025-05-19 DIAGNOSIS — Z99.89 DEPENDENCE ON CANE: ICD-10-CM

## 2025-05-19 DIAGNOSIS — Z13.1 DIABETES MELLITUS SCREENING: ICD-10-CM

## 2025-05-19 DIAGNOSIS — R09.81 NASAL CONGESTION: ICD-10-CM

## 2025-05-19 PROCEDURE — G2211 COMPLEX E/M VISIT ADD ON: HCPCS | Performed by: INTERNAL MEDICINE

## 2025-05-19 PROCEDURE — 99214 OFFICE O/P EST MOD 30 MIN: CPT | Performed by: INTERNAL MEDICINE

## 2025-05-19 RX ORDER — LORATADINE 10 MG/1
10 TABLET ORAL DAILY
Qty: 90 TABLET | Refills: 0 | Status: SHIPPED | OUTPATIENT
Start: 2025-05-19

## 2025-05-19 NOTE — PROGRESS NOTES
Name: Omega Vega      : 1934      MRN: [id number]  Encounter Provider: Pj Noland MD  Encounter Date: 2025   Encounter department: [facility] INTERNAL MEDICINE [place]  :  Assessment & Plan  Primary hypertension  He stopped amlodipine 2/2 LE swelling, continue metoprolol 12.5 mg and valsartan was increased to 80 mg.    Continue current regimen, /78. Will continue current regimen.  Orders:    CBC and differential; Future    Comprehensive metabolic panel; Future     Dependence on cane  Noted.       Nasal congestion  Reports when he goes down to check his mail box, he will walk back up and get sob, it is at a incline, reports his right nasal nostril will get blocked, this could be causing his SOB, since this started, he has been using saline spray, will send flonase, consider Claritin and/or allegra.     Declining flonase 2/2 side effects, he will try Claritin.  Orders:    loratadine (CLARITIN) 10 mg tablet; Take 1 tablet (10 mg total) by mouth daily    Hyperlipidemia, unspecified hyperlipidemia type  Continue regimen.  Orders:    Lipid Panel with Direct LDL reflex; Future    Anxiety  Can take atarax.  Orders:    TSH, 3rd generation with Free T4 reflex; Future    Diabetes mellitus screening  Check A1c.  Orders:    Hemoglobin A1C; Future          Depression Screening and Follow-up Plan:   Patient with underlying depression and was advised to continue current medications as prescribed.       History of Present Illness   HTN f/u.      Review of Systems   Constitutional:  Negative for chills and fever.   HENT:  Negative for ear pain and sore throat.    Eyes:  Negative for pain and visual disturbance.   Respiratory:  Negative for cough and shortness of breath.    Cardiovascular:  Negative for chest pain and palpitations.   Gastrointestinal:  Negative for abdominal pain and vomiting.   Genitourinary:  Negative for dysuria and hematuria.   Musculoskeletal:  Negative for arthralgias and  "back pain.   Skin:  Negative for color change and rash.   Neurological:  Negative for seizures and syncope.   All other systems reviewed and are negative.      Objective   /78 (BP Location: Left arm, Patient Position: Sitting, Cuff Size: Standard)   Pulse (!) 51   Temp (!) 97.3 °F (36.3 °C) (Temporal)   Ht 5' 5\" (1.651 m)   Wt 63.5 kg (140 lb)   SpO2 99%   BMI 23.30 kg/m²      Physical Exam  Vitals and nursing note reviewed.   Constitutional:       General: He is not in acute distress.     Appearance: He is well-developed.   HENT:      Head: Normocephalic and atraumatic.     Eyes:      Conjunctiva/sclera: Conjunctivae normal.       Cardiovascular:      Rate and Rhythm: Normal rate.      Heart sounds: No murmur heard.  Pulmonary:      Effort: Pulmonary effort is normal. No respiratory distress.   Abdominal:      Tenderness: There is no abdominal tenderness.     Musculoskeletal:         General: No swelling.     Skin:     General: Skin is dry.     Neurological:      Mental Status: He is alert.      Gait: Gait abnormal.     Psychiatric:         Mood and Affect: Mood normal.         "

## 2025-05-19 NOTE — ASSESSMENT & PLAN NOTE
He stopped amlodipine 2/2 LE swelling, continue metoprolol 12.5 mg and valsartan was increased to 80 mg.    Continue current regimen, /78. Will continue current regimen.  Orders:    CBC and differential; Future    Comprehensive metabolic panel; Future

## 2025-06-03 ENCOUNTER — OFFICE VISIT (OUTPATIENT)
Dept: INTERNAL MEDICINE CLINIC | Facility: CLINIC | Age: OVER 89
End: 2025-06-03
Payer: MEDICARE

## 2025-06-03 VITALS
HEART RATE: 114 BPM | RESPIRATION RATE: 16 BRPM | HEIGHT: 65 IN | BODY MASS INDEX: 23.32 KG/M2 | TEMPERATURE: 99.7 F | SYSTOLIC BLOOD PRESSURE: 128 MMHG | OXYGEN SATURATION: 98 % | WEIGHT: 140 LBS | DIASTOLIC BLOOD PRESSURE: 70 MMHG

## 2025-06-03 DIAGNOSIS — I10 PRIMARY HYPERTENSION: Primary | ICD-10-CM

## 2025-06-03 DIAGNOSIS — J40 BRONCHITIS: ICD-10-CM

## 2025-06-03 PROCEDURE — G2211 COMPLEX E/M VISIT ADD ON: HCPCS | Performed by: INTERNAL MEDICINE

## 2025-06-03 PROCEDURE — 99214 OFFICE O/P EST MOD 30 MIN: CPT | Performed by: INTERNAL MEDICINE

## 2025-06-03 RX ORDER — CEFUROXIME AXETIL 500 MG/1
500 TABLET ORAL EVERY 12 HOURS SCHEDULED
Qty: 14 TABLET | Refills: 0 | Status: SHIPPED | OUTPATIENT
Start: 2025-06-03 | End: 2025-06-10

## 2025-06-03 NOTE — PROGRESS NOTES
"Name: Omega Vega      : 1934      MRN: [id number]  Encounter Provider: Pj Noland MD  Encounter Date: 6/3/2025   Encounter department: [facility] INTERNAL MEDICINE [place]  :  Assessment & Plan  Primary hypertension  He stopped amlodipine 2/2 LE swelling, continue metoprolol 12.5 mg and valsartan was increased to 80 mg.    Continue current regimen, /70. Will continue current regimen.          Bronchitis  X 1 week, coughing with sputum production. He is tachycardic. Discussed OTC tx, completed ABT.  Orders:  •  cefuroxime (CEFTIN) 500 mg tablet; Take 1 tablet (500 mg total) by mouth every 12 (twelve) hours for 7 days          Depression Screening and Follow-up Plan: Patient was screened for depression during today's encounter. They screened negative with a PHQ-2 score of 0.      History of Present Illness   2 week follow up.    Review of Systems   Constitutional:  Negative for chills and fever.   HENT:  Positive for congestion. Negative for ear pain and sore throat.    Eyes:  Negative for pain and visual disturbance.   Respiratory:  Positive for cough. Negative for shortness of breath.    Cardiovascular:  Negative for chest pain and palpitations.   Gastrointestinal:  Negative for abdominal pain and vomiting.   Genitourinary:  Negative for dysuria and hematuria.   Musculoskeletal:  Negative for arthralgias and back pain.   Skin:  Negative for color change and rash.   Neurological:  Negative for seizures and syncope.   All other systems reviewed and are negative.      Objective   /70 (BP Location: Left arm, Patient Position: Sitting, Cuff Size: Adult)   Pulse 100   Resp 16   Ht 5' 5\" (1.651 m)   Wt 63.5 kg (140 lb)   SpO2 98%   BMI 23.30 kg/m²      Physical Exam  Vitals and nursing note reviewed.   Constitutional:       General: He is not in acute distress.     Appearance: He is well-developed. He is ill-appearing.   HENT:      Head: Normocephalic and atraumatic.     Eyes:    "   Conjunctiva/sclera: Conjunctivae normal.       Cardiovascular:      Rate and Rhythm: Normal rate. Rhythm irregular.      Heart sounds: Murmur heard.   Pulmonary:      Effort: Pulmonary effort is normal. No respiratory distress.      Breath sounds: Normal breath sounds.   Abdominal:      Tenderness: There is no abdominal tenderness.     Musculoskeletal:         General: No swelling.     Skin:     General: Skin is warm and dry.     Neurological:      Mental Status: He is alert.      Gait: Gait abnormal.     Psychiatric:         Mood and Affect: Mood normal.

## 2025-06-03 NOTE — ASSESSMENT & PLAN NOTE
He stopped amlodipine 2/2 LE swelling, continue metoprolol 12.5 mg and valsartan was increased to 80 mg.    Continue current regimen, /70. Will continue current regimen.

## 2025-06-04 ENCOUNTER — APPOINTMENT (OUTPATIENT)
Dept: LAB | Facility: HOSPITAL | Age: OVER 89
End: 2025-06-04
Payer: MEDICARE

## 2025-06-04 DIAGNOSIS — I10 PRIMARY HYPERTENSION: ICD-10-CM

## 2025-06-04 DIAGNOSIS — F41.9 ANXIETY: ICD-10-CM

## 2025-06-04 DIAGNOSIS — I25.10 2-VESSEL CORONARY ARTERY DISEASE: ICD-10-CM

## 2025-06-04 DIAGNOSIS — E78.5 HYPERLIPIDEMIA, UNSPECIFIED HYPERLIPIDEMIA TYPE: ICD-10-CM

## 2025-06-04 DIAGNOSIS — Z13.1 DIABETES MELLITUS SCREENING: ICD-10-CM

## 2025-06-04 DIAGNOSIS — I25.119 ATHEROSCLEROSIS OF NATIVE CORONARY ARTERY WITH ANGINA PECTORIS, UNSPECIFIED WHETHER NATIVE OR TRANSPLANTED HEART (HCC): ICD-10-CM

## 2025-06-04 DIAGNOSIS — I25.5 ISCHEMIC CARDIOMYOPATHY: ICD-10-CM

## 2025-06-04 DIAGNOSIS — R79.9 ABNORMAL FINDING OF BLOOD CHEMISTRY, UNSPECIFIED: ICD-10-CM

## 2025-06-04 DIAGNOSIS — I48.92 ATRIAL FLUTTER BY ELECTROCARDIOGRAM (HCC): ICD-10-CM

## 2025-06-04 LAB
INR PPP: 1.66 (ref 0.85–1.19)
PROTHROMBIN TIME: 20.4 SECONDS (ref 12.3–15)

## 2025-06-04 PROCEDURE — 85610 PROTHROMBIN TIME: CPT

## 2025-06-04 PROCEDURE — 36415 COLL VENOUS BLD VENIPUNCTURE: CPT

## 2025-06-18 ENCOUNTER — APPOINTMENT (OUTPATIENT)
Dept: LAB | Facility: HOSPITAL | Age: OVER 89
End: 2025-06-18
Attending: INTERNAL MEDICINE
Payer: MEDICARE

## 2025-06-18 DIAGNOSIS — I48.92 ATRIAL FLUTTER BY ELECTROCARDIOGRAM (HCC): ICD-10-CM

## 2025-06-18 LAB
INR PPP: 2.83 (ref 0.85–1.19)
PROTHROMBIN TIME: 30.4 SECONDS (ref 12.3–15)

## 2025-06-18 PROCEDURE — 36415 COLL VENOUS BLD VENIPUNCTURE: CPT

## 2025-06-18 PROCEDURE — 85610 PROTHROMBIN TIME: CPT

## 2025-06-23 ENCOUNTER — DOCUMENTATION (OUTPATIENT)
Dept: NON INVASIVE DIAGNOSTICS | Facility: HOSPITAL | Age: OVER 89
End: 2025-06-23

## 2025-06-23 DIAGNOSIS — I48.92 ATRIAL FLUTTER BY ELECTROCARDIOGRAM (HCC): ICD-10-CM

## 2025-06-23 DIAGNOSIS — G45.9 TIA (TRANSIENT ISCHEMIC ATTACK): Primary | ICD-10-CM

## 2025-06-23 DIAGNOSIS — I48.0 PAROXYSMAL A-FIB (HCC): ICD-10-CM

## 2025-06-26 ENCOUNTER — HOSPITAL ENCOUNTER (OUTPATIENT)
Dept: NON INVASIVE DIAGNOSTICS | Facility: HOSPITAL | Age: OVER 89
Discharge: HOME | End: 2025-06-26
Attending: INTERNAL MEDICINE
Payer: MEDICARE

## 2025-06-26 ENCOUNTER — ANESTHESIA EVENT (OUTPATIENT)
Dept: NON INVASIVE DIAGNOSTICS | Facility: HOSPITAL | Age: OVER 89
End: 2025-06-26
Payer: MEDICARE

## 2025-06-26 ENCOUNTER — ANESTHESIA (OUTPATIENT)
Dept: NON INVASIVE DIAGNOSTICS | Facility: HOSPITAL | Age: OVER 89
End: 2025-06-26
Payer: MEDICARE

## 2025-06-26 VITALS
DIASTOLIC BLOOD PRESSURE: 93 MMHG | BODY MASS INDEX: 23.14 KG/M2 | HEIGHT: 65 IN | HEART RATE: 85 BPM | TEMPERATURE: 97.3 F | RESPIRATION RATE: 23 BRPM | WEIGHT: 138.89 LBS | SYSTOLIC BLOOD PRESSURE: 141 MMHG | OXYGEN SATURATION: 99 %

## 2025-06-26 VITALS
HEIGHT: 65 IN | RESPIRATION RATE: 18 BRPM | HEART RATE: 107 BPM | WEIGHT: 138.89 LBS | DIASTOLIC BLOOD PRESSURE: 80 MMHG | SYSTOLIC BLOOD PRESSURE: 125 MMHG | OXYGEN SATURATION: 98 % | BODY MASS INDEX: 23.14 KG/M2 | TEMPERATURE: 97.4 F

## 2025-06-26 DIAGNOSIS — I48.0 PAROXYSMAL A-FIB (HCC): ICD-10-CM

## 2025-06-26 DIAGNOSIS — I48.92 ATRIAL FLUTTER BY ELECTROCARDIOGRAM (HCC): ICD-10-CM

## 2025-06-26 LAB
ANION GAP SERPL CALCULATED.3IONS-SCNC: 6 MMOL/L (ref 4–13)
BUN SERPL-MCNC: 22 MG/DL (ref 5–25)
CALCIUM SERPL-MCNC: 8.9 MG/DL (ref 8.4–10.2)
CHLORIDE SERPL-SCNC: 105 MMOL/L (ref 96–108)
CO2 SERPL-SCNC: 28 MMOL/L (ref 21–32)
CREAT SERPL-MCNC: 0.94 MG/DL (ref 0.6–1.3)
ERYTHROCYTE [DISTWIDTH] IN BLOOD BY AUTOMATED COUNT: 14.2 % (ref 11.6–15.1)
GFR SERPL CREATININE-BSD FRML MDRD: 71 ML/MIN/1.73SQ M
GLUCOSE P FAST SERPL-MCNC: 97 MG/DL (ref 65–99)
GLUCOSE SERPL-MCNC: 97 MG/DL (ref 65–140)
HCT VFR BLD AUTO: 40.3 % (ref 36.5–49.3)
HGB BLD-MCNC: 12.9 G/DL (ref 12–17)
INR PPP: 2.81 (ref 0.85–1.19)
MAGNESIUM SERPL-MCNC: 2 MG/DL (ref 1.9–2.7)
MCH RBC QN AUTO: 29.6 PG (ref 26.8–34.3)
MCHC RBC AUTO-ENTMCNC: 32 G/DL (ref 31.4–37.4)
MCV RBC AUTO: 92 FL (ref 82–98)
PLATELET # BLD AUTO: 163 THOUSANDS/UL (ref 149–390)
PMV BLD AUTO: 10.4 FL (ref 8.9–12.7)
POTASSIUM SERPL-SCNC: 4 MMOL/L (ref 3.5–5.3)
PROTHROMBIN TIME: 30.2 SECONDS (ref 12.3–15)
RBC # BLD AUTO: 4.36 MILLION/UL (ref 3.88–5.62)
SODIUM SERPL-SCNC: 139 MMOL/L (ref 135–147)
WBC # BLD AUTO: 6.31 THOUSAND/UL (ref 4.31–10.16)

## 2025-06-26 PROCEDURE — 83735 ASSAY OF MAGNESIUM: CPT | Performed by: INTERNAL MEDICINE

## 2025-06-26 PROCEDURE — 85027 COMPLETE CBC AUTOMATED: CPT | Performed by: INTERNAL MEDICINE

## 2025-06-26 PROCEDURE — 92960 CARDIOVERSION ELECTRIC EXT: CPT

## 2025-06-26 PROCEDURE — 80048 BASIC METABOLIC PNL TOTAL CA: CPT | Performed by: INTERNAL MEDICINE

## 2025-06-26 PROCEDURE — 93005 ELECTROCARDIOGRAM TRACING: CPT

## 2025-06-26 PROCEDURE — 85610 PROTHROMBIN TIME: CPT | Performed by: INTERNAL MEDICINE

## 2025-06-26 RX ORDER — AMIODARONE HYDROCHLORIDE 200 MG/1
200 TABLET ORAL
Status: DISCONTINUED | OUTPATIENT
Start: 2025-06-26 | End: 2025-06-26

## 2025-06-26 RX ORDER — PHENYLEPHRINE HCL IN 0.9% NACL 1 MG/10 ML
SYRINGE (ML) INTRAVENOUS AS NEEDED
Status: DISCONTINUED | OUTPATIENT
Start: 2025-06-26 | End: 2025-06-26

## 2025-06-26 RX ORDER — AMIODARONE HYDROCHLORIDE 200 MG/1
200 TABLET ORAL DAILY
Status: DISCONTINUED | OUTPATIENT
Start: 2025-06-27 | End: 2025-06-27 | Stop reason: HOSPADM

## 2025-06-26 RX ORDER — SODIUM CHLORIDE, SODIUM LACTATE, POTASSIUM CHLORIDE, CALCIUM CHLORIDE 600; 310; 30; 20 MG/100ML; MG/100ML; MG/100ML; MG/100ML
INJECTION, SOLUTION INTRAVENOUS CONTINUOUS PRN
Status: DISCONTINUED | OUTPATIENT
Start: 2025-06-26 | End: 2025-06-26

## 2025-06-26 RX ORDER — FUROSEMIDE 40 MG/1
40 TABLET ORAL DAILY
COMMUNITY
Start: 2025-06-25

## 2025-06-26 RX ORDER — PROPOFOL 10 MG/ML
INJECTION, EMULSION INTRAVENOUS AS NEEDED
Status: DISCONTINUED | OUTPATIENT
Start: 2025-06-26 | End: 2025-06-26

## 2025-06-26 RX ADMIN — PROPOFOL 20 MG: 10 INJECTION, EMULSION INTRAVENOUS at 12:38

## 2025-06-26 RX ADMIN — Medication 150 MCG: at 12:46

## 2025-06-26 RX ADMIN — AMIODARONE HYDROCHLORIDE 200 MG: 200 TABLET ORAL at 13:21

## 2025-06-26 RX ADMIN — PROPOFOL 80 MG: 10 INJECTION, EMULSION INTRAVENOUS at 12:37

## 2025-06-26 RX ADMIN — SODIUM CHLORIDE, SODIUM LACTATE, POTASSIUM CHLORIDE, AND CALCIUM CHLORIDE: .6; .31; .03; .02 INJECTION, SOLUTION INTRAVENOUS at 12:20

## 2025-06-26 RX ADMIN — Medication 150 MCG: at 12:44

## 2025-06-26 NOTE — ANESTHESIA POSTPROCEDURE EVALUATION
Post-Op Assessment Note    CV Status:  Stable  Pain Score: 0    Pain management: adequate       Mental Status:  Sleepy and arousable   Hydration Status:  Euvolemic   PONV Controlled:  Controlled   Airway Patency:  Patent     Post Op Vitals Reviewed: Yes    No anethesia notable event occurred.            Last Filed PACU Vitals:  Vitals Value Taken Time   Temp 97.3    Pulse 78    Bp 127/61    Resp 17    SpO2 100

## 2025-06-26 NOTE — ANESTHESIA PREPROCEDURE EVALUATION
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)   (+) Hyperlipidemia   (+) Hypertension   (+) Murmur, cardiac   (+) Presence of stent in coronary artery in patient with coronary artery disease      MUSCULOSKELETAL   (+) Lumbar pain   (+) Primary osteoarthritis of left knee   (+) Primary osteoarthritis of right knee      NEURO/PSYCH   (+) Anxiety   (+) Chronic pain of both shoulders        Physical Exam    Airway     Mallampati score: II  TM Distance: >3 FB  Neck ROM: full      Cardiovascular  Rhythm: irregular, Rate: normal    Dental   No notable dental hx     Pulmonary   Breath sounds clear to auscultation    Neurological  - normal exam    Other Findings        Anesthesia Plan  ASA Score- 3     Anesthesia Type- IV sedation with anesthesia with ASA Monitors.         Additional Monitors:     Airway Plan: natural airway.           Plan Factors-Exercise tolerance (METS): >4 METS.    Chart reviewed.   Existing labs reviewed. Patient summary reviewed.    Patient is not a current smoker.              Induction- intravenous.    Postoperative Plan- .   Monitoring Plan - Monitoring plan - standard ASA monitoring          Informed Consent- Anesthetic plan and risks discussed with patient.  I personally reviewed this patient with the CRNA. Discussed and agreed on the Anesthesia Plan with the CRNA..      NPO Status:  Vitals Value Taken Time   Date of last liquid 06/25/25 06/26/25 11:03   Time of last liquid 2330 06/26/25 11:03   Date of last solid 06/25/25 06/26/25 11:03   Time of last solid 2330 06/26/25 11:03

## 2025-06-26 NOTE — H&P
[facility]  [address] [place] [address]  [phone]     Fax: [phone]     H and P     Name:Omega Vega  Room: Room/bed info not found  Attending Provider:Gabriel Martínez MD  Admission Date: 6/26/2025 11:19 AM  DOS: 6/26/2025    IMPRESSION  Atrial flutter    PLAN  Cardioversion     Subjective:      Omega Vega is a 89 y.o. male who is evaluated in consultation for atrial flutter.     He denies problems of chest pain, syncope, or bleeding problems. He has shortness of breath, orthopnea, PND, palpitations      Review of Systems  Constitutional: Negative for fever, chills, diaphoresis, appetite change, fatigue and unexpected weight change.  HENT: Negative for postnasal drip and trouble swallowing.  Eyes: Negative for visual disturbance.  Respiratory: Negative for shortness of breath. Negative for cough.  Cardiovascular: Negative for chest pain, palpitations and leg swelling.  Gastrointestinal: Negative for abdominal pain, blood in stool and abdominal distention.  Endocrine: Negative for cold intolerance, heat intolerance and polyuria.  Genitourinary: Negative for decreased urine volume and difficulty urinating.  Musculoskeletal: Negative for myalgias and arthralgias. Negative for back pain.  Skin: Negative for rash.  Neurological: Negative for dizziness, syncope, light-headedness and headaches.  Hematological: Does not bruise/bleed easily.  Psychiatric/Behavioral: Negative for sleep disturbance. Negative for anxiety.     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 ([facility]) 11/27/2019    Squamous cell carcinoma in situ (SCCIS) of scalp 05/15/2019    Murmur, cardiac 10/09/2018    Basal cell carcinoma of scalp 07/31/2017    Cardiomyopathy ([facility]) 03/17/2016    Presence of stent in  "coronary artery in patient with coronary artery disease 03/17/2016    2-vessel coronary artery disease 09/30/2015    History of heart artery stent 09/30/2015    Lumbar pain 09/30/2015    Primary osteoarthritis of right knee 09/17/2014    Primary osteoarthritis of left knee 09/17/2014    Hyperlipidemia 02/13/2013    Hypertension 02/13/2013    Vitamin D deficiency 02/13/2013        Past Medical History[1]     Past Surgical History[2]     Social History[3]     Family History[4]  Family history reviewed and noncontributory     Allergies[5]     Scheduled Medicines       Infusions  No current facility-administered medications for this encounter.       PRN Medicines       No diet orders on file        The following portions of the patient's history were reviewed and updated as appropriate: current medications, past family history, past medical history, past social history, past surgical history, allergies and problem list.              Objective:      /80   Pulse (!) 107   Temp (!) 97.4 °F (36.3 °C)   Resp 18   Ht 5' 5\" (1.651 m)   Wt 63 kg (138 lb 14.2 oz)   SpO2 98%   BMI 23.11 kg/m²      BP Readings from Last 3 Encounters:   06/26/25 125/80   06/26/25 125/80   06/03/25 128/70        No intake or output data in the 24 hours ending 06/26/25 1248     Physical Exam:  General Appearance: Alert, cooperative, in no acute distress   Eyes: Sclerae anicteric.   HEET: Normocephalic, atraumatic.   Neck: Supple, no JVD   Cardiovascular: Normal rate, regular rhythm. No murmurs   No edema, normal pulses   Respiratory: Breathing unlabored. Lungs clear to auscultation   Gastrointestinal: Normoactive bowel sounds. Abdomen soft, nontender to palpation.   Musculoskeletal: No back or joint deformity.   Dermatologic: Skin is warm and dry.   Neurologic: Alert and oriented and neurologic exam is grossly normal.   Psychiatric: Normal affect and mood.            Lab Results:  Lab Results   Component Value Date    WBC 6.31 06/26/2025 " "   WBC 5.7 11/01/2016    HGB 12.9 06/26/2025    HGB 14.0 11/01/2016    HCT 40.3 06/26/2025    HCT 41.4 11/01/2016     06/26/2025     11/01/2016     Lab Results   Component Value Date    CHOL 133 09/18/2015    TRIG 51 03/12/2024    TRIG 51 11/01/2016    HDL 58 03/12/2024    HDL 64 09/18/2015    LDLDIRECT 36 07/14/2020    LDLDIRECT 50 11/01/2016     Lab Results   Component Value Date     11/01/2016    K 4.0 06/26/2025    K 4.8 11/01/2016     06/26/2025     11/01/2016    CO2 28 06/26/2025    CO2 26 11/01/2016    BUN 22 06/26/2025    BUN 20 11/01/2016    CREATININE 0.94 06/26/2025    CREATININE 0.93 11/01/2016    ALKPHOS 91 08/01/2024    ALKPHOS 88 11/01/2016    ALT 17 08/01/2024    ALT 17 11/01/2016    AST 22 08/01/2024    AST 24 11/01/2016    BILIDIR 0.23 (H) 02/24/2023     Lab Results   Component Value Date    INR 2.81 (H) 06/26/2025    INR 1.0 07/13/2016     No results for input(s): \"TROPONIN\" in the last 72 hours.     Diagnostic Results:  ECG :     ECHO:  Chest Xray:     Assessment & Plan:      @[facility]@        Gabriel Martínez MD       [1]   Past Medical History:  Diagnosis Date    Abnormal weight loss     Last Assessed: 2/24/2014    Arthritis     Basal cell carcinoma     Last Assessed: 8/16/2016    Bursitis     left hip pain    Cancer (HCC)     BCA- back, left forearm, skin    Cardiomyopathy (HCC)     Chest discomfort     Last Assessed: 2/23/2016    Chest pain     Last Assessed: 2/13/2013    Coronary artery disease     Ecchymosis     Last Assessed: 7/12/2016    Exertional dyspnea     Last Assessed: 2/23/2016    Hepatic hemangioma     Herniation of lumbar intervertebral disc     Hyperlipidemia     Hypertension     Nonmelanoma skin cancer     Last Assessed: 12/15/2017    Pleural effusion     Bilateral; Last Assessed: 4/26/2016    Pulmonary nodule     multiple nodes; Last Assessesd: 4/26/2016    Shortness of breath     Vitamin D deficiency    [2]   Past Surgical History:  Procedure " Laterality Date    CARPAL TUNNEL RELEASE Bilateral     CORONARY ARTERY BYPASS GRAFT  2011    CORONARY ARTERY BYPASS GRAFT      AL REPAIR FIRST ABDOMINAL WALL HERNIA Bilateral 2017    Procedure: LAPAROSCOPIC INGUINAL HERNIA REPAIR WITH MESH ;  Surgeon: Con Flores MD;  Location: [facility];  Service: General   [3]   Social History  Socioeconomic History    Marital status: /Civil Union   Tobacco Use    Smoking status: Former     Current packs/day: 0.00     Types: Cigarettes     Quit date: 1980     Years since quittin.2    Smokeless tobacco: Never    Tobacco comments:     quit date  per allscripts   Vaping Use    Vaping status: Never Used   Substance and Sexual Activity    Alcohol use: Yes     Alcohol/week: 1.0 standard drink of alcohol     Types: 1 Glasses of wine per week     Comment: daily    Drug use: No    Sexual activity: Not Currently   Social History Narrative    Caffeine use     Social Drivers of Health     Financial Resource Strain: Low Risk  (3/23/2023)    Overall Financial Resource Strain (CARDIA)     Difficulty of Paying Living Expenses: Not hard at all   Food Insecurity: No Food Insecurity (2025)    Nursing - Inadequate Food Risk Classification     Worried About Running Out of Food in the Last Year: Never true     Ran Out of Food in the Last Year: Never true     Ran Out of Food in the Last Year: Never true   Transportation Needs: No Transportation Needs (2025)    Nursing - Transportation Risk Classification     Lack of Transportation: No   Intimate Partner Violence: Unknown (2025)    Nursing IPS     Physically Hurt by Someone: No     Hurt or Threatened by Someone: No   Housing Stability: Unknown (2025)    Nursing: Inadequate Housing Risk Classification     Unable to Pay for Housing in the Last Year: No     Has Housing: No   [4]   Family History  Problem Relation Name Age of Onset    Heart disease Mother      Hypertension Mother     [5]    Allergies  Allergen Reactions    Other Allergic Rhinitis     Environmental

## 2025-06-26 NOTE — DISCHARGE INSTRUCTIONS
-CONTINUE TAKING METOPROLOL AS DIRECTED  BEGIN AMIODARONE 200MG TOMORROW (6/27) MORNING DAILY  You had 1 dose today after the cardioversion    -STOP THE WARFARIN AND BEGIN THE ELIQUIS 2X DAY    -CALL OFFICE [phone] TO MAKE FOLLOW UP APPT FOR NEXT WEEK

## 2025-06-27 LAB
ATRIAL RATE: 234 BPM
ATRIAL RATE: 80 BPM
P AXIS: 95 DEGREES
PR INTERVAL: 234 MS
QRS AXIS: 69 DEGREES
QRS AXIS: 82 DEGREES
QRSD INTERVAL: 74 MS
QRSD INTERVAL: 88 MS
QT INTERVAL: 362 MS
QT INTERVAL: 410 MS
QTC INTERVAL: 450 MS
QTC INTERVAL: 472 MS
T WAVE AXIS: 61 DEGREES
T WAVE AXIS: 84 DEGREES
VENTRICULAR RATE: 80 BPM
VENTRICULAR RATE: 93 BPM

## 2025-06-27 PROCEDURE — 93010 ELECTROCARDIOGRAM REPORT: CPT | Performed by: INTERNAL MEDICINE

## 2025-07-10 ENCOUNTER — APPOINTMENT (OUTPATIENT)
Dept: LAB | Facility: HOSPITAL | Age: OVER 89
End: 2025-07-10
Attending: INTERNAL MEDICINE
Payer: MEDICARE

## 2025-07-10 LAB
ALBUMIN SERPL BCG-MCNC: 3.7 G/DL (ref 3.5–5)
ALP SERPL-CCNC: 136 U/L (ref 34–104)
ALT SERPL W P-5'-P-CCNC: 24 U/L (ref 7–52)
ANION GAP SERPL CALCULATED.3IONS-SCNC: 5 MMOL/L (ref 4–13)
AST SERPL W P-5'-P-CCNC: 30 U/L (ref 13–39)
BASOPHILS # BLD AUTO: 0.02 THOUSANDS/ÂΜL (ref 0–0.1)
BASOPHILS NFR BLD AUTO: 0 % (ref 0–1)
BILIRUB SERPL-MCNC: 1.34 MG/DL (ref 0.2–1)
BUN SERPL-MCNC: 15 MG/DL (ref 5–25)
CALCIUM SERPL-MCNC: 9 MG/DL (ref 8.4–10.2)
CHLORIDE SERPL-SCNC: 104 MMOL/L (ref 96–108)
CHOLEST SERPL-MCNC: 105 MG/DL (ref ?–200)
CO2 SERPL-SCNC: 29 MMOL/L (ref 21–32)
CREAT SERPL-MCNC: 0.86 MG/DL (ref 0.6–1.3)
EOSINOPHIL # BLD AUTO: 0.07 THOUSAND/ÂΜL (ref 0–0.61)
EOSINOPHIL NFR BLD AUTO: 1 % (ref 0–6)
ERYTHROCYTE [DISTWIDTH] IN BLOOD BY AUTOMATED COUNT: 14.7 % (ref 11.6–15.1)
EST. AVERAGE GLUCOSE BLD GHB EST-MCNC: 134 MG/DL
GFR SERPL CREATININE-BSD FRML MDRD: 76 ML/MIN/1.73SQ M
GLUCOSE P FAST SERPL-MCNC: 96 MG/DL (ref 65–99)
HBA1C MFR BLD: 6.3 %
HCT VFR BLD AUTO: 41.5 % (ref 36.5–49.3)
HDLC SERPL-MCNC: 54 MG/DL
HGB BLD-MCNC: 12.9 G/DL (ref 12–17)
IMM GRANULOCYTES # BLD AUTO: 0.02 THOUSAND/UL (ref 0–0.2)
IMM GRANULOCYTES NFR BLD AUTO: 0 % (ref 0–2)
LDLC SERPL CALC-MCNC: 43 MG/DL (ref 0–100)
LYMPHOCYTES # BLD AUTO: 1.39 THOUSANDS/ÂΜL (ref 0.6–4.47)
LYMPHOCYTES NFR BLD AUTO: 23 % (ref 14–44)
MCH RBC QN AUTO: 29.3 PG (ref 26.8–34.3)
MCHC RBC AUTO-ENTMCNC: 31.1 G/DL (ref 31.4–37.4)
MCV RBC AUTO: 94 FL (ref 82–98)
MONOCYTES # BLD AUTO: 0.58 THOUSAND/ÂΜL (ref 0.17–1.22)
MONOCYTES NFR BLD AUTO: 10 % (ref 4–12)
NEUTROPHILS # BLD AUTO: 3.95 THOUSANDS/ÂΜL (ref 1.85–7.62)
NEUTS SEG NFR BLD AUTO: 66 % (ref 43–75)
NRBC BLD AUTO-RTO: 0 /100 WBCS
PLATELET # BLD AUTO: 166 THOUSANDS/UL (ref 149–390)
PMV BLD AUTO: 11 FL (ref 8.9–12.7)
POTASSIUM SERPL-SCNC: 4.7 MMOL/L (ref 3.5–5.3)
PROT SERPL-MCNC: 6.8 G/DL (ref 6.4–8.4)
RBC # BLD AUTO: 4.4 MILLION/UL (ref 3.88–5.62)
SODIUM SERPL-SCNC: 138 MMOL/L (ref 135–147)
T4 FREE SERPL-MCNC: 0.83 NG/DL (ref 0.61–1.12)
TRIGL SERPL-MCNC: 41 MG/DL (ref ?–150)
TSH SERPL DL<=0.05 MIU/L-ACNC: 4.7 UIU/ML (ref 0.45–4.5)
WBC # BLD AUTO: 6.03 THOUSAND/UL (ref 4.31–10.16)

## 2025-07-10 PROCEDURE — 85025 COMPLETE CBC W/AUTO DIFF WBC: CPT

## 2025-07-10 PROCEDURE — 84443 ASSAY THYROID STIM HORMONE: CPT

## 2025-07-10 PROCEDURE — 80061 LIPID PANEL: CPT

## 2025-07-10 PROCEDURE — 83036 HEMOGLOBIN GLYCOSYLATED A1C: CPT

## 2025-07-10 PROCEDURE — 84439 ASSAY OF FREE THYROXINE: CPT

## 2025-07-10 PROCEDURE — 80053 COMPREHEN METABOLIC PANEL: CPT

## 2025-07-30 ENCOUNTER — OFFICE VISIT (OUTPATIENT)
Dept: INTERNAL MEDICINE CLINIC | Facility: CLINIC | Age: OVER 89
End: 2025-07-30
Payer: MEDICARE

## 2025-07-30 VITALS
RESPIRATION RATE: 20 BRPM | SYSTOLIC BLOOD PRESSURE: 120 MMHG | BODY MASS INDEX: 22.09 KG/M2 | DIASTOLIC BLOOD PRESSURE: 54 MMHG | WEIGHT: 132.6 LBS | HEART RATE: 79 BPM | OXYGEN SATURATION: 97 % | HEIGHT: 65 IN

## 2025-07-30 DIAGNOSIS — I48.92 ATRIAL FLUTTER BY ELECTROCARDIOGRAM (HCC): ICD-10-CM

## 2025-07-30 DIAGNOSIS — R73.03 PREDIABETES: ICD-10-CM

## 2025-07-30 DIAGNOSIS — R21 RASH: ICD-10-CM

## 2025-07-30 DIAGNOSIS — I25.10 2-VESSEL CORONARY ARTERY DISEASE: ICD-10-CM

## 2025-07-30 DIAGNOSIS — I10 PRIMARY HYPERTENSION: ICD-10-CM

## 2025-07-30 DIAGNOSIS — Z99.89 DEPENDENCE ON CANE: ICD-10-CM

## 2025-07-30 DIAGNOSIS — E03.8 SUBCLINICAL HYPOTHYROIDISM: ICD-10-CM

## 2025-07-30 DIAGNOSIS — I10 PRIMARY HYPERTENSION: Primary | ICD-10-CM

## 2025-07-30 DIAGNOSIS — F41.9 ANXIETY: ICD-10-CM

## 2025-07-30 DIAGNOSIS — I25.119 ATHEROSCLEROSIS OF NATIVE CORONARY ARTERY OF NATIVE HEART WITH ANGINA PECTORIS (HCC): ICD-10-CM

## 2025-07-30 DIAGNOSIS — R63.4 WEIGHT LOSS: ICD-10-CM

## 2025-07-30 DIAGNOSIS — I87.2 VENOUS INSUFFICIENCY: ICD-10-CM

## 2025-07-30 PROCEDURE — 99214 OFFICE O/P EST MOD 30 MIN: CPT | Performed by: INTERNAL MEDICINE

## 2025-07-30 PROCEDURE — G2211 COMPLEX E/M VISIT ADD ON: HCPCS | Performed by: INTERNAL MEDICINE

## 2025-07-30 RX ORDER — AMIODARONE HYDROCHLORIDE 200 MG/1
1 TABLET ORAL DAILY
COMMUNITY
Start: 2025-06-26 | End: 2025-07-30

## 2025-07-30 RX ORDER — HYDROCORTISONE 25 MG/G
CREAM TOPICAL 4 TIMES DAILY PRN
Qty: 20 G | Refills: 3 | Status: SHIPPED | OUTPATIENT
Start: 2025-07-30

## 2025-07-30 RX ORDER — METOPROLOL SUCCINATE 50 MG/1
TABLET, EXTENDED RELEASE ORAL
COMMUNITY
Start: 2025-07-30 | End: 2025-07-30 | Stop reason: SDUPTHER

## 2025-07-30 RX ORDER — METOPROLOL SUCCINATE 50 MG/1
50 TABLET, EXTENDED RELEASE ORAL DAILY
Qty: 90 TABLET | Refills: 3 | Status: SHIPPED | OUTPATIENT
Start: 2025-07-30

## 2025-07-30 RX ORDER — METOPROLOL SUCCINATE 25 MG/1
50 TABLET, EXTENDED RELEASE ORAL DAILY
Start: 2025-07-30 | End: 2025-07-30

## 2025-07-30 RX ORDER — ASPIRIN 81 MG/1
81 TABLET, CHEWABLE ORAL DAILY
COMMUNITY

## 2025-07-31 RX ORDER — VALSARTAN 80 MG/1
80 TABLET ORAL DAILY
Qty: 90 TABLET | Refills: 1 | Status: SHIPPED | OUTPATIENT
Start: 2025-07-31

## (undated) DEVICE — LIGHT HANDLE COVER CAMERA DISP

## (undated) DEVICE — REM POLYHESIVE ADULT PATIENT RETURN ELECTRODE: Brand: VALLEYLAB

## (undated) DEVICE — 3M™ STERI-STRIP™ REINFORCED ADHESIVE SKIN CLOSURES, R1547, 1/2 IN X 4 IN (12 MM X 100 MM), 6 STRIPS/ENVELOPE: Brand: 3M™ STERI-STRIP™

## (undated) DEVICE — GAUZE SPONGES,16 PLY: Brand: CURITY

## (undated) DEVICE — [HIGH FLOW INSUFFLATOR,  DO NOT USE IF PACKAGE IS DAMAGED,  KEEP DRY,  KEEP AWAY FROM SUNLIGHT,  PROTECT FROM HEAT AND RADIOACTIVE SOURCES.]: Brand: PNEUMOSURE

## (undated) DEVICE — GLOVE SRG BIOGEL ECLIPSE 7.5

## (undated) DEVICE — INTENDED FOR TISSUE SEPARATION, AND OTHER PROCEDURES THAT REQUIRE A SHARP SURGICAL BLADE TO PUNCTURE OR CUT.: Brand: BARD-PARKER SAFETY BLADES SIZE 15, STERILE

## (undated) DEVICE — ENDOPATH XCEL BLADELESS TROCARS WITH STABILITY SLEEVES: Brand: ENDOPATH XCEL

## (undated) DEVICE — CHLORAPREP HI-LITE 26ML ORANGE

## (undated) DEVICE — ENDOPATH XCEL UNIVERSAL TROCAR STABLILITY SLEEVES: Brand: ENDOPATH XCEL

## (undated) DEVICE — SUT VICRYL 4-0 PS-2 27 IN J426H

## (undated) DEVICE — GLOVE INDICATOR PI UNDERGLOVE SZ 7.5 BLUE

## (undated) DEVICE — SUT VICRYL 0 UR-6 27 IN J603H

## (undated) DEVICE — SORBAFIX ABSORBABLE FIXATION SYSTEM 30 ABSORBABLE FASTENERS: Brand: SORBAFIX ABSORBABLE FIXATION SYSTEM

## (undated) DEVICE — ALLENTOWN LAP CHOLE APP PACK: Brand: CARDINAL HEALTH

## (undated) DEVICE — GAUZE SPONGES,8 PLY: Brand: CURITY

## (undated) DEVICE — 3M™ STERI-STRIP™ REINFORCED ADHESIVE SKIN CLOSURES, R1546, 1/4 IN X 4 IN (6 MM X 100 MM), 10 STRIPS/ENVELOPE: Brand: 3M™ STERI-STRIP™

## (undated) DEVICE — TROCAR HERNIA BALLON STRUCTURED 10 MM

## (undated) DEVICE — SCD SEQUENTIAL COMPRESSION COMFORT SLEEVE MEDIUM KNEE LENGTH: Brand: KENDALL SCD

## (undated) DEVICE — TROCAR HERNIA OVAL PERITONEAL DISTENTION BALLOON

## (undated) DEVICE — 3M™ TEGADERM™ TRANSPARENT FILM DRESSING FRAME STYLE, 1624W, 2-3/8 IN X 2-3/4 IN (6 CM X 7 CM), 100/CT 4CT/CASE: Brand: 3M™ TEGADERM™